# Patient Record
Sex: FEMALE | Race: WHITE | Employment: OTHER | URBAN - METROPOLITAN AREA
[De-identification: names, ages, dates, MRNs, and addresses within clinical notes are randomized per-mention and may not be internally consistent; named-entity substitution may affect disease eponyms.]

---

## 2017-01-04 ENCOUNTER — TELEPHONE (OUTPATIENT)
Dept: CARDIOLOGY CLINIC | Age: 67
End: 2017-01-04

## 2017-01-19 ENCOUNTER — HOSPITAL ENCOUNTER (OUTPATIENT)
Dept: SURGERY | Age: 67
Discharge: HOME | End: 2017-01-19
Attending: INTERNAL MEDICINE | Admitting: INTERNAL MEDICINE

## 2017-01-19 VITALS
HEIGHT: 60 IN | OXYGEN SATURATION: 100 % | HEART RATE: 97 BPM | RESPIRATION RATE: 18 BRPM | SYSTOLIC BLOOD PRESSURE: 140 MMHG | DIASTOLIC BLOOD PRESSURE: 81 MMHG | TEMPERATURE: 97 F | BODY MASS INDEX: 43.39 KG/M2 | WEIGHT: 221 LBS

## 2017-01-19 LAB
GLUCOSE BLD-MCNC: 174 MG/DL (ref 70–99)
PERFORMED ON: ABNORMAL

## 2017-01-19 RX ORDER — LIDOCAINE HYDROCHLORIDE 10 MG/ML
0.1 INJECTION, SOLUTION INFILTRATION; PERINEURAL ONCE
Status: DISCONTINUED | OUTPATIENT
Start: 2017-01-19 | End: 2017-01-20 | Stop reason: HOSPADM

## 2017-01-19 RX ORDER — SODIUM CHLORIDE, SODIUM LACTATE, POTASSIUM CHLORIDE, CALCIUM CHLORIDE 600; 310; 30; 20 MG/100ML; MG/100ML; MG/100ML; MG/100ML
INJECTION, SOLUTION INTRAVENOUS CONTINUOUS
Status: DISCONTINUED | OUTPATIENT
Start: 2017-01-19 | End: 2017-01-20 | Stop reason: HOSPADM

## 2017-01-19 RX ADMIN — SODIUM CHLORIDE, SODIUM LACTATE, POTASSIUM CHLORIDE, CALCIUM CHLORIDE: 600; 310; 30; 20 INJECTION, SOLUTION INTRAVENOUS at 09:50

## 2017-01-19 ASSESSMENT — PAIN SCALES - GENERAL: PAINLEVEL_OUTOF10: 0

## 2017-01-19 ASSESSMENT — PAIN - FUNCTIONAL ASSESSMENT: PAIN_FUNCTIONAL_ASSESSMENT: 0-10

## 2017-01-29 PROBLEM — J44.1 COPD EXACERBATION (HCC): Status: ACTIVE | Noted: 2017-01-29

## 2017-01-29 PROBLEM — F32.A DEPRESSION: Chronic | Status: ACTIVE | Noted: 2017-01-29

## 2017-05-11 PROBLEM — K21.9 GERD (GASTROESOPHAGEAL REFLUX DISEASE): Chronic | Status: ACTIVE | Noted: 2017-05-11

## 2017-05-11 PROBLEM — R73.9 ACUTE HYPERGLYCEMIA: Status: ACTIVE | Noted: 2017-05-11

## 2017-05-11 PROBLEM — D64.9 CHRONIC ANEMIA: Chronic | Status: ACTIVE | Noted: 2017-05-11

## 2017-05-11 PROBLEM — R79.89 ELEVATED LFTS: Status: ACTIVE | Noted: 2017-05-11

## 2017-11-14 ENCOUNTER — TELEPHONE (OUTPATIENT)
Dept: CARDIOLOGY CLINIC | Age: 67
End: 2017-11-14

## 2018-02-21 RX ORDER — METOPROLOL TARTRATE 50 MG/1
TABLET, FILM COATED ORAL
Qty: 60 TABLET | Refills: 4 | OUTPATIENT
Start: 2018-02-21

## 2018-02-28 NOTE — TELEPHONE ENCOUNTER
Pt's  called to get pt's medication refilled. Informed  of these encounters. He stated he will inform the pt to call back to get scheduled to see RPS for a yearly followup. Pt last saw NPBB Dec 2016 and was informed to see RPS next.  When pt gets scheduled send message back to medical staff informing of pt's new appt and request to get her metoprolol filled to last until that next appt,

## 2018-03-01 RX ORDER — METOPROLOL TARTRATE 50 MG/1
50 TABLET, FILM COATED ORAL 2 TIMES DAILY
Qty: 60 TABLET | Refills: 0 | Status: SHIPPED | OUTPATIENT
Start: 2018-03-01 | End: 2018-03-07 | Stop reason: SDUPTHER

## 2018-03-07 ENCOUNTER — OFFICE VISIT (OUTPATIENT)
Dept: CARDIOLOGY CLINIC | Age: 68
End: 2018-03-07

## 2018-03-07 VITALS
DIASTOLIC BLOOD PRESSURE: 80 MMHG | HEIGHT: 60 IN | WEIGHT: 209 LBS | SYSTOLIC BLOOD PRESSURE: 122 MMHG | HEART RATE: 88 BPM | BODY MASS INDEX: 41.03 KG/M2

## 2018-03-07 DIAGNOSIS — I48.0 PAF (PAROXYSMAL ATRIAL FIBRILLATION) (HCC): Primary | Chronic | ICD-10-CM

## 2018-03-07 PROCEDURE — 3014F SCREEN MAMMO DOC REV: CPT | Performed by: INTERNAL MEDICINE

## 2018-03-07 PROCEDURE — 1036F TOBACCO NON-USER: CPT | Performed by: INTERNAL MEDICINE

## 2018-03-07 PROCEDURE — 4040F PNEUMOC VAC/ADMIN/RCVD: CPT | Performed by: INTERNAL MEDICINE

## 2018-03-07 PROCEDURE — G8417 CALC BMI ABV UP PARAM F/U: HCPCS | Performed by: INTERNAL MEDICINE

## 2018-03-07 PROCEDURE — 3017F COLORECTAL CA SCREEN DOC REV: CPT | Performed by: INTERNAL MEDICINE

## 2018-03-07 PROCEDURE — G8427 DOCREV CUR MEDS BY ELIG CLIN: HCPCS | Performed by: INTERNAL MEDICINE

## 2018-03-07 PROCEDURE — 99213 OFFICE O/P EST LOW 20 MIN: CPT | Performed by: INTERNAL MEDICINE

## 2018-03-07 PROCEDURE — 1123F ACP DISCUSS/DSCN MKR DOCD: CPT | Performed by: INTERNAL MEDICINE

## 2018-03-07 PROCEDURE — 1090F PRES/ABSN URINE INCON ASSESS: CPT | Performed by: INTERNAL MEDICINE

## 2018-03-07 PROCEDURE — G8400 PT W/DXA NO RESULTS DOC: HCPCS | Performed by: INTERNAL MEDICINE

## 2018-03-07 PROCEDURE — G8598 ASA/ANTIPLAT THER USED: HCPCS | Performed by: INTERNAL MEDICINE

## 2018-03-07 PROCEDURE — G8484 FLU IMMUNIZE NO ADMIN: HCPCS | Performed by: INTERNAL MEDICINE

## 2018-03-07 PROCEDURE — 93000 ELECTROCARDIOGRAM COMPLETE: CPT | Performed by: INTERNAL MEDICINE

## 2018-03-07 RX ORDER — METOPROLOL TARTRATE 50 MG/1
50 TABLET, FILM COATED ORAL 2 TIMES DAILY
Qty: 60 TABLET | Refills: 11 | Status: SHIPPED | OUTPATIENT
Start: 2018-03-07 | End: 2019-04-03 | Stop reason: SDUPTHER

## 2018-03-07 NOTE — LETTER
Depression; Diabetes mellitus ([facility] [address] GERD (gastroesophageal reflux disease); HCAP (healthcare-associated pneumonia); Hypertension; Paroxysmal atrial fibrillation ([facility] [address] Sepsis ([facility] TIA (transient ischemic attack); and Unspecified cerebral artery occlusion with cerebral infarction. Past Surgical History:   has a past surgical history that includes Hysterectomy; Cholecystectomy; Appendectomy; cyst incision and drainage (1986); Colonoscopy (01/19/2017); and Upper gastrointestinal endoscopy (01/19/2017). Home Medications:    Prior to Admission medications    Medication Sig Start Date End Date Taking? Authorizing Provider   metoprolol tartrate (LOPRESSOR) 50 MG tablet Take 1 tablet by mouth 2 times daily 3/1/18  Yes Conchis Moseley CNP   guaiFENesin (MUCINEX) 600 MG extended release tablet Take 1 tablet by mouth 2 times daily 5/14/17  Yes Sharri Sorenson CNP   empagliflozin (JARDIANCE) 25 MG tablet Take 25 mg by mouth daily   Yes Historical Provider, MD   ASPIRIN LOW DOSE 81 MG EC tablet TAKE 1 TABLET BY MOUTH ONE TIME A DAY  10/17/16  Yes Santos Mendoza MD   Rivaroxaban (XARELTO PO) Take 20 mg by mouth daily Does not know dose    Yes Historical Provider, MD   albuterol (PROVENTIL) (2.5 MG/3ML) 0.083% nebulizer solution Take 3 mLs by nebulization every 4 hours as needed for Wheezing or Shortness of Breath 2/14/16  Yes Eden Jacobson MD   metFORMIN ER (GLUCOPHAGE-XR) 500 MG XR tablet Take 2,000 mg by mouth Daily with supper   Yes Historical Provider, MD   omeprazole (PRILOSEC) 40 MG capsule Take 40 mg by mouth daily   Yes Historical Provider, MD   atorvastatin (LIPITOR) 20 MG tablet TAKE 1 TABLET BY MOUTH nightly AT BEDTIME 6/19/15  Yes Historical Provider, MD   NONFORMULARY Patient uses C Pap at night. Yes Historical Provider, MD   venlafaxine (EFFEXOR-XR) 75 MG XR capsule Take 75 mg by mouth daily.      Yes Historical Provider, MD   fluticasone-salmeterol (ADVAIR DISKUS) 500-50 MCG/DOSE diskus inhaler Inhale 1 puff into the lungs every 12 hours. Yes Historical Provider, MD   albuterol (PROVENTIL HFA;VENTOLIN HFA) 108 (90 BASE) MCG/ACT inhaler Inhale 2 puffs into the lungs every 6 hours as needed. Yes Historical Provider, MD       Allergies:  Acyclovir; Ace inhibitors; Amoxicillin; Cephalosporins; Oxycodone-acetaminophen; Penicillins; and Percocet [oxycodone-acetaminophen]    Social History:   reports that she has never smoked. She has never used smokeless tobacco. She reports that she does not drink alcohol or use drugs. Family History: family history includes Cancer in her mother. REVIEW OF SYSTEMS:    · Constitutional: there has been no unanticipated weight loss. There's been no change in energy level, sleep pattern, or activity level. · Eyes: No visual changes or diplopia. No scleral icterus. · ENT: No Headaches, hearing loss or vertigo. No mouth sores or sore throat. · Cardiovascular: No chest pain, No dyspnea on exertion, No palpitations or No loss of consciousness. No cough, hemoptysis, No pleuritic pain, or phlebitis. · Respiratory: No cough or wheezing, no sputum production. No hematemesis. · Gastrointestinal: No abdominal pain, appetite loss, blood in stools. No change in bowel or bladder habits. · Genitourinary: No dysuria, trouble voiding, or hematuria. · Musculoskeletal:  No gait disturbance, No weakness or joint complaints. · Integumentary: No rash or pruritis. · Neurological: No headache, diplopia, change in muscle strength, numbness or tingling. No change in gait, balance, coordination, mood, affect, memory, mentation, behavior. · Psychiatric: No anxiety, or depression. · Endocrine: No temperature intolerance. No excessive thirst, fluid intake, or urination. No tremor. · Hematologic/Lymphatic: No abnormal bruising or bleeding, blood clots or swollen lymph nodes. · Allergic/Immunologic: No nasal congestion or hives.     PHYSICAL EXAM:    Physical Examination: 1. Tobacco Cessation Counseling:   N/A  2. Retake of BP if >140/90:    N/A  3. Documentation to PCP/referring for new patient:  Sent to PCP at close of office visit  4. CAD patient on anti-platelet:   N/A    5. CAD patient on STATIN therapy:  N/A    6. Patient with Paroxysmal atrial fibrillation on anticoagulation:  Yes (Xarelto)        All questions and concerns were addressed to the patient/family. Alternatives to my treatment were discussed. The note was completed using EMR. Every effort was made to ensure accuracy; however, inadvertent computerized transcription errors may be present. Discussed with patient and nursing. MADY Tomlinson F.A.C.C. Electrophysiology  [facility]. [address]. [address]. [facility], [address]  Phone: [phone]  Fax: [phone]   3/7/2018  1:24 PM                       If you have questions, please do not hesitate to call me. I look forward to following Shiva along with you.     Sincerely,        Lucila Abbott MD

## 2018-03-12 NOTE — COMMUNICATION BODY
S2.  · Jugular venous pulsation Normal  · The carotid upstroke is normal in amplitude and contour without delay or bruit  · Peripheral pulses are symmetrical and full   Abdomen:  · No masses or tenderness  · Bowel sounds present  Extremities:  ·  No Cyanosis or Clubbing  ·  Lower extremity edema: No  ·  Skin: Warm and dry  Neurological:  · Alert and oriented. · Moves all extremities well  · No abnormalities of mood, affect, memory, mentation, or behavior are noted    DATA:    ECG:  SR 88      CARDIOLOGY LABS:   CBC: No results for input(s): WBC, HGB, HCT, PLT in the last 72 hours. BMP: No results for input(s): NA, K, CO2, BUN, CREATININE, LABGLOM, GLUCOSE in the last 72 hours. PT/INR: No results for input(s): PROTIME, INR in the last 72 hours. APTT:No results for input(s): APTT in the last 72 hours. FASTING LIPID PANEL:  Lab Results   Component Value Date    HDL 36 10/17/2015    LDLCALC 31 10/17/2015    TRIG 124 10/17/2015     LIVER PROFILE:No results for input(s): AST, ALT, ALB in the last 72 hours. IMPRESSION:    Patient Active Problem List   Diagnosis    HTN (hypertension)    Chest pain    DM2 (diabetes mellitus, type 2), diet controlled    Paroxysmal a-fib ([address]    Obesity       RECOMMENDATIONS:  1. Stable from a cardiac standpoint. 2. Continue current medications as prescribed. 3. Follow up with Judy Beasley CNP in 1 year. QUALITY MEASURES  1. Tobacco Cessation Counseling:   N/A  2. Retake of BP if >140/90:    N/A  3. Documentation to PCP/referring for new patient:  Sent to PCP at close of office visit  4. CAD patient on anti-platelet:   N/A    5. CAD patient on STATIN therapy:  N/A    6. Patient with Paroxysmal atrial fibrillation on anticoagulation:  Yes (Xarelto)        All questions and concerns were addressed to the patient/family. Alternatives to my treatment were discussed. The note was completed using EMR.  Every effort was made to ensure accuracy; however, inadvertent

## 2018-03-27 PROBLEM — Z99.89 OSA ON CPAP: Chronic | Status: ACTIVE | Noted: 2018-03-27

## 2018-03-27 PROBLEM — R74.8 ELEVATED ALKALINE PHOSPHATASE LEVEL: Chronic | Status: ACTIVE | Noted: 2017-05-11

## 2018-03-27 PROBLEM — G47.33 OSA ON CPAP: Chronic | Status: ACTIVE | Noted: 2018-03-27

## 2018-03-27 PROBLEM — G93.40 ACUTE ENCEPHALOPATHY: Status: ACTIVE | Noted: 2018-03-27

## 2018-03-28 PROBLEM — J44.9 CHRONIC OBSTRUCTIVE PULMONARY DISEASE (HCC): Status: ACTIVE | Noted: 2017-01-29

## 2018-03-28 PROBLEM — R91.8 PULMONARY MASS: Status: ACTIVE | Noted: 2018-03-28

## 2018-03-28 PROBLEM — Z86.73 HISTORY OF CARDIOEMBOLIC CEREBROVASCULAR ACCIDENT (CVA): Chronic | Status: ACTIVE | Noted: 2018-03-28

## 2018-03-28 PROBLEM — Z86.73 HISTORY OF CARDIOEMBOLIC CEREBROVASCULAR ACCIDENT (CVA): Chronic | Status: RESOLVED | Noted: 2018-03-28 | Resolved: 2018-03-28

## 2018-03-28 PROBLEM — E87.3 RESPIRATORY ALKALOSIS: Status: ACTIVE | Noted: 2018-03-28

## 2018-04-09 PROBLEM — E44.1 MILD MALNUTRITION (HCC): Chronic | Status: ACTIVE | Noted: 2018-04-09

## 2018-04-09 PROBLEM — E11.65 UNCONTROLLED TYPE 2 DIABETES MELLITUS WITH HYPERGLYCEMIA (HCC): Status: ACTIVE | Noted: 2018-04-09

## 2018-04-09 PROBLEM — I49.3 PVC (PREMATURE VENTRICULAR CONTRACTION): Status: ACTIVE | Noted: 2018-04-09

## 2018-04-09 PROBLEM — E66.9 OBESITY (BMI 30-39.9): Status: ACTIVE | Noted: 2018-04-09

## 2018-05-07 ENCOUNTER — TELEPHONE (OUTPATIENT)
Dept: PULMONOLOGY | Age: 68
End: 2018-05-07

## 2018-05-31 ENCOUNTER — OFFICE VISIT (OUTPATIENT)
Dept: NEUROLOGY | Age: 68
End: 2018-05-31

## 2018-05-31 VITALS
DIASTOLIC BLOOD PRESSURE: 66 MMHG | HEIGHT: 65 IN | HEART RATE: 59 BPM | OXYGEN SATURATION: 96 % | WEIGHT: 198 LBS | BODY MASS INDEX: 32.99 KG/M2 | SYSTOLIC BLOOD PRESSURE: 119 MMHG

## 2018-05-31 DIAGNOSIS — I61.1 NONTRAUMATIC CORTICAL HEMORRHAGE OF LEFT CEREBRAL HEMISPHERE (HCC): Primary | ICD-10-CM

## 2018-05-31 DIAGNOSIS — F34.1 DYSTHYMIA: ICD-10-CM

## 2018-05-31 DIAGNOSIS — I25.10 CAD IN NATIVE ARTERY: ICD-10-CM

## 2018-05-31 DIAGNOSIS — I10 HTN (HYPERTENSION), BENIGN: ICD-10-CM

## 2018-05-31 DIAGNOSIS — I48.0 PAF (PAROXYSMAL ATRIAL FIBRILLATION) (HCC): ICD-10-CM

## 2018-05-31 DIAGNOSIS — E78.5 DYSLIPIDEMIA: ICD-10-CM

## 2018-05-31 PROCEDURE — 1090F PRES/ABSN URINE INCON ASSESS: CPT | Performed by: PSYCHIATRY & NEUROLOGY

## 2018-05-31 PROCEDURE — G8417 CALC BMI ABV UP PARAM F/U: HCPCS | Performed by: PSYCHIATRY & NEUROLOGY

## 2018-05-31 PROCEDURE — 3017F COLORECTAL CA SCREEN DOC REV: CPT | Performed by: PSYCHIATRY & NEUROLOGY

## 2018-05-31 PROCEDURE — G8598 ASA/ANTIPLAT THER USED: HCPCS | Performed by: PSYCHIATRY & NEUROLOGY

## 2018-05-31 PROCEDURE — G8400 PT W/DXA NO RESULTS DOC: HCPCS | Performed by: PSYCHIATRY & NEUROLOGY

## 2018-05-31 PROCEDURE — 99214 OFFICE O/P EST MOD 30 MIN: CPT | Performed by: PSYCHIATRY & NEUROLOGY

## 2018-05-31 PROCEDURE — 1123F ACP DISCUSS/DSCN MKR DOCD: CPT | Performed by: PSYCHIATRY & NEUROLOGY

## 2018-05-31 PROCEDURE — 1036F TOBACCO NON-USER: CPT | Performed by: PSYCHIATRY & NEUROLOGY

## 2018-05-31 PROCEDURE — G8427 DOCREV CUR MEDS BY ELIG CLIN: HCPCS | Performed by: PSYCHIATRY & NEUROLOGY

## 2018-05-31 PROCEDURE — 4040F PNEUMOC VAC/ADMIN/RCVD: CPT | Performed by: PSYCHIATRY & NEUROLOGY

## 2018-05-31 RX ORDER — FLUTICASONE PROPIONATE AND SALMETEROL 232; 14 UG/1; UG/1
1 POWDER, METERED RESPIRATORY (INHALATION)
COMMUNITY
Start: 2018-01-23 | End: 2018-05-31

## 2018-05-31 RX ORDER — ESOMEPRAZOLE MAGNESIUM 40 MG/1
40 CAPSULE, DELAYED RELEASE ORAL
Status: ON HOLD | COMMUNITY
End: 2018-10-16 | Stop reason: SDUPTHER

## 2018-05-31 RX ORDER — DILTIAZEM HYDROCHLORIDE 60 MG/1
60 TABLET, FILM COATED ORAL
COMMUNITY
Start: 2018-05-14 | End: 2021-03-11 | Stop reason: ALTCHOICE

## 2018-06-07 ENCOUNTER — HOSPITAL ENCOUNTER (OUTPATIENT)
Dept: CT IMAGING | Age: 68
Discharge: HOME | End: 2018-06-07
Attending: PSYCHIATRY & NEUROLOGY | Admitting: PSYCHIATRY & NEUROLOGY

## 2018-06-07 DIAGNOSIS — I61.1 NONTRAUMATIC CORTICAL HEMORRHAGE OF LEFT CEREBRAL HEMISPHERE (HCC): ICD-10-CM

## 2018-06-07 DIAGNOSIS — I61.1: ICD-10-CM

## 2018-09-06 ENCOUNTER — OFFICE VISIT (OUTPATIENT)
Dept: NEUROLOGY | Age: 68
End: 2018-09-06

## 2018-09-06 VITALS
HEIGHT: 65 IN | SYSTOLIC BLOOD PRESSURE: 118 MMHG | HEART RATE: 67 BPM | BODY MASS INDEX: 32.99 KG/M2 | DIASTOLIC BLOOD PRESSURE: 70 MMHG | OXYGEN SATURATION: 96 % | WEIGHT: 198 LBS

## 2018-09-06 DIAGNOSIS — F34.1 DYSTHYMIA: ICD-10-CM

## 2018-09-06 DIAGNOSIS — E78.5 DYSLIPIDEMIA: ICD-10-CM

## 2018-09-06 DIAGNOSIS — I48.0 PAF (PAROXYSMAL ATRIAL FIBRILLATION) (HCC): ICD-10-CM

## 2018-09-06 DIAGNOSIS — I61.1 NONTRAUMATIC CORTICAL HEMORRHAGE OF LEFT CEREBRAL HEMISPHERE (HCC): Primary | ICD-10-CM

## 2018-09-06 DIAGNOSIS — I10 HTN (HYPERTENSION), BENIGN: ICD-10-CM

## 2018-09-06 PROCEDURE — 1101F PT FALLS ASSESS-DOCD LE1/YR: CPT | Performed by: PSYCHIATRY & NEUROLOGY

## 2018-09-06 PROCEDURE — 99214 OFFICE O/P EST MOD 30 MIN: CPT | Performed by: PSYCHIATRY & NEUROLOGY

## 2018-09-06 PROCEDURE — 1036F TOBACCO NON-USER: CPT | Performed by: PSYCHIATRY & NEUROLOGY

## 2018-09-06 PROCEDURE — G8427 DOCREV CUR MEDS BY ELIG CLIN: HCPCS | Performed by: PSYCHIATRY & NEUROLOGY

## 2018-09-06 PROCEDURE — 1090F PRES/ABSN URINE INCON ASSESS: CPT | Performed by: PSYCHIATRY & NEUROLOGY

## 2018-09-06 PROCEDURE — G8417 CALC BMI ABV UP PARAM F/U: HCPCS | Performed by: PSYCHIATRY & NEUROLOGY

## 2018-09-06 PROCEDURE — G8400 PT W/DXA NO RESULTS DOC: HCPCS | Performed by: PSYCHIATRY & NEUROLOGY

## 2018-09-06 PROCEDURE — 1123F ACP DISCUSS/DSCN MKR DOCD: CPT | Performed by: PSYCHIATRY & NEUROLOGY

## 2018-09-06 PROCEDURE — 3017F COLORECTAL CA SCREEN DOC REV: CPT | Performed by: PSYCHIATRY & NEUROLOGY

## 2018-09-06 PROCEDURE — 4040F PNEUMOC VAC/ADMIN/RCVD: CPT | Performed by: PSYCHIATRY & NEUROLOGY

## 2018-09-06 PROCEDURE — G8598 ASA/ANTIPLAT THER USED: HCPCS | Performed by: PSYCHIATRY & NEUROLOGY

## 2018-09-06 NOTE — PROGRESS NOTES
The patient came today for follow up regarding: Test results and recent hemorrhagic CVA. The patient came today for another follow up. Since her last visit, she had a repeat CT of the head which showed right hemispheric encephalomalacia but no residual hemorrhage. She is on aspirin daily. She denies any new symptoms today except for the same right visual field deficit. No recent falling or injury. No weakness or numbness or tingling today. Blood pressure is controlled on medications. She is on statin daily. No sleep disturbance or insomnia or parasomnia. No significant headaches or double vision or blurred vision. Other review of system was unremarkable. Past Medical History:   Diagnosis Date    Arthritis     Asthma     Depression     Diabetes mellitus (HCC)     GERD (gastroesophageal reflux disease)     HCAP (healthcare-associated pneumonia)     Hypertension     Mycobacterium gordonae infection      5/7/18 + afb called from [address] pending  ( from [address] 3/29/18)    [facility]ox[facility]mal [facility] ([facility] [address]     Sepsis ([facility] [address]     TIA (transient ischemic attack)     Unspecified cerebral artery occlusion with cerebral infarction      Prior to Visit Medications    Medication Sig Taking?  Authorizing Provider   esomeprazole (NEXIUM) 40 MG delayed release capsule Take 40 mg by mouth Yes Historical Provider, MD   diltiazem (CARDIZEM) 60 MG tablet Take 60 mg by mouth Yes Historical Provider, MD   ferrous sulfate 325 (65 Fe) MG tablet Take 325 mg by mouth daily (with breakfast) Yes Historical Provider, MD   folic acid (FOLVITE) 1 MG tablet Take 1 mg by mouth daily Yes Historical Provider, MD   montelukast (SINGULAIR) 10 MG tablet Take 10 mg by mouth nightly Yes Historical Provider, MD   albuterol (PROVENTIL) (2.5 MG/3ML) 0.083% nebulizer solution Take 3 mLs by nebulization every 4 hours as needed for Wheezing or Shortness of Breath Yes Rick Manuel, DO   metoprolol tartrate (LOPRESSOR) 50

## 2018-10-13 ENCOUNTER — APPOINTMENT (OUTPATIENT)
Dept: CT IMAGING | Age: 68
DRG: 871 | End: 2018-10-13
Payer: MEDICARE

## 2018-10-13 ENCOUNTER — HOSPITAL ENCOUNTER (EMERGENCY)
Age: 68
Discharge: HOME | DRG: 871 | End: 2018-10-13
Attending: EMERGENCY MEDICINE
Payer: MEDICARE

## 2018-10-13 VITALS
TEMPERATURE: 98 F | WEIGHT: 198 LBS | RESPIRATION RATE: 16 BRPM | BODY MASS INDEX: 32.99 KG/M2 | OXYGEN SATURATION: 99 % | HEIGHT: 65 IN | SYSTOLIC BLOOD PRESSURE: 149 MMHG | HEART RATE: 61 BPM | DIASTOLIC BLOOD PRESSURE: 68 MMHG

## 2018-10-13 DIAGNOSIS — R20.0 NUMBNESS OF LEFT HAND: ICD-10-CM

## 2018-10-13 DIAGNOSIS — R51.9 ACUTE NONINTRACTABLE HEADACHE, UNSPECIFIED HEADACHE TYPE: Primary | ICD-10-CM

## 2018-10-13 LAB
A/G RATIO: 1.2 (ref 1.1–2.2)
ALBUMIN SERPL-MCNC: 4.1 G/DL (ref 3.4–5)
ALP BLD-CCNC: 127 U/L (ref 40–129)
ALT SERPL-CCNC: 16 U/L (ref 10–40)
ANION GAP SERPL CALCULATED.3IONS-SCNC: 10 MMOL/L (ref 3–16)
AST SERPL-CCNC: 20 U/L (ref 15–37)
BASOPHILS ABSOLUTE: 0.1 K/UL (ref 0–0.2)
BASOPHILS RELATIVE PERCENT: 0.8 %
BILIRUB SERPL-MCNC: 0.4 MG/DL (ref 0–1)
BUN BLDV-MCNC: 17 MG/DL (ref 7–20)
CALCIUM SERPL-MCNC: 9.2 MG/DL (ref 8.3–10.6)
CHLORIDE BLD-SCNC: 102 MMOL/L (ref 99–110)
CO2: 26 MMOL/L (ref 21–32)
CREAT SERPL-MCNC: 0.8 MG/DL (ref 0.6–1.2)
EOSINOPHILS ABSOLUTE: 0.2 K/UL (ref 0–0.6)
EOSINOPHILS RELATIVE PERCENT: 2.2 %
GFR AFRICAN AMERICAN: >60
GFR NON-AFRICAN AMERICAN: >60
GLOBULIN: 3.5 G/DL
GLUCOSE BLD-MCNC: 144 MG/DL (ref 70–99)
HCT VFR BLD CALC: 34.7 % (ref 36–48)
HEMOGLOBIN: 11.7 G/DL (ref 12–16)
LYMPHOCYTES ABSOLUTE: 1.7 K/UL (ref 1–5.1)
LYMPHOCYTES RELATIVE PERCENT: 23 %
MCH RBC QN AUTO: 30.1 PG (ref 26–34)
MCHC RBC AUTO-ENTMCNC: 33.8 G/DL (ref 31–36)
MCV RBC AUTO: 89 FL (ref 80–100)
MONOCYTES ABSOLUTE: 0.5 K/UL (ref 0–1.3)
MONOCYTES RELATIVE PERCENT: 7.1 %
NEUTROPHILS ABSOLUTE: 5.1 K/UL (ref 1.7–7.7)
NEUTROPHILS RELATIVE PERCENT: 66.9 %
PDW BLD-RTO: 14.9 % (ref 12.4–15.4)
PLATELET # BLD: 92 K/UL (ref 135–450)
PLATELET SLIDE REVIEW: ABNORMAL
PMV BLD AUTO: 10.5 FL (ref 5–10.5)
POTASSIUM REFLEX MAGNESIUM: 4.1 MMOL/L (ref 3.5–5.1)
RBC # BLD: 3.9 M/UL (ref 4–5.2)
SLIDE REVIEW: ABNORMAL
SODIUM BLD-SCNC: 138 MMOL/L (ref 136–145)
TOTAL PROTEIN: 7.6 G/DL (ref 6.4–8.2)
WBC # BLD: 7.6 K/UL (ref 4–11)

## 2018-10-13 PROCEDURE — 80053 COMPREHEN METABOLIC PANEL: CPT

## 2018-10-13 PROCEDURE — 96361 HYDRATE IV INFUSION ADD-ON: CPT

## 2018-10-13 PROCEDURE — 85025 COMPLETE CBC W/AUTO DIFF WBC: CPT

## 2018-10-13 PROCEDURE — 70450 CT HEAD/BRAIN W/O DYE: CPT

## 2018-10-13 PROCEDURE — 96374 THER/PROPH/DIAG INJ IV PUSH: CPT

## 2018-10-13 PROCEDURE — 6360000002 HC RX W HCPCS: Performed by: EMERGENCY MEDICINE

## 2018-10-13 PROCEDURE — 2580000003 HC RX 258: Performed by: EMERGENCY MEDICINE

## 2018-10-13 PROCEDURE — 93005 ELECTROCARDIOGRAM TRACING: CPT | Performed by: EMERGENCY MEDICINE

## 2018-10-13 PROCEDURE — 93010 ELECTROCARDIOGRAM REPORT: CPT | Performed by: INTERNAL MEDICINE

## 2018-10-13 PROCEDURE — 6370000000 HC RX 637 (ALT 250 FOR IP): Performed by: EMERGENCY MEDICINE

## 2018-10-13 PROCEDURE — 99284 EMERGENCY DEPT VISIT MOD MDM: CPT

## 2018-10-13 RX ORDER — BUTALBITAL, ACETAMINOPHEN AND CAFFEINE 50; 325; 40 MG/1; MG/1; MG/1
1 TABLET ORAL ONCE
Status: COMPLETED | OUTPATIENT
Start: 2018-10-13 | End: 2018-10-13

## 2018-10-13 RX ORDER — 0.9 % SODIUM CHLORIDE 0.9 %
1000 INTRAVENOUS SOLUTION INTRAVENOUS ONCE
Status: COMPLETED | OUTPATIENT
Start: 2018-10-13 | End: 2018-10-13

## 2018-10-13 RX ORDER — ONDANSETRON 2 MG/ML
4 INJECTION INTRAMUSCULAR; INTRAVENOUS ONCE
Status: COMPLETED | OUTPATIENT
Start: 2018-10-13 | End: 2018-10-13

## 2018-10-13 RX ORDER — BUTALBITAL, ACETAMINOPHEN AND CAFFEINE 300; 40; 50 MG/1; MG/1; MG/1
1 CAPSULE ORAL EVERY 6 HOURS PRN
Qty: 12 CAPSULE | Refills: 0 | Status: SHIPPED | OUTPATIENT
Start: 2018-10-13 | End: 2018-10-14

## 2018-10-13 RX ADMIN — SODIUM CHLORIDE 1000 ML: 9 INJECTION, SOLUTION INTRAVENOUS at 17:26

## 2018-10-13 RX ADMIN — BUTALBITAL, ACETAMINOPHEN, AND CAFFEINE 1 TABLET: 50; 325; 40 TABLET ORAL at 17:26

## 2018-10-13 RX ADMIN — ONDANSETRON HYDROCHLORIDE 4 MG: 2 INJECTION, SOLUTION INTRAMUSCULAR; INTRAVENOUS at 17:26

## 2018-10-13 ASSESSMENT — PAIN DESCRIPTION - LOCATION: LOCATION: HEAD

## 2018-10-13 ASSESSMENT — PAIN SCALES - GENERAL
PAINLEVEL_OUTOF10: 2
PAINLEVEL_OUTOF10: 5
PAINLEVEL_OUTOF10: 5

## 2018-10-13 ASSESSMENT — PAIN DESCRIPTION - PAIN TYPE: TYPE: ACUTE PAIN

## 2018-10-13 NOTE — ED NOTES
Pt ambulatory to bathroom with family as standby assist. Gait steady. Sts pain is the same as arrival, no change in condition. Denies any further needs, call light within reach, will continue to monitor.         Carlos Alberto Garcia RN  10/13/18 [id number]

## 2018-10-13 NOTE — ED PROVIDER NOTES
breast ca       Exam  ED Triage Vitals [10/13/18 1540]   BP Temp Temp src Pulse Resp SpO2 Height Weight   (!) 161/56 -- -- 71 18 97 % 5' 5\" (1.651 m) 198 lb (89.8 kg)   Physical Exam   Constitutional: She is oriented to person, place, and time. She appears well-developed and well-nourished. No distress. HENT:   Head: Normocephalic and atraumatic. Eyes: Pupils are equal, round, and reactive to light. Conjunctivae and lids are normal. Right eye exhibits no discharge. Left eye exhibits no discharge. No scleral icterus. Neck: Neck supple. No tracheal deviation present. Cardiovascular: Normal rate, regular rhythm, normal heart sounds and intact distal pulses. No murmur heard. Pulmonary/Chest: Effort normal and breath sounds normal. No stridor. No respiratory distress. She has no wheezes. Abdominal: Soft. She exhibits no distension. There is no tenderness. There is no rebound and no guarding. Musculoskeletal: She exhibits no edema or deformity. Neurological: She is alert and oriented to person, place, and time. She has normal strength. She displays no tremor. A cranial nerve deficit (decreased peripheral vision on the right - baseline perpatient) and sensory deficit (patient reports decreased sensation to light touch on the left index finger, ulnar side of ring finger, and pinky finger. sensation normal in the left thumb and middle finger, and radial side of the ring finger.) is present. She exhibits normal muscle tone. No facial droop, no slurred speech. Skin: Skin is warm and dry. No rash noted. She is not diaphoretic. No cyanosis. No pallor. Psychiatric: She has a normal mood and affect. Her speech is normal.   Nursing note and vitals reviewed. MDM/ED Course  EKG  The Ekg interpreted by me in the absence of a cardiologist shows. normal sinus rhythm with a rate of 68  Axis is   Normal  QTc is  normal  Intervals and Durations are unremarkable.       No specific ST-T wave changes

## 2018-10-14 ENCOUNTER — APPOINTMENT (OUTPATIENT)
Dept: GENERAL RADIOLOGY | Age: 68
DRG: 871 | End: 2018-10-14
Payer: MEDICARE

## 2018-10-14 ENCOUNTER — HOSPITAL ENCOUNTER (INPATIENT)
Age: 68
LOS: 8 days | Discharge: HOME | DRG: 871 | End: 2018-10-22
Attending: EMERGENCY MEDICINE | Admitting: INTERNAL MEDICINE
Payer: MEDICARE

## 2018-10-14 DIAGNOSIS — R53.1 GENERAL WEAKNESS: ICD-10-CM

## 2018-10-14 DIAGNOSIS — A41.9 SEPSIS, DUE TO UNSPECIFIED ORGANISM: ICD-10-CM

## 2018-10-14 DIAGNOSIS — J18.9 PNEUMONIA DUE TO ORGANISM: Primary | ICD-10-CM

## 2018-10-14 DIAGNOSIS — R11.2 NAUSEA AND VOMITING, INTRACTABILITY OF VOMITING NOT SPECIFIED, UNSPECIFIED VOMITING TYPE: ICD-10-CM

## 2018-10-14 LAB
A/G RATIO: 1 (ref 1.1–2.2)
ALBUMIN SERPL-MCNC: 3.8 G/DL (ref 3.4–5)
ALP BLD-CCNC: 133 U/L (ref 40–129)
ALT SERPL-CCNC: 34 U/L (ref 10–40)
ANION GAP SERPL CALCULATED.3IONS-SCNC: 12 MMOL/L (ref 3–16)
AST SERPL-CCNC: 43 U/L (ref 15–37)
BACTERIA: ABNORMAL /HPF
BANDED NEUTROPHILS RELATIVE PERCENT: 13 % (ref 0–7)
BASE EXCESS ARTERIAL: -1.1 MMOL/L (ref -3–3)
BASOPHILS ABSOLUTE: 0 K/UL (ref 0–0.2)
BASOPHILS RELATIVE PERCENT: 0 %
BILIRUB SERPL-MCNC: 1.6 MG/DL (ref 0–1)
BILIRUBIN URINE: NEGATIVE
BLOOD, URINE: ABNORMAL
BUN BLDV-MCNC: 15 MG/DL (ref 7–20)
CALCIUM SERPL-MCNC: 8.8 MG/DL (ref 8.3–10.6)
CARBOXYHEMOGLOBIN ARTERIAL: 0.8 % (ref 0–1.5)
CHLORIDE BLD-SCNC: 98 MMOL/L (ref 99–110)
CLARITY: CLEAR
CO2: 24 MMOL/L (ref 21–32)
COLOR: YELLOW
CREAT SERPL-MCNC: 0.8 MG/DL (ref 0.6–1.2)
EOSINOPHILS ABSOLUTE: 0 K/UL (ref 0–0.6)
EOSINOPHILS RELATIVE PERCENT: 0 %
EPITHELIAL CELLS, UA: ABNORMAL /HPF
GFR AFRICAN AMERICAN: >60
GFR NON-AFRICAN AMERICAN: >60
GLOBULIN: 3.7 G/DL
GLUCOSE BLD-MCNC: 154 MG/DL (ref 70–99)
GLUCOSE BLD-MCNC: 164 MG/DL (ref 70–99)
GLUCOSE URINE: NEGATIVE MG/DL
HCO3 ARTERIAL: 23 MMOL/L (ref 21–29)
HCT VFR BLD CALC: 34.4 % (ref 36–48)
HEMOGLOBIN, ART, EXTENDED: 11.4 G/DL (ref 12–16)
HEMOGLOBIN: 11.2 G/DL (ref 12–16)
KETONES, URINE: NEGATIVE MG/DL
LACTIC ACID: 2.1 MMOL/L (ref 0.4–2)
LEUKOCYTE ESTERASE, URINE: NEGATIVE
LYMPHOCYTES ABSOLUTE: 2.2 K/UL (ref 1–5.1)
LYMPHOCYTES RELATIVE PERCENT: 10 %
MCH RBC QN AUTO: 29.8 PG (ref 26–34)
MCHC RBC AUTO-ENTMCNC: 32.6 G/DL (ref 31–36)
MCV RBC AUTO: 91.2 FL (ref 80–100)
METAMYELOCYTES RELATIVE PERCENT: 1 %
METHEMOGLOBIN ARTERIAL: 0.4 %
MICROSCOPIC EXAMINATION: YES
MONOCYTES ABSOLUTE: 1.1 K/UL (ref 0–1.3)
MONOCYTES RELATIVE PERCENT: 5 %
NEUTROPHILS ABSOLUTE: 19 K/UL (ref 1.7–7.7)
NEUTROPHILS RELATIVE PERCENT: 71 %
NITRITE, URINE: NEGATIVE
O2 CONTENT ARTERIAL: 15 ML/DL
O2 SAT, ARTERIAL: 92.5 %
O2 THERAPY: ABNORMAL
PCO2 ARTERIAL: 35.9 MMHG (ref 35–45)
PDW BLD-RTO: 14.9 % (ref 12.4–15.4)
PERFORMED ON: ABNORMAL
PH ARTERIAL: 7.42 (ref 7.35–7.45)
PH UA: 6
PLATELET # BLD: 69 K/UL (ref 135–450)
PLATELET SLIDE REVIEW: ABNORMAL
PMV BLD AUTO: 11.2 FL (ref 5–10.5)
PO2 ARTERIAL: 61.8 MMHG (ref 75–108)
POTASSIUM SERPL-SCNC: 4.3 MMOL/L (ref 3.5–5.1)
PROTEIN UA: 30 MG/DL
RBC # BLD: 3.77 M/UL (ref 4–5.2)
RBC UA: ABNORMAL /HPF (ref 0–2)
REASON FOR REJECTION: NORMAL
REJECTED TEST: NORMAL
RENAL EPITHELIAL, UA: ABNORMAL /HPF
SLIDE REVIEW: ABNORMAL
SODIUM BLD-SCNC: 134 MMOL/L (ref 136–145)
SPECIFIC GRAVITY UA: 1.02
SPHEROCYTES: ABNORMAL
STOMATOCYTES: ABNORMAL
TCO2 ARTERIAL: 24.1 MMOL/L
TOTAL PROTEIN: 7.5 G/DL (ref 6.4–8.2)
URINE REFLEX TO CULTURE: ABNORMAL
URINE TYPE: ABNORMAL
UROBILINOGEN, URINE: 0.2 E.U./DL
WBC # BLD: 22.3 K/UL (ref 4–11)
WBC UA: ABNORMAL /HPF (ref 0–5)

## 2018-10-14 PROCEDURE — 81001 URINALYSIS AUTO W/SCOPE: CPT

## 2018-10-14 PROCEDURE — 2060000000 HC ICU INTERMEDIATE R&B

## 2018-10-14 PROCEDURE — 94640 AIRWAY INHALATION TREATMENT: CPT

## 2018-10-14 PROCEDURE — 2580000003 HC RX 258: Performed by: INTERNAL MEDICINE

## 2018-10-14 PROCEDURE — 36415 COLL VENOUS BLD VENIPUNCTURE: CPT

## 2018-10-14 PROCEDURE — 94150 VITAL CAPACITY TEST: CPT

## 2018-10-14 PROCEDURE — 6370000000 HC RX 637 (ALT 250 FOR IP): Performed by: NURSE PRACTITIONER

## 2018-10-14 PROCEDURE — 2580000003 HC RX 258: Performed by: NURSE PRACTITIONER

## 2018-10-14 PROCEDURE — 94664 DEMO&/EVAL PT USE INHALER: CPT

## 2018-10-14 PROCEDURE — 80053 COMPREHEN METABOLIC PANEL: CPT

## 2018-10-14 PROCEDURE — 87040 BLOOD CULTURE FOR BACTERIA: CPT

## 2018-10-14 PROCEDURE — 83036 HEMOGLOBIN GLYCOSYLATED A1C: CPT

## 2018-10-14 PROCEDURE — 85025 COMPLETE CBC W/AUTO DIFF WBC: CPT

## 2018-10-14 PROCEDURE — 6360000002 HC RX W HCPCS: Performed by: NURSE PRACTITIONER

## 2018-10-14 PROCEDURE — 71046 X-RAY EXAM CHEST 2 VIEWS: CPT

## 2018-10-14 PROCEDURE — 96375 TX/PRO/DX INJ NEW DRUG ADDON: CPT

## 2018-10-14 PROCEDURE — 96366 THER/PROPH/DIAG IV INF ADDON: CPT

## 2018-10-14 PROCEDURE — 99285 EMERGENCY DEPT VISIT HI MDM: CPT

## 2018-10-14 PROCEDURE — 93005 ELECTROCARDIOGRAM TRACING: CPT | Performed by: EMERGENCY MEDICINE

## 2018-10-14 PROCEDURE — 82803 BLOOD GASES ANY COMBINATION: CPT

## 2018-10-14 PROCEDURE — 83605 ASSAY OF LACTIC ACID: CPT

## 2018-10-14 PROCEDURE — 96365 THER/PROPH/DIAG IV INF INIT: CPT

## 2018-10-14 RX ORDER — ACETAMINOPHEN 325 MG/1
650 TABLET ORAL EVERY 4 HOURS PRN
Status: DISCONTINUED | OUTPATIENT
Start: 2018-10-14 | End: 2018-10-22 | Stop reason: HOSPADM

## 2018-10-14 RX ORDER — LABETALOL HYDROCHLORIDE 5 MG/ML
5 INJECTION, SOLUTION INTRAVENOUS EVERY 4 HOURS PRN
Status: DISCONTINUED | OUTPATIENT
Start: 2018-10-14 | End: 2018-10-22 | Stop reason: HOSPADM

## 2018-10-14 RX ORDER — KETOROLAC TROMETHAMINE 30 MG/ML
15 INJECTION, SOLUTION INTRAMUSCULAR; INTRAVENOUS ONCE
Status: COMPLETED | OUTPATIENT
Start: 2018-10-14 | End: 2018-10-14

## 2018-10-14 RX ORDER — DEXTROSE MONOHYDRATE 25 G/50ML
12.5 INJECTION, SOLUTION INTRAVENOUS PRN
Status: DISCONTINUED | OUTPATIENT
Start: 2018-10-14 | End: 2018-10-22 | Stop reason: HOSPADM

## 2018-10-14 RX ORDER — ONDANSETRON 2 MG/ML
4 INJECTION INTRAMUSCULAR; INTRAVENOUS ONCE
Status: COMPLETED | OUTPATIENT
Start: 2018-10-14 | End: 2018-10-14

## 2018-10-14 RX ORDER — IPRATROPIUM BROMIDE AND ALBUTEROL SULFATE 2.5; .5 MG/3ML; MG/3ML
1 SOLUTION RESPIRATORY (INHALATION) ONCE
Status: COMPLETED | OUTPATIENT
Start: 2018-10-14 | End: 2018-10-14

## 2018-10-14 RX ORDER — ACETAMINOPHEN 500 MG
1000 TABLET ORAL ONCE
Status: COMPLETED | OUTPATIENT
Start: 2018-10-14 | End: 2018-10-14

## 2018-10-14 RX ORDER — LEVOFLOXACIN 5 MG/ML
750 INJECTION, SOLUTION INTRAVENOUS ONCE
Status: COMPLETED | OUTPATIENT
Start: 2018-10-14 | End: 2018-10-14

## 2018-10-14 RX ORDER — 0.9 % SODIUM CHLORIDE 0.9 %
1721 INTRAVENOUS SOLUTION INTRAVENOUS ONCE
Status: COMPLETED | OUTPATIENT
Start: 2018-10-14 | End: 2018-10-14

## 2018-10-14 RX ORDER — SODIUM CHLORIDE 0.9 % (FLUSH) 0.9 %
10 SYRINGE (ML) INJECTION EVERY 12 HOURS SCHEDULED
Status: DISCONTINUED | OUTPATIENT
Start: 2018-10-14 | End: 2018-10-22 | Stop reason: HOSPADM

## 2018-10-14 RX ORDER — SODIUM CHLORIDE 0.9 % (FLUSH) 0.9 %
10 SYRINGE (ML) INJECTION PRN
Status: DISCONTINUED | OUTPATIENT
Start: 2018-10-14 | End: 2018-10-14 | Stop reason: SDUPTHER

## 2018-10-14 RX ORDER — SODIUM CHLORIDE 0.9 % (FLUSH) 0.9 %
10 SYRINGE (ML) INJECTION PRN
Status: DISCONTINUED | OUTPATIENT
Start: 2018-10-14 | End: 2018-10-22 | Stop reason: HOSPADM

## 2018-10-14 RX ORDER — 0.9 % SODIUM CHLORIDE 0.9 %
1000 INTRAVENOUS SOLUTION INTRAVENOUS ONCE
Status: COMPLETED | OUTPATIENT
Start: 2018-10-14 | End: 2018-10-14

## 2018-10-14 RX ORDER — DEXTROSE MONOHYDRATE 50 MG/ML
100 INJECTION, SOLUTION INTRAVENOUS PRN
Status: DISCONTINUED | OUTPATIENT
Start: 2018-10-14 | End: 2018-10-22 | Stop reason: HOSPADM

## 2018-10-14 RX ORDER — IPRATROPIUM BROMIDE AND ALBUTEROL SULFATE 2.5; .5 MG/3ML; MG/3ML
1 SOLUTION RESPIRATORY (INHALATION) EVERY 4 HOURS PRN
Status: DISCONTINUED | OUTPATIENT
Start: 2018-10-14 | End: 2018-10-14

## 2018-10-14 RX ORDER — IPRATROPIUM BROMIDE AND ALBUTEROL SULFATE 2.5; .5 MG/3ML; MG/3ML
1 SOLUTION RESPIRATORY (INHALATION)
Status: DISCONTINUED | OUTPATIENT
Start: 2018-10-14 | End: 2018-10-15

## 2018-10-14 RX ORDER — SODIUM CHLORIDE 9 MG/ML
INJECTION, SOLUTION INTRAVENOUS CONTINUOUS
Status: DISCONTINUED | OUTPATIENT
Start: 2018-10-14 | End: 2018-10-15

## 2018-10-14 RX ORDER — SODIUM CHLORIDE 0.9 % (FLUSH) 0.9 %
10 SYRINGE (ML) INJECTION EVERY 12 HOURS SCHEDULED
Status: DISCONTINUED | OUTPATIENT
Start: 2018-10-14 | End: 2018-10-14 | Stop reason: SDUPTHER

## 2018-10-14 RX ORDER — LEVOFLOXACIN 5 MG/ML
500 INJECTION, SOLUTION INTRAVENOUS EVERY 24 HOURS
Status: DISCONTINUED | OUTPATIENT
Start: 2018-10-15 | End: 2018-10-20

## 2018-10-14 RX ORDER — NICOTINE POLACRILEX 4 MG
15 LOZENGE BUCCAL PRN
Status: DISCONTINUED | OUTPATIENT
Start: 2018-10-14 | End: 2018-10-22 | Stop reason: HOSPADM

## 2018-10-14 RX ADMIN — SODIUM CHLORIDE: 9 INJECTION, SOLUTION INTRAVENOUS at 18:36

## 2018-10-14 RX ADMIN — SODIUM CHLORIDE 1721 ML: 9 INJECTION, SOLUTION INTRAVENOUS at 17:28

## 2018-10-14 RX ADMIN — KETOROLAC TROMETHAMINE 15 MG: 30 INJECTION, SOLUTION INTRAMUSCULAR at 18:36

## 2018-10-14 RX ADMIN — IPRATROPIUM BROMIDE AND ALBUTEROL SULFATE 1 AMPULE: .5; 3 SOLUTION RESPIRATORY (INHALATION) at 17:47

## 2018-10-14 RX ADMIN — ONDANSETRON 4 MG: 2 INJECTION, SOLUTION INTRAMUSCULAR; INTRAVENOUS at 17:04

## 2018-10-14 RX ADMIN — LEVOFLOXACIN 750 MG: 5 INJECTION, SOLUTION INTRAVENOUS at 17:27

## 2018-10-14 RX ADMIN — IPRATROPIUM BROMIDE AND ALBUTEROL SULFATE 1 AMPULE: .5; 3 SOLUTION RESPIRATORY (INHALATION) at 21:16

## 2018-10-14 RX ADMIN — ACETAMINOPHEN 1000 MG: 500 TABLET ORAL at 17:04

## 2018-10-14 RX ADMIN — SODIUM CHLORIDE 1000 ML: 9 INJECTION, SOLUTION INTRAVENOUS at 17:04

## 2018-10-14 ASSESSMENT — PAIN SCALES - GENERAL
PAINLEVEL_OUTOF10: 6
PAINLEVEL_OUTOF10: 7
PAINLEVEL_OUTOF10: 0
PAINLEVEL_OUTOF10: 10
PAINLEVEL_OUTOF10: 0

## 2018-10-14 ASSESSMENT — PAIN DESCRIPTION - LOCATION: LOCATION: HEAD

## 2018-10-14 ASSESSMENT — PAIN DESCRIPTION - PAIN TYPE: TYPE: ACUTE PAIN

## 2018-10-14 NOTE — ED NOTES
Patient assisted to bedside commode. Patient back in bed with call light within reach. Denies needs at this time.        Marivel Schneider RN  10/14/18 [id number]

## 2018-10-15 LAB
EKG ATRIAL RATE: 68 BPM
EKG DIAGNOSIS: NORMAL
EKG P AXIS: 50 DEGREES
EKG P-R INTERVAL: 204 MS
EKG Q-T INTERVAL: 432 MS
EKG QRS DURATION: 74 MS
EKG QTC CALCULATION (BAZETT): 459 MS
EKG R AXIS: -3 DEGREES
EKG T AXIS: 57 DEGREES
EKG VENTRICULAR RATE: 68 BPM
ESTIMATED AVERAGE GLUCOSE: 145.6 MG/DL
GLUCOSE BLD-MCNC: 122 MG/DL (ref 70–99)
GLUCOSE BLD-MCNC: 137 MG/DL (ref 70–99)
GLUCOSE BLD-MCNC: 142 MG/DL (ref 70–99)
GLUCOSE BLD-MCNC: 188 MG/DL (ref 70–99)
HBA1C MFR BLD: 6.7 %
LACTIC ACID, SEPSIS: 3.1 MMOL/L (ref 0.4–1.9)
LACTIC ACID: 1.6 MMOL/L (ref 0.4–2)
PERFORMED ON: ABNORMAL

## 2018-10-15 PROCEDURE — 36415 COLL VENOUS BLD VENIPUNCTURE: CPT

## 2018-10-15 PROCEDURE — 93010 ELECTROCARDIOGRAM REPORT: CPT | Performed by: INTERNAL MEDICINE

## 2018-10-15 PROCEDURE — 2580000003 HC RX 258: Performed by: NURSE PRACTITIONER

## 2018-10-15 PROCEDURE — 6360000002 HC RX W HCPCS: Performed by: INTERNAL MEDICINE

## 2018-10-15 PROCEDURE — G8987 SELF CARE CURRENT STATUS: HCPCS

## 2018-10-15 PROCEDURE — 94761 N-INVAS EAR/PLS OXIMETRY MLT: CPT

## 2018-10-15 PROCEDURE — 94664 DEMO&/EVAL PT USE INHALER: CPT

## 2018-10-15 PROCEDURE — 97116 GAIT TRAINING THERAPY: CPT

## 2018-10-15 PROCEDURE — 90686 IIV4 VACC NO PRSV 0.5 ML IM: CPT | Performed by: INTERNAL MEDICINE

## 2018-10-15 PROCEDURE — 83605 ASSAY OF LACTIC ACID: CPT

## 2018-10-15 PROCEDURE — 97535 SELF CARE MNGMENT TRAINING: CPT

## 2018-10-15 PROCEDURE — G8978 MOBILITY CURRENT STATUS: HCPCS

## 2018-10-15 PROCEDURE — 1200000000 HC SEMI PRIVATE

## 2018-10-15 PROCEDURE — 2580000003 HC RX 258: Performed by: INTERNAL MEDICINE

## 2018-10-15 PROCEDURE — 6370000000 HC RX 637 (ALT 250 FOR IP): Performed by: NURSE PRACTITIONER

## 2018-10-15 PROCEDURE — 97162 PT EVAL MOD COMPLEX 30 MIN: CPT

## 2018-10-15 PROCEDURE — 97530 THERAPEUTIC ACTIVITIES: CPT

## 2018-10-15 PROCEDURE — 6370000000 HC RX 637 (ALT 250 FOR IP): Performed by: INTERNAL MEDICINE

## 2018-10-15 PROCEDURE — G0008 ADMIN INFLUENZA VIRUS VAC: HCPCS | Performed by: INTERNAL MEDICINE

## 2018-10-15 PROCEDURE — 94640 AIRWAY INHALATION TREATMENT: CPT

## 2018-10-15 PROCEDURE — G8979 MOBILITY GOAL STATUS: HCPCS

## 2018-10-15 PROCEDURE — 94667 MNPJ CHEST WALL 1ST: CPT

## 2018-10-15 PROCEDURE — 97165 OT EVAL LOW COMPLEX 30 MIN: CPT

## 2018-10-15 PROCEDURE — 97110 THERAPEUTIC EXERCISES: CPT

## 2018-10-15 PROCEDURE — G8988 SELF CARE GOAL STATUS: HCPCS

## 2018-10-15 RX ORDER — FOLIC ACID 1 MG/1
1 TABLET ORAL DAILY
Status: DISCONTINUED | OUTPATIENT
Start: 2018-10-15 | End: 2018-10-22 | Stop reason: HOSPADM

## 2018-10-15 RX ORDER — PANTOPRAZOLE SODIUM 40 MG/1
40 TABLET, DELAYED RELEASE ORAL
Status: DISCONTINUED | OUTPATIENT
Start: 2018-10-15 | End: 2018-10-22 | Stop reason: HOSPADM

## 2018-10-15 RX ORDER — ASPIRIN 81 MG/1
81 TABLET ORAL DAILY
Status: DISCONTINUED | OUTPATIENT
Start: 2018-10-15 | End: 2018-10-22 | Stop reason: HOSPADM

## 2018-10-15 RX ORDER — VENLAFAXINE HYDROCHLORIDE 37.5 MG/1
75 CAPSULE, EXTENDED RELEASE ORAL DAILY
Status: DISCONTINUED | OUTPATIENT
Start: 2018-10-15 | End: 2018-10-22 | Stop reason: HOSPADM

## 2018-10-15 RX ORDER — ATORVASTATIN CALCIUM 10 MG/1
20 TABLET, FILM COATED ORAL NIGHTLY
Status: DISCONTINUED | OUTPATIENT
Start: 2018-10-15 | End: 2018-10-17

## 2018-10-15 RX ORDER — IPRATROPIUM BROMIDE AND ALBUTEROL SULFATE 2.5; .5 MG/3ML; MG/3ML
1 SOLUTION RESPIRATORY (INHALATION) EVERY 4 HOURS
Status: DISCONTINUED | OUTPATIENT
Start: 2018-10-15 | End: 2018-10-22 | Stop reason: HOSPADM

## 2018-10-15 RX ORDER — FERROUS SULFATE 325(65) MG
325 TABLET ORAL
Status: DISCONTINUED | OUTPATIENT
Start: 2018-10-15 | End: 2018-10-22 | Stop reason: HOSPADM

## 2018-10-15 RX ORDER — MONTELUKAST SODIUM 10 MG/1
10 TABLET ORAL NIGHTLY
Status: DISCONTINUED | OUTPATIENT
Start: 2018-10-15 | End: 2018-10-22 | Stop reason: HOSPADM

## 2018-10-15 RX ORDER — TRAMADOL HYDROCHLORIDE 50 MG/1
50 TABLET ORAL EVERY 6 HOURS PRN
Status: DISCONTINUED | OUTPATIENT
Start: 2018-10-15 | End: 2018-10-22 | Stop reason: HOSPADM

## 2018-10-15 RX ORDER — METOPROLOL TARTRATE 50 MG/1
50 TABLET, FILM COATED ORAL 2 TIMES DAILY
Status: DISCONTINUED | OUTPATIENT
Start: 2018-10-15 | End: 2018-10-22 | Stop reason: HOSPADM

## 2018-10-15 RX ADMIN — MONTELUKAST SODIUM 10 MG: 10 TABLET, COATED ORAL at 22:10

## 2018-10-15 RX ADMIN — METOPROLOL TARTRATE 50 MG: 50 TABLET ORAL at 22:10

## 2018-10-15 RX ADMIN — INFLUENZA A VIRUS A/MICHIGAN/45/2015 X-275 (H1N1) ANTIGEN (FORMALDEHYDE INACTIVATED), INFLUENZA A VIRUS A/SINGAPORE/INFIMH-16-0019/2016 IVR-186 (H3N2) ANTIGEN (FORMALDEHYDE INACTIVATED), INFLUENZA B VIRUS B/PHUKET/3073/2013 ANTIGEN (FORMALDEHYDE INACTIVATED), AND INFLUENZA B VIRUS B/MARYLAND/15/2016 BX-69A ANTIGEN (FORMALDEHYDE INACTIVATED) 0.5 ML: 15; 15; 15; 15 INJECTION, SUSPENSION INTRAMUSCULAR at 08:54

## 2018-10-15 RX ADMIN — ATORVASTATIN CALCIUM 20 MG: 10 TABLET, FILM COATED ORAL at 22:10

## 2018-10-15 RX ADMIN — Medication 10 ML: at 22:10

## 2018-10-15 RX ADMIN — SODIUM CHLORIDE: 9 INJECTION, SOLUTION INTRAVENOUS at 06:46

## 2018-10-15 RX ADMIN — ACETAMINOPHEN 650 MG: 325 TABLET ORAL at 13:32

## 2018-10-15 RX ADMIN — IPRATROPIUM BROMIDE AND ALBUTEROL SULFATE 1 AMPULE: .5; 3 SOLUTION RESPIRATORY (INHALATION) at 08:17

## 2018-10-15 RX ADMIN — LEVOFLOXACIN 500 MG: 5 INJECTION, SOLUTION INTRAVENOUS at 17:08

## 2018-10-15 RX ADMIN — IPRATROPIUM BROMIDE AND ALBUTEROL SULFATE 1 AMPULE: .5; 3 SOLUTION RESPIRATORY (INHALATION) at 12:07

## 2018-10-15 RX ADMIN — IPRATROPIUM BROMIDE AND ALBUTEROL SULFATE 1 AMPULE: .5; 3 SOLUTION RESPIRATORY (INHALATION) at 23:45

## 2018-10-15 RX ADMIN — METOPROLOL TARTRATE 50 MG: 50 TABLET ORAL at 08:55

## 2018-10-15 RX ADMIN — FOLIC ACID 1 MG: 1 TABLET ORAL at 08:55

## 2018-10-15 RX ADMIN — INSULIN LISPRO 1 UNITS: 100 INJECTION, SOLUTION INTRAVENOUS; SUBCUTANEOUS at 13:32

## 2018-10-15 RX ADMIN — FERROUS SULFATE TAB 325 MG (65 MG ELEMENTAL FE) 325 MG: 325 (65 FE) TAB at 08:55

## 2018-10-15 RX ADMIN — PANTOPRAZOLE SODIUM 40 MG: 40 TABLET, DELAYED RELEASE ORAL at 06:45

## 2018-10-15 RX ADMIN — VENLAFAXINE HYDROCHLORIDE 75 MG: 37.5 CAPSULE, EXTENDED RELEASE ORAL at 08:55

## 2018-10-15 RX ADMIN — IPRATROPIUM BROMIDE AND ALBUTEROL SULFATE 1 AMPULE: .5; 3 SOLUTION RESPIRATORY (INHALATION) at 20:20

## 2018-10-15 RX ADMIN — IPRATROPIUM BROMIDE AND ALBUTEROL SULFATE 1 AMPULE: .5; 3 SOLUTION RESPIRATORY (INHALATION) at 16:40

## 2018-10-15 RX ADMIN — TRAMADOL HYDROCHLORIDE 50 MG: 50 TABLET, FILM COATED ORAL at 22:35

## 2018-10-15 RX ADMIN — Medication 10 ML: at 08:55

## 2018-10-15 RX ADMIN — ASPIRIN 81 MG: 81 TABLET, COATED ORAL at 08:55

## 2018-10-15 ASSESSMENT — PAIN DESCRIPTION - DESCRIPTORS
DESCRIPTORS: SHARP;SHOOTING
DESCRIPTORS: ACHING
DESCRIPTORS: ACHING

## 2018-10-15 ASSESSMENT — PAIN SCALES - GENERAL
PAINLEVEL_OUTOF10: 10
PAINLEVEL_OUTOF10: 9
PAINLEVEL_OUTOF10: 8
PAINLEVEL_OUTOF10: 0
PAINLEVEL_OUTOF10: 9
PAINLEVEL_OUTOF10: 5

## 2018-10-15 ASSESSMENT — PAIN DESCRIPTION - ONSET
ONSET: ON-GOING
ONSET: SUDDEN

## 2018-10-15 ASSESSMENT — PAIN DESCRIPTION - PROGRESSION
CLINICAL_PROGRESSION: GRADUALLY WORSENING
CLINICAL_PROGRESSION: GRADUALLY IMPROVING

## 2018-10-15 ASSESSMENT — PAIN DESCRIPTION - ORIENTATION
ORIENTATION: ANTERIOR
ORIENTATION: LEFT;RIGHT

## 2018-10-15 ASSESSMENT — PAIN DESCRIPTION - LOCATION
LOCATION: CHEST
LOCATION: RIB CAGE
LOCATION: CHEST

## 2018-10-15 ASSESSMENT — PAIN DESCRIPTION - FREQUENCY
FREQUENCY: INTERMITTENT
FREQUENCY: CONTINUOUS

## 2018-10-15 ASSESSMENT — PAIN DESCRIPTION - PAIN TYPE
TYPE: ACUTE PAIN

## 2018-10-15 NOTE — PROGRESS NOTES
Pt has transfer orders and room assignment to [facility]. S/w jossue villatoro. She will come to unit/bedside for report.

## 2018-10-15 NOTE — PROGRESS NOTES
Screening  Patient Currently in Pain: Yes  Pain Assessment  Pain Assessment: 0-10  Pain Level: 5  Pain Type: Acute pain  Pain Location: Chest  Pain Orientation: Anterior  Pain Descriptors: Aching  Pain Frequency: Continuous  Pain Onset: On-going  Clinical Progression: Gradually improving  Pain Intervention(s): Repositioned; Ambulation/Increased activity; Emotional support  Response to Pain Intervention: Patient Satisfied    Pre Treatment Pain Screening  Intervention List: Patient able to continue with treatment    Orientation  Orientation  Overall Orientation Status: Within Normal Limits    Social/Functional History  Social/Functional History  Lives With: Spouse  Type of Home: House  Home Layout: Two level, Able to Live on Main level with bedroom/bathroom  Home Access: Stairs to enter with rails (3)  Entrance Stairs - Rails: Right  Bathroom Shower/Tub: Tub/Shower unit  Bathroom Toilet: Standard  Bathroom Equipment: Grab bars in shower, 3-in-1 commode, Shower chair  Home Equipment: Rolling walker, Cane  Receives Help From: Outpatient therapy  ADL Assistance: Independent  Homemaking Responsibilities: Yes  Meal Prep Responsibility: No (spouse)  Laundry Responsibility: Primary  Cleaning Responsibility: No (spouse)  Ambulation Assistance: Independent (without device in the house,  will stand by when walking outside)  Transfer Assistance: Independent  Active : No  Patient's  Info: spouse drives as patient has R filed cut and neglect from previous CVA  Occupation: Retired  Type of occupation:  at [employer]: gayatri to read  Objective     RLE AROM: WFL  LLE AROM : WFL  Strength : BLEs grossly 3-/5 hip abd and ext, 3/5 hip flexion, knee ext 4+, DF 4+        Bed mobility  Comment: not observed, pt up in chair upon approach and on exit  Transfers  Sit to Stand: Contact guard assistance  Stand to sit: Contact guard assistance  Ambulation  Surface: level tile  Device: Rolling

## 2018-10-15 NOTE — PROGRESS NOTES
Occupational Therapy   Occupational Therapy Initial Assessment/Treatment  Date: 10/15/2018   Patient Name: Rakesh Reilly  MRN: [id number]     : 1950    Date of Service: 10/15/2018    Discharge Recommendations:  Outpatient OT, 24 hour supervision or assist         Patient Diagnosis(es): The primary encounter diagnosis was Pneumonia due to organism. Diagnoses of Sepsis, due to unspecified organism ([facility] [address] Nausea and vomiting, intractability of vomiting not specified, unspecified vomiting type, and General weakness were also pertinent to this visit. has a past medical history of Arthritis; Asthma; Depression; Diabetes mellitus ([facility] [address] GERD (gastroesophageal reflux disease); HCAP (healthcare-associated pneumonia); Hypertension; Mycobacterium gordonae infection; Paroxysmal atrial fibrillation ([facility] [address] Sepsis ([facility] [address] TIA (transient ischemic attack); and Unspecified cerebral artery occlusion with cerebral infarction. has a past surgical history that includes Hysterectomy; Cholecystectomy; Appendectomy; cyst incision and drainage (); Colonoscopy (2017); and Upper gastrointestinal endoscopy (2017). Restrictions  Restrictions/Precautions  Restrictions/Precautions: Fall Risk, General Precautions  Position Activity Restriction  Other position/activity restrictions: Up with assist    Subjective   General  Chart Reviewed: Yes  Patient assessed for rehabilitation services?: Yes  Family / Caregiver Present: No  Referring Practitioner: Tyler  Referral Date : 10/15/18  Diagnosis: PNA, sepsis, lactic acidosis, encephalopathy  Subjective: Pt. pleasant and agreeable to OT evaluation. \"I want to get back to therapy to work on my vision so I can read.   Pain Assessment  Patient Currently in Pain: Yes  Pain Assessment: 0-10  Pain Level: 5  Pain Type: Acute pain  Pain Location: Chest  Pain Orientation: Anterior  Pain Descriptors: Aching  Pain Frequency: Continuous  Clinical Progression: Gradually Cognitive Status: Exceptions  Insights: Decreased awareness of deficits  Perception  Overall Perceptual Status: Impaired  Unilateral Attention: Cues to attend right visual field     LUE AROM (degrees)  LUE AROM : WFL  RUE AROM (degrees)  RUE AROM : WFL  LUE Strength  Gross LUE Strength: WFL  RUE Strength  Gross RUE Strength: WFL     Assessment   Performance deficits / Impairments: Decreased functional mobility ; Decreased endurance;Decreased ADL status; Decreased balance;Decreased vision/visual deficit  After evaluation, pt found to be presenting with the above mentioned occupational performance deficits which are affecting participation in daily living skills. Pt would benefit from continued skilled occupational therapy to address ADLs, functional mobility, and safety while in acute care. Patient with R neglect and field cut from previous CVA, requiring assistance with walker mgmt and grooming at sink when objects on R side. CGA for functional transfers with Carter for functional mobility with RW. Prognosis: Good  Decision Making: Medium Complexity  Patient Education: ADLS, bed mobility, functional transfers and role of OT  REQUIRES OT FOLLOW UP: Yes  Activity Tolerance  Activity Tolerance: Patient Tolerated treatment well  Safety Devices  Safety Devices in place: Yes  Type of devices: Left in chair;Call light within reach;Nurse notified; Chair alarm in place;Gait belt         Plan   Plan  Times per week: 3-5x's a week while in acute care    G-Code  OT G-codes  Functional Assessment Tool Used: AM-PAC  Score: 19  Functional Limitation: Self care  Self Care Current Status (): At least 40 percent but less than 60 percent impaired, limited or restricted  Self Care Goal Status ():  At least 20 percent but less than 40 percent impaired, limited or restricted                 AM-PAC Score        AM-PAC Inpatient Daily Activity Raw Score: 19  AM-PAC Inpatient ADL T-Scale Score : 40.22  ADL Inpatient CMS 0-100% Score:

## 2018-10-15 NOTE — PROGRESS NOTES
appears stated age and cooperative. HEENT:  Normal cephalic, atraumatic without obvious deformity. Pupils equal, round, and reactive to light. Extra ocular muscles intact. Conjunctivae/corneas clear. Neck: Supple, with full range of motion. No jugular venous distention. Trachea midline. Respiratory:  Normal respiratory effort. Clear to auscultation, bilaterally without Rales/Wheezes/Rhonchi. Cardiovascular:  Regular rate and rhythm with normal S1/S2 without murmurs, rubs or gallops. Abdomen: Soft, non-tender, non-distended with normal bowel sounds. Musculoskeletal:  No clubbing, cyanosis or edema bilaterally. Full range of motion without deformity. Skin: Skin color, texture, turgor normal.  No rashes or lesions. Neurologic:  Binocular R visual field defect. Symmetrical and fairly diffuse weakness, more pronounced in BUE's. Psychiatric:  Alert and mostly oriented, limited insight, sometimes loses her train of thought. Capillary Refill: Brisk,< 3 seconds   Peripheral Pulses: +2 palpable, equal bilaterally       Labs:   Recent Labs      10/13/18   1600  10/14/18   1646   WBC  7.6  22.3*   HGB  11.7*  11.2*   HCT  34.7*  34.4*   PLT  92*  69*     Recent Labs      10/13/18   1600  10/14/18   1646   NA  138  134*   K  4.1  4.3   CL  102  98*   CO2  26  24   BUN  17  15   CREATININE  0.8  0.8   CALCIUM  9.2  8.8     Recent Labs      10/13/18   1600  10/14/18   1646   AST  20  43*   ALT  16  34   BILITOT  0.4  1.6*   ALKPHOS  127  133*     No results for input(s): INR in the last 72 hours. No results for input(s): Leila Jutricia in the last 72 hours.     Urinalysis:    Lab Results   Component Value Date    NITRU Negative 10/14/2018    WBCUA 3-5 10/14/2018    BACTERIA Rare 10/14/2018    RBCUA 3-5 10/14/2018    BLOODU TRACE-INTACT 10/14/2018    SPECGRAV 1.025 10/14/2018    GLUCOSEU Negative 10/14/2018       Radiology:  XR CHEST STANDARD (2 VW)   Final Result   Focal opacity within the left midlung,

## 2018-10-15 NOTE — PROGRESS NOTES
RESPIRATORY THERAPY ASSESSMENT    Name:  Arcelia Hernandez [facility] Record Number:  [id number]  Age: 74 y.o. Gender: female  : 1950  Today's Date:  10/14/2018  Room:  [facility], [facility]/[id number]    Assessment     Is the patient being admitted for a COPD or Asthma exacerbation? No   (If yes the patient will be seen every 4 hours for the first 24 hours and then reassessed)    Patient Admission Diagnosis      Allergies  Allergies   Allergen Reactions    Acyclovir Anaphylaxis     Redness face & back, swollen tounge, eyes and throat     Ace Inhibitors      Possible anaphylaxis    Amoxicillin      hives    Cephalosporins      anaphylaxis  Lip swelling    Oxycodone-Acetaminophen      Respiratory distress    Penicillins     Percocet [Oxycodone-Acetaminophen]        Minimum Predicted Vital Capacity:     683          Actual Vital Capacity:      250-500          Pulmonary History:COPD and Asthma, JARRETT (home unit)  Home Oxygen Therapy:  room air  Home Respiratory Therapy:Albuterol hhn qid/mdi prn (per pt)  Current Respiratory Therapy:  duoneb   Treatment Type: IS  Medications: Albuterol/Ipratropium    Respiratory Severity Index(RSI)   Patients with orders for inhalation medications, oxygen, or any therapeutic treatment modality will be placed on Respiratory Protocol. They will be assessed with the first treatment and at least every 72 hours thereafter. The following severity scale will be used to determine frequency of treatment intervention.     Smoking History: No Smoking History = 0    Social History  Social History   Substance Use Topics    Smoking status: Never Smoker    Smokeless tobacco: Never Used    Alcohol use No       Recent Surgical History: None = 0  Past Surgical History  Past Surgical History:   Procedure Laterality Date    APPENDECTOMY      CHOLECYSTECTOMY      COLONOSCOPY  2017    polyp    CYST INCISION AND DRAINAGE  1986    on head    HYSTERECTOMY      UPPER GASTROINTESTINAL ENDOSCOPY when the following criteria are met:  a. Alert and cooperative     b. HR < 140 bpm  c. RR < 30 bpm                d. Can demonstrate a 2-3 second inspiratory hold  4. Bronchodilators will be administered via Hand Held Nebulizer [facility]) to patients when ANY of the following criteria are met  a. Incognizant or uncooperative          b. Patients treated with HHN at Home        c. Unable to demonstrate proper use of MDI with spacer     d. RR > 30 bpm   5. Bronchodilators will be delivered via Metered Dose Inhaler (MDI), HHN, Aerogen to intubated patients on mechanical ventilation. 6. Inhalation medication orders will be delivered and/or substituted as outlined below. Aerosolized Medications Ordering and Administration Guidelines:    1. All Medications will be ordered by a physician, and their frequency and/or modality will be adjusted as defined by the patients Respiratory Severity Index (RSI) score. 2. If the patient does not have documented COPD, consider discontinuing anticholinergics when RSI is less than 9.  3. If the bronchospasm worsens (increased RSI), then the bronchodilator frequency can be increased to a maximum of every 4 hours. If greater than every 4 hours is required, the physician will be contacted. 4. If the bronchospasm improves, the frequency of the bronchodilator can be decreased, based on the patient's RSI, but not less than home treatment regimen frequency. 5. Bronchodilator(s) will be discontinued if patient has a RSI less than 9 and has received no scheduled or as needed treatment for 72  Hrs. Patients Ordered on a Mucolytic Agent:    1. Must always be administered with a bronchodilator. 2. Discontinue if patient experiences worsened bronchospasm, or secretions have lessened to the point that the patient is able to clear them with a cough. Anti-inflammatory and Combination Medications:    1.  If the patient lacks prior history of lung disease, is not using inhaled anti-inflammatory

## 2018-10-15 NOTE — ED PROVIDER NOTES
Emergency Department Provider Note     Location: [place] [address] PCU  10/14/2018    I independently performed a history and physical on [address] [place] [address]. All diagnostic, treatment, and disposition decisions were made by myself in conjunction with the mid-level provider. Briefly, this is a 74 y.o. female here for N/V and fatigue. I saw this patient yesterday for what she said was the worst headache of her life. She also had numbness and tingling in 3 of her fingers in the left hand. CT was unremarkable and she declined LP. After getting her sick, her headache resolved and she was discharged home. Patient returned today stating that she had a very mild headache earlier today that already resolved. However she still had nausea and vomiting today. In addition, she feels \"terrible\" and weak in general.      ED Triage Vitals [10/14/18 1605]   BP Temp Temp Source Pulse Resp SpO2 Height Weight   (!) 141/56 103.3 °F (39.6 °C) Oral 94 14 92 % 5' (1.524 m) 200 lb (90.7 kg)        Exam showed WDWN F, ill appearing, very sleepy and slow to answer questions. Heart RRR. Lungs diminished in the bases. No LE edema. Patient mental status today is a significant change compared to yesterday. EKG  The Ekg interpreted by me in the absence of a cardiologist shows. normal sinus rhythm with a rate of 90  Axis is   Normal  QTc is  normal  Intervals and Durations are unremarkable. No specific ST-T wave changes appreciated. No evidence of acute ischemia. No significant change from prior EKG dated 1013/18     Lab reviewed, WBC 22,000 which is a significant change compared to yesterday. CBC and CMP did not change significantly compared to yesterday. Lactic acid 2.1    Chest x-ray showed pneumonia  Patient treated as coming acquired pneumonia. Admit to the hospitalist.    For further details of [facility] emergency department encounter, please see La Nena Samaniego NP's documentation.       This chart was generated in

## 2018-10-15 NOTE — PLAN OF CARE
Problem: Neurological  Intervention: OT Evaluation/treatment  Increase patients ADLs/functional status to baseline.

## 2018-10-16 ENCOUNTER — APPOINTMENT (OUTPATIENT)
Dept: MRI IMAGING | Age: 68
DRG: 871 | End: 2018-10-16
Payer: MEDICARE

## 2018-10-16 LAB
ALBUMIN SERPL-MCNC: 3.3 G/DL (ref 3.4–5)
ALP BLD-CCNC: 125 U/L (ref 40–129)
ALT SERPL-CCNC: 29 U/L (ref 10–40)
ANION GAP SERPL CALCULATED.3IONS-SCNC: 11 MMOL/L (ref 3–16)
AST SERPL-CCNC: 23 U/L (ref 15–37)
BILIRUB SERPL-MCNC: 1.2 MG/DL (ref 0–1)
BILIRUBIN DIRECT: 0.5 MG/DL (ref 0–0.3)
BILIRUBIN, INDIRECT: 0.7 MG/DL (ref 0–1)
BUN BLDV-MCNC: 11 MG/DL (ref 7–20)
CALCIUM SERPL-MCNC: 8.9 MG/DL (ref 8.3–10.6)
CHLORIDE BLD-SCNC: 103 MMOL/L (ref 99–110)
CO2: 21 MMOL/L (ref 21–32)
CREAT SERPL-MCNC: 0.7 MG/DL (ref 0.6–1.2)
GFR AFRICAN AMERICAN: >60
GFR NON-AFRICAN AMERICAN: >60
GLUCOSE BLD-MCNC: 145 MG/DL (ref 70–99)
GLUCOSE BLD-MCNC: 155 MG/DL (ref 70–99)
GLUCOSE BLD-MCNC: 191 MG/DL (ref 70–99)
GLUCOSE BLD-MCNC: 238 MG/DL (ref 70–99)
GLUCOSE BLD-MCNC: 295 MG/DL (ref 70–99)
HCT VFR BLD CALC: 34.4 % (ref 36–48)
HEMOGLOBIN: 11.1 G/DL (ref 12–16)
MAGNESIUM: 1.9 MG/DL (ref 1.8–2.4)
MCH RBC QN AUTO: 30 PG (ref 26–34)
MCHC RBC AUTO-ENTMCNC: 32.4 G/DL (ref 31–36)
MCV RBC AUTO: 92.5 FL (ref 80–100)
PDW BLD-RTO: 15 % (ref 12.4–15.4)
PERFORMED ON: ABNORMAL
PLATELET # BLD: 48 K/UL (ref 135–450)
PLATELET SLIDE REVIEW: ABNORMAL
PMV BLD AUTO: 11.5 FL (ref 5–10.5)
POTASSIUM REFLEX MAGNESIUM: 3.8 MMOL/L (ref 3.5–5.1)
RBC # BLD: 3.72 M/UL (ref 4–5.2)
SLIDE REVIEW: ABNORMAL
SODIUM BLD-SCNC: 135 MMOL/L (ref 136–145)
TOTAL PROTEIN: 7.3 G/DL (ref 6.4–8.2)
WBC # BLD: 12.2 K/UL (ref 4–11)

## 2018-10-16 PROCEDURE — 94640 AIRWAY INHALATION TREATMENT: CPT

## 2018-10-16 PROCEDURE — 70551 MRI BRAIN STEM W/O DYE: CPT

## 2018-10-16 PROCEDURE — 1200000000 HC SEMI PRIVATE

## 2018-10-16 PROCEDURE — 80076 HEPATIC FUNCTION PANEL: CPT

## 2018-10-16 PROCEDURE — 36415 COLL VENOUS BLD VENIPUNCTURE: CPT

## 2018-10-16 PROCEDURE — 6370000000 HC RX 637 (ALT 250 FOR IP): Performed by: INTERNAL MEDICINE

## 2018-10-16 PROCEDURE — 6370000000 HC RX 637 (ALT 250 FOR IP): Performed by: NURSE PRACTITIONER

## 2018-10-16 PROCEDURE — 94668 MNPJ CHEST WALL SBSQ: CPT

## 2018-10-16 PROCEDURE — 97110 THERAPEUTIC EXERCISES: CPT

## 2018-10-16 PROCEDURE — 80048 BASIC METABOLIC PNL TOTAL CA: CPT

## 2018-10-16 PROCEDURE — 2500000003 HC RX 250 WO HCPCS: Performed by: INTERNAL MEDICINE

## 2018-10-16 PROCEDURE — 93005 ELECTROCARDIOGRAM TRACING: CPT | Performed by: INTERNAL MEDICINE

## 2018-10-16 PROCEDURE — 6360000002 HC RX W HCPCS: Performed by: INTERNAL MEDICINE

## 2018-10-16 PROCEDURE — 97116 GAIT TRAINING THERAPY: CPT

## 2018-10-16 PROCEDURE — 2580000003 HC RX 258: Performed by: NURSE PRACTITIONER

## 2018-10-16 PROCEDURE — 83735 ASSAY OF MAGNESIUM: CPT

## 2018-10-16 PROCEDURE — 85027 COMPLETE CBC AUTOMATED: CPT

## 2018-10-16 RX ORDER — PREDNISONE 20 MG/1
40 TABLET ORAL DAILY
Status: DISCONTINUED | OUTPATIENT
Start: 2018-10-16 | End: 2018-10-22

## 2018-10-16 RX ORDER — DILTIAZEM HYDROCHLORIDE 5 MG/ML
5 INJECTION INTRAVENOUS ONCE
Status: COMPLETED | OUTPATIENT
Start: 2018-10-16 | End: 2018-10-16

## 2018-10-16 RX ORDER — DILTIAZEM HYDROCHLORIDE 60 MG/1
60 TABLET, FILM COATED ORAL 2 TIMES DAILY
Status: DISCONTINUED | OUTPATIENT
Start: 2018-10-16 | End: 2018-10-22 | Stop reason: HOSPADM

## 2018-10-16 RX ORDER — POTASSIUM CHLORIDE 20 MEQ/1
20 TABLET, EXTENDED RELEASE ORAL ONCE
Status: COMPLETED | OUTPATIENT
Start: 2018-10-16 | End: 2018-10-16

## 2018-10-16 RX ORDER — MAGNESIUM SULFATE 1 G/100ML
1 INJECTION INTRAVENOUS PRN
Status: DISCONTINUED | OUTPATIENT
Start: 2018-10-16 | End: 2018-10-22 | Stop reason: HOSPADM

## 2018-10-16 RX ORDER — OMEPRAZOLE 40 MG/1
40 CAPSULE, DELAYED RELEASE ORAL DAILY
Status: ON HOLD | COMMUNITY
End: 2022-08-25 | Stop reason: HOSPADM

## 2018-10-16 RX ADMIN — LEVOFLOXACIN 500 MG: 5 INJECTION, SOLUTION INTRAVENOUS at 17:27

## 2018-10-16 RX ADMIN — Medication 10 ML: at 09:07

## 2018-10-16 RX ADMIN — INSULIN LISPRO 1 UNITS: 100 INJECTION, SOLUTION INTRAVENOUS; SUBCUTANEOUS at 09:13

## 2018-10-16 RX ADMIN — POTASSIUM CHLORIDE 20 MEQ: 20 TABLET, EXTENDED RELEASE ORAL at 09:07

## 2018-10-16 RX ADMIN — INSULIN LISPRO 1 UNITS: 100 INJECTION, SOLUTION INTRAVENOUS; SUBCUTANEOUS at 12:45

## 2018-10-16 RX ADMIN — TRAMADOL HYDROCHLORIDE 50 MG: 50 TABLET, FILM COATED ORAL at 23:17

## 2018-10-16 RX ADMIN — IPRATROPIUM BROMIDE AND ALBUTEROL SULFATE 1 AMPULE: .5; 3 SOLUTION RESPIRATORY (INHALATION) at 07:55

## 2018-10-16 RX ADMIN — IPRATROPIUM BROMIDE AND ALBUTEROL SULFATE 1 AMPULE: .5; 3 SOLUTION RESPIRATORY (INHALATION) at 12:36

## 2018-10-16 RX ADMIN — DILTIAZEM HYDROCHLORIDE 60 MG: 60 TABLET, FILM COATED ORAL at 11:13

## 2018-10-16 RX ADMIN — ASPIRIN 81 MG: 81 TABLET, COATED ORAL at 09:07

## 2018-10-16 RX ADMIN — MONTELUKAST SODIUM 10 MG: 10 TABLET, COATED ORAL at 22:06

## 2018-10-16 RX ADMIN — DILTIAZEM HYDROCHLORIDE 60 MG: 60 TABLET, FILM COATED ORAL at 22:06

## 2018-10-16 RX ADMIN — IPRATROPIUM BROMIDE AND ALBUTEROL SULFATE 1 AMPULE: .5; 3 SOLUTION RESPIRATORY (INHALATION) at 23:27

## 2018-10-16 RX ADMIN — PANTOPRAZOLE SODIUM 40 MG: 40 TABLET, DELAYED RELEASE ORAL at 05:49

## 2018-10-16 RX ADMIN — VENLAFAXINE HYDROCHLORIDE 75 MG: 37.5 CAPSULE, EXTENDED RELEASE ORAL at 09:07

## 2018-10-16 RX ADMIN — PREDNISONE 40 MG: 20 TABLET ORAL at 11:13

## 2018-10-16 RX ADMIN — IPRATROPIUM BROMIDE AND ALBUTEROL SULFATE 1 AMPULE: .5; 3 SOLUTION RESPIRATORY (INHALATION) at 15:47

## 2018-10-16 RX ADMIN — FOLIC ACID 1 MG: 1 TABLET ORAL at 09:07

## 2018-10-16 RX ADMIN — IPRATROPIUM BROMIDE AND ALBUTEROL SULFATE 1 AMPULE: .5; 3 SOLUTION RESPIRATORY (INHALATION) at 03:27

## 2018-10-16 RX ADMIN — METOPROLOL TARTRATE 50 MG: 50 TABLET ORAL at 09:07

## 2018-10-16 RX ADMIN — INSULIN LISPRO 2 UNITS: 100 INJECTION, SOLUTION INTRAVENOUS; SUBCUTANEOUS at 17:31

## 2018-10-16 RX ADMIN — IPRATROPIUM BROMIDE AND ALBUTEROL SULFATE 1 AMPULE: .5; 3 SOLUTION RESPIRATORY (INHALATION) at 19:18

## 2018-10-16 RX ADMIN — Medication 10 ML: at 22:08

## 2018-10-16 RX ADMIN — DILTIAZEM HYDROCHLORIDE 5 MG: 5 INJECTION INTRAVENOUS at 11:13

## 2018-10-16 RX ADMIN — ATORVASTATIN CALCIUM 20 MG: 10 TABLET, FILM COATED ORAL at 22:06

## 2018-10-16 RX ADMIN — METOPROLOL TARTRATE 50 MG: 50 TABLET ORAL at 22:06

## 2018-10-16 RX ADMIN — FERROUS SULFATE TAB 325 MG (65 MG ELEMENTAL FE) 325 MG: 325 (65 FE) TAB at 09:07

## 2018-10-16 RX ADMIN — INSULIN LISPRO 2 UNITS: 100 INJECTION, SOLUTION INTRAVENOUS; SUBCUTANEOUS at 22:04

## 2018-10-16 RX ADMIN — TRAMADOL HYDROCHLORIDE 50 MG: 50 TABLET, FILM COATED ORAL at 07:33

## 2018-10-16 ASSESSMENT — PAIN SCALES - GENERAL
PAINLEVEL_OUTOF10: 7
PAINLEVEL_OUTOF10: 7

## 2018-10-16 NOTE — PROGRESS NOTES
Pt OOB ambulating to BR then to chair, O-2 RA 92%, Hr 80  Some shortness of breath and cough with exertion Will monitor.

## 2018-10-16 NOTE — PROGRESS NOTES
Physical Therapy  HOLD Note/No Treatment  RN requesting to hold PT at this time d/t pt's elevated HR this am. Will follow up later as able.     Fariba Benjamin, [place] [address]

## 2018-10-16 NOTE — PROGRESS NOTES
211 lb 11.2 oz (96 kg)   LMP  (LMP Unknown)   SpO2 95%   BMI 41.34 kg/m²     General appearance:  No apparent distress, appears stated age and cooperative. HEENT:  Normal cephalic, atraumatic without obvious deformity. Pupils equal, round, and reactive to light. Extra ocular muscles intact. Conjunctivae/corneas clear. Neck: Supple, with full range of motion. No jugular venous distention. Trachea midline. Respiratory:  Normal respiratory effort. Clear to auscultation, bilaterally without Rales/Wheezes/Rhonchi. Cardiovascular:  Regular rate and rhythm with normal S1/S2 without murmurs, rubs or gallops. Abdomen: Soft, non-tender, non-distended with normal bowel sounds. Musculoskeletal:  No clubbing, cyanosis or edema bilaterally. Full range of motion without deformity. Skin: Skin color, texture, turgor normal.  No rashes or lesions. Neurologic:  Binocular R visual field defect. Symmetrical and fairly diffuse weakness, more pronounced in BUE's. Psychiatric:  Alert and oriented, has insight  Capillary Refill: Brisk,< 3 seconds   Peripheral Pulses: +2 palpable, equal bilaterally       Labs:   Recent Labs      10/13/18   1600  10/14/18   1646  10/16/18   0556   WBC  7.6  22.3*  12.2*   HGB  11.7*  11.2*  11.1*   HCT  34.7*  34.4*  34.4*   PLT  92*  69*  48*     Recent Labs      10/13/18   1600  10/14/18   1646  10/16/18   0556   NA  138  134*  135*   K  4.1  4.3  3.8   CL  102  98*  103   CO2  26  24  21   BUN  17  15  11   CREATININE  0.8  0.8  0.7   CALCIUM  9.2  8.8  8.9     Recent Labs      10/13/18   1600  10/14/18   1646  10/16/18   0556   AST  20  43*  23   ALT  16  34  29   BILIDIR   --    --   0.5*   BILITOT  0.4  1.6*  1.2*   ALKPHOS  127  133*  125     No results for input(s): INR in the last 72 hours. No results for input(s): Nicole Cindy in the last 72 hours.     Urinalysis:      Lab Results   Component Value Date    NITRU Negative 10/14/2018    WBCUA 3-5 10/14/2018    BACTERIA Rare

## 2018-10-17 ENCOUNTER — APPOINTMENT (OUTPATIENT)
Dept: CT IMAGING | Age: 68
DRG: 871 | End: 2018-10-17
Payer: MEDICARE

## 2018-10-17 LAB
ALBUMIN SERPL-MCNC: 3.4 G/DL (ref 3.4–5)
ALP BLD-CCNC: 112 U/L (ref 40–129)
ALT SERPL-CCNC: 23 U/L (ref 10–40)
ANION GAP SERPL CALCULATED.3IONS-SCNC: 9 MMOL/L (ref 3–16)
AST SERPL-CCNC: 13 U/L (ref 15–37)
BILIRUB SERPL-MCNC: 0.6 MG/DL (ref 0–1)
BILIRUBIN DIRECT: 0.3 MG/DL (ref 0–0.3)
BILIRUBIN, INDIRECT: 0.3 MG/DL (ref 0–1)
BUN BLDV-MCNC: 13 MG/DL (ref 7–20)
CALCIUM SERPL-MCNC: 9 MG/DL (ref 8.3–10.6)
CHLORIDE BLD-SCNC: 104 MMOL/L (ref 99–110)
CO2: 24 MMOL/L (ref 21–32)
CREAT SERPL-MCNC: 0.7 MG/DL (ref 0.6–1.2)
EKG ATRIAL RATE: 82 BPM
EKG DIAGNOSIS: NORMAL
EKG P AXIS: 57 DEGREES
EKG P-R INTERVAL: 170 MS
EKG Q-T INTERVAL: 392 MS
EKG QRS DURATION: 82 MS
EKG QTC CALCULATION (BAZETT): 457 MS
EKG R AXIS: 3 DEGREES
EKG T AXIS: 17 DEGREES
EKG VENTRICULAR RATE: 82 BPM
GFR AFRICAN AMERICAN: >60
GFR NON-AFRICAN AMERICAN: >60
GLUCOSE BLD-MCNC: 140 MG/DL (ref 70–99)
GLUCOSE BLD-MCNC: 153 MG/DL (ref 70–99)
GLUCOSE BLD-MCNC: 197 MG/DL (ref 70–99)
GLUCOSE BLD-MCNC: 247 MG/DL (ref 70–99)
GLUCOSE BLD-MCNC: 293 MG/DL (ref 70–99)
HCT VFR BLD CALC: 30.5 % (ref 36–48)
HEMOGLOBIN: 10.1 G/DL (ref 12–16)
MCH RBC QN AUTO: 29.8 PG (ref 26–34)
MCHC RBC AUTO-ENTMCNC: 33 G/DL (ref 31–36)
MCV RBC AUTO: 90.3 FL (ref 80–100)
PDW BLD-RTO: 14.6 % (ref 12.4–15.4)
PERFORMED ON: ABNORMAL
PLATELET # BLD: 60 K/UL (ref 135–450)
PMV BLD AUTO: 11 FL (ref 5–10.5)
POTASSIUM REFLEX MAGNESIUM: 3.8 MMOL/L (ref 3.5–5.1)
RBC # BLD: 3.38 M/UL (ref 4–5.2)
SODIUM BLD-SCNC: 137 MMOL/L (ref 136–145)
TOTAL PROTEIN: 7.1 G/DL (ref 6.4–8.2)
WBC # BLD: 7.4 K/UL (ref 4–11)

## 2018-10-17 PROCEDURE — 70496 CT ANGIOGRAPHY HEAD: CPT

## 2018-10-17 PROCEDURE — 80048 BASIC METABOLIC PNL TOTAL CA: CPT

## 2018-10-17 PROCEDURE — 94664 DEMO&/EVAL PT USE INHALER: CPT

## 2018-10-17 PROCEDURE — 1200000000 HC SEMI PRIVATE

## 2018-10-17 PROCEDURE — 97530 THERAPEUTIC ACTIVITIES: CPT

## 2018-10-17 PROCEDURE — 94640 AIRWAY INHALATION TREATMENT: CPT

## 2018-10-17 PROCEDURE — G8979 MOBILITY GOAL STATUS: HCPCS

## 2018-10-17 PROCEDURE — 36415 COLL VENOUS BLD VENIPUNCTURE: CPT

## 2018-10-17 PROCEDURE — 6370000000 HC RX 637 (ALT 250 FOR IP): Performed by: INTERNAL MEDICINE

## 2018-10-17 PROCEDURE — 6360000002 HC RX W HCPCS: Performed by: INTERNAL MEDICINE

## 2018-10-17 PROCEDURE — 94668 MNPJ CHEST WALL SBSQ: CPT

## 2018-10-17 PROCEDURE — 80076 HEPATIC FUNCTION PANEL: CPT

## 2018-10-17 PROCEDURE — 70498 CT ANGIOGRAPHY NECK: CPT

## 2018-10-17 PROCEDURE — 97110 THERAPEUTIC EXERCISES: CPT

## 2018-10-17 PROCEDURE — 93010 ELECTROCARDIOGRAM REPORT: CPT | Performed by: INTERNAL MEDICINE

## 2018-10-17 PROCEDURE — 97535 SELF CARE MNGMENT TRAINING: CPT

## 2018-10-17 PROCEDURE — G8978 MOBILITY CURRENT STATUS: HCPCS

## 2018-10-17 PROCEDURE — 85027 COMPLETE CBC AUTOMATED: CPT

## 2018-10-17 PROCEDURE — 2580000003 HC RX 258: Performed by: NURSE PRACTITIONER

## 2018-10-17 PROCEDURE — 6360000004 HC RX CONTRAST MEDICATION: Performed by: INTERNAL MEDICINE

## 2018-10-17 RX ORDER — POTASSIUM CHLORIDE 20 MEQ/1
20 TABLET, EXTENDED RELEASE ORAL ONCE
Status: COMPLETED | OUTPATIENT
Start: 2018-10-17 | End: 2018-10-17

## 2018-10-17 RX ORDER — ATORVASTATIN CALCIUM 40 MG/1
40 TABLET, FILM COATED ORAL NIGHTLY
Status: DISCONTINUED | OUTPATIENT
Start: 2018-10-17 | End: 2018-10-22 | Stop reason: HOSPADM

## 2018-10-17 RX ADMIN — INSULIN LISPRO 1 UNITS: 100 INJECTION, SOLUTION INTRAVENOUS; SUBCUTANEOUS at 12:47

## 2018-10-17 RX ADMIN — IPRATROPIUM BROMIDE AND ALBUTEROL SULFATE 1 AMPULE: .5; 3 SOLUTION RESPIRATORY (INHALATION) at 03:47

## 2018-10-17 RX ADMIN — PREDNISONE 40 MG: 20 TABLET ORAL at 08:24

## 2018-10-17 RX ADMIN — MONTELUKAST SODIUM 10 MG: 10 TABLET, COATED ORAL at 21:18

## 2018-10-17 RX ADMIN — INSULIN LISPRO 1 UNITS: 100 INJECTION, SOLUTION INTRAVENOUS; SUBCUTANEOUS at 08:26

## 2018-10-17 RX ADMIN — DILTIAZEM HYDROCHLORIDE 60 MG: 60 TABLET, FILM COATED ORAL at 08:24

## 2018-10-17 RX ADMIN — Medication 10 ML: at 21:22

## 2018-10-17 RX ADMIN — IPRATROPIUM BROMIDE AND ALBUTEROL SULFATE 1 AMPULE: .5; 3 SOLUTION RESPIRATORY (INHALATION) at 07:47

## 2018-10-17 RX ADMIN — INSULIN LISPRO 2 UNITS: 100 INJECTION, SOLUTION INTRAVENOUS; SUBCUTANEOUS at 16:55

## 2018-10-17 RX ADMIN — IOPAMIDOL 80 ML: 755 INJECTION, SOLUTION INTRAVENOUS at 18:10

## 2018-10-17 RX ADMIN — VENLAFAXINE HYDROCHLORIDE 75 MG: 37.5 CAPSULE, EXTENDED RELEASE ORAL at 08:25

## 2018-10-17 RX ADMIN — FERROUS SULFATE TAB 325 MG (65 MG ELEMENTAL FE) 325 MG: 325 (65 FE) TAB at 08:24

## 2018-10-17 RX ADMIN — POTASSIUM CHLORIDE 20 MEQ: 20 TABLET, EXTENDED RELEASE ORAL at 14:05

## 2018-10-17 RX ADMIN — IPRATROPIUM BROMIDE AND ALBUTEROL SULFATE 1 AMPULE: .5; 3 SOLUTION RESPIRATORY (INHALATION) at 23:27

## 2018-10-17 RX ADMIN — INSULIN LISPRO 2 UNITS: 100 INJECTION, SOLUTION INTRAVENOUS; SUBCUTANEOUS at 21:19

## 2018-10-17 RX ADMIN — ATORVASTATIN CALCIUM 40 MG: 40 TABLET, FILM COATED ORAL at 21:18

## 2018-10-17 RX ADMIN — IPRATROPIUM BROMIDE AND ALBUTEROL SULFATE 1 AMPULE: .5; 3 SOLUTION RESPIRATORY (INHALATION) at 19:47

## 2018-10-17 RX ADMIN — DILTIAZEM HYDROCHLORIDE 60 MG: 60 TABLET, FILM COATED ORAL at 21:18

## 2018-10-17 RX ADMIN — LEVOFLOXACIN 500 MG: 5 INJECTION, SOLUTION INTRAVENOUS at 16:29

## 2018-10-17 RX ADMIN — FOLIC ACID 1 MG: 1 TABLET ORAL at 08:25

## 2018-10-17 RX ADMIN — METOPROLOL TARTRATE 50 MG: 50 TABLET ORAL at 08:25

## 2018-10-17 RX ADMIN — ASPIRIN 81 MG: 81 TABLET, COATED ORAL at 08:24

## 2018-10-17 RX ADMIN — METOPROLOL TARTRATE 50 MG: 50 TABLET ORAL at 21:18

## 2018-10-17 RX ADMIN — IPRATROPIUM BROMIDE AND ALBUTEROL SULFATE 1 AMPULE: .5; 3 SOLUTION RESPIRATORY (INHALATION) at 11:46

## 2018-10-17 RX ADMIN — PANTOPRAZOLE SODIUM 40 MG: 40 TABLET, DELAYED RELEASE ORAL at 06:54

## 2018-10-17 RX ADMIN — Medication 10 ML: at 08:30

## 2018-10-17 RX ADMIN — IPRATROPIUM BROMIDE AND ALBUTEROL SULFATE 1 AMPULE: .5; 3 SOLUTION RESPIRATORY (INHALATION) at 16:19

## 2018-10-17 NOTE — PROGRESS NOTES
Hospitalist Progress Note      PCP: Mariia Javed MD    Date of Admission: 10/14/2018    Chief Complaint: lethargy    Hospital Course: 74 Y F with a h/o HTN, HLD, DM2, Afib, and hemorrhagic CVA in 3/2018 presented to the ED with a headache and L hand paresthesias on 10/13, basic workup was negative and she felt better, so she was discharged home. She now returns the following day due to fluctuating spells of lethargy, confusion, and generalized weakness. She has developed a new cough lately, CXR suggested pneumonia, and lab workup suggested sepsis. Subjective:  Respiratory status is stable. RVR has resolved. MRI from yesterday showed a possible recent R posterior CVA. The patient has had a variety of neurological symptoms in the few days leading up to this admission, one of which was increased difficulty with reading. CTA head and neck ordered, neuro consulted. Perhaps change from aspirin to clopidogrel? I increased to high-intensity statin therapy (though lipid panel in 8/2018 looked great).         Medications:  Reviewed    Infusion Medications    dextrose       Scheduled Medications    diltiazem  60 mg Oral BID    predniSONE  40 mg Oral Daily    aspirin  81 mg Oral Daily    atorvastatin  20 mg Oral Nightly    pantoprazole  40 mg Oral QAM AC    ferrous sulfate  325 mg Oral Daily with breakfast    folic acid  1 mg Oral Daily    metoprolol tartrate  50 mg Oral BID    montelukast  10 mg Oral Nightly    venlafaxine  75 mg Oral Daily    ipratropium-albuterol  1 ampule Inhalation Q4H    sodium chloride flush  10 mL Intravenous 2 times per day    levofloxacin  500 mg Intravenous Q24H    insulin lispro  0-6 Units Subcutaneous TID WC    insulin lispro  0-3 Units Subcutaneous Nightly     PRN Meds: magnesium sulfate, traMADol, acetaminophen, sodium chloride flush, magnesium hydroxide, glucose, dextrose, glucagon (rDNA), dextrose, labetalol      Intake/Output Summary (Last 24 hours) at 10/17/18 1453  Last data filed at 10/17/18 1423   Gross per 24 hour   Intake             1840 ml   Output                0 ml   Net             1840 ml       Physical Exam Performed:    BP (!) 146/77   Pulse 69   Temp 98.1 °F (36.7 °C) (Oral)   Resp 17   Ht 5' (1.524 m)   Wt 211 lb 11.2 oz (96 kg)   LMP  (LMP Unknown)   SpO2 93%   BMI 41.34 kg/m²     General appearance:  No apparent distress, appears stated age and cooperative. HEENT:  Normal cephalic, atraumatic without obvious deformity. Pupils equal, round, and reactive to light. Extra ocular muscles intact. Conjunctivae/corneas clear. Neck: Supple, with full range of motion. No jugular venous distention. Trachea midline. Respiratory:  Normal respiratory effort. Clear to auscultation, bilaterally without Rales/Wheezes/Rhonchi. Cardiovascular:  Regular rate and rhythm with normal S1/S2 without murmurs, rubs or gallops. Abdomen: Soft, non-tender, non-distended with normal bowel sounds. Musculoskeletal:  No clubbing, cyanosis or edema bilaterally. Full range of motion without deformity. Skin: Skin color, texture, turgor normal.  No rashes or lesions. Neurologic:  Binocular R visual field defect, also peripheral L visual field defect. Symmetrical and fairly diffuse weakness, more pronounced in BUE's, also perhaps R facial droop.   Psychiatric:  Alert and oriented, has insight  Capillary Refill: Brisk,< 3 seconds   Peripheral Pulses: +2 palpable, equal bilaterally       Labs:   Recent Labs      10/14/18   1646  10/16/18   0556  10/17/18   0519   WBC  22.3*  12.2*  7.4   HGB  11.2*  11.1*  10.1*   HCT  34.4*  34.4*  30.5*   PLT  69*  48*  60*     Recent Labs      10/14/18   1646  10/16/18   0556  10/17/18   0519   NA  134*  135*  137   K  4.3  3.8  3.8   CL  98*  103  104   CO2  24  21  24   BUN  15  11  13   CREATININE  0.8  0.7  0.7   CALCIUM  8.8  8.9  9.0     Recent Labs      10/14/18   1646  10/16/18   0556  10/17/18   0519   AST  43*  23  13* ALT  34  29  23   BILIDIR   --   0.5*  0.3   BILITOT  1.6*  1.2*  0.6   ALKPHOS  133*  125  112     No results for input(s): INR in the last 72 hours. No results for input(s): Frann Goes in the last 72 hours. Urinalysis:      Lab Results   Component Value Date    NITRU Negative 10/14/2018    WBCUA 3-5 10/14/2018    BACTERIA Rare 10/14/2018    RBCUA 3-5 10/14/2018    BLOODU TRACE-INTACT 10/14/2018    SPECGRAV 1.025 10/14/2018    GLUCOSEU Negative 10/14/2018       Radiology:  MRI BRAIN WO CONTRAST   Final Result   1. Evolving area of chronic stroke involving the left occipital lobe with   areas of laminar necrosis. 2.  Chronic areas of stroke there seen throughout right hemisphere in   distribution of right MCA including right frontal, right temporal, and right   parietal lobes. 3.  New area of abnormal signal is seen involving right occipital cortex and   right cerebellar hemisphere suggestive of recent areas of stroke. Clinical   correlation recommended. XR CHEST STANDARD (2 VW)   Final Result   Focal opacity within the left midlung, suggestive of focal pneumonia. That   should be followed to resolution. Assessment/Plan:    Active Hospital Problems    Diagnosis Date Noted    Pneumonia [J18.9] 10/14/2018    HTN (hypertension), benign [I10]     Morbid obesity with BMI of 40.0-44.9, adult ([facility] [E66.01, Z68.41] 05/19/2014    Asthma/COPD [T36.108] 12/02/2013    Poorly controlled type 2 diabetes mellitus ([facility] [E11.65] 12/02/2013       74 Y F with a h/o HTN, HLD, DM2, Afib, and hemorrhagic CVA in 3/2018 presented to the ED with a headache and L hand paresthesias on 10/13, basic workup was negative and she felt better, so she was discharged home. She now returns the following day due to fluctuating spells of lethargy, confusion, and generalized weakness.   She has developed a new cough lately, CXR suggested pneumonia, and lab workup suggested sepsis.          L-sided

## 2018-10-17 NOTE — PROGRESS NOTES
0-100% Score: 42.8  ADL Inpatient CMS G-Code Modifier : CK    Goals  Short term goals  Time Frame for Short term goals: 1 week unless otherwise specified  Short term goal 1: Pt. will attend to R visual field during ADL's/functional mobility with 1 vc.   Short term goal 2: Pt. will complete LE dressing with SBA by 10/19  Short term goal 3: Pt. will complete toilet transfers with supervision  Patient Goals   Patient goals : \"to go back to therapy to work on my vision\"       Therapy Time   Individual Concurrent Group Co-treatment   Time In 0833         Time Out 0911         Minutes 38         Timed Code Treatment Minutes: 206 [facility], OTR/L

## 2018-10-18 LAB
GLUCOSE BLD-MCNC: 130 MG/DL (ref 70–99)
GLUCOSE BLD-MCNC: 187 MG/DL (ref 70–99)
GLUCOSE BLD-MCNC: 235 MG/DL (ref 70–99)
GLUCOSE BLD-MCNC: 249 MG/DL (ref 70–99)
PERFORMED ON: ABNORMAL

## 2018-10-18 PROCEDURE — 2580000003 HC RX 258: Performed by: NURSE PRACTITIONER

## 2018-10-18 PROCEDURE — 6370000000 HC RX 637 (ALT 250 FOR IP): Performed by: INTERNAL MEDICINE

## 2018-10-18 PROCEDURE — 94640 AIRWAY INHALATION TREATMENT: CPT

## 2018-10-18 PROCEDURE — 1200000000 HC SEMI PRIVATE

## 2018-10-18 PROCEDURE — 94664 DEMO&/EVAL PT USE INHALER: CPT

## 2018-10-18 PROCEDURE — 94668 MNPJ CHEST WALL SBSQ: CPT

## 2018-10-18 PROCEDURE — 6360000002 HC RX W HCPCS: Performed by: INTERNAL MEDICINE

## 2018-10-18 RX ADMIN — MONTELUKAST SODIUM 10 MG: 10 TABLET, COATED ORAL at 21:05

## 2018-10-18 RX ADMIN — IPRATROPIUM BROMIDE AND ALBUTEROL SULFATE 1 AMPULE: .5; 3 SOLUTION RESPIRATORY (INHALATION) at 03:24

## 2018-10-18 RX ADMIN — VENLAFAXINE HYDROCHLORIDE 75 MG: 37.5 CAPSULE, EXTENDED RELEASE ORAL at 08:19

## 2018-10-18 RX ADMIN — METOPROLOL TARTRATE 50 MG: 50 TABLET ORAL at 08:19

## 2018-10-18 RX ADMIN — Medication 10 ML: at 08:19

## 2018-10-18 RX ADMIN — FOLIC ACID 1 MG: 1 TABLET ORAL at 08:19

## 2018-10-18 RX ADMIN — IPRATROPIUM BROMIDE AND ALBUTEROL SULFATE 1 AMPULE: .5; 3 SOLUTION RESPIRATORY (INHALATION) at 07:56

## 2018-10-18 RX ADMIN — INSULIN LISPRO 1 UNITS: 100 INJECTION, SOLUTION INTRAVENOUS; SUBCUTANEOUS at 11:46

## 2018-10-18 RX ADMIN — PANTOPRAZOLE SODIUM 40 MG: 40 TABLET, DELAYED RELEASE ORAL at 08:19

## 2018-10-18 RX ADMIN — INSULIN LISPRO 2 UNITS: 100 INJECTION, SOLUTION INTRAVENOUS; SUBCUTANEOUS at 16:38

## 2018-10-18 RX ADMIN — IPRATROPIUM BROMIDE AND ALBUTEROL SULFATE 1 AMPULE: .5; 3 SOLUTION RESPIRATORY (INHALATION) at 19:44

## 2018-10-18 RX ADMIN — IPRATROPIUM BROMIDE AND ALBUTEROL SULFATE 1 AMPULE: .5; 3 SOLUTION RESPIRATORY (INHALATION) at 15:53

## 2018-10-18 RX ADMIN — FERROUS SULFATE TAB 325 MG (65 MG ELEMENTAL FE) 325 MG: 325 (65 FE) TAB at 08:19

## 2018-10-18 RX ADMIN — DILTIAZEM HYDROCHLORIDE 60 MG: 60 TABLET, FILM COATED ORAL at 08:19

## 2018-10-18 RX ADMIN — IPRATROPIUM BROMIDE AND ALBUTEROL SULFATE 1 AMPULE: .5; 3 SOLUTION RESPIRATORY (INHALATION) at 12:21

## 2018-10-18 RX ADMIN — Medication 10 ML: at 21:06

## 2018-10-18 RX ADMIN — ATORVASTATIN CALCIUM 40 MG: 40 TABLET, FILM COATED ORAL at 21:05

## 2018-10-18 RX ADMIN — ASPIRIN 81 MG: 81 TABLET, COATED ORAL at 08:19

## 2018-10-18 RX ADMIN — INSULIN LISPRO 1 UNITS: 100 INJECTION, SOLUTION INTRAVENOUS; SUBCUTANEOUS at 21:08

## 2018-10-18 RX ADMIN — METOPROLOL TARTRATE 50 MG: 50 TABLET ORAL at 21:05

## 2018-10-18 RX ADMIN — PREDNISONE 40 MG: 20 TABLET ORAL at 08:19

## 2018-10-18 RX ADMIN — DILTIAZEM HYDROCHLORIDE 60 MG: 60 TABLET, FILM COATED ORAL at 21:05

## 2018-10-18 RX ADMIN — LEVOFLOXACIN 500 MG: 5 INJECTION, SOLUTION INTRAVENOUS at 16:44

## 2018-10-18 ASSESSMENT — PAIN DESCRIPTION - PAIN TYPE: TYPE: ACUTE PAIN

## 2018-10-18 ASSESSMENT — PAIN SCALES - GENERAL: PAINLEVEL_OUTOF10: 3

## 2018-10-18 ASSESSMENT — PAIN DESCRIPTION - LOCATION: LOCATION: RIB CAGE

## 2018-10-18 NOTE — CONSULTS
throughout right hemisphere in   distribution of right MCA including right frontal, right temporal, and right   parietal lobes. 3.  New area of abnormal signal is seen involving right occipital cortex and   right cerebellar hemisphere suggestive of recent areas of stroke. Clinical   correlation recommended. XR CHEST STANDARD (2 VW)   Final Result   Focal opacity within the left midlung, suggestive of focal pneumonia. That   should be followed to resolution. MRI of the head:10/16/2018          All reports below personally reviewed & actual images reviewed where indicated. Pertinent positives & negatives are addressed in Assessment & Plan section of note      Laboratory Review: All results below personally reviewed.  Pertinent positives & negatives are addressed in Assessment & Plan section of note  Recent Results (from the past 36 hour(s))   POCT Glucose    Collection Time: 10/16/18  8:41 PM   Result Value Ref Range    POC Glucose 295 (H) 70 - 99 mg/dl    Performed on ACCU-CHEK    Basic Metabolic Panel w/ Reflex to MG    Collection Time: 10/17/18  5:19 AM   Result Value Ref Range    Sodium 137 136 - 145 mmol/L    Potassium reflex Magnesium 3.8 3.5 - 5.1 mmol/L    Chloride 104 99 - 110 mmol/L    CO2 24 21 - 32 mmol/L    Anion Gap 9 3 - 16    Glucose 153 (H) 70 - 99 mg/dL    BUN 13 7 - 20 mg/dL    CREATININE 0.7 0.6 - 1.2 mg/dL    GFR Non-African American >60 >60    GFR African American >60 >60    Calcium 9.0 8.3 - 10.6 mg/dL   CBC    Collection Time: 10/17/18  5:19 AM   Result Value Ref Range    WBC 7.4 4.0 - 11.0 K/uL    RBC 3.38 (L) 4.00 - 5.20 M/uL    Hemoglobin 10.1 (L) 12.0 - 16.0 g/dL    Hematocrit 30.5 (L) 36.0 - 48.0 %    MCV 90.3 80.0 - 100.0 fL    MCH 29.8 26.0 - 34.0 pg    MCHC 33.0 31.0 - 36.0 g/dL    RDW 14.6 12.4 - 15.4 %    Platelets 60 (L) 829 - 450 K/uL    MPV 11.0 (H) 5.0 - 10.5 fL   Hepatic Function Panel    Collection Time: 10/17/18  5:19 AM   Result Value Ref Range Total Protein 7.1 6.4 - 8.2 g/dL    Alb 3.4 3.4 - 5.0 g/dL    Alkaline Phosphatase 112 40 - 129 U/L    ALT 23 10 - 40 U/L    AST 13 (L) 15 - 37 U/L    Total Bilirubin 0.6 0.0 - 1.0 mg/dL    Bilirubin, Direct 0.3 0.0 - 0.3 mg/dL    Bilirubin, Indirect 0.3 0.0 - 1.0 mg/dL   POCT Glucose    Collection Time: 10/17/18  7:45 AM   Result Value Ref Range    POC Glucose 140 (H) 70 - 99 mg/dl    Performed on ACCU-CHEK    POCT Glucose    Collection Time: 10/17/18 11:50 AM   Result Value Ref Range    POC Glucose 197 (H) 70 - 99 mg/dl    Performed on ACCU-CHEK    POCT Glucose    Collection Time: 10/17/18  4:52 PM   Result Value Ref Range    POC Glucose 247 (H) 70 - 99 mg/dl    Performed on ACCU-CHEK    POCT Glucose    Collection Time: 10/17/18  8:28 PM   Result Value Ref Range    POC Glucose 293 (H) 70 - 99 mg/dl    Performed on ACCU-CHEK    POCT Glucose    Collection Time: 10/18/18  7:16 AM   Result Value Ref Range    POC Glucose 130 (H) 70 - 99 mg/dl    Performed on ACCU-CHEK        Scheduled Meds:   atorvastatin  40 mg Oral Nightly    diltiazem  60 mg Oral BID    predniSONE  40 mg Oral Daily    aspirin  81 mg Oral Daily    pantoprazole  40 mg Oral QAM AC    ferrous sulfate  325 mg Oral Daily with breakfast    folic acid  1 mg Oral Daily    metoprolol tartrate  50 mg Oral BID    montelukast  10 mg Oral Nightly    venlafaxine  75 mg Oral Daily    ipratropium-albuterol  1 ampule Inhalation Q4H    sodium chloride flush  10 mL Intravenous 2 times per day    levofloxacin  500 mg Intravenous Q24H    insulin lispro  0-6 Units Subcutaneous TID WC    insulin lispro  0-3 Units Subcutaneous Nightly       Continuous Infusions:    dextrose          ASSESSMENT & RECOMMENDATIONS:   Pge Taylor 75 y/o female who presented with encephalopathy suspect due to pneumonia with abnormal MRI of the brain revealing new areas of cerebral infarctions within the right cerebellar and occipital regions.   History of atrial fibrillation but

## 2018-10-18 NOTE — PROGRESS NOTES
Re: question of anticoagulation in setting of stroke after ICH    75 yo lady with recurrent ischemic strokes in the setting of afib with ICH in march 2018. On review of march/april imaging this is most consistent with parenchymal hematoma following ischemic stroke (as opposed to primary lobar ICH). No CMBs or superficial siderosis to suggest amyloid. Current acute infarct burden is small. I think initiation of anticoagulation is reasonable with heparin infusion transition to eliquis    Heparin infusion. Low dose protocol targeting Xa of 0.3-0.7 or PTT of 70-90 (similar to ACS protocols) without a bolus. Once therapeutic Xa or PTT check non-con CT to assess for hemorrhagic transformation    If CT stable can begin oral anticoagulation.           CT 3/27/18        Head CT 3/31        MRI 3/2018            10/2018

## 2018-10-19 ENCOUNTER — ANESTHESIA (OUTPATIENT)
Dept: CARDIAC CATH/INVASIVE PROCEDURES | Age: 68
DRG: 871 | End: 2018-10-19
Payer: MEDICARE

## 2018-10-19 ENCOUNTER — APPOINTMENT (OUTPATIENT)
Dept: CARDIAC CATH/INVASIVE PROCEDURES | Age: 68
DRG: 871 | End: 2018-10-19
Payer: MEDICARE

## 2018-10-19 ENCOUNTER — ANESTHESIA EVENT (OUTPATIENT)
Dept: CARDIAC CATH/INVASIVE PROCEDURES | Age: 68
DRG: 871 | End: 2018-10-19
Payer: MEDICARE

## 2018-10-19 VITALS — OXYGEN SATURATION: 96 % | DIASTOLIC BLOOD PRESSURE: 94 MMHG | SYSTOLIC BLOOD PRESSURE: 169 MMHG

## 2018-10-19 LAB
ANION GAP SERPL CALCULATED.3IONS-SCNC: 10 MMOL/L (ref 3–16)
APTT: 22.9 SEC (ref 26–36)
APTT: 25 SEC (ref 26–36)
BLOOD CULTURE, ROUTINE: NORMAL
BUN BLDV-MCNC: 17 MG/DL (ref 7–20)
CALCIUM SERPL-MCNC: 8.9 MG/DL (ref 8.3–10.6)
CHLORIDE BLD-SCNC: 105 MMOL/L (ref 99–110)
CO2: 26 MMOL/L (ref 21–32)
CREAT SERPL-MCNC: 0.7 MG/DL (ref 0.6–1.2)
CULTURE, BLOOD 2: NORMAL
GFR AFRICAN AMERICAN: >60
GFR NON-AFRICAN AMERICAN: >60
GLUCOSE BLD-MCNC: 137 MG/DL (ref 70–99)
GLUCOSE BLD-MCNC: 141 MG/DL (ref 70–99)
GLUCOSE BLD-MCNC: 194 MG/DL (ref 70–99)
GLUCOSE BLD-MCNC: 198 MG/DL (ref 70–99)
GLUCOSE BLD-MCNC: 216 MG/DL (ref 70–99)
GLUCOSE BLD-MCNC: 235 MG/DL (ref 70–99)
GLUCOSE BLD-MCNC: 272 MG/DL (ref 70–99)
HCT VFR BLD CALC: 33.4 % (ref 36–48)
HEMOGLOBIN: 11.1 G/DL (ref 12–16)
LV EF: 55 %
LVEF MODALITY: NORMAL
MAGNESIUM: 2 MG/DL (ref 1.8–2.4)
MCH RBC QN AUTO: 29.8 PG (ref 26–34)
MCHC RBC AUTO-ENTMCNC: 33.2 G/DL (ref 31–36)
MCV RBC AUTO: 90 FL (ref 80–100)
PDW BLD-RTO: 14.7 % (ref 12.4–15.4)
PERFORMED ON: ABNORMAL
PLATELET # BLD: 83 K/UL (ref 135–450)
PMV BLD AUTO: 10.7 FL (ref 5–10.5)
POTASSIUM REFLEX MAGNESIUM: 3.5 MMOL/L (ref 3.5–5.1)
RBC # BLD: 3.71 M/UL (ref 4–5.2)
SODIUM BLD-SCNC: 141 MMOL/L (ref 136–145)
WBC # BLD: 7 K/UL (ref 4–11)

## 2018-10-19 PROCEDURE — 36415 COLL VENOUS BLD VENIPUNCTURE: CPT

## 2018-10-19 PROCEDURE — 0DJ08ZZ INSPECTION OF UPPER INTESTINAL TRACT, VIA NATURAL OR ARTIFICIAL OPENING ENDOSCOPIC: ICD-10-PCS | Performed by: INTERNAL MEDICINE

## 2018-10-19 PROCEDURE — 94640 AIRWAY INHALATION TREATMENT: CPT

## 2018-10-19 PROCEDURE — 2500000003 HC RX 250 WO HCPCS: Performed by: NURSE ANESTHETIST, CERTIFIED REGISTERED

## 2018-10-19 PROCEDURE — 6370000000 HC RX 637 (ALT 250 FOR IP): Performed by: INTERNAL MEDICINE

## 2018-10-19 PROCEDURE — 1200000000 HC SEMI PRIVATE

## 2018-10-19 PROCEDURE — 2580000003 HC RX 258: Performed by: NURSE PRACTITIONER

## 2018-10-19 PROCEDURE — 93315 ECHO TRANSESOPHAGEAL: CPT | Performed by: INTERNAL MEDICINE

## 2018-10-19 PROCEDURE — 85730 THROMBOPLASTIN TIME PARTIAL: CPT

## 2018-10-19 PROCEDURE — 3700000000 HC ANESTHESIA ATTENDED CARE

## 2018-10-19 PROCEDURE — 93312 ECHO TRANSESOPHAGEAL: CPT

## 2018-10-19 PROCEDURE — 83735 ASSAY OF MAGNESIUM: CPT

## 2018-10-19 PROCEDURE — 6360000002 HC RX W HCPCS: Performed by: NURSE ANESTHETIST, CERTIFIED REGISTERED

## 2018-10-19 PROCEDURE — 85027 COMPLETE CBC AUTOMATED: CPT

## 2018-10-19 PROCEDURE — 99221 1ST HOSP IP/OBS SF/LOW 40: CPT | Performed by: INTERNAL MEDICINE

## 2018-10-19 PROCEDURE — 94664 DEMO&/EVAL PT USE INHALER: CPT

## 2018-10-19 PROCEDURE — 93312 ECHO TRANSESOPHAGEAL: CPT | Performed by: INTERNAL MEDICINE

## 2018-10-19 PROCEDURE — 93320 DOPPLER ECHO COMPLETE: CPT | Performed by: INTERNAL MEDICINE

## 2018-10-19 PROCEDURE — 80048 BASIC METABOLIC PNL TOTAL CA: CPT

## 2018-10-19 PROCEDURE — 93325 DOPPLER ECHO COLOR FLOW MAPG: CPT | Performed by: INTERNAL MEDICINE

## 2018-10-19 PROCEDURE — B24BZZ4 ULTRASONOGRAPHY OF HEART WITH AORTA, TRANSESOPHAGEAL: ICD-10-PCS | Performed by: INTERNAL MEDICINE

## 2018-10-19 PROCEDURE — 94668 MNPJ CHEST WALL SBSQ: CPT

## 2018-10-19 PROCEDURE — 6360000002 HC RX W HCPCS: Performed by: INTERNAL MEDICINE

## 2018-10-19 RX ORDER — LIDOCAINE HYDROCHLORIDE 20 MG/ML
INJECTION, SOLUTION EPIDURAL; INFILTRATION; INTRACAUDAL; PERINEURAL PRN
Status: DISCONTINUED | OUTPATIENT
Start: 2018-10-19 | End: 2018-10-19 | Stop reason: SDUPTHER

## 2018-10-19 RX ORDER — PROPOFOL 10 MG/ML
INJECTION, EMULSION INTRAVENOUS PRN
Status: DISCONTINUED | OUTPATIENT
Start: 2018-10-19 | End: 2018-10-19 | Stop reason: SDUPTHER

## 2018-10-19 RX ADMIN — FOLIC ACID 1 MG: 1 TABLET ORAL at 08:35

## 2018-10-19 RX ADMIN — INSULIN LISPRO 3 UNITS: 100 INJECTION, SOLUTION INTRAVENOUS; SUBCUTANEOUS at 15:02

## 2018-10-19 RX ADMIN — ATORVASTATIN CALCIUM 40 MG: 40 TABLET, FILM COATED ORAL at 21:03

## 2018-10-19 RX ADMIN — PROPOFOL 80 MG: 10 INJECTION, EMULSION INTRAVENOUS at 12:01

## 2018-10-19 RX ADMIN — HEPARIN SODIUM 12 UNITS/KG/HR: 10000 INJECTION, SOLUTION INTRAVENOUS at 17:00

## 2018-10-19 RX ADMIN — IPRATROPIUM BROMIDE AND ALBUTEROL SULFATE 1 AMPULE: .5; 3 SOLUTION RESPIRATORY (INHALATION) at 07:44

## 2018-10-19 RX ADMIN — DILTIAZEM HYDROCHLORIDE 60 MG: 60 TABLET, FILM COATED ORAL at 08:35

## 2018-10-19 RX ADMIN — LIDOCAINE HYDROCHLORIDE 50 MG: 20 INJECTION, SOLUTION EPIDURAL; INFILTRATION; INTRACAUDAL; PERINEURAL at 12:01

## 2018-10-19 RX ADMIN — HEPARIN SODIUM 10.5 ML/HR: 10000 INJECTION, SOLUTION INTRAVENOUS at 23:00

## 2018-10-19 RX ADMIN — PREDNISONE 40 MG: 20 TABLET ORAL at 08:35

## 2018-10-19 RX ADMIN — INSULIN LISPRO 2 UNITS: 100 INJECTION, SOLUTION INTRAVENOUS; SUBCUTANEOUS at 18:24

## 2018-10-19 RX ADMIN — ASPIRIN 81 MG: 81 TABLET, COATED ORAL at 08:35

## 2018-10-19 RX ADMIN — Medication 10 ML: at 08:35

## 2018-10-19 RX ADMIN — FERROUS SULFATE TAB 325 MG (65 MG ELEMENTAL FE) 325 MG: 325 (65 FE) TAB at 08:34

## 2018-10-19 RX ADMIN — INSULIN LISPRO 1 UNITS: 100 INJECTION, SOLUTION INTRAVENOUS; SUBCUTANEOUS at 21:11

## 2018-10-19 RX ADMIN — METOPROLOL TARTRATE 50 MG: 50 TABLET ORAL at 21:03

## 2018-10-19 RX ADMIN — LEVOFLOXACIN 500 MG: 5 INJECTION, SOLUTION INTRAVENOUS at 15:58

## 2018-10-19 RX ADMIN — IPRATROPIUM BROMIDE AND ALBUTEROL SULFATE 1 AMPULE: .5; 3 SOLUTION RESPIRATORY (INHALATION) at 20:00

## 2018-10-19 RX ADMIN — MONTELUKAST SODIUM 10 MG: 10 TABLET, COATED ORAL at 21:03

## 2018-10-19 RX ADMIN — VENLAFAXINE HYDROCHLORIDE 75 MG: 37.5 CAPSULE, EXTENDED RELEASE ORAL at 08:35

## 2018-10-19 RX ADMIN — IPRATROPIUM BROMIDE AND ALBUTEROL SULFATE 1 AMPULE: .5; 3 SOLUTION RESPIRATORY (INHALATION) at 01:13

## 2018-10-19 RX ADMIN — IPRATROPIUM BROMIDE AND ALBUTEROL SULFATE 1 AMPULE: .5; 3 SOLUTION RESPIRATORY (INHALATION) at 04:52

## 2018-10-19 RX ADMIN — IPRATROPIUM BROMIDE AND ALBUTEROL SULFATE 1 AMPULE: .5; 3 SOLUTION RESPIRATORY (INHALATION) at 15:48

## 2018-10-19 RX ADMIN — DILTIAZEM HYDROCHLORIDE 60 MG: 60 TABLET, FILM COATED ORAL at 21:03

## 2018-10-19 RX ADMIN — METOPROLOL TARTRATE 50 MG: 50 TABLET ORAL at 08:35

## 2018-10-19 ASSESSMENT — PAIN SCALES - GENERAL
PAINLEVEL_OUTOF10: 0

## 2018-10-19 NOTE — CONSULTS
([facility]    Fever    Mild malnutrition ([facility]    Uncontrolled type 2 diabetes mellitus with hyperglycemia (HCC)    Obesity (BMI 30-39. 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 EKG:  ECHO:   STRESS TEST:  CATH:  BYPASS:  VASCULAR:    Past Medical History:   has a past medical history of Arthritis; Asthma; Depression; Diabetes mellitus ([facility] GERD (gastroesophageal reflux disease); HCAP (healthcare-associated pneumonia); Hypertension; Mycobacterium gordonae infection; Paroxysmal atrial fibrillation ([facility] Sepsis ([facility] TIA (transient ischemic attack); and Unspecified cerebral artery occlusion with cerebral infarction. Surgical History:   has a past surgical history that includes Hysterectomy; Cholecystectomy; Appendectomy; cyst incision and drainage (1986); Colonoscopy (01/19/2017); and Upper gastrointestinal endoscopy (01/19/2017). Social History:   reports that she has never smoked. She has never used smokeless tobacco. She reports that she does not drink alcohol or use drugs. Family History:  No evidence for sudden cardiac death or premature CAD    Medications:  Reviewed and are listed in nursing record. and/or listed below  Outpatient Medications:  Prior to Admission medications    Medication Sig Start Date End Date Taking?  Authorizing Provider   omeprazole (PRILOSEC) 40 MG delayed release capsule Take 40 mg by mouth daily   Yes Historical Provider, MD   diltiazem (CARDIZEM) 60 MG tablet Take 60 mg by mouth 5/14/18  Yes Historical Provider, MD   ferrous sulfate 325 (65 Fe) MG tablet Take 325 mg by mouth daily (with breakfast)   Yes Historical Provider, MD   folic acid (FOLVITE) 1 MG tablet Take 1 mg by mouth daily   Yes Historical Provider, MD   montelukast (SINGULAIR) 10 MG tablet Take 10 mg by mouth nightly available positives mentioned in history of present illness.        Physical Examination:    [unfilled]  BP (!) 145/88   Pulse 93   Temp 97.9 °F (36.6 °C) (Oral)   Resp 18   Ht 5' (1.524 m)   Wt 211 lb 11.2 oz (96 kg)   LMP  (LMP Unknown)   SpO2 94%   BMI 41.34 kg/m²    Weight: 211 lb 11.2 oz (96 kg)     Wt Readings from Last 3 Encounters:   10/15/18 211 lb 11.2 oz (96 kg)   10/13/18 198 lb (89.8 kg)   09/06/18 198 lb (89.8 kg)       Intake/Output Summary (Last 24 hours) at 10/19/18 1150  Last data filed at 10/19/18 0843   Gross per 24 hour   Intake              170 ml   Output                0 ml   Net              170 ml       General Appearance:  Alert, cooperative, no distress, appears stated age   Head:  Normocephalic, without obvious abnormality, atraumatic   Eyes:  PERRL, conjunctiva/corneas clear       Nose: Nares normal, no drainage or sinus tenderness   Throat: Lips, mucosa, and tongue normal   Neck: Supple, symmetrical, trachea midline, no adenopathy, thyroid: not enlarged, symmetric, no tenderness/mass/nodules, no carotid bruit or JVD       Lungs:   Clear to auscultation bilaterally, respirations unlabored   Chest Wall:  No tenderness or deformity   Heart:  Regular rhythm and normal rate; S1, S2 are normal; no murmur noted; no rub or gallop   Abdomen:   Soft, non-tender, bowel sounds active all four quadrants,  no masses, no organomegaly           Extremities: Extremities normal, atraumatic, no cyanosis or edema   Pulses: 2+ and symmetric   Skin: Skin color, texture, turgor normal, no rashes or lesions   Pysch: Distant mood and affect   Neurologic: Limited due to prior CVA        Labs  Recent Labs      10/17/18   0519  10/19/18   0516   WBC  7.4  7.0   HGB  10.1*  11.1*   HCT  30.5*  33.4*   MCV  90.3  90.0   PLT  60*  83*     Recent Labs      10/17/18   0519  10/19/18   0516   CREATININE  0.7  0.7   BUN  13  17   NA  137  141   K  3.8  3.5   CL  104  105   CO2  24  26     No results for input(s):

## 2018-10-19 NOTE — ANESTHESIA PRE PROCEDURE
insulin NPH (HUMULIN N;NOVOLIN N) injection vial 5 Units  5 Units Subcutaneous QAM Michael Reese MD        atorvastatin (LIPITOR) tablet 40 mg  40 mg Oral Nightly Michael Reese MD   40 mg at 10/18/18 2105    magnesium sulfate 1 g in dextrose 5% 100 mL IVPB  1 g Intravenous PRN Michael Reese MD        diltiazem (CARDIZEM) tablet 60 mg  60 mg Oral BID Michael Reese MD   60 mg at 10/19/18 0835    predniSONE (DELTASONE) tablet 40 mg  40 mg Oral Daily Michael Reese MD   40 mg at 10/19/18 0835    aspirin EC tablet 81 mg  81 mg Oral Daily Michael Reese MD   81 mg at 10/19/18 0835    pantoprazole (PROTONIX) tablet 40 mg  40 mg Oral QAM AC Michael Reese MD   40 mg at 10/18/18 6866    ferrous sulfate tablet 325 mg  325 mg Oral Daily with breakfast Michael Reese MD   325 mg at 29/59/60 0211    folic acid (FOLVITE) tablet 1 mg  1 mg Oral Daily Michael Reese MD   1 mg at 10/19/18 0835    metoprolol tartrate (LOPRESSOR) tablet 50 mg  50 mg Oral BID Michael Reese MD   50 mg at 10/19/18 0835    montelukast (SINGULAIR) tablet 10 mg  10 mg Oral Nightly Michael Reese MD   10 mg at 10/18/18 2105    venlafaxine (EFFEXOR XR) extended release capsule 75 mg  75 mg Oral Daily Michael Reese MD   75 mg at 10/19/18 0835    ipratropium-albuterol (DUONEB) nebulizer solution 1 ampule  1 ampule Inhalation Q4H Michael Reese MD   1 ampule at 10/19/18 0744    traMADol (ULTRAM) tablet 50 mg  50 mg Oral Q6H PRN Marie Ledezma, APRN - CNP   50 mg at 10/16/18 2317    sodium chloride flush 0.9 % injection 10 mL  10 mL Intravenous 2 times per day JARROD Coffman - CNP   10 mL at 10/19/18 0835    levofloxacin (LEVAQUIN) 500 MG/100ML infusion 500 mg  500 mg Intravenous Q24H Michael Reese MD   Stopped at 10/18/18 1825    acetaminophen (TYLENOL) tablet 650 mg  650 mg Oral Q4H PRN Michael Reese MD   650 mg at 10/15/18 1332    sodium chloride flush 0.9 % injection 10 mL  10 mL Intravenous PRN Farzaneh Baig Substance Use Topics    Smoking status: Never Smoker    Smokeless tobacco: Never Used    Alcohol use No                                Counseling given: Not Answered      Vital Signs (Current):   Vitals:    10/19/18 0354 10/19/18 0454 10/19/18 0800 10/19/18 0815   BP:   (!) 145/88    Pulse:   93    Resp:  16 18    Temp:   97.9 °F (36.6 °C)    TempSrc: Oral  Oral    SpO2:  97% 94% 94%   Weight:       Height:                                                  BP Readings from Last 3 Encounters:   10/19/18 (!) 145/88   10/13/18 (!) 149/68   09/06/18 118/70       NPO Status:                                                                                 BMI:   Wt Readings from Last 3 Encounters:   10/15/18 211 lb 11.2 oz (96 kg)   10/13/18 198 lb (89.8 kg)   09/06/18 198 lb (89.8 kg)     Body mass index is 41.34 kg/m².     CBC:   Lab Results   Component Value Date    WBC 7.0 10/19/2018    RBC 3.71 10/19/2018    HGB 11.1 10/19/2018    HCT 33.4 10/19/2018    MCV 90.0 10/19/2018    RDW 14.7 10/19/2018    PLT 83 10/19/2018       CMP:   Lab Results   Component Value Date     10/19/2018    K 3.5 10/19/2018     10/19/2018    CO2 26 10/19/2018    BUN 17 10/19/2018    CREATININE 0.7 10/19/2018    GFRAA >60 10/19/2018    GFRAA 54 12/17/2012    AGRATIO 1.0 10/14/2018    LABGLOM >60 10/19/2018    GLUCOSE 137 10/19/2018    PROT 7.1 10/17/2018    PROT 6.5 12/17/2012    CALCIUM 8.9 10/19/2018    BILITOT 0.6 10/17/2018    ALKPHOS 112 10/17/2018    AST 13 10/17/2018    ALT 23 10/17/2018       POC Tests:   Recent Labs      10/19/18   0843   POCGLU  141*       Coags:   Lab Results   Component Value Date    PROTIME 14.0 03/28/2018    INR 1.21 03/28/2018    APTT 28.1 05/12/2017       HCG (If Applicable): No results found for: PREGTESTUR, PREGSERUM, HCG, HCGQUANT     ABGs:   Lab Results   Component Value Date    PHART 7.424 10/14/2018    PO2ART 61.8 10/14/2018    ITO7IWQ 35.9 10/14/2018    OTI4TRF 23.0 10/14/2018    BEART -1.1

## 2018-10-19 NOTE — PLAN OF CARE
Problem: Pain:  Goal: Control of acute pain  Control of acute pain   Outcome: Ongoing  Pt with c/o rib pain from coughing, states improved. Denies need for pain medication at present time. Will continue to monitor.

## 2018-10-19 NOTE — PROGRESS NOTES
Pertinent positives & negatives are addressed in Assessment & Plan section of note  Recent Results (from the past 36 hour(s))   POCT Glucose    Collection Time: 10/18/18  7:16 AM   Result Value Ref Range    POC Glucose 130 (H) 70 - 99 mg/dl    Performed on ACCU-CHEK    POCT Glucose    Collection Time: 10/18/18 11:02 AM   Result Value Ref Range    POC Glucose 187 (H) 70 - 99 mg/dl    Performed on ACCU-CHEK    POCT Glucose    Collection Time: 10/18/18  4:36 PM   Result Value Ref Range    POC Glucose 235 (H) 70 - 99 mg/dl    Performed on ACCU-CHEK    POCT Glucose    Collection Time: 10/18/18  8:56 PM   Result Value Ref Range    POC Glucose 249 (H) 70 - 99 mg/dl    Performed on ACCU-CHEK    Basic Metabolic Panel w/ Reflex to MG    Collection Time: 10/19/18  5:16 AM   Result Value Ref Range    Sodium 141 136 - 145 mmol/L    Potassium reflex Magnesium 3.5 3.5 - 5.1 mmol/L    Chloride 105 99 - 110 mmol/L    CO2 26 21 - 32 mmol/L    Anion Gap 10 3 - 16    Glucose 137 (H) 70 - 99 mg/dL    BUN 17 7 - 20 mg/dL    CREATININE 0.7 0.6 - 1.2 mg/dL    GFR Non-African American >60 >60    GFR African American >60 >60    Calcium 8.9 8.3 - 10.6 mg/dL   CBC    Collection Time: 10/19/18  5:16 AM   Result Value Ref Range    WBC 7.0 4.0 - 11.0 K/uL    RBC 3.71 (L) 4.00 - 5.20 M/uL    Hemoglobin 11.1 (L) 12.0 - 16.0 g/dL    Hematocrit 33.4 (L) 36.0 - 48.0 %    MCV 90.0 80.0 - 100.0 fL    MCH 29.8 26.0 - 34.0 pg    MCHC 33.2 31.0 - 36.0 g/dL    RDW 14.7 12.4 - 15.4 %    Platelets 83 (L) 162 - 450 K/uL    MPV 10.7 (H) 5.0 - 10.5 fL   Magnesium    Collection Time: 10/19/18  5:16 AM   Result Value Ref Range    Magnesium 2.00 1.80 - 2.40 mg/dL   POCT Glucose    Collection Time: 10/19/18  8:43 AM   Result Value Ref Range    POC Glucose 141 (H) 70 - 99 mg/dl    Performed on ACCU-CHEK    POCT Glucose    Collection Time: 10/19/18  1:13 PM   Result Value Ref Range    POC Glucose 198 (H) 70 - 99 mg/dl    Performed on ACCU-CHEK        Scheduled

## 2018-10-19 NOTE — PROGRESS NOTES
Pt back from [facility]. Report received via phone. VSS, room air. Pt to remain NPO until 1350. Pt verbalized understanding.

## 2018-10-20 ENCOUNTER — APPOINTMENT (OUTPATIENT)
Dept: CT IMAGING | Age: 68
DRG: 871 | End: 2018-10-20
Payer: MEDICARE

## 2018-10-20 LAB
APTT: 42.2 SEC (ref 26–36)
APTT: 45.2 SEC (ref 26–36)
APTT: 48.9 SEC (ref 26–36)
GLUCOSE BLD-MCNC: 126 MG/DL (ref 70–99)
GLUCOSE BLD-MCNC: 158 MG/DL (ref 70–99)
GLUCOSE BLD-MCNC: 218 MG/DL (ref 70–99)
GLUCOSE BLD-MCNC: 364 MG/DL (ref 70–99)
HCT VFR BLD CALC: 32.3 % (ref 36–48)
HEMOGLOBIN: 10.8 G/DL (ref 12–16)
MCH RBC QN AUTO: 30 PG (ref 26–34)
MCHC RBC AUTO-ENTMCNC: 33.5 G/DL (ref 31–36)
MCV RBC AUTO: 89.4 FL (ref 80–100)
PDW BLD-RTO: 14.4 % (ref 12.4–15.4)
PERFORMED ON: ABNORMAL
PLATELET # BLD: 99 K/UL (ref 135–450)
PMV BLD AUTO: 11.6 FL (ref 5–10.5)
RBC # BLD: 3.61 M/UL (ref 4–5.2)
WBC # BLD: 5.6 K/UL (ref 4–11)

## 2018-10-20 PROCEDURE — 85730 THROMBOPLASTIN TIME PARTIAL: CPT

## 2018-10-20 PROCEDURE — 1200000000 HC SEMI PRIVATE

## 2018-10-20 PROCEDURE — 6370000000 HC RX 637 (ALT 250 FOR IP): Performed by: INTERNAL MEDICINE

## 2018-10-20 PROCEDURE — 85027 COMPLETE CBC AUTOMATED: CPT

## 2018-10-20 PROCEDURE — 99233 SBSQ HOSP IP/OBS HIGH 50: CPT | Performed by: INTERNAL MEDICINE

## 2018-10-20 PROCEDURE — 70450 CT HEAD/BRAIN W/O DYE: CPT

## 2018-10-20 PROCEDURE — 94640 AIRWAY INHALATION TREATMENT: CPT

## 2018-10-20 PROCEDURE — 36415 COLL VENOUS BLD VENIPUNCTURE: CPT

## 2018-10-20 PROCEDURE — 94664 DEMO&/EVAL PT USE INHALER: CPT

## 2018-10-20 PROCEDURE — 6360000002 HC RX W HCPCS: Performed by: INTERNAL MEDICINE

## 2018-10-20 PROCEDURE — 94668 MNPJ CHEST WALL SBSQ: CPT

## 2018-10-20 PROCEDURE — 94761 N-INVAS EAR/PLS OXIMETRY MLT: CPT

## 2018-10-20 RX ORDER — LEVOFLOXACIN 500 MG/1
500 TABLET, FILM COATED ORAL ONCE
Status: COMPLETED | OUTPATIENT
Start: 2018-10-20 | End: 2018-10-20

## 2018-10-20 RX ADMIN — DILTIAZEM HYDROCHLORIDE 60 MG: 60 TABLET, FILM COATED ORAL at 10:00

## 2018-10-20 RX ADMIN — LEVOFLOXACIN 500 MG: 500 TABLET, FILM COATED ORAL at 17:19

## 2018-10-20 RX ADMIN — IPRATROPIUM BROMIDE AND ALBUTEROL SULFATE 1 AMPULE: .5; 3 SOLUTION RESPIRATORY (INHALATION) at 07:40

## 2018-10-20 RX ADMIN — PREDNISONE 40 MG: 20 TABLET ORAL at 09:53

## 2018-10-20 RX ADMIN — METOPROLOL TARTRATE 50 MG: 50 TABLET ORAL at 10:00

## 2018-10-20 RX ADMIN — VENLAFAXINE HYDROCHLORIDE 75 MG: 37.5 CAPSULE, EXTENDED RELEASE ORAL at 09:53

## 2018-10-20 RX ADMIN — IPRATROPIUM BROMIDE AND ALBUTEROL SULFATE 1 AMPULE: .5; 3 SOLUTION RESPIRATORY (INHALATION) at 05:18

## 2018-10-20 RX ADMIN — INSULIN LISPRO 1 UNITS: 100 INJECTION, SOLUTION INTRAVENOUS; SUBCUTANEOUS at 21:20

## 2018-10-20 RX ADMIN — FOLIC ACID 1 MG: 1 TABLET ORAL at 09:52

## 2018-10-20 RX ADMIN — INSULIN LISPRO 1 UNITS: 100 INJECTION, SOLUTION INTRAVENOUS; SUBCUTANEOUS at 11:53

## 2018-10-20 RX ADMIN — IPRATROPIUM BROMIDE AND ALBUTEROL SULFATE 1 AMPULE: .5; 3 SOLUTION RESPIRATORY (INHALATION) at 00:00

## 2018-10-20 RX ADMIN — FERROUS SULFATE TAB 325 MG (65 MG ELEMENTAL FE) 325 MG: 325 (65 FE) TAB at 09:54

## 2018-10-20 RX ADMIN — INSULIN HUMAN 5 UNITS: 100 INJECTION, SUSPENSION SUBCUTANEOUS at 10:01

## 2018-10-20 RX ADMIN — INSULIN LISPRO 5 UNITS: 100 INJECTION, SOLUTION INTRAVENOUS; SUBCUTANEOUS at 17:19

## 2018-10-20 RX ADMIN — METOPROLOL TARTRATE 50 MG: 50 TABLET ORAL at 21:21

## 2018-10-20 RX ADMIN — ATORVASTATIN CALCIUM 40 MG: 40 TABLET, FILM COATED ORAL at 21:21

## 2018-10-20 RX ADMIN — ASPIRIN 81 MG: 81 TABLET, COATED ORAL at 09:52

## 2018-10-20 RX ADMIN — DILTIAZEM HYDROCHLORIDE 60 MG: 60 TABLET, FILM COATED ORAL at 21:21

## 2018-10-20 RX ADMIN — PANTOPRAZOLE SODIUM 40 MG: 40 TABLET, DELAYED RELEASE ORAL at 05:42

## 2018-10-20 RX ADMIN — IPRATROPIUM BROMIDE AND ALBUTEROL SULFATE 1 AMPULE: .5; 3 SOLUTION RESPIRATORY (INHALATION) at 19:46

## 2018-10-20 RX ADMIN — IPRATROPIUM BROMIDE AND ALBUTEROL SULFATE 1 AMPULE: .5; 3 SOLUTION RESPIRATORY (INHALATION) at 15:26

## 2018-10-20 RX ADMIN — MONTELUKAST SODIUM 10 MG: 10 TABLET, COATED ORAL at 21:21

## 2018-10-20 RX ADMIN — HEPARIN SODIUM 13.1 ML/HR: 10000 INJECTION, SOLUTION INTRAVENOUS at 16:51

## 2018-10-20 NOTE — PROGRESS NOTES
Hospitalist Progress Note      PCP: Berenice Mnuoz MD    Date of Admission: 10/14/2018    Chief Complaint: lethargy    Hospital Course: 74 Y F with a h/o HTN, HLD, DM2, Afib, and hemorrhagic CVA in 3/2018 presented to the ED with a headache and L hand paresthesias on 10/13, basic workup was negative and she felt better, so she was discharged home. She now returns the following day due to fluctuating spells of lethargy, confusion, and generalized weakness. She has developed a new cough lately, CXR suggested pneumonia, and lab workup suggested sepsis. Subjective:  ALISTAIR yesterday without thrombus. Started on heparin gtt yesterday per neurology. Will order repeat head CT today. If no neurologic changes or evidence of bleeding, will transition to eliquis tomorrow.       Medications:  Reviewed    Infusion Medications    heparin (porcine) 11.8 mL/hr (10/20/18 0708)    dextrose       Scheduled Medications    insulin NPH  5 Units Subcutaneous QAM    atorvastatin  40 mg Oral Nightly    diltiazem  60 mg Oral BID    predniSONE  40 mg Oral Daily    aspirin  81 mg Oral Daily    pantoprazole  40 mg Oral QAM AC    ferrous sulfate  325 mg Oral Daily with breakfast    folic acid  1 mg Oral Daily    metoprolol tartrate  50 mg Oral BID    montelukast  10 mg Oral Nightly    venlafaxine  75 mg Oral Daily    ipratropium-albuterol  1 ampule Inhalation Q4H    sodium chloride flush  10 mL Intravenous 2 times per day    levofloxacin  500 mg Intravenous Q24H    insulin lispro  0-6 Units Subcutaneous TID WC    insulin lispro  0-3 Units Subcutaneous Nightly     PRN Meds: magnesium sulfate, traMADol, acetaminophen, sodium chloride flush, magnesium hydroxide, glucose, dextrose, glucagon (rDNA), dextrose, labetalol      Intake/Output Summary (Last 24 hours) at 10/20/18 0913  Last data filed at 10/20/18 0948   Gross per 24 hour   Intake             1741 ml   Output                0 ml   Net             1741 ml 10/14/2018    SPECGRAV 1.025 10/14/2018    GLUCOSEU Negative 10/14/2018       Radiology:  CTA NECK W CONTRAST   Final Result   Addendum 1 of 1   ADDENDUM:   3D reconstructed images were performed on a separate workstation and    provided   for review. Final   Patent head and neck arteries. Subacute infarcts in the right postcentral gyrus, right occipital lobe, and   right superior cerebellum. Left upper lobe pneumonia. CTA HEAD W CONTRAST   Final Result   Addendum 1 of 1   ADDENDUM:   3D reconstructed images were performed on a separate workstation and    provided   for review. Final   Patent head and neck arteries. Subacute infarcts in the right postcentral gyrus, right occipital lobe, and   right superior cerebellum. Left upper lobe pneumonia. MRI BRAIN WO CONTRAST   Final Result   1. Evolving area of chronic stroke involving the left occipital lobe with   areas of laminar necrosis. 2.  Chronic areas of stroke there seen throughout right hemisphere in   distribution of right MCA including right frontal, right temporal, and right   parietal lobes. 3.  New area of abnormal signal is seen involving right occipital cortex and   right cerebellar hemisphere suggestive of recent areas of stroke. Clinical   correlation recommended. XR CHEST STANDARD (2 VW)   Final Result   Focal opacity within the left midlung, suggestive of focal pneumonia. That   should be followed to resolution.                  Assessment/Plan:    Active Hospital Problems    Diagnosis Date Noted    Pneumonia [J18.9] 10/14/2018    Stroke [facility]cerebrum) ([facility] [I63.9]     HTN (hypertension), benign [I10]     Morbid obesity with BMI of 40.0-44.9, adult ([facility] [E66.01, Z68.41] 05/19/2014    Asthma/COPD [S71.511] 12/02/2013    Poorly controlled type 2 diabetes mellitus ([facility] [E11.65] 12/02/2013       74 Y F with a h/o HTN, HLD, DM2, Afib, and hemorrhagic CVA in 3/2018 presented to the discharge.     Afib with RVR  - was previously deemed to not be a candidate for anticoagulation due to her previous intracranial hemorrhage, also thrombocytopenic. Continue aspirin, statin. - metoprolol and diltiazem. Given extra dilt IV PRN. Rate was uncontrolled due to infection, RVR eventually resolved. - anticoagulation as above     Acute on chronic thrombocytopenia  - acute component likely due to sepsis. Monitor. Morbid Obesity- with body mass index of 02.3, complicating assessment and treatment, placing patient at risk for multiple co-morbidities as well as early death and contributing to the patient's presentation.  Counseled on weight loss.       DVT Prophylaxis: SCDs  Diet: DIET GENERAL;  Code Status: Full Code    PT/OT Eval Status: rec'd outpatient PT/OT    Dispo - likely 10/21 as long as no bleeding issues      Jay Park MD

## 2018-10-20 NOTE — PROGRESS NOTES
Perfect served cross cover MD.\" FYI, Pt had 4 beats of Vtach this morning. Pt asymptomatic and sleeping when it occurred. Labs being done this morning. \"

## 2018-10-21 LAB
APTT: 41.9 SEC (ref 26–36)
APTT: 64.3 SEC (ref 26–36)
GLUCOSE BLD-MCNC: 131 MG/DL (ref 70–99)
GLUCOSE BLD-MCNC: 179 MG/DL (ref 70–99)
GLUCOSE BLD-MCNC: 223 MG/DL (ref 70–99)
GLUCOSE BLD-MCNC: 364 MG/DL (ref 70–99)
HCT VFR BLD CALC: 33 % (ref 36–48)
HEMOGLOBIN: 11 G/DL (ref 12–16)
MCH RBC QN AUTO: 29.9 PG (ref 26–34)
MCHC RBC AUTO-ENTMCNC: 33.4 G/DL (ref 31–36)
MCV RBC AUTO: 89.5 FL (ref 80–100)
PDW BLD-RTO: 14.4 % (ref 12.4–15.4)
PERFORMED ON: ABNORMAL
PLATELET # BLD: 107 K/UL (ref 135–450)
PMV BLD AUTO: 10.9 FL (ref 5–10.5)
RBC # BLD: 3.69 M/UL (ref 4–5.2)
WBC # BLD: 5.6 K/UL (ref 4–11)

## 2018-10-21 PROCEDURE — 85027 COMPLETE CBC AUTOMATED: CPT

## 2018-10-21 PROCEDURE — 1200000000 HC SEMI PRIVATE

## 2018-10-21 PROCEDURE — 6370000000 HC RX 637 (ALT 250 FOR IP): Performed by: INTERNAL MEDICINE

## 2018-10-21 PROCEDURE — 94668 MNPJ CHEST WALL SBSQ: CPT

## 2018-10-21 PROCEDURE — 36415 COLL VENOUS BLD VENIPUNCTURE: CPT

## 2018-10-21 PROCEDURE — 94640 AIRWAY INHALATION TREATMENT: CPT

## 2018-10-21 PROCEDURE — 2580000003 HC RX 258: Performed by: NURSE PRACTITIONER

## 2018-10-21 PROCEDURE — 85730 THROMBOPLASTIN TIME PARTIAL: CPT

## 2018-10-21 RX ADMIN — METOPROLOL TARTRATE 50 MG: 50 TABLET ORAL at 09:14

## 2018-10-21 RX ADMIN — IPRATROPIUM BROMIDE AND ALBUTEROL SULFATE 1 AMPULE: .5; 3 SOLUTION RESPIRATORY (INHALATION) at 00:09

## 2018-10-21 RX ADMIN — ASPIRIN 81 MG: 81 TABLET, COATED ORAL at 09:14

## 2018-10-21 RX ADMIN — INSULIN LISPRO 1 UNITS: 100 INJECTION, SOLUTION INTRAVENOUS; SUBCUTANEOUS at 21:47

## 2018-10-21 RX ADMIN — IPRATROPIUM BROMIDE AND ALBUTEROL SULFATE 1 AMPULE: .5; 3 SOLUTION RESPIRATORY (INHALATION) at 04:22

## 2018-10-21 RX ADMIN — PANTOPRAZOLE SODIUM 40 MG: 40 TABLET, DELAYED RELEASE ORAL at 05:10

## 2018-10-21 RX ADMIN — FOLIC ACID 1 MG: 1 TABLET ORAL at 09:14

## 2018-10-21 RX ADMIN — MONTELUKAST SODIUM 10 MG: 10 TABLET, COATED ORAL at 21:47

## 2018-10-21 RX ADMIN — PREDNISONE 40 MG: 20 TABLET ORAL at 09:14

## 2018-10-21 RX ADMIN — METOPROLOL TARTRATE 50 MG: 50 TABLET ORAL at 21:47

## 2018-10-21 RX ADMIN — DILTIAZEM HYDROCHLORIDE 60 MG: 60 TABLET, FILM COATED ORAL at 21:47

## 2018-10-21 RX ADMIN — IPRATROPIUM BROMIDE AND ALBUTEROL SULFATE 1 AMPULE: .5; 3 SOLUTION RESPIRATORY (INHALATION) at 23:24

## 2018-10-21 RX ADMIN — INSULIN HUMAN 5 UNITS: 100 INJECTION, SUSPENSION SUBCUTANEOUS at 09:12

## 2018-10-21 RX ADMIN — IPRATROPIUM BROMIDE AND ALBUTEROL SULFATE 1 AMPULE: .5; 3 SOLUTION RESPIRATORY (INHALATION) at 15:43

## 2018-10-21 RX ADMIN — INSULIN LISPRO 1 UNITS: 100 INJECTION, SOLUTION INTRAVENOUS; SUBCUTANEOUS at 12:45

## 2018-10-21 RX ADMIN — IPRATROPIUM BROMIDE AND ALBUTEROL SULFATE 1 AMPULE: .5; 3 SOLUTION RESPIRATORY (INHALATION) at 19:23

## 2018-10-21 RX ADMIN — DILTIAZEM HYDROCHLORIDE 60 MG: 60 TABLET, FILM COATED ORAL at 09:14

## 2018-10-21 RX ADMIN — VENLAFAXINE HYDROCHLORIDE 75 MG: 37.5 CAPSULE, EXTENDED RELEASE ORAL at 09:14

## 2018-10-21 RX ADMIN — APIXABAN 5 MG: 5 TABLET, FILM COATED ORAL at 11:02

## 2018-10-21 RX ADMIN — FERROUS SULFATE TAB 325 MG (65 MG ELEMENTAL FE) 325 MG: 325 (65 FE) TAB at 09:14

## 2018-10-21 RX ADMIN — IPRATROPIUM BROMIDE AND ALBUTEROL SULFATE 1 AMPULE: .5; 3 SOLUTION RESPIRATORY (INHALATION) at 12:10

## 2018-10-21 RX ADMIN — APIXABAN 5 MG: 5 TABLET, FILM COATED ORAL at 21:47

## 2018-10-21 RX ADMIN — INSULIN LISPRO 5 UNITS: 100 INJECTION, SOLUTION INTRAVENOUS; SUBCUTANEOUS at 16:05

## 2018-10-21 RX ADMIN — Medication 10 ML: at 21:55

## 2018-10-21 RX ADMIN — ATORVASTATIN CALCIUM 40 MG: 40 TABLET, FILM COATED ORAL at 21:47

## 2018-10-21 NOTE — DISCHARGE SUMMARY
Studies    Radiology:   CT HEAD WO CONTRAST   Final Result   No acute intracranial abnormality. Multiple remote infarcts as described above, similar to the prior study. CTA NECK W CONTRAST   Final Result   Addendum 1 of 1   ADDENDUM:   3D reconstructed images were performed on a separate workstation and    provided   for review. Final   Patent head and neck arteries. Subacute infarcts in the right postcentral gyrus, right occipital lobe, and   right superior cerebellum. Left upper lobe pneumonia. CTA HEAD W CONTRAST   Final Result   Addendum 1 of 1   ADDENDUM:   3D reconstructed images were performed on a separate workstation and    provided   for review. Final   Patent head and neck arteries. Subacute infarcts in the right postcentral gyrus, right occipital lobe, and   right superior cerebellum. Left upper lobe pneumonia. MRI BRAIN WO CONTRAST   Final Result   1. Evolving area of chronic stroke involving the left occipital lobe with   areas of laminar necrosis. 2.  Chronic areas of stroke there seen throughout right hemisphere in   distribution of right MCA including right frontal, right temporal, and right   parietal lobes. 3.  New area of abnormal signal is seen involving right occipital cortex and   right cerebellar hemisphere suggestive of recent areas of stroke. Clinical   correlation recommended. XR CHEST STANDARD (2 VW)   Final Result   Focal opacity within the left midlung, suggestive of focal pneumonia. That   should be followed to resolution.                 Consults:     IP CONSULT TO HOSPITALIST  IP CONSULT TO NEUROLOGY  IP CONSULT TO CARDIOLOGY  IP CONSULT TO HOME CARE NEEDS    Disposition:  Home with home health     Condition at Discharge: Stable    Discharge Instructions/Follow-up:  Follow up with PCP within 1 week    Code Status:  Full Code    Activity: activity as tolerated    Diet: regular diet      Discharge

## 2018-10-22 VITALS
WEIGHT: 211.7 LBS | SYSTOLIC BLOOD PRESSURE: 144 MMHG | HEART RATE: 63 BPM | TEMPERATURE: 97.7 F | HEIGHT: 60 IN | BODY MASS INDEX: 41.56 KG/M2 | OXYGEN SATURATION: 96 % | RESPIRATION RATE: 16 BRPM | DIASTOLIC BLOOD PRESSURE: 74 MMHG

## 2018-10-22 PROBLEM — J44.1 COPD EXACERBATION (HCC): Status: ACTIVE | Noted: 2018-10-22

## 2018-10-22 LAB
GLUCOSE BLD-MCNC: 121 MG/DL (ref 70–99)
GLUCOSE BLD-MCNC: 211 MG/DL (ref 70–99)
HCT VFR BLD CALC: 34.3 % (ref 36–48)
HEMOGLOBIN: 11.1 G/DL (ref 12–16)
MCH RBC QN AUTO: 29.1 PG (ref 26–34)
MCHC RBC AUTO-ENTMCNC: 32.4 G/DL (ref 31–36)
MCV RBC AUTO: 89.7 FL (ref 80–100)
PDW BLD-RTO: 14.5 % (ref 12.4–15.4)
PERFORMED ON: ABNORMAL
PERFORMED ON: ABNORMAL
PLATELET # BLD: 124 K/UL (ref 135–450)
PMV BLD AUTO: 10.4 FL (ref 5–10.5)
RBC # BLD: 3.82 M/UL (ref 4–5.2)
WBC # BLD: 6.3 K/UL (ref 4–11)

## 2018-10-22 PROCEDURE — 85027 COMPLETE CBC AUTOMATED: CPT

## 2018-10-22 PROCEDURE — 6370000000 HC RX 637 (ALT 250 FOR IP): Performed by: INTERNAL MEDICINE

## 2018-10-22 PROCEDURE — 2580000003 HC RX 258: Performed by: NURSE PRACTITIONER

## 2018-10-22 PROCEDURE — 94668 MNPJ CHEST WALL SBSQ: CPT

## 2018-10-22 PROCEDURE — 97535 SELF CARE MNGMENT TRAINING: CPT

## 2018-10-22 PROCEDURE — 97530 THERAPEUTIC ACTIVITIES: CPT

## 2018-10-22 PROCEDURE — 36415 COLL VENOUS BLD VENIPUNCTURE: CPT

## 2018-10-22 PROCEDURE — 94640 AIRWAY INHALATION TREATMENT: CPT

## 2018-10-22 PROCEDURE — G8989 SELF CARE D/C STATUS: HCPCS

## 2018-10-22 RX ORDER — PREDNISONE 10 MG/1
TABLET ORAL
Qty: 12 TABLET | Refills: 0 | Status: SHIPPED | OUTPATIENT
Start: 2018-10-22 | End: 2019-03-28 | Stop reason: ALTCHOICE

## 2018-10-22 RX ADMIN — PREDNISONE 30 MG: 20 TABLET ORAL at 09:12

## 2018-10-22 RX ADMIN — Medication 10 ML: at 09:13

## 2018-10-22 RX ADMIN — IPRATROPIUM BROMIDE AND ALBUTEROL SULFATE 1 AMPULE: .5; 3 SOLUTION RESPIRATORY (INHALATION) at 07:49

## 2018-10-22 RX ADMIN — FOLIC ACID 1 MG: 1 TABLET ORAL at 09:13

## 2018-10-22 RX ADMIN — FERROUS SULFATE TAB 325 MG (65 MG ELEMENTAL FE) 325 MG: 325 (65 FE) TAB at 09:13

## 2018-10-22 RX ADMIN — IPRATROPIUM BROMIDE AND ALBUTEROL SULFATE 1 AMPULE: .5; 3 SOLUTION RESPIRATORY (INHALATION) at 15:19

## 2018-10-22 RX ADMIN — METOPROLOL TARTRATE 50 MG: 50 TABLET ORAL at 09:13

## 2018-10-22 RX ADMIN — PANTOPRAZOLE SODIUM 40 MG: 40 TABLET, DELAYED RELEASE ORAL at 05:24

## 2018-10-22 RX ADMIN — IPRATROPIUM BROMIDE AND ALBUTEROL SULFATE 1 AMPULE: .5; 3 SOLUTION RESPIRATORY (INHALATION) at 03:48

## 2018-10-22 RX ADMIN — VENLAFAXINE HYDROCHLORIDE 75 MG: 37.5 CAPSULE, EXTENDED RELEASE ORAL at 09:13

## 2018-10-22 RX ADMIN — APIXABAN 5 MG: 5 TABLET, FILM COATED ORAL at 09:13

## 2018-10-22 RX ADMIN — INSULIN LISPRO 2 UNITS: 100 INJECTION, SOLUTION INTRAVENOUS; SUBCUTANEOUS at 11:41

## 2018-10-22 RX ADMIN — INSULIN HUMAN 5 UNITS: 100 INJECTION, SUSPENSION SUBCUTANEOUS at 09:15

## 2018-10-22 RX ADMIN — ASPIRIN 81 MG: 81 TABLET, COATED ORAL at 09:13

## 2018-10-22 RX ADMIN — DILTIAZEM HYDROCHLORIDE 60 MG: 60 TABLET, FILM COATED ORAL at 09:13

## 2018-10-22 RX ADMIN — IPRATROPIUM BROMIDE AND ALBUTEROL SULFATE 1 AMPULE: .5; 3 SOLUTION RESPIRATORY (INHALATION) at 11:37

## 2018-10-22 NOTE — PROGRESS NOTES
CK    Goals  Short term goals  Time Frame for Short term goals: 1 week unless otherwise specified  Short term goal 1: Pt. will attend to R visual field during ADL's/functional mobility with 1 vc.-STG met 10/22/18  Short term goal 2: Pt. will complete LE dressing with SBA by 10/19-STG met 10/22/18  Short term goal 3: Pt. will complete toilet transfers with supervision-STG met 10/22/18  Patient Goals   Patient goals : \"to go back to therapy to work on my vision\"       Therapy Time   Individual Concurrent Group Co-treatment   Time In 1310         Time Out 1348         Minutes 38         Timed Code Treatment Minutes: [address] [place], [place]

## 2018-10-22 NOTE — PROGRESS NOTES
Shift assessment updated as charted. Medication administered as ordered. Patient resting at this time. Denies further needs. Call light in reach. Will monitor.

## 2018-10-22 NOTE — DISCHARGE SUMMARY
Hospital Medicine Discharge Summary    Patient ID: Kiara Beaulieu      Patient's PCP: Darylene Glaser, MD    Admit Date: 10/14/2018     Discharge Date:   10/22/2018    Admitting Physician: Geoffrey Bernard MD     Discharge Physician: Francy Hernadez MD     Discharge Diagnoses: Active Hospital Problems    Diagnosis Date Noted    Stroke (cerebrum) [facility]) [I63.9]      Priority: High    PA[facility]ox[facility] [I48.0] 12/02/2013     Priority: Medium    Pneumonia [J18.9] 10/14/2018     Priority: Low    COPD exacerbation ([facility] [J44.1] 10/22/2018    HTN (hypertension), benign [I10]     Morbid obesity with BMI of 40.0-44.9, adult ([facility] [E66.01, Z68.41] 05/19/2014    Poorly controlled type 2 diabetes mellitus ([facility] [E11.65] 12/02/2013       The patient was seen and examined on day of discharge and this discharge summary is in conjunction with any daily progress note from day of discharge. Hospital Course:   74 Y F with a h/o HTN, HLD, DM2, Afib, and hemorrhagic CVA in 3/2018 presented to the ED with a headache and L hand paresthesias on 10/13, basic workup was negative and she felt better, so she was discharged home. Delores Sen now returns the following day due to fluctuating spells of lethargy, confusion, and generalized weakness.  She has developed a new cough lately, CXR suggested pneumonia, and lab workup suggested sepsis.       Patient was treated with levaquin for her CAP. Patient also subsequently developed some increased wheezing and was placed steroids for a presumed asthma exacerbation superimposed on pneumonia and and was given steroid taper at d/c. Patient's confusion improved with treatment of her pneumonia and holding of fioricet. CT head was stable but MRI showed several small CVAs that had occurred since March. Cardiology was consulted for a ALISTAIR that did not show any thrombus. Patient was started on a heparin gtt for presumed cardioembolic CVAs.  Due to her history of hemorrhagic CVA, a repeat CT head was ordered and did not show any new bleeding. Patient was successfully transitioned to eliquis prior to discharge. Patient also had some RVR with her known Afib. This improved with the treatment of her pneumonia and patient remained rate controlled with her home cardizem and metoprolol. Initial plan for d/c on 10/21/2018 but was later cancelled as eliquis was unable to be filled by her pharmacy that day but was then discharged the next day when this was provided by her pharmacy. D/ryder home in stable condition. Physical Exam Performed:     BP (!) 154/76   Pulse 64   Temp 97.7 °F (36.5 °C) (Oral)   Resp 18   Ht 5' (1.524 m)   Wt 211 lb 11.2 oz (96 kg)   LMP  (LMP Unknown)   SpO2 94%   BMI 41.34 kg/m²       General appearance:  No apparent distress, appears stated age and cooperative. HEENT:  Normal cephalic, atraumatic without obvious deformity. Pupils equal, round, and reactive to light. Extra ocular muscles intact. Conjunctivae/corneas clear. Neck: Supple, with full range of motion. No jugular venous distention. Trachea midline. Respiratory:  Normal respiratory effort. Clear to auscultation, bilaterally without Rales/Wheezes/Rhonchi. Cardiovascular:  Regular rate and rhythm with normal S1/S2 without murmurs, rubs or gallops. Abdomen: Soft, non-tender, non-distended with normal bowel sounds. Musculoskeletal:  No clubbing, cyanosis or edema bilaterally. Skin: Skin color, texture, turgor normal.  No rashes or lesions. Neurologic:  Neurovascularly intact without any focal sensory/motor deficits. Cranial nerves: II-XII intact, grossly non-focal.  Psychiatric:  Alert and oriented, thought content appropriate, normal insight  Capillary Refill: Brisk,< 3 seconds   Peripheral Pulses: +2 palpable, equal bilaterally       Labs:  For convenience and continuity at follow-up the following most recent labs are provided:      CBC:    Lab Results   Component Value Date CONSULT TO HOSPITALIST  IP CONSULT TO NEUROLOGY  IP CONSULT TO CARDIOLOGY  IP CONSULT TO HOME CARE NEEDS    Disposition:  Home with outpatient PT/OT       Condition at Discharge: Stable    Discharge Instructions/Follow-up:     Follow up with neurology in 1-2 weeks     Outpatient f/u with your pulmonologist, Dr Michael Tong       Outpatient PT/OT            Code Status:  Full Code       Activity: activity as tolerated    Diet: diabetic diet      Discharge Medications:     Current Discharge Medication List           Details   predniSONE (DELTASONE) 10 MG tablet Take 30mg for 2 days then 20mg for 2 days then 10 mg for 2 days then stop  Qty: 12 tablet, Refills: 0      apixaban (ELIQUIS) 5 MG TABS tablet Take 1 tablet by mouth 2 times daily  Qty: 60 tablet, Refills: 0              Details   omeprazole (PRILOSEC) 40 MG delayed release capsule Take 40 mg by mouth daily      diltiazem (CARDIZEM) 60 MG tablet Take 60 mg by mouth      ferrous sulfate 325 (65 Fe) MG tablet Take 325 mg by mouth daily (with breakfast)      folic acid (FOLVITE) 1 MG tablet Take 1 mg by mouth daily      montelukast (SINGULAIR) 10 MG tablet Take 10 mg by mouth nightly      albuterol (PROVENTIL) (2.5 MG/3ML) 0.083% nebulizer solution Take 3 mLs by nebulization every 4 hours as needed for Wheezing or Shortness of Breath  Qty: 120 each, Refills: 3      metoprolol tartrate (LOPRESSOR) 50 MG tablet Take 1 tablet by mouth 2 times daily  Qty: 60 tablet, Refills: 11      ASPIRIN LOW DOSE 81 MG EC tablet TAKE 1 TABLET BY MOUTH ONE TIME A DAY   Qty: 30 tablet, Refills: 5      metFORMIN ER (GLUCOPHAGE-XR) 500 MG XR tablet Take 1,000 mg by mouth Daily with supper       atorvastatin (LIPITOR) 20 MG tablet TAKE 1 TABLET BY MOUTH nightly AT BEDTIME      venlafaxine (EFFEXOR-XR) 75 MG XR capsule Take 75 mg by mouth daily. albuterol (PROVENTIL HFA;VENTOLIN HFA) 108 (90 BASE) MCG/ACT inhaler Inhale 2 puffs into the lungs every 6 hours as needed.                Time Spent on discharge is more than 30 minutes in the examination, evaluation, counseling and review of medications and discharge plan. Signed:    Ellyn Ya MD   10/22/2018      Thank you Norm Poole MD for the opportunity to be involved in this patient's care. If you have any questions or concerns please feel free to contact me at [phone].

## 2018-10-24 LAB
EKG ATRIAL RATE: 90 BPM
EKG DIAGNOSIS: NORMAL
EKG P AXIS: 53 DEGREES
EKG P-R INTERVAL: 176 MS
EKG Q-T INTERVAL: 342 MS
EKG QRS DURATION: 78 MS
EKG QTC CALCULATION (BAZETT): 418 MS
EKG R AXIS: 17 DEGREES
EKG T AXIS: 48 DEGREES
EKG VENTRICULAR RATE: 90 BPM

## 2018-11-08 ENCOUNTER — HOSPITAL ENCOUNTER (OUTPATIENT)
Age: 68
Discharge: HOME | End: 2018-11-08
Payer: MEDICARE

## 2018-11-08 ENCOUNTER — HOSPITAL ENCOUNTER (OUTPATIENT)
Dept: GENERAL RADIOLOGY | Age: 68
Discharge: HOME | End: 2018-11-08
Payer: MEDICARE

## 2018-11-08 DIAGNOSIS — J18.9 UNRESOLVED PNEUMONIA: ICD-10-CM

## 2018-11-08 PROCEDURE — 71046 X-RAY EXAM CHEST 2 VIEWS: CPT

## 2019-03-26 RX ORDER — METOPROLOL TARTRATE 50 MG/1
TABLET, FILM COATED ORAL
Qty: 60 TABLET | Refills: 0 | OUTPATIENT
Start: 2019-03-26

## 2019-03-28 ENCOUNTER — OFFICE VISIT (OUTPATIENT)
Dept: NEUROLOGY | Age: 69
End: 2019-03-28
Payer: MEDICARE

## 2019-03-28 VITALS
HEART RATE: 56 BPM | SYSTOLIC BLOOD PRESSURE: 118 MMHG | WEIGHT: 198 LBS | BODY MASS INDEX: 32.99 KG/M2 | HEIGHT: 65 IN | OXYGEN SATURATION: 96 % | DIASTOLIC BLOOD PRESSURE: 60 MMHG

## 2019-03-28 DIAGNOSIS — I48.0 PAF (PAROXYSMAL ATRIAL FIBRILLATION) (HCC): ICD-10-CM

## 2019-03-28 DIAGNOSIS — I61.1 NONTRAUMATIC CORTICAL HEMORRHAGE OF LEFT CEREBRAL HEMISPHERE (HCC): Primary | ICD-10-CM

## 2019-03-28 DIAGNOSIS — F34.1 DYSTHYMIA: ICD-10-CM

## 2019-03-28 DIAGNOSIS — I10 HTN (HYPERTENSION), BENIGN: ICD-10-CM

## 2019-03-28 DIAGNOSIS — E78.5 DYSLIPIDEMIA: ICD-10-CM

## 2019-03-28 PROCEDURE — 1036F TOBACCO NON-USER: CPT | Performed by: PSYCHIATRY & NEUROLOGY

## 2019-03-28 PROCEDURE — 4040F PNEUMOC VAC/ADMIN/RCVD: CPT | Performed by: PSYCHIATRY & NEUROLOGY

## 2019-03-28 PROCEDURE — 3017F COLORECTAL CA SCREEN DOC REV: CPT | Performed by: PSYCHIATRY & NEUROLOGY

## 2019-03-28 PROCEDURE — G8598 ASA/ANTIPLAT THER USED: HCPCS | Performed by: PSYCHIATRY & NEUROLOGY

## 2019-03-28 PROCEDURE — G8482 FLU IMMUNIZE ORDER/ADMIN: HCPCS | Performed by: PSYCHIATRY & NEUROLOGY

## 2019-03-28 PROCEDURE — 99213 OFFICE O/P EST LOW 20 MIN: CPT | Performed by: PSYCHIATRY & NEUROLOGY

## 2019-03-28 PROCEDURE — G8417 CALC BMI ABV UP PARAM F/U: HCPCS | Performed by: PSYCHIATRY & NEUROLOGY

## 2019-03-28 PROCEDURE — G8427 DOCREV CUR MEDS BY ELIG CLIN: HCPCS | Performed by: PSYCHIATRY & NEUROLOGY

## 2019-03-28 PROCEDURE — G8400 PT W/DXA NO RESULTS DOC: HCPCS | Performed by: PSYCHIATRY & NEUROLOGY

## 2019-03-28 PROCEDURE — 1123F ACP DISCUSS/DSCN MKR DOCD: CPT | Performed by: PSYCHIATRY & NEUROLOGY

## 2019-03-28 PROCEDURE — 1090F PRES/ABSN URINE INCON ASSESS: CPT | Performed by: PSYCHIATRY & NEUROLOGY

## 2019-03-29 RX ORDER — METOPROLOL TARTRATE 50 MG/1
TABLET, FILM COATED ORAL
Qty: 60 TABLET | Refills: 0 | OUTPATIENT
Start: 2019-03-29

## 2019-04-02 RX ORDER — METOPROLOL TARTRATE 50 MG/1
TABLET, FILM COATED ORAL
Qty: 60 TABLET | Refills: 10 | OUTPATIENT
Start: 2019-04-02

## 2019-04-02 NOTE — TELEPHONE ENCOUNTER
' called the office stating pt will be out of her Metoprolol by this Friday 4-5-19.  stated they have been trying to get this refilled with no luck. Please advise the refill. He's hoping to hear back today if the medication can or cannot get filled. Please contact him at [phone].

## 2019-04-03 RX ORDER — METOPROLOL TARTRATE 50 MG/1
50 TABLET, FILM COATED ORAL 2 TIMES DAILY
Qty: 60 TABLET | Refills: 0 | Status: SHIPPED | OUTPATIENT
Start: 2019-04-03 | End: 2019-04-24 | Stop reason: SDUPTHER

## 2019-04-03 NOTE — TELEPHONE ENCOUNTER
Spoke with the patient's , he was greatly confused about the medication and didn't realize that it was a cardiac medication. He would like to continue care with our office and would like to have a refill on Metoprolol, will see NPBB on 4/18/19.

## 2019-04-12 ENCOUNTER — APPOINTMENT (OUTPATIENT)
Dept: CT IMAGING | Age: 69
DRG: 065 | End: 2019-04-12
Payer: MEDICARE

## 2019-04-12 ENCOUNTER — APPOINTMENT (OUTPATIENT)
Dept: GENERAL RADIOLOGY | Age: 69
DRG: 065 | End: 2019-04-12
Payer: MEDICARE

## 2019-04-12 ENCOUNTER — HOSPITAL ENCOUNTER (INPATIENT)
Age: 69
LOS: 1 days | Discharge: HOME HEALTH | DRG: 065 | End: 2019-04-13
Attending: EMERGENCY MEDICINE | Admitting: INTERNAL MEDICINE
Payer: MEDICARE

## 2019-04-12 DIAGNOSIS — G45.9 TIA (TRANSIENT ISCHEMIC ATTACK): Primary | ICD-10-CM

## 2019-04-12 LAB
A/G RATIO: 1.1 (ref 1.1–2.2)
ALBUMIN SERPL-MCNC: 4.2 G/DL (ref 3.4–5)
ALP BLD-CCNC: 153 U/L (ref 40–129)
ALT SERPL-CCNC: 19 U/L (ref 10–40)
AMPHETAMINE SCREEN, URINE: NORMAL
ANION GAP SERPL CALCULATED.3IONS-SCNC: 11 MMOL/L (ref 3–16)
AST SERPL-CCNC: 22 U/L (ref 15–37)
BARBITURATE SCREEN URINE: NORMAL
BASOPHILS ABSOLUTE: 0 K/UL (ref 0–0.2)
BASOPHILS RELATIVE PERCENT: 0.5 %
BENZODIAZEPINE SCREEN, URINE: NORMAL
BILIRUB SERPL-MCNC: 0.6 MG/DL (ref 0–1)
BUN BLDV-MCNC: 21 MG/DL (ref 7–20)
CALCIUM SERPL-MCNC: 9.3 MG/DL (ref 8.3–10.6)
CANNABINOID SCREEN URINE: NORMAL
CHLORIDE BLD-SCNC: 102 MMOL/L (ref 99–110)
CO2: 27 MMOL/L (ref 21–32)
COCAINE METABOLITE SCREEN URINE: NORMAL
CREAT SERPL-MCNC: 1 MG/DL (ref 0.6–1.2)
EOSINOPHILS ABSOLUTE: 0.1 K/UL (ref 0–0.6)
EOSINOPHILS RELATIVE PERCENT: 1.9 %
ETHANOL: NORMAL MG/DL (ref 0–0.08)
GFR AFRICAN AMERICAN: >60
GFR NON-AFRICAN AMERICAN: 55
GLOBULIN: 3.9 G/DL
GLUCOSE BLD-MCNC: 196 MG/DL (ref 70–99)
GLUCOSE BLD-MCNC: 197 MG/DL (ref 70–99)
HCT VFR BLD CALC: 36.3 % (ref 36–48)
HEMOGLOBIN: 12.3 G/DL (ref 12–16)
LYMPHOCYTES ABSOLUTE: 2.3 K/UL (ref 1–5.1)
LYMPHOCYTES RELATIVE PERCENT: 30.4 %
Lab: NORMAL
MCH RBC QN AUTO: 31.1 PG (ref 26–34)
MCHC RBC AUTO-ENTMCNC: 33.9 G/DL (ref 31–36)
MCV RBC AUTO: 91.8 FL (ref 80–100)
METHADONE SCREEN, URINE: NORMAL
MONOCYTES ABSOLUTE: 0.5 K/UL (ref 0–1.3)
MONOCYTES RELATIVE PERCENT: 7.2 %
NEUTROPHILS ABSOLUTE: 4.5 K/UL (ref 1.7–7.7)
NEUTROPHILS RELATIVE PERCENT: 60 %
OPIATE SCREEN URINE: NORMAL
OXYCODONE URINE: NORMAL
PDW BLD-RTO: 14.5 % (ref 12.4–15.4)
PERFORMED ON: ABNORMAL
PH UA: 6
PHENCYCLIDINE SCREEN URINE: NORMAL
PLATELET # BLD: 104 K/UL (ref 135–450)
PMV BLD AUTO: 11.4 FL (ref 5–10.5)
POTASSIUM SERPL-SCNC: 4.1 MMOL/L (ref 3.5–5.1)
PROPOXYPHENE SCREEN: NORMAL
RBC # BLD: 3.95 M/UL (ref 4–5.2)
SODIUM BLD-SCNC: 140 MMOL/L (ref 136–145)
SPECIMEN STATUS: NORMAL
TOTAL PROTEIN: 8.1 G/DL (ref 6.4–8.2)
TROPONIN: <0.01 NG/ML
WBC # BLD: 7.4 K/UL (ref 4–11)

## 2019-04-12 PROCEDURE — 93005 ELECTROCARDIOGRAM TRACING: CPT | Performed by: INTERNAL MEDICINE

## 2019-04-12 PROCEDURE — G0480 DRUG TEST DEF 1-7 CLASSES: HCPCS

## 2019-04-12 PROCEDURE — 70450 CT HEAD/BRAIN W/O DYE: CPT

## 2019-04-12 PROCEDURE — 85025 COMPLETE CBC W/AUTO DIFF WBC: CPT

## 2019-04-12 PROCEDURE — G0378 HOSPITAL OBSERVATION PER HR: HCPCS

## 2019-04-12 PROCEDURE — 6370000000 HC RX 637 (ALT 250 FOR IP): Performed by: EMERGENCY MEDICINE

## 2019-04-12 PROCEDURE — 80307 DRUG TEST PRSMV CHEM ANLYZR: CPT

## 2019-04-12 PROCEDURE — 6360000004 HC RX CONTRAST MEDICATION: Performed by: EMERGENCY MEDICINE

## 2019-04-12 PROCEDURE — 80053 COMPREHEN METABOLIC PANEL: CPT

## 2019-04-12 PROCEDURE — 99291 CRITICAL CARE FIRST HOUR: CPT

## 2019-04-12 PROCEDURE — 70496 CT ANGIOGRAPHY HEAD: CPT

## 2019-04-12 PROCEDURE — 84484 ASSAY OF TROPONIN QUANT: CPT

## 2019-04-12 PROCEDURE — 71045 X-RAY EXAM CHEST 1 VIEW: CPT

## 2019-04-12 PROCEDURE — 70498 CT ANGIOGRAPHY NECK: CPT

## 2019-04-12 PROCEDURE — 93005 ELECTROCARDIOGRAM TRACING: CPT | Performed by: EMERGENCY MEDICINE

## 2019-04-12 RX ORDER — ASPIRIN 81 MG/1
81 TABLET ORAL DAILY
Status: DISCONTINUED | OUTPATIENT
Start: 2019-04-13 | End: 2019-04-13 | Stop reason: HOSPADM

## 2019-04-12 RX ORDER — DEXTROSE MONOHYDRATE 50 MG/ML
100 INJECTION, SOLUTION INTRAVENOUS PRN
Status: DISCONTINUED | OUTPATIENT
Start: 2019-04-12 | End: 2019-04-13 | Stop reason: HOSPADM

## 2019-04-12 RX ORDER — METOPROLOL TARTRATE 50 MG/1
50 TABLET, FILM COATED ORAL 2 TIMES DAILY
Status: DISCONTINUED | OUTPATIENT
Start: 2019-04-12 | End: 2019-04-13 | Stop reason: HOSPADM

## 2019-04-12 RX ORDER — NICOTINE POLACRILEX 4 MG
15 LOZENGE BUCCAL PRN
Status: DISCONTINUED | OUTPATIENT
Start: 2019-04-12 | End: 2019-04-13 | Stop reason: HOSPADM

## 2019-04-12 RX ORDER — PANTOPRAZOLE SODIUM 40 MG/1
40 TABLET, DELAYED RELEASE ORAL
Status: DISCONTINUED | OUTPATIENT
Start: 2019-04-13 | End: 2019-04-13 | Stop reason: HOSPADM

## 2019-04-12 RX ORDER — SODIUM CHLORIDE 0.9 % (FLUSH) 0.9 %
10 SYRINGE (ML) INJECTION EVERY 12 HOURS SCHEDULED
Status: DISCONTINUED | OUTPATIENT
Start: 2019-04-12 | End: 2019-04-13 | Stop reason: HOSPADM

## 2019-04-12 RX ORDER — SODIUM CHLORIDE 0.9 % (FLUSH) 0.9 %
10 SYRINGE (ML) INJECTION PRN
Status: DISCONTINUED | OUTPATIENT
Start: 2019-04-12 | End: 2019-04-13 | Stop reason: HOSPADM

## 2019-04-12 RX ORDER — VENLAFAXINE HYDROCHLORIDE 37.5 MG/1
75 CAPSULE, EXTENDED RELEASE ORAL DAILY
Status: DISCONTINUED | OUTPATIENT
Start: 2019-04-13 | End: 2019-04-13 | Stop reason: HOSPADM

## 2019-04-12 RX ORDER — DILTIAZEM HYDROCHLORIDE 60 MG/1
60 TABLET, FILM COATED ORAL EVERY 12 HOURS SCHEDULED
Status: DISCONTINUED | OUTPATIENT
Start: 2019-04-12 | End: 2019-04-13 | Stop reason: HOSPADM

## 2019-04-12 RX ORDER — METOPROLOL SUCCINATE 50 MG/1
50 TABLET, EXTENDED RELEASE ORAL ONCE
Status: COMPLETED | OUTPATIENT
Start: 2019-04-12 | End: 2019-04-12

## 2019-04-12 RX ORDER — ATORVASTATIN CALCIUM 10 MG/1
20 TABLET, FILM COATED ORAL NIGHTLY
Status: DISCONTINUED | OUTPATIENT
Start: 2019-04-12 | End: 2019-04-13

## 2019-04-12 RX ORDER — ONDANSETRON 2 MG/ML
4 INJECTION INTRAMUSCULAR; INTRAVENOUS EVERY 6 HOURS PRN
Status: DISCONTINUED | OUTPATIENT
Start: 2019-04-12 | End: 2019-04-13 | Stop reason: HOSPADM

## 2019-04-12 RX ORDER — MONTELUKAST SODIUM 10 MG/1
10 TABLET ORAL NIGHTLY
Status: DISCONTINUED | OUTPATIENT
Start: 2019-04-12 | End: 2019-04-13 | Stop reason: HOSPADM

## 2019-04-12 RX ORDER — FOLIC ACID 1 MG/1
1 TABLET ORAL DAILY
Status: DISCONTINUED | OUTPATIENT
Start: 2019-04-13 | End: 2019-04-13 | Stop reason: HOSPADM

## 2019-04-12 RX ORDER — ASPIRIN 81 MG/1
324 TABLET, CHEWABLE ORAL ONCE
Status: COMPLETED | OUTPATIENT
Start: 2019-04-12 | End: 2019-04-12

## 2019-04-12 RX ORDER — DEXTROSE MONOHYDRATE 25 G/50ML
12.5 INJECTION, SOLUTION INTRAVENOUS PRN
Status: DISCONTINUED | OUTPATIENT
Start: 2019-04-12 | End: 2019-04-13 | Stop reason: HOSPADM

## 2019-04-12 RX ORDER — ALBUTEROL SULFATE 2.5 MG/3ML
2.5 SOLUTION RESPIRATORY (INHALATION) EVERY 4 HOURS PRN
Status: DISCONTINUED | OUTPATIENT
Start: 2019-04-12 | End: 2019-04-13 | Stop reason: HOSPADM

## 2019-04-12 RX ORDER — FERROUS SULFATE 325(65) MG
325 TABLET ORAL
Status: DISCONTINUED | OUTPATIENT
Start: 2019-04-13 | End: 2019-04-13 | Stop reason: HOSPADM

## 2019-04-12 RX ADMIN — ASPIRIN 81 MG 324 MG: 81 TABLET ORAL at 21:33

## 2019-04-12 RX ADMIN — METOPROLOL SUCCINATE 50 MG: 50 TABLET, EXTENDED RELEASE ORAL at 22:36

## 2019-04-12 RX ADMIN — IOPAMIDOL 75 ML: 755 INJECTION, SOLUTION INTRAVENOUS at 20:53

## 2019-04-12 RX ADMIN — APIXABAN 5 MG: 5 TABLET, FILM COATED ORAL at 22:36

## 2019-04-12 ASSESSMENT — ENCOUNTER SYMPTOMS
TROUBLE SWALLOWING: 0
VOMITING: 0
NAUSEA: 0
ABDOMINAL PAIN: 0
VOICE CHANGE: 0
FACIAL SWELLING: 0
STRIDOR: 0
COLOR CHANGE: 0
WHEEZING: 0
SHORTNESS OF BREATH: 0

## 2019-04-12 ASSESSMENT — PAIN SCALES - GENERAL: PAINLEVEL_OUTOF10: 0

## 2019-04-12 NOTE — ED PROVIDER NOTES
[facility]  ED  eMERGENCY dEPARTMENT eNCOUnter      Pt Name: Gem Melton  MRN: [id number]  [place] 1950  Date of evaluation: 4/12/2019  Provider: Gabriella Chamorro MD    [address] [place] [address]       Chief Complaint   Patient presents with    Fatigue     Pt states yesterday at [phone] she couldn't get up out of the bathtub. States she both legs were weak.  Blurred Vision     Pt states at 1100 today she couldn't remember how to play solitaire and she plays every day. Pt states she feels like she is seeing tiny spots. HISTORY OF PRESENT ILLNESS   (Location/Symptom, Timing/Onset, Context/Setting, Quality, Duration, Modifying Factors, Severity)  Note limiting factors. Gem Melton is a 79 y.o. female with hx of previous TIAs and CVA who presents with intermittent neurologic deficits. The patient's  reports that last night the patient struggled to get out of bed and he is unsure whether this was due to weakness or confusion. He reports that the symptoms completely resolved until 11 AM this morning when the patient was playing solitaire and forgot how to play despite playing the skin every day. He reports that this episode of confusion lasted approximately one hour but did resolve. They stated that the patient began complaining of blurry vision in her left visual fields later in the day causing them to bring her to the emergency department for evaluation. The patient reports that her speech and confusion has resolved. She reports that her gait is at her chronic baseline. Her symptoms are intermittent, severe, waxing and waning, and have no known aggravating or alleviating factors. HPI    Nursing Notes were reviewed. REVIEW OFSYSTEMS    (2-9 systems for level 4, 10 or more for level 5)     Review of Systems   Constitutional: Negative for appetite change, fever and unexpected weight change. HENT: Negative for facial swelling, trouble swallowing and voice change. Eyes: Positive for visual disturbance. Respiratory: Negative for shortness of breath, wheezing and stridor. Cardiovascular: Negative for chest pain and palpitations. Gastrointestinal: Negative for abdominal pain, nausea and vomiting. Genitourinary: Negative for dysuria and vaginal bleeding. Musculoskeletal: Negative for neck pain and neck stiffness. Skin: Negative for color change and wound. Neurological: Positive for weakness. Negative for seizures and syncope. Psychiatric/Behavioral: Positive for confusion. Negative for self-injury and suicidal ideas. Except as noted above the remainder of the review of systems was reviewed and negative. PAST MEDICAL HISTORY     Past Medical History:   Diagnosis Date    Arthritis     Asthma     Depression     [facility]     GERD (gastroesophageal reflux disease)     HCAP (healthcare-associated pneumonia)     Hypertension     Mycobacterium gordonae infection      5/7/18 + afb called from [address] pending  ( from [address] 3/29/18)    Paroxysmal atrial fibrillation ([facility] [address]     Sepsis ([facility]     TIA (transient ischemic attack)     Unspecified cerebral artery occlusion with cerebral infarction          SURGICAL HISTORY       Past Surgical History:   Procedure Laterality Date    APPENDECTOMY      CHOLECYSTECTOMY      COLONOSCOPY  01/19/2017    polyp    CYST INCISION AND DRAINAGE  1986    on head    HYSTERECTOMY      UPPER GASTROINTESTINAL ENDOSCOPY  01/19/2017    barretts ? CURRENT MEDICATIONS       Previous Medications    ALBUTEROL (PROVENTIL HFA;VENTOLIN HFA) 108 (90 BASE) MCG/ACT INHALER    Inhale 2 puffs into the lungs every 6 hours as needed.       ALBUTEROL (PROVENTIL) (2.5 MG/3ML) 0.083% NEBULIZER SOLUTION    Take 3 mLs by nebulization every 4 hours as needed for Wheezing or Shortness of Breath    APIXABAN (ELIQUIS) 5 MG TABS TABLET    Take 1 tablet by mouth 2 times daily    ATORVASTATIN (LIPITOR) 20 MG TABLET    TAKE 1 TABLET BY MOUTH nightly AT BEDTIME    DILTIAZEM (CARDIZEM) 60 MG TABLET    Take 60 mg by mouth    FERROUS SULFATE 325 (65 FE) MG TABLET    Take 325 mg by mouth daily (with breakfast)    FOLIC ACID (FOLVITE) 1 MG TABLET    Take 1 mg by mouth daily    METFORMIN ER (GLUCOPHAGE-XR) 500 MG XR TABLET    Take 1,000 mg by mouth Daily with supper     METOPROLOL TARTRATE (LOPRESSOR) 50 MG TABLET    Take 1 tablet by mouth 2 times daily    MONTELUKAST (SINGULAIR) 10 MG TABLET    Take 10 mg by mouth nightly    OMEPRAZOLE (PRILOSEC) 40 MG DELAYED RELEASE CAPSULE    Take 40 mg by mouth daily    VENLAFAXINE (EFFEXOR-XR) 75 MG XR CAPSULE    Take 75 mg by mouth daily. ALLERGIES     Acyclovir; Ace inhibitors; Amoxicillin; Cephalosporins;  Oxycodone-acetaminophen; Penicillins; and Percocet [oxycodone-acetaminophen]    FAMILY HISTORY       Family History   Problem Relation Age of Onset    Cancer Mother         breast ca          SOCIAL HISTORY       Social History     Socioeconomic History    Marital status:      Spouse name: None    Number of children: None    Years of education: None    Highest education level: None   Occupational History    None   Social Needs    Financial resource strain: None    Food insecurity:     Worry: None     Inability: None    Transportation needs:     Medical: None     Non-medical: None   Tobacco Use    Smoking status: Never Smoker    Smokeless tobacco: Never Used   Substance and Sexual Activity    Alcohol use: No     Alcohol/week: 0.0 oz    Drug use: No    Sexual activity: Never   Lifestyle    Physical activity:     Days per week: None     Minutes per session: None    Stress: None   Relationships    Social connections:     Talks on phone: None     Gets together: None     Attends Caodaism service: None     Active member of club or organization: None     Attends meetings of clubs or organizations: None     Relationship status: None    Intimate partner violence:     Fear of current or ex partner: None     Emotionally abused: None     Physically abused: None     Forced sexual activity: None   Other Topics Concern    None   Social History Narrative    None         PHYSICAL EXAM    (up to 7 for level 4, 8 or more for level 5)     ED Triage Vitals [04/12/19 1955]   BP Temp Temp Source Pulse Resp SpO2 Height Weight   (!) 142/70 97.2 °F (36.2 °C) Oral 81 16 93 % 5' (1.524 m) 205 lb (93 kg)       Physical Exam   Constitutional: She appears well-developed and well-nourished. HENT:   Head: Normocephalic and atraumatic. Right Ear: External ear normal.   Left Ear: External ear normal.   Eyes: Conjunctivae and EOM are normal.   Neck: Neck supple. No JVD present. No tracheal deviation present. Cardiovascular: Normal rate and intact distal pulses. Pulmonary/Chest: Effort normal and breath sounds normal. No respiratory distress. She has no wheezes. Abdominal: Soft. She exhibits no distension. There is no tenderness. There is no rebound and no guarding. Musculoskeletal: Normal range of motion. She exhibits no tenderness or deformity. Neurological: She is alert. No cranial nerve deficit. Oriented to person, year, and the fact that she is in a hospital but not oriented to which hospital.  Normal speech with no facial droop. No CN V sensory deficit to any distribution of the face. 5 out of 5  strength equal bilaterally. Slow deliberate steady gait on own power at patient's baseline according to her and her . Sensation intact in all 4 extremities. No pronator drift. Negative Romberg. Normal finger to nose and right visual fields and straight ahead visual fields but mild past pointing to left visual fields. Skin: Skin is warm and dry. She is not diaphoretic. Nursing note and vitals reviewed.       NIH Stroke Scale     Interval: Baseline  Time: 10:39 PM  Person Administering Scale: [address] stroke scale items in the order listed. Record performance in each category after each subscale exam. Do not go back and change scores. Follow directions provided for each exam technique. Scores should reflect what the patient does, not what the clinician thinks the patient can do. The clinician should record answers while administering the exam and work quickly. Except where indicated, the patient should not be coached (i.e., repeated requests to patient to make a special effort). 1a  Level of consciousness: 0=alert; keenly responsive   1b. LOC questions:  0=Performs both tasks correctly   1c. LOC commands: 0=Performs both tasks correctly   2. Best Gaze: 0=normal   3. Visual: 1=Partial hemianopia   4. Facial Palsy: 0=Normal symmetric movement   5a. Motor left arm: 0=No drift, limb holds 90 (or 45) degrees for full 10 seconds   5b. Motor right arm: 0=No drift, limb holds 90 (or 45) degrees for full 10 seconds   6a. motor left le=No drift, limb holds 90 (or 45) degrees for full 10 seconds   6b  Motor right le=No drift, limb holds 90 (or 45) degrees for full 10 seconds   7. Limb Ataxia: 0=Absent   8. Sensory: 0=Normal; no sensory loss   9. Best Language:  0=No aphasia, normal   10. Dysarthria: 0=Normal   11. Extinction and Inattention: 0=No abnormality         Total:  1         DIAGNOSTIC RESULTS       RADIOLOGY:     Interpretation per the Radiologist below, if available at the time of this note:    XR CHEST PORTABLE   Final Result   No acute abnormality detected. CTA HEAD W CONTRAST   Final Result   No acute arterial abnormality or hemosiderin arterial stenosis in the head or   neck. CTA NECK W CONTRAST   Final Result   No acute arterial abnormality or hemosiderin arterial stenosis in the head or   neck. CT HEAD WO CONTRAST   Final Result   Multifocal areas of encephalomalacia are stable from 10/20/2018. No acute   intracranial abnormality. Unchanged right craniotomy with adjacent dural thickening. A1C       All otherlabs were within normal range or not returned as of this dictation. EMERGENCY DEPARTMENT COURSE and DIFFERENTIAL DIAGNOSIS/MDM:   Vitals:    Vitals:    04/12/19 2056 04/12/19 2057 04/12/19 2135 04/12/19 2236   BP:  (!) 143/66  117/67   Pulse:  78 74 76   Resp:  18 16    Temp:       TempSrc:       SpO2:  96% 100%    Weight: 208 lb 12.8 oz (94.7 kg)      Height:             MDM  As the patient is on anticoagulant Eliquis and is far outside the window for TPA I do not feel she is not a candidate for TPA at this time. I do have concerns about possible crescendo TIAs versus current CVA. I've spoken to the  stroke team who agrees with my current plan with the patient is not a TPA candidate however stroke imaging should be performed, if there is no bleed the patient should be given aspirin, and she should be admitted at [facility] for MRI and neurology consultation. The patient and her  express understanding and agreement with this plan. She is reevaluated multiple times after standing emergency Department with no acute worsening of clinical status.     CONSULTS:  [facility]  IP CONSULT TO HOSPITALIST  IP CONSULT TO NEUROLOGY    PROCEDURES:  Unless otherwise noted below, none     Critical Care  Performed by: Donato Biggs MD  Authorized by: Donato Biggs MD     Critical care provider statement:     Critical care time (minutes):  40    Critical care time was exclusive of:  Separately billable procedures and treating other patients and teaching time    Critical care was necessary to treat or prevent imminent or life-threatening deterioration of the following conditions:  CNS failure or compromise    Critical care was time spent personally by me on the following activities:  Ordering and performing treatments and interventions, development of treatment plan with patient or surrogate, ordering and review of laboratory studies, discussions with consultants, ordering and review of radiographic studies, evaluation of patient's response to treatment, re-evaluation of patient's condition, examination of patient and obtaining history from patient or surrogate        FINAL IMPRESSION      1. TIA (transient ischemic attack)          DISPOSITION/PLAN   DISPOSITION Admitted 04/12/2019 10:32:59 PM      PATIENT REFERRED TO:  Marissa Ervin MD  [address] [address] Cynthia Casper  [address] [place] [id number]            (Please note that portions of this note were completed with a voice recognition program.  Efforts were made to edit the dictations but occasionally words aremis-transcribed. )    Ramila Waters MD (electronically signed)  Attending Emergency Physician           Ramila Waters MD  04/12/19 6833

## 2019-04-12 NOTE — ED NOTES
Dr. Eusebio Chiang performing NIHSS. States CODE STROKE. Charge RN informed.        Keli Gilliland RN  04/12/19 1951

## 2019-04-13 ENCOUNTER — APPOINTMENT (OUTPATIENT)
Dept: MRI IMAGING | Age: 69
DRG: 065 | End: 2019-04-13
Payer: MEDICARE

## 2019-04-13 VITALS
RESPIRATION RATE: 16 BRPM | OXYGEN SATURATION: 97 % | HEIGHT: 60 IN | WEIGHT: 208.8 LBS | BODY MASS INDEX: 40.99 KG/M2 | DIASTOLIC BLOOD PRESSURE: 75 MMHG | TEMPERATURE: 97.9 F | HEART RATE: 64 BPM | SYSTOLIC BLOOD PRESSURE: 122 MMHG

## 2019-04-13 PROBLEM — I63.9 ACUTE CVA (CEREBROVASCULAR ACCIDENT) (HCC): Status: ACTIVE | Noted: 2019-04-13

## 2019-04-13 LAB
ANION GAP SERPL CALCULATED.3IONS-SCNC: 8 MMOL/L (ref 3–16)
BASOPHILS ABSOLUTE: 0 K/UL (ref 0–0.2)
BASOPHILS RELATIVE PERCENT: 0.6 %
BUN BLDV-MCNC: 17 MG/DL (ref 7–20)
CALCIUM SERPL-MCNC: 9.3 MG/DL (ref 8.3–10.6)
CHLORIDE BLD-SCNC: 104 MMOL/L (ref 99–110)
CHOLESTEROL, TOTAL: 102 MG/DL (ref 0–199)
CO2: 27 MMOL/L (ref 21–32)
CREAT SERPL-MCNC: 0.8 MG/DL (ref 0.6–1.2)
EKG ATRIAL RATE: 74 BPM
EKG DIAGNOSIS: NORMAL
EKG P AXIS: 53 DEGREES
EKG P-R INTERVAL: 216 MS
EKG Q-T INTERVAL: 410 MS
EKG QRS DURATION: 74 MS
EKG QTC CALCULATION (BAZETT): 455 MS
EKG R AXIS: 5 DEGREES
EKG T AXIS: 66 DEGREES
EKG VENTRICULAR RATE: 74 BPM
EOSINOPHILS ABSOLUTE: 0.1 K/UL (ref 0–0.6)
EOSINOPHILS RELATIVE PERCENT: 2.3 %
GFR AFRICAN AMERICAN: >60
GFR NON-AFRICAN AMERICAN: >60
GLUCOSE BLD-MCNC: 121 MG/DL (ref 70–99)
GLUCOSE BLD-MCNC: 151 MG/DL (ref 70–99)
GLUCOSE BLD-MCNC: 151 MG/DL (ref 70–99)
GLUCOSE BLD-MCNC: 157 MG/DL (ref 70–99)
HCT VFR BLD CALC: 33.5 % (ref 36–48)
HDLC SERPL-MCNC: 51 MG/DL (ref 40–60)
HEMOGLOBIN: 11.2 G/DL (ref 12–16)
LDL CHOLESTEROL CALCULATED: 33 MG/DL
LYMPHOCYTES ABSOLUTE: 2 K/UL (ref 1–5.1)
LYMPHOCYTES RELATIVE PERCENT: 31.2 %
MCH RBC QN AUTO: 30.6 PG (ref 26–34)
MCHC RBC AUTO-ENTMCNC: 33.5 G/DL (ref 31–36)
MCV RBC AUTO: 91.4 FL (ref 80–100)
MONOCYTES ABSOLUTE: 0.5 K/UL (ref 0–1.3)
MONOCYTES RELATIVE PERCENT: 7.4 %
NEUTROPHILS ABSOLUTE: 3.7 K/UL (ref 1.7–7.7)
NEUTROPHILS RELATIVE PERCENT: 58.5 %
PDW BLD-RTO: 14.7 % (ref 12.4–15.4)
PERFORMED ON: ABNORMAL
PLATELET # BLD: 80 K/UL (ref 135–450)
PMV BLD AUTO: 11 FL (ref 5–10.5)
POTASSIUM REFLEX MAGNESIUM: 4.1 MMOL/L (ref 3.5–5.1)
RBC # BLD: 3.66 M/UL (ref 4–5.2)
SODIUM BLD-SCNC: 139 MMOL/L (ref 136–145)
TRIGL SERPL-MCNC: 90 MG/DL (ref 0–150)
VLDLC SERPL CALC-MCNC: 18 MG/DL
WBC # BLD: 6.4 K/UL (ref 4–11)

## 2019-04-13 PROCEDURE — 2580000003 HC RX 258: Performed by: NURSE PRACTITIONER

## 2019-04-13 PROCEDURE — 85025 COMPLETE CBC W/AUTO DIFF WBC: CPT

## 2019-04-13 PROCEDURE — 96372 THER/PROPH/DIAG INJ SC/IM: CPT

## 2019-04-13 PROCEDURE — 1200000000 HC SEMI PRIVATE

## 2019-04-13 PROCEDURE — 94150 VITAL CAPACITY TEST: CPT

## 2019-04-13 PROCEDURE — 93010 ELECTROCARDIOGRAM REPORT: CPT | Performed by: INTERNAL MEDICINE

## 2019-04-13 PROCEDURE — 94640 AIRWAY INHALATION TREATMENT: CPT

## 2019-04-13 PROCEDURE — 6370000000 HC RX 637 (ALT 250 FOR IP): Performed by: INTERNAL MEDICINE

## 2019-04-13 PROCEDURE — 6370000000 HC RX 637 (ALT 250 FOR IP): Performed by: NURSE PRACTITIONER

## 2019-04-13 PROCEDURE — 83036 HEMOGLOBIN GLYCOSYLATED A1C: CPT

## 2019-04-13 PROCEDURE — 36415 COLL VENOUS BLD VENIPUNCTURE: CPT

## 2019-04-13 PROCEDURE — 97165 OT EVAL LOW COMPLEX 30 MIN: CPT

## 2019-04-13 PROCEDURE — 97116 GAIT TRAINING THERAPY: CPT

## 2019-04-13 PROCEDURE — 97535 SELF CARE MNGMENT TRAINING: CPT

## 2019-04-13 PROCEDURE — 80061 LIPID PANEL: CPT

## 2019-04-13 PROCEDURE — 80048 BASIC METABOLIC PNL TOTAL CA: CPT

## 2019-04-13 PROCEDURE — 99222 1ST HOSP IP/OBS MODERATE 55: CPT | Performed by: PSYCHIATRY & NEUROLOGY

## 2019-04-13 PROCEDURE — 97162 PT EVAL MOD COMPLEX 30 MIN: CPT

## 2019-04-13 PROCEDURE — 70551 MRI BRAIN STEM W/O DYE: CPT

## 2019-04-13 RX ORDER — ALBUTEROL SULFATE 90 UG/1
2 AEROSOL, METERED RESPIRATORY (INHALATION) EVERY 6 HOURS PRN
Status: DISCONTINUED | OUTPATIENT
Start: 2019-04-13 | End: 2019-04-13 | Stop reason: HOSPADM

## 2019-04-13 RX ORDER — ATORVASTATIN CALCIUM 80 MG/1
80 TABLET, FILM COATED ORAL NIGHTLY
Status: DISCONTINUED | OUTPATIENT
Start: 2019-04-13 | End: 2019-04-13 | Stop reason: HOSPADM

## 2019-04-13 RX ORDER — ALBUTEROL SULFATE 90 UG/1
2 AEROSOL, METERED RESPIRATORY (INHALATION) DAILY
Status: DISCONTINUED | OUTPATIENT
Start: 2019-04-13 | End: 2019-04-13 | Stop reason: HOSPADM

## 2019-04-13 RX ADMIN — ASPIRIN 81 MG: 81 TABLET, COATED ORAL at 08:44

## 2019-04-13 RX ADMIN — METOPROLOL TARTRATE 50 MG: 50 TABLET ORAL at 08:44

## 2019-04-13 RX ADMIN — INSULIN LISPRO 2 UNITS: 100 INJECTION, SOLUTION INTRAVENOUS; SUBCUTANEOUS at 12:51

## 2019-04-13 RX ADMIN — DILTIAZEM HYDROCHLORIDE 60 MG: 60 TABLET, FILM COATED ORAL at 00:14

## 2019-04-13 RX ADMIN — VENLAFAXINE HYDROCHLORIDE 75 MG: 37.5 CAPSULE, EXTENDED RELEASE ORAL at 08:44

## 2019-04-13 RX ADMIN — APIXABAN 5 MG: 5 TABLET, FILM COATED ORAL at 08:44

## 2019-04-13 RX ADMIN — INSULIN LISPRO 2 UNITS: 100 INJECTION, SOLUTION INTRAVENOUS; SUBCUTANEOUS at 08:44

## 2019-04-13 RX ADMIN — Medication 2 PUFF: at 10:07

## 2019-04-13 RX ADMIN — ATORVASTATIN CALCIUM 20 MG: 10 TABLET, FILM COATED ORAL at 00:14

## 2019-04-13 RX ADMIN — MONTELUKAST 10 MG: 10 TABLET, FILM COATED ORAL at 00:14

## 2019-04-13 RX ADMIN — FERROUS SULFATE TAB 325 MG (65 MG ELEMENTAL FE) 325 MG: 325 (65 FE) TAB at 07:59

## 2019-04-13 RX ADMIN — DILTIAZEM HYDROCHLORIDE 60 MG: 60 TABLET, FILM COATED ORAL at 08:44

## 2019-04-13 RX ADMIN — SODIUM CHLORIDE, PRESERVATIVE FREE 10 ML: 5 INJECTION INTRAVENOUS at 08:44

## 2019-04-13 RX ADMIN — PANTOPRAZOLE SODIUM 40 MG: 40 TABLET, DELAYED RELEASE ORAL at 06:08

## 2019-04-13 RX ADMIN — SODIUM CHLORIDE, PRESERVATIVE FREE 10 ML: 5 INJECTION INTRAVENOUS at 00:14

## 2019-04-13 RX ADMIN — FOLIC ACID 1 MG: 1 TABLET ORAL at 08:44

## 2019-04-13 ASSESSMENT — PAIN SCALES - GENERAL
PAINLEVEL_OUTOF10: 0
PAINLEVEL_OUTOF10: 0
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
## 2019-04-13 NOTE — PROGRESS NOTES
RESPIRATORY THERAPY ASSESSMENT    Name:  Arcelia Jo Record Number:  [id number]  Age: 79 y.o. Gender: female  : 1950  Today's Date:  2019  Room:  [address] [place] [address]    Assessment     Is the patient being admitted for a COPD or Asthma exacerbation? No   (If yes the patient will be seen every 4 hours for the first 24 hours and then reassessed)    Patient Admission Diagnosis      Allergies  Allergies   Allergen Reactions    Acyclovir Anaphylaxis     Redness face & back, swollen tounge, eyes and throat     Ace Inhibitors      Possible anaphylaxis    Amoxicillin      hives    Cephalosporins      anaphylaxis  Lip swelling    Oxycodone-Acetaminophen      Respiratory distress    Penicillins     Percocet [Oxycodone-Acetaminophen]        Minimum Predicted Vital Capacity:     683          Actual Vital Capacity:      1000              Pulmonary History:Asthma  Home Oxygen Therapy:  room air  Home Respiratory Therapy:Albuterol prn  Current Respiratory Therapy:  Albuterol prn  Treatment Type: IS       Respiratory Severity Index(RSI)   Patients with orders for inhalation medications, oxygen, or any therapeutic treatment modality will be placed on Respiratory Protocol. They will be assessed with the first treatment and at least every 72 hours thereafter. The following severity scale will be used to determine frequency of treatment intervention.     Smoking History: No Smoking History = 0    Social History  Social History     Tobacco Use    Smoking status: Never Smoker    Smokeless tobacco: Never Used   Substance Use Topics    Alcohol use: No     Alcohol/week: 0.0 oz    Drug use: No       Recent Surgical History: None = 0  Past Surgical History  Past Surgical History:   Procedure Laterality Date    APPENDECTOMY      CHOLECYSTECTOMY      COLONOSCOPY  2017    polyp    CYST INCISION AND DRAINAGE  1986    on head    HYSTERECTOMY      UPPER GASTROINTESTINAL ENDOSCOPY  2017    marcio ?       Level of Consciousness: Alert, Oriented, and Cooperative = 0    Level of Activity: Walking with assistance = 1    Respiratory Pattern: Regular Pattern; RR 8-20 = 0    Breath Sounds: Diminished unilaterally = 1    Sputum   ,  ,    Cough: Strong, spontaneous, non-productive = 0    Vital Signs   /66   Pulse 66   Temp 97.6 °F (36.4 °C) (Oral)   Resp 16   Ht 5' (1.524 m)   Wt 208 lb 12.8 oz (94.7 kg)   LMP  (LMP Unknown)   SpO2 98%   BMI 40.78 kg/m²   SPO2 (COPD values may differ): Greater than or equal to 92% on room air = 0    Peak Flow (asthma only): not applicable = 0    RSI: 0-4 = See once and convert to home regimen or discontinue        Plan       Goals: medication delivery    Patient/caregiver was educated on the proper method of use for Respiratory Care Devices:  Yes      Level of patient/caregiver understanding able to:   ? Verbalize understanding   ? Demonstrate understanding       ? Teach back        ? Needs reinforcement       ? No available caregiver               ? Other:     Response to education:  Excellent     Is patient being placed on Home Treatment Regimen? Yes     Does the patient have everything they need prior to discharge? NA     Comments: pt seen and chart reviewed    Plan of Care: albuterol prn    Electronically signed by Connor Acevedo RCP on 4/13/2019 at 1:00 AM    Respiratory Protocol Guidelines     1. Assessment and treatment by Respiratory Therapy will be initiated for medication and therapeutic interventions upon initiation of aerosolized medication. 2. Physician will be contacted for respiratory rate (RR) greater than 35 breaths per minute. Therapy will be held for heart rate (HR) greater than 140 beats per minute, pending direction from physician. 3. Bronchodilators will be administered via Metered Dose Inhaler (MDI) with spacer when the following criteria are met:  a.  Alert and cooperative     b. HR < 140 bpm  c. RR < 30 bpm                d. Can demonstrate a 2-3 second inspiratory hold  4. Bronchodilators will be administered via Hand Held Nebulizer [facility]) to patients when ANY of the following criteria are met  a. Incognizant or uncooperative          b. Patients treated with HHN at Home        c. Unable to demonstrate proper use of MDI with spacer     d. RR > 30 bpm   5. Bronchodilators will be delivered via Metered Dose Inhaler (MDI), HHN, Aerogen to intubated patients on mechanical ventilation. 6. Inhalation medication orders will be delivered and/or substituted as outlined below. Aerosolized Medications Ordering and Administration Guidelines:    1. All Medications will be ordered by a physician, and their frequency and/or modality will be adjusted as defined by the patients Respiratory Severity Index (RSI) score. 2. If the patient does not have documented COPD, consider discontinuing anticholinergics when RSI is less than 9.  3. If the bronchospasm worsens (increased RSI), then the bronchodilator frequency can be increased to a maximum of every 4 hours. If greater than every 4 hours is required, the physician will be contacted. 4. If the bronchospasm improves, the frequency of the bronchodilator can be decreased, based on the patient's RSI, but not less than home treatment regimen frequency. 5. Bronchodilator(s) will be discontinued if patient has a RSI less than 9 and has received no scheduled or as needed treatment for 72  Hrs. Patients Ordered on a Mucolytic Agent:    1. Must always be administered with a bronchodilator. 2. Discontinue if patient experiences worsened bronchospasm, or secretions have lessened to the point that the patient is able to clear them with a cough. Anti-inflammatory and Combination Medications:    1.  If the patient lacks prior history of lung disease, is not using inhaled anti-inflammatory medication at home, and lacks wheezing by examination or by history for at least 24 hours, contact physician for possible discontinuation.

## 2019-04-13 NOTE — PLAN OF CARE
Bed in lowest position, wheels locked, 2/4 side rails up, nonskid footwear on. Bedcheck alarm in place, call light within reach. Pt instructed to call out when needing assistance. Pt stated understanding. Pt has no acute deficits related to TIA. Will continue to monitor.      Problem: Falls - Risk of:  Goal: Will remain free from falls  Description  Will remain free from falls  Outcome: Ongoing     Problem: HEMODYNAMIC STATUS  Goal: Patient has stable vital signs and fluid balance  Outcome: Ongoing     Problem: ACTIVITY INTOLERANCE/IMPAIRED MOBILITY  Goal: Mobility/activity is maintained at optimum level for patient  Outcome: Ongoing     Problem: COMMUNICATION IMPAIRMENT  Goal: Ability to express needs and understand communication  Outcome: Ongoing

## 2019-04-13 NOTE — CONSULTS
(transient ischemic attack)     Unspecified cerebral artery occlusion with cerebral infarction      Family History   Problem Relation Age of Onset    Cancer Mother         breast ca     Past Surgical History:   Procedure Laterality Date    APPENDECTOMY      CHOLECYSTECTOMY      COLONOSCOPY  01/19/2017    polyp    CYST INCISION AND DRAINAGE  1986    on head    HYSTERECTOMY      UPPER GASTROINTESTINAL ENDOSCOPY  01/19/2017    barretts ? Past Surgical History:   Procedure Laterality Date    APPENDECTOMY      CHOLECYSTECTOMY      COLONOSCOPY  01/19/2017    polyp    CYST INCISION AND DRAINAGE  1986    on head    HYSTERECTOMY      UPPER GASTROINTESTINAL ENDOSCOPY  01/19/2017    barretts ?      Family History   Problem Relation Age of Onset    Cancer Mother         breast ca     Social History     Tobacco Use    Smoking status: Never Smoker    Smokeless tobacco: Never Used   Substance Use Topics    Alcohol use: No     Alcohol/week: 0.0 oz    Drug use: No     Allergies   Allergen Reactions    Acyclovir Anaphylaxis     Redness face & back, swollen tounge, eyes and throat     Ace Inhibitors      Possible anaphylaxis    Amoxicillin      hives    Cephalosporins      anaphylaxis  Lip swelling    Oxycodone-Acetaminophen      Respiratory distress    Penicillins     Percocet [Oxycodone-Acetaminophen]      Current Facility-Administered Medications   Medication Dose Route Frequency Provider Last Rate Last Dose    albuterol sulfate  (90 Base) MCG/ACT inhaler 2 puff  2 puff Inhalation Q6H PRN Irasema Frost MD        albuterol sulfate  (90 Base) MCG/ACT inhaler 2 puff  2 puff Inhalation Daily Irasema Frost MD   2 puff at 04/13/19 1007    atorvastatin (LIPITOR) tablet 80 mg  80 mg Oral Nightly Irasema Frost MD        albuterol (PROVENTIL) nebulizer solution 2.5 mg  2.5 mg Nebulization Q4H PRN JARROD Tuttle - CNP        apixaban (ELIQUIS) tablet 5 mg  5 mg Oral BID Leena Rand, APRN - CNP   5 mg at 04/13/19 0844    diltiazem (CARDIZEM) tablet 60 mg  60 mg Oral 2 times per day Leena Rand, APRN - CNP   60 mg at 04/13/19 0844    ferrous sulfate tablet 325 mg  325 mg Oral Daily with breakfast Leena Rand, APRN - CNP   325 mg at 02/12/79 3572    folic acid (FOLVITE) tablet 1 mg  1 mg Oral Daily Leena Rand, APRN - CNP   1 mg at 04/13/19 0844    metoprolol tartrate (LOPRESSOR) tablet 50 mg  50 mg Oral BID Leena Rand, APRN - CNP   50 mg at 04/13/19 0844    montelukast (SINGULAIR) tablet 10 mg  10 mg Oral Nightly Leena Rand, APRN - CNP   10 mg at 04/13/19 0014    pantoprazole (PROTONIX) tablet 40 mg  40 mg Oral QAM AC Leena Rand, APRN - CNP   40 mg at 04/13/19 4455    venlafaxine (EFFEXOR XR) extended release capsule 75 mg  75 mg Oral Daily Leena Rand, APRN - CNP   75 mg at 04/13/19 0844    sodium chloride flush 0.9 % injection 10 mL  10 mL Intravenous 2 times per day Leena Rand, APRN - CNP   10 mL at 04/13/19 0844    sodium chloride flush 0.9 % injection 10 mL  10 mL Intravenous PRN Leena Rand, APRN - CNP        magnesium hydroxide (MILK OF MAGNESIA) 400 MG/5ML suspension 30 mL  30 mL Oral Daily PRN Leena Rand, APRN - CNP        ondansetron (ZOFRAN) injection 4 mg  4 mg Intravenous Q6H PRN Leena Rand, APRN - CNP        aspirin EC tablet 81 mg  81 mg Oral Daily Leena Rand, APRN - CNP   81 mg at 04/13/19 0844    insulin lispro (HUMALOG) injection vial 0-12 Units  0-12 Units Subcutaneous TID WC Leena Rand, APRN - CNP   2 Units at 04/13/19 1251    insulin lispro (HUMALOG) injection vial 0-6 Units  0-6 Units Subcutaneous Nightly Leena Rand, APRN - CNP        glucose (GLUTOSE) 40 % oral gel 15 g  15 g Oral PRN Leena Gutting, APRN - CNP        dextrose 50 % solution 12.5 g  12.5 g Intravenous PRN Leena Njting, APRN - CNP        glucagon (rDNA) injection 1 mg  1 mg Intramuscular PRN Leena Rand, APRN - CNP        dextrose 5 % solution  100 mL/hr Intravenous PRN Araceli Gutierrez, APRN - CNP           ROS : A 10-12 system review of constitutional, cardiovascular, respiratory, musculoskeletal, endocrine, skin, hematological, SHEENT, genitourinary, psychiatric and neurologic systems was obtained and updated today and is unremarkable except as mentioned in my HPI      Exam:     Constitutional:   Vitals:    04/13/19 0757 04/13/19 0800 04/13/19 1009 04/13/19 1210   BP: (!) 141/76 (!) 141/76  122/75   Pulse: 71 71  64   Resp: 16 18 18 16   Temp: 98.2 °F (36.8 °C) 98.2 °F (36.8 °C)  97.9 °F (36.6 °C)   TempSrc: Oral Oral  Oral   SpO2: 93% 95% 94% 97%   Weight:       Height:           General appearance:  Normal development and appear in no acute distress. Eye: No icterus. Fundus: No blurring of optic disc. Neck: supple  Cardiovascular: No carotid bruit. No lower leg edema with good pulsation. Mental Status:   Oriented to person, place, problem, and time. Memory: Aware of recent and remote event. Good immediate recall. Intact remote memory  Normal attention span and concentration. Language: intact naming, repeating and fluency   Good fund of Knowledge. Aware of current events and vocabulary   Cranial Nerves:   II: Visual fields: Full to confrontation and nl [facility]. Pupils: equal, round, reactive to light  III,IV,VI: Extra Ocular Movements are intact. No nystagmus  V: Facial sensation is intact to pin prick and light touch  VII: Facial strength and movements: intact and symmetric  VIII: Hearing: Intact to finger rub bilaterally  IX: Palate elevation is symmetric  XI: Shoulder shrug is intact  XII: Tongue movements are normal  Musculoskeletal: 5/5 in all 4 extremities. Tone: Normal tone. Reflexes: Bilateral biceps 2/4, triceps 2/4, brachial radialis 2/4, knee 2/4 and ankle 2/4. Planters: flexor bilaterally. Coordination: no pronator drift, no dysmetria with FNF in upper extremities. Normal REM.    Sensation: normal to all modalities in both arms and

## 2019-04-13 NOTE — PROGRESS NOTES
Hospitalist Progress Note  4/13/2019 1:19 PM  Subjective:   Admit Date: 4/12/2019  PCP: Chelsie Hernandez MD Status: Observation [104]  Interval History: Hospital Day: 2, admitted overnight with fatigue and blurred vision. History of CVA in March 2018, followed by neurology (Dr. Nori Robert). Could not play solitaire and she was trying to get on the computer and she couldn't remember how to turn it on. She has been taking aspirin daily since CVA last year and Eliquis for atrial fibrillation. HPI: The patient stated since her stroke she plays solitaire every day and today she could not remember how to play. She stated she then began to see floaters out of her left eye. The patient stated the physical therapist showed up and she told him her symptoms and he told her to go to the ED for further evaluation. the patient stated her symptoms lasted roughly 1 hour and she returned to baseline. DIET CARB CONTROL;  Medications:     albuterol sulfate HFA  2 puff Inhalation Daily   apixaban  5 mg Oral BID   atorvastatin  20 mg Oral Nightly   diltiazem  60 mg Oral 2 times per day   ferrous sulfate  325 mg Oral Daily with breakfast   folic acid  1 mg Oral Daily   metoprolol tartrate  50 mg Oral BID   montelukast  10 mg Oral Nightly   pantoprazole  40 mg Oral QAM AC   venlafaxine  75 mg Oral Daily   aspirin  81 mg Oral Daily   insulin lispro SSI   0-12 Units Subcutaneous TID WC / HS     Recent Labs     04/12/19 2007 04/13/19  0608   WBC 7.4 6.4   HGB 12.3 11.2*   * 80*   MCV 91.8 91.4     Recent Labs     04/12/19 2007 04/13/19  0608    139   K 4.1 4.1    104   CO2 27 27   BUN 21* 17   CREATININE 1.0 0.8   GLUCOSE 196* 151*     Recent Labs     04/12/19 2007   AST 22   ALT 19   BILITOT 0.6   ALKPHOS 153*     Troponin T:  negative  POC Glucose:   Recent Labs     04/12/19 2001 04/13/19  0743 04/13/19  1142   POCGLU 197* 151* 157*     MRI brain (4/13) Acute infarct in left precentral gyrus.  Multiple chronic

## 2019-04-13 NOTE — PROGRESS NOTES
Physical Therapy    Facility/Department: [facility] C5 - MED SURG/ORTHO  Initial Assessment    NAME: Iglesia Caldwell  : 1950  MRN: [id number]    Date of Service: 2019    Discharge Recommendations:  24 hour supervision or assist, Home with Home health PT, S Level 1   PT Equipment Recommendations  Equipment Needed: No    Assessment   Body structures, Functions, Activity limitations: Decreased functional mobility ; Decreased balance  Assessment: Pt is 80 y/o female referred to PT with diagnosis of TIA and PMHx including CVA with residual R side neglect. Pt reports PLOF to be IND for amb with RW. Pt amb 400 ft total today with SBA using RW and went up/down 3 steps CGA without AD. Recommend continued acute PT until safe d/c home with 24 hr assist and HHPT S1 level. Treatment Diagnosis: dec IND with functional mobility  Specific instructions for Next Treatment: progress functional mobility per pt teresa  Prognosis: Good  Decision Making: Medium Complexity  Patient Education: role of PT, d/c recs, use of call light, transfers/amb with RW, stair navigation; pt verbalizes understanding  REQUIRES PT FOLLOW UP: Yes  Activity Tolerance  Activity Tolerance: Patient Tolerated treatment well       Patient Diagnosis(es): The encounter diagnosis was TIA (transient ischemic attack). has a past medical history of Arthritis, Asthma, Depression, Diabetes mellitus ([address] GERD (gastroesophageal reflux disease), HCAP (healthcare-associated pneumonia), Hypertension, Mycobacterium gordonae infection, Paroxysmal atrial fibrillation ([facility] [address] Sepsis ([facility] [address] TIA (transient ischemic attack), and Unspecified cerebral artery occlusion with cerebral infarction. has a past surgical history that includes Hysterectomy; Cholecystectomy; Appendectomy; cyst incision and drainage (); Colonoscopy (2017); and Upper gastrointestinal endoscopy (2017).     Restrictions  Restrictions/Precautions  Restrictions/Precautions: General (degrees)  RLE AROM: WNL  AROM LLE (degrees)  LLE AROM : WNL  Strength RLE  Strength RLE: WNL  Strength LLE  Strength LLE: WNL  Tone RLE  RLE Tone: Normotonic  Tone LLE  LLE Tone: Normotonic  Motor Control  Gross Motor?: WNL  Coordination  Finger to Nose: Normal  Heel to Shin: Normal  Sensation  Overall Sensation Status: WNL(intact to light touch BLEs)  Bed mobility  Rolling to Left: Stand by assistance  Rolling to Right: Stand by assistance  Supine to Sit: Stand by assistance  Sit to Supine: Stand by assistance  Scooting: Stand by assistance  Transfers  Sit to Stand: Stand by assistance  Stand to sit: Stand by assistance  Bed to Chair: Unable to assess  Ambulation  Ambulation?: Yes  Ambulation 1  Surface: level tile  Device: Rolling Walker  Assistance: Stand by assistance  Quality of Gait: partial step through pattern with dec augie, steady with no overt LOB  Distance: 200' x 2 with stair training between bouts  Stairs/Curb  Stairs?: Yes  Stairs  # Steps : 3  Stairs Height: 6\"  Rails: Bilateral  Device: No Device  Assistance: Contact guard assistance  Comment: step to pattern with RLE leading up and LLE leading down, steady              Plan   Plan  Times per week: 3-5x/wk  Plan weeks: 4/20/2019  Specific instructions for Next Treatment: progress functional mobility per pt teresa  Current Treatment Recommendations: Strengthening, Functional Mobility Training, Neuromuscular Re-education, Transfer Training, Gait Training, Stair training, Endurance Training, Balance Training, Patient/Caregiver Education & Training, Safety Education & Training, Home Exercise Program  Safety Devices  Type of devices:  All fall risk precautions in place, Bed alarm in place, Call light within reach, Left in bed, Patient at risk for falls, Gait belt  Restraints  Initially in place: No    G-Code  PT G-Codes  Functional Assessment Tool Used: AMPAC  Score: 23  Functional Limitation: Mobility: Walking and moving around    AM-PAC Score  AM-PAC Inpatient Mobility Raw Score : 23  AM-PAC Inpatient T-Scale Score : 56.93  Mobility Inpatient CMS 0-100% Score: 11.2  Mobility Inpatient CMS G-Code Modifier : CI          Goals  Short term goals  Time Frame for Short term goals: 4/20/2019  Short term goal 1: Pt to be IND for bed mobility  Short term goal 2: Pt to transfer bed<>chair Mod I with RW  Short term goal 3: Pt to amb 150 ft Mod I with RW  Short term goal 4: Pt to go up/down 1 step Mod I without AD  Short term goal 5: By 4/15: Pt to teresa 2x10 BLE standing ther ex to promote inc LE strength and improved balance  Patient Goals   Patient goals : \"to go home\"       Therapy Time   Individual Concurrent Group Co-treatment   Time In       1500   Time Out       1533   Minutes       33   Timed Code Treatment Minutes: 23 Minutes(10 for eval)       Kavon Bradford, PT, DPT #[id number]

## 2019-04-13 NOTE — DISCHARGE SUMMARY
Hospital Medicine Discharge Summary    Ellyn Zuniga  :  1950  MRN:  [id number]    Admit date:  2019  Discharge date:  2019    Admitting Physician:  Peter Poole MD  Primary Care Physician:  Paulette Herzog MD  Code Status:   Full Code  Status: Inpatient [101]  Discharged Condition: Stable  Activity: activity as tolerated  Diet:  DIET CARB CONTROL; Discharge Diagnoses:      TIA (transient ischemic attack)    HTN (hypertension), benign    Paroxysmal atrial fibrillation (HCC)    H/O: stroke    DM (diabetes mellitus), type 2, uncontrolled with complications (HCC)    Obesity (BMI 30-39. 9)    Dyslipidemia    CAD in native artery    Acute CVA (cerebrovascular accident) [facility])        Hospital Course:   79 y.o. female admitted with fatigue and blurred vision. History of CVA in 2018, followed by neurology (Dr. Mateus Overton). Could not play solitaire and she was trying to get on the computer and she couldn't remember how to turn it on. She has been taking aspirin daily since CVA last year and Eliquis for atrial fibrillation.       HPI: The patient stated since her stroke she plays solitaire every day and today she could not remember how to play. She stated she then began to see floaters out of her left eye. The patient stated the physical therapist showed up and she told him her symptoms and he told her to go to the ED for further evaluation. the patient stated her symptoms lasted roughly 1 hour and she returned to baseline. 1. Acute CVA in setting or prior CVA a year ago:  Aspirin 81 mg daily, increase intensity of statin therapy with atorvastatin 80 mg nightly. Acute infarct in left precentral gyrus by MRI. Neurology evaluation pending. NIHSS scale, pt score of 0 at this time. PT/OT evaluation. 2. Paroxysmal atrial fibrillation:  Sinus rhythm by exam today. Anticoagulation with apixaban (Eliquis) 5 mg BID and rate control with diltiazem 60 mg BID.    3. Diabetes (controlled, HgbA1c 6.7% 10/18/18): Cover with a \"sliding scale\" lispro moderate scale prandial correction insulin. 4. Class III obesity (BMI 50.9) complicates medical management. Neurology Evaluation:  Continue current blood pressure medications and monitor BP at home  Continue Effexor  Stroke prevention was discussed with the patient as well as test results  Can be discharged from neurology when medical stable    Discharge Medications:    albuterol (PROVENTIL HFA;VENTOLIN HFA) 108 (90 BASE) MCG/ACT inhaler  Inhale 2 puffs into the lungs every 6 hours as needed. albuterol (PROVENTIL) (2.5 MG/3ML) 0.083% nebulizer solution  Take 3 mLs by nebulization every 4 hours as needed for Wheezing or Shortness of Breath     apixaban (ELIQUIS) 5 MG TABS tablet  Take 1 tablet by mouth 2 times daily     atorvastatin (LIPITOR) 20 MG tablet  TAKE 1 TABLET BY MOUTH nightly AT BEDTIME     dextromethorphan-guaiFENesin (MUCINEX DM)  MG per extended release tablet  Take 1 tablet by mouth every 12 hours as needed     diltiazem (CARDIZEM) 60 MG tablet  Take 60 mg by mouth     ferrous sulfate 325 (65 Fe) MG tablet  Take 325 mg by mouth daily (with breakfast)     folic acid (FOLVITE) 1 MG tablet  Take 1 mg by mouth daily     metFORMIN ER (GLUCOPHAGE-XR) 500 MG XR tablet  Take 1,000 mg by mouth Daily with supper      metoprolol tartrate (LOPRESSOR) 50 MG tablet  Take 1 tablet by mouth 2 times daily     montelukast (SINGULAIR) 10 MG tablet  Take 10 mg by mouth nightly     omeprazole (PRILOSEC) 40 MG delayed release capsule  Take 40 mg by mouth daily     venlafaxine (EFFEXOR-XR) 75 MG XR capsule  Take 75 mg by mouth daily. Consults:  IP CONSULT TO STROKE TEAM  IP CONSULT TO HOSPITALIST  IP CONSULT TO NEUROLOGY    Significant Diagnostic Studies:    MRI brain (4/13) Acute infarct in left precentral gyrus. Multiple chronic infarcts.   CTA head and neck (4/12) No acute arterial abnormality or hemosiderin arterial stenosis in the head or neck.  CT head (4/12) Multifocal areas of encephalomalacia are stable from 10/20/2018.  No acute intracranial abnormality. Unchanged right craniotomy with adjacent dural thickening. Treatments:   Telemetry (no arrhythmia), observation    Disposition:   Home with self care  Follow up with Tam Lam MD in 1-2 weeks.     Signed:  KELLI PEREZ  4/13/2019, 5:13 PM

## 2019-04-13 NOTE — PLAN OF CARE
Pt assessed using the NIHSS scale, pt score of 0. Reassessed every 4 hours with no changes at this time. Fall precautions in place. 2/4 siderails up, non skid footwear on pt. Call light within reach and pt bed check on and engaged. Educated pt on the importance of calling out when getting out of bed. Will continue to assess and monitor.

## 2019-04-13 NOTE — PLAN OF CARE
Patient has a past medical history of Arthritis, Asthma, Depression, Diabetes mellitus ([facility] [address] GERD (gastroesophageal reflux disease), HCAP (healthcare-associated pneumonia), Hypertension, Mycobacterium gordonae infection, Paroxysmal atrial fibrillation ([facility] [address] Sepsis ([facility] [address] TIA (transient ischemic attack), and Unspecified cerebral artery occlusion with cerebral infarction. Comorbidities and risk factors for stroke reviewed and education provided as appropriate. Patient and/or family's stated goal of care: Improve mobility     Reviewed the Following Education with Patient and/or Family:   Signs and Symptoms of Stroke-Reviewed FAST   Facial Drooping   Arm Weakness   Speech Difficulty   Time to Call 911 and how to activate EMS (911)   Importance of Follow Up Appointment at Discharge   Importance of Compliance with Medications Prescribed at Discharge     Pt verbalized understanding.      Family Present during Education: No     Stroke Education Booklet at Bedside: Yes    Total Education Time: 15 Minutes

## 2019-04-13 NOTE — DISCHARGE INSTR - COC
Continuity of Care Form    Patient Name: Gem Melton   :  1950  MRN:  [id number]    Admit date:  2019  Discharge date:  ***    Code Status Order: Full Code   Advance Directives:   Advance Care Flowsheet Documentation     Date/Time Healthcare Directive Type of Healthcare Directive Copy in [address] Agent's Name Healthcare Agent's Phone Number    [phone]  No, patient does not have an advance directive for healthcare treatment -- -- -- -- --          Admitting Physician:  Krish Cassidy MD  PCP: Chelsie Hernandez MD    Discharging Nurse: [facility] Unit/Room#: 7279/6940-34  Discharging Unit Phone Number: ***    Emergency Contact:   Extended Emergency Contact Information  Primary Emergency Contact: [email or web address]  Address: [address] [place], [address] [place] Mcardle of [address] Phone: [phone]  Mobile Phone: [phone]  Relation: Spouse  Secondary Emergency Contact: Domonique Bates  [place] Phone: [phone]  Relation: Brother/Sister    Past Surgical History:  Past Surgical History:   Procedure Laterality Date    APPENDECTOMY      CHOLECYSTECTOMY      COLONOSCOPY  2017    polyp    CYST INCISION AND DRAINAGE  1986    on head    HYSTERECTOMY      UPPER GASTROINTESTINAL ENDOSCOPY  2017    barretts ?        Immunization History:   Immunization History   Administered Date(s) Administered    Influenza Virus Vaccine 12/10/2012, 2013, 2016    Influenza, [place], 3 yrs and older, IM, PF (Fluzone 3 yrs and older or Afluria 5 yrs and older) 2017    Influenza, [place] November, 6 mo and older, IM, PF (Flulaval, Fluarix) 10/15/2018    Pneumococcal Polysaccharide ([id number]) 12/10/2012, 2016       Active Problems:  Patient Active Problem List   Diagnosis Code    Poorly controlled type 2 diabetes mellitus ([facility] [address] E11.65    PAF (paroxysmal atrial fibrillation) ([facility] [address] I48.0    Morbid obesity with BMI of LAZJ:[id number]}    Rehab Therapies: {THERAPEUTIC INTERVENTION:8991375234}  Weight Bearing Status/Restrictions: [address] [employer] Weight Bearin}  Other Medical Equipment (for information only, NOT a DME order):  {EQUIPMENT:065152036}  Other Treatments: ***    Patient's personal belongings (please select all that are sent with patient):  {CHP DME Belongings:961340522}    RN SIGNATURE:  {Esignature:[id number]}    CASE MANAGEMENT/SOCIAL WORK SECTION    Inpatient Status Date: ***    Readmission Risk Assessment Score:  Readmission Risk              Risk of Unplanned Readmission:        19           Discharging to Facility/ Agency   · Name: [facility] acute hospital  · Address:  · Phone:[phone]  · Fax:[phone]    Dialysis Facility (if applicable)   · Name:  · Address:  · Dialysis Schedule:  · Phone:  · Fax:    / signature: Electronically signed by Myla Abdullahi RN on 19 at 5:54 PM    PHYSICIAN SECTION    Prognosis: Good    Condition at Discharge: Stable    Rehab Potential (if transferring to Rehab): Good    Recommended Labs or Other Treatments After Discharge:     Physician Certification: I certify the above information and transfer of Lencho Mejias  is necessary for the continuing treatment of the diagnosis listed and that she requires Home Care for less 30 days.      Update Admission H&P: No change in H&P    PHYSICIAN SIGNATURE:  Electronically signed by Anthony Ventura MD on 19 at 5:22 PM

## 2019-04-13 NOTE — PROGRESS NOTES
Patient has a past medical history of Arthritis, Asthma, Depression, Diabetes mellitus ([facility] [address] GERD (gastroesophageal reflux disease), HCAP (healthcare-associated pneumonia), Hypertension, Mycobacterium gordonae infection, Paroxysmal atrial fibrillation ([facility] [address] Sepsis ([facility] [address] TIA (transient ischemic attack), and Unspecified cerebral artery occlusion with cerebral infarction. Comorbidities and risk factors for stroke reviewed and education provided as appropriate. Patient and/or family's stated goal of care: Improve mobility     Reviewed the Following Education with Patient and/or Family:   Signs and Symptoms of Stroke-Reviewed FAST   Facial Drooping   Arm Weakness   Speech Difficulty   Time to Call 911 and how to activate EMS (911)   Importance of Follow Up Appointment at Discharge   Importance of Compliance with Medications Prescribed at Discharge     Pt verbalized understanding.      Family Present during Education: no     Stroke Education Booklet at Bedside: yes     Total Education Time:  Minutes

## 2019-04-13 NOTE — H&P
Hospital Medicine History & Physical      PCP: Paulette Herzog MD    Date of Admission: 4/12/2019    Date of Service: Pt seen/examined on 4/12/2019 and Admitted to observation with expected LOS less than two midnights due to medical therapy. Chief Complaint:  Fatigue and blurred vision    History Of Present Illness:     79 y.o. female, with PMH of CVA, HTN, HLD, CAD, DM, obesity and PAF, who presented to [address] [place] [address] with fatigue and blurred vision. History obtained from the patient and review of EMR. The patient stated yesterday around noon she was getting ready for a doctor appointment when she became extremely weak  In the bath tub. She stated she yelled for her  and told him she was unable to get out of the bathtub. The patient stated her  helped her out and then she went to lay down down. The patient stated this morning she felt fine, but then in the afternoon she was trying to get on the computer and she couldn't remember how to turn it on. She stated she does have a history of a CVA in March, 2018 and she follows with dr. Erica Babb from neurology. The patient stated since her stroke she plays solitaire every day and today she could not remember how to play. She stated she then began to see floaters out of her left eye. The patient stated the physical therapist showed up and she told him her symptoms and he told her to go to the ED for further evaluation. the patient stated her symptoms lasted roughly 1 hour and she returned to baseline. The patient denied any other associated symptoms as well as any aggravating and/or alleviating factors. At the time of this assessment, the patient was resting comfortably in bed. The currently denies any headaches, vision change, chest pain, back pain, abdominal pain, shortness of breath, numbness, tingling, N/V/C/D, fever and/or chills.      Past Medical History:          Diagnosis Date    Arthritis     Asthma     Depression     Diabetes mellitus ([facility]     GERD (gastroesophageal reflux disease)     HCAP (healthcare-associated pneumonia)     Hypertension     Mycobacterium gordonae infection      5/7/18 + afb called from [address] pending  ( from [address] 3/29/18)    Paroxysmal atrial fibrillation ([facility]     [facility]     TIA (transient ischemic attack)     Unspecified cerebral artery occlusion with cerebral infarction      Past Surgical History:          Procedure Laterality Date    APPENDECTOMY      CHOLECYSTECTOMY      COLONOSCOPY  01/19/2017    polyp    CYST INCISION AND DRAINAGE  1986    on head    HYSTERECTOMY      UPPER GASTROINTESTINAL ENDOSCOPY  01/19/2017    barretts ? Medications Prior to Admission:      Prior to Admission medications    Medication Sig Start Date End Date Taking?  Authorizing Provider   dextromethorphan-guaiFENesin (MUCINEX DM)  MG per extended release tablet Take 1 tablet by mouth every 12 hours as needed   Yes Historical Provider, MD   metoprolol tartrate (LOPRESSOR) 50 MG tablet Take 1 tablet by mouth 2 times daily 4/3/19  Yes Nazia Gama MD   apixaban (ELIQUIS) 5 MG TABS tablet Take 1 tablet by mouth 2 times daily 10/21/18  Yes Gelacio Garcia MD   omeprazole (PRILOSEC) 40 MG delayed release capsule Take 40 mg by mouth daily   Yes Historical Provider, MD   diltiazem (CARDIZEM) 60 MG tablet Take 60 mg by mouth 5/14/18  Yes Historical Provider, MD   ferrous sulfate 325 (65 Fe) MG tablet Take 325 mg by mouth daily (with breakfast)   Yes Historical Provider, MD   folic acid (FOLVITE) 1 MG tablet Take 1 mg by mouth daily   Yes Historical Provider, MD   montelukast (SINGULAIR) 10 MG tablet Take 10 mg by mouth nightly   Yes Historical Provider, MD   albuterol (PROVENTIL) (2.5 MG/3ML) 0.083% nebulizer solution Take 3 mLs by nebulization every 4 hours as needed for Wheezing or Shortness of Breath 3/19/18  Yes Rick Manuel DO   metFORMIN ER (GLUCOPHAGE-XR) 500 MG XR tablet Take 1,000 mg by mouth Daily with supper    Yes Historical Provider, MD   atorvastatin (LIPITOR) 20 MG tablet TAKE 1 TABLET BY MOUTH nightly AT BEDTIME 6/19/15  Yes Historical Provider, MD   venlafaxine (EFFEXOR-XR) 75 MG XR capsule Take 75 mg by mouth daily. Yes Historical Provider, MD   albuterol (PROVENTIL HFA;VENTOLIN HFA) 108 (90 BASE) MCG/ACT inhaler Inhale 2 puffs into the lungs every 6 hours as needed. Yes Historical Provider, MD     Allergies:  Acyclovir; Ace inhibitors; Amoxicillin; Cephalosporins; Oxycodone-acetaminophen; Penicillins; and Percocet [oxycodone-acetaminophen]    Social History:      The patient currently lives at home with her     TOBACCO:   reports that she has never smoked. She has never used smokeless tobacco.  ETOH:   reports that she does not drink alcohol. Family History:      Reviewed in detail. Positive as follows:        Problem Relation Age of Onset    Cancer Mother         breast ca     REVIEW OF SYSTEMS:   Pertinent positives as noted in the HPI. All other systems reviewed and negative. PHYSICAL EXAM PERFORMED:    /66   Pulse 66   Temp 97.6 °F (36.4 °C) (Oral)   Resp 16   Ht 5' (1.524 m)   Wt 208 lb 12.8 oz (94.7 kg)   LMP  (LMP Unknown)   SpO2 98%   BMI 40.78 kg/m²     General appearance:  Pleasant, obese female in no apparent distress, appears stated age and cooperative. HEENT: Pupils equal, round, and reactive to light. glasses Extra ocular muscles intact. Conjunctivae/corneas clear. Neck: Supple, with full range of motion. No jugular venous distention. Trachea midline. Respiratory:  Normal respiratory effort. Clear to auscultation, bilaterally without Rales/Wheezes/Rhonchi. Cardiovascular:  Regular rate and rhythm with normal S1/S2 without murmurs, rubs or gallops. Abdomen: Soft, obese, round, non-tender, non-distended with normal bowel sounds. Musculoskeletal:  No clubbing, cyanosis or edema bilaterally. Full range of motion without deformity.   Skin: Skin Dyslipidemia [E78.5] 05/31/2018    Uncontrolled type 2 diabetes mellitus with hyperglycemia ([facility] [E11.65] 04/09/2018    Obesity (BMI 30-39. 9) [E66.9] 04/09/2018    H/O: stroke [Z86.73] 03/28/2018    Paroxysmal atrial fibrillation (HCC) [I48.0]     Hypertension [I10]      PLAN:    TIA vs CVA - symptoms include fatigue and blurred vision. Patient does have a history of stroke   -CT head reveled:no acute intracranial abnormality  -CTA head and neck revealed: no acute arterial abnormality or hemosiderin arterial stenosis in the head or neck  -stroke team called in ED - not a TPA candidate  -NIH in ED 1  -ASA given in ED  -UDS in ED negative  -UA pending  -Anticoagulated on eliquis   -asa and statin daily  -MRI brain w/o contrast in am  -echo in am  -pt/ot  -stroke education  -neurochecks  -Monitor on tele. -Consult neurology for further recs - appreciated in advance. Essential HTN in setting of known CAD  -continue metoprolol    HLD, stable  -continue atorvastatin    DM2, uncontrolled   -  -hemglobin a1c 6.7 10/14/2017, repeat in am  -hold home metformin  -mdssi  -poct ac/hs  -hypoglycemia protocol  -carb control diet    PAF,   -currently on Eliquis for [facility]  -continue diltiazem    Obesity  -With Body mass index is 40.9 kg/m². Complicating assessment and treatment. Placing patient at risk for multiple co-morbidities as well as early death and contributing to the patient's presentation. Counseled on weight loss    DVT Prophylaxis: Lovenox  Diet: DIET CARB CONTROL;  Code Status: Full Code    PT/OT Eval Status: ordered    Dispo - 1-2 days pending clinical improvement     Redge Console, APRN - CNP    Thank you Brady Herman MD for the opportunity to be involved in this patient's care.  If you have any questions or concerns please feel free to contact me at [phone].  -----------------------------------Anticipated Dr. Nery rae----------------------------------

## 2019-04-13 NOTE — CARE COORDINATION
CASE MANAGEMENT DISCHARGE SUMMARY  Patient used [facility] home care in the past. Unable to contact [facility] as number has been disconnected. Discharge to: home with [place] ordered/agency: na    Transportation:    Family/car: private    Notified:    Facility/Agency: [facility] [id number]  :     Note: Discharging nurse to complete [facility], reconcile AVS, and place final copy with patient's discharge packet. RN to ensure that written prescriptions for  Level II medications are sent with patient to the facility as per protocol.

## 2019-04-14 LAB
EKG ATRIAL RATE: 75 BPM
EKG DIAGNOSIS: NORMAL
EKG P AXIS: 54 DEGREES
EKG P-R INTERVAL: 216 MS
EKG Q-T INTERVAL: 404 MS
EKG QRS DURATION: 78 MS
EKG QTC CALCULATION (BAZETT): 451 MS
EKG R AXIS: 4 DEGREES
EKG T AXIS: 67 DEGREES
EKG VENTRICULAR RATE: 75 BPM
ESTIMATED AVERAGE GLUCOSE: 177.2 MG/DL
HBA1C MFR BLD: 7.8 %

## 2019-04-14 PROCEDURE — 93010 ELECTROCARDIOGRAM REPORT: CPT | Performed by: INTERNAL MEDICINE

## 2019-04-14 NOTE — PROGRESS NOTES
Pt provided and explained AVS, pt denies any needs or questions. PIV pulled out and tele box removed, [facility] notified. Transported pt via wheelchair to husbands car.

## 2019-04-18 ENCOUNTER — OFFICE VISIT (OUTPATIENT)
Dept: CARDIOLOGY CLINIC | Age: 69
End: 2019-04-18
Payer: MEDICARE

## 2019-04-18 ENCOUNTER — TELEPHONE (OUTPATIENT)
Dept: CARDIOLOGY CLINIC | Age: 69
End: 2019-04-18

## 2019-04-18 VITALS
SYSTOLIC BLOOD PRESSURE: 108 MMHG | WEIGHT: 210.2 LBS | BODY MASS INDEX: 41.27 KG/M2 | HEART RATE: 54 BPM | DIASTOLIC BLOOD PRESSURE: 62 MMHG | OXYGEN SATURATION: 95 % | HEIGHT: 60 IN

## 2019-04-18 DIAGNOSIS — I10 ESSENTIAL HYPERTENSION: ICD-10-CM

## 2019-04-18 DIAGNOSIS — I48.0 PAROXYSMAL ATRIAL FIBRILLATION (HCC): Primary | ICD-10-CM

## 2019-04-18 DIAGNOSIS — I44.0 FIRST DEGREE AV BLOCK: ICD-10-CM

## 2019-04-18 PROBLEM — J18.9 PNEUMONIA: Status: RESOLVED | Noted: 2018-10-14 | Resolved: 2019-04-18

## 2019-04-18 PROCEDURE — G8598 ASA/ANTIPLAT THER USED: HCPCS | Performed by: NURSE PRACTITIONER

## 2019-04-18 PROCEDURE — 1036F TOBACCO NON-USER: CPT | Performed by: NURSE PRACTITIONER

## 2019-04-18 PROCEDURE — G8400 PT W/DXA NO RESULTS DOC: HCPCS | Performed by: NURSE PRACTITIONER

## 2019-04-18 PROCEDURE — 1123F ACP DISCUSS/DSCN MKR DOCD: CPT | Performed by: NURSE PRACTITIONER

## 2019-04-18 PROCEDURE — G8417 CALC BMI ABV UP PARAM F/U: HCPCS | Performed by: NURSE PRACTITIONER

## 2019-04-18 PROCEDURE — 99214 OFFICE O/P EST MOD 30 MIN: CPT | Performed by: NURSE PRACTITIONER

## 2019-04-18 PROCEDURE — 1090F PRES/ABSN URINE INCON ASSESS: CPT | Performed by: NURSE PRACTITIONER

## 2019-04-18 PROCEDURE — G8427 DOCREV CUR MEDS BY ELIG CLIN: HCPCS | Performed by: NURSE PRACTITIONER

## 2019-04-18 PROCEDURE — 4040F PNEUMOC VAC/ADMIN/RCVD: CPT | Performed by: NURSE PRACTITIONER

## 2019-04-18 PROCEDURE — 3017F COLORECTAL CA SCREEN DOC REV: CPT | Performed by: NURSE PRACTITIONER

## 2019-04-18 PROCEDURE — 93000 ELECTROCARDIOGRAM COMPLETE: CPT | Performed by: NURSE PRACTITIONER

## 2019-04-18 PROCEDURE — 1111F DSCHRG MED/CURRENT MED MERGE: CPT | Performed by: NURSE PRACTITIONER

## 2019-04-18 RX ORDER — ASPIRIN 81 MG/1
81 TABLET ORAL DAILY
Qty: 90 TABLET | Refills: 1 | COMMUNITY
Start: 2019-04-18

## 2019-04-18 NOTE — TELEPHONE ENCOUNTER
Called patient to discuss Dr. Jia You recommendations for loop recorder. I left a message for her to call me back on Monday to discuss further.

## 2019-04-18 NOTE — PROGRESS NOTES
[address]   Electrophysiology  Note              Date:  April 18, 2019  Patient name: Reggie Sanz  YOB: 1950    Primary Care physician: Karie Armando MD    HISTORY OF PRESENT ILLNESS: The patient is a 79 y.o.  female with a history of paroxysmal atrial fibrillation, HTN, recurrent CVA (with hemorrhagic conversion), DVT, and DM. In 2013 she was admitted with chest pain. A LHC in 12/2013 showed normal coronary arteries. Echo in 10/2015 showed an EF of 55%. She had an acute left posterior parietal/occipital CVA in 10/2015 while on Xarelto. She is followed by Dr. Latricia Myers and Xarelto and ASA were resumed. She was admitted in 4/2018 with CVA with hemorrhagic conversion and anticoagulation was stopped. She was readmitted in 10/2018 with recurrent CVA and Eliquis was started. ALISTAIR was negative. Patient was readmitted in 4/2019 with another CVA while on Eliquis and ASA. Today she is being seen for paroxysmal atrial fibrillation. Her EKG shows sinus ayo with a 1st degree AVB with a HR of 55. She complains of a rare palpitation and chronic SOB. Denies chest pain and dizziness. Is frustrated in not finding an explanation for recurrent CVA. Past Medical History:   has a past medical history of Arthritis, Asthma, Depression, Diabetes mellitus ([address] GERD (gastroesophageal reflux disease), HCAP (healthcare-associated pneumonia), Hypertension, Mycobacterium gordonae infection, Paroxysmal atrial fibrillation ([address] Sepsis ([address] TIA (transient ischemic attack), and Unspecified cerebral artery occlusion with cerebral infarction. Past Surgical History:   has a past surgical history that includes Hysterectomy; Cholecystectomy; Appendectomy; cyst incision and drainage (1986); Colonoscopy (01/19/2017); and Upper gastrointestinal endoscopy (01/19/2017). Home Medications:    Prior to Admission medications    Medication Sig Start Date End Date Taking?  Authorizing Provider dextromethorphan-guaiFENesin (MUCINEX DM)  MG per extended release tablet Take 1 tablet by mouth every 12 hours as needed   Yes Historical Provider, MD   metoprolol tartrate (LOPRESSOR) 50 MG tablet Take 1 tablet by mouth 2 times daily 4/3/19  Yes Sunita Lindsey MD   apixaban (ELIQUIS) 5 MG TABS tablet Take 1 tablet by mouth 2 times daily 10/21/18  Yes Renuka Zimmerman MD   omeprazole (PRILOSEC) 40 MG delayed release capsule Take 40 mg by mouth daily   Yes Historical Provider, MD   diltiazem (CARDIZEM) 60 MG tablet Take 60 mg by mouth 5/14/18  Yes Historical Provider, MD   ferrous sulfate 325 (65 Fe) MG tablet Take 325 mg by mouth daily (with breakfast)   Yes Historical Provider, MD   folic acid (FOLVITE) 1 MG tablet Take 1 mg by mouth daily   Yes Historical Provider, MD   montelukast (SINGULAIR) 10 MG tablet Take 10 mg by mouth nightly   Yes Historical Provider, MD   albuterol (PROVENTIL) (2.5 MG/3ML) 0.083% nebulizer solution Take 3 mLs by nebulization every 4 hours as needed for Wheezing or Shortness of Breath 3/19/18  Yes Rick Manuel,    metFORMIN ER (GLUCOPHAGE-XR) 500 MG XR tablet Take 1,000 mg by mouth Daily with supper    Yes Historical Provider, MD   atorvastatin (LIPITOR) 20 MG tablet TAKE 1 TABLET BY MOUTH nightly AT BEDTIME 6/19/15  Yes Historical Provider, MD   venlafaxine (EFFEXOR-XR) 75 MG XR capsule Take 75 mg by mouth daily. Yes Historical Provider, MD   albuterol (PROVENTIL HFA;VENTOLIN HFA) 108 (90 BASE) MCG/ACT inhaler Inhale 2 puffs into the lungs every 6 hours as needed. Yes Historical Provider, MD       Allergies:  Acyclovir; Ace inhibitors; Amoxicillin; Cephalosporins; Oxycodone-acetaminophen; Penicillins; and Percocet [oxycodone-acetaminophen]    Social History:   reports that she has never smoked. She has never used smokeless tobacco. She reports that she does not drink alcohol or use drugs. Family History: family history includes Cancer in her mother.     Review of Systems   Constitutional: Negative. HENT: Negative. Eyes: Negative. Respiratory: + chronic SOB  Cardiovascular: see HPI  Gastrointestinal: Negative. Genitourinary: Negative. Musculoskeletal: Negative. Skin: Negative. Neurological: + right facial droop  Hematological: Negative. Psychiatric/Behavioral: Negative. PHYSICAL EXAM:    Vital signs:    /62   Pulse 54   Ht 5' (1.524 m)   Wt 210 lb 3.2 oz (95.3 kg)   LMP  (LMP Unknown)   SpO2 95%   BMI 41.05 kg/m²      Constitutional and general appearance: alert, cooperative, no distress and appears stated age  HEENT: PERRL, no cervical lymphadenopathy. No masses palpable. Normal oral mucosa  Respiratory:  · Normal excursion and expansion without use of accessory muscles  · Resp auscultation: Normal breath sounds without dullness or wheezing  Cardiovascular:  · The apical impulse is not displaced  · Heart tones are crisp and normal. Regular S1 and S2.  · Jugular venous pulsation Normal  · The carotid upstroke is normal in amplitude and contour without delay or bruit  · Peripheral pulses are symmetrical and full   Abdomen:  · No masses or tenderness  · Bowel sounds present  Extremities:  ·  No cyanosis or clubbing  ·  No lower extremity edema  ·  Skin: warm and dry  Neurological:  · Alert and oriented  · Moves all extremities equally   · + right facial droop   · No abnormalities of mood, affect, memory, mentation, or behavior are noted    DATA:    ECG 4/18/2019:  Sinus ayo with a 1st degree AVB with a HR of 55    ALISTAIR 10/19/2018:  Left ventricular systolic function is normal with an estimated ejection fraction of 55%.   There is mild concentric left ventricular hypertrophy.   Moderate mitral regurgitation.  Coco Factor is no evidence of mass or thrombus in the left atrium or appendage.   A bubble study was performed and showed no evidence of shunting.     Echo 4/2/2018:   Technically difficult study due to morbid obesity, poor acoustic windows and patient on ventilator.   Normal left ventricular systolic function with ejection fraction of 55-60%.  Regional wall motion abnormalites cannot be excluded due to poor visualization.   Mild concentric left ventricular hypertrophy.   Grade I diastolic dysfunction with normal filling pressure.   Systolic pulmonic artery pressure (SPAP) is normal at 14 mmHg assuming a right atrial pressure of 3 mmHg.   Saline bubble study failed to show evidence of shunting.   Compared to previous study, no changed noted. Echo 10/19/2015:  Normal left ventricle size with mild concentric left ventricular   hypertrophy.   Normal systolic function with an estimated ejection fraction of 55%.   No regional wall motion abnormalities are seen.   Diastolic filling parameters suggests grade I diastolic dysfunction .   Mild mitral regurgitation.   Trace tricuspid regurgitation.   Systolic pulmonary artery pressure (SPAP) is normal and estimated at 26 mmHg   (RA pressure 3 mmHg). [facility] 12/2/2013 Ana Lilia Mayes):  Left Main: normal  Left anterior descending artery: normal  Left circumflex: normal  Right coronary artery: Dominant, normal     LVEF 55%. Wall motion; inferoapical hypokinesis  Mitral valve regurgitation; trace  No gradient across aortic valve  LVEDP; elevated      Impression:  -Angiographically normal coronary arteries  -LVEF 55%  -Elevated LVEDP secondary to HTN  -Inferoapical hypokinesis    CARDIOLOGY LABS:   CBC: No results for input(s): WBC, HGB, HCT, PLT in the last 72 hours. BMP: No results for input(s): NA, K, CO2, BUN, CREATININE, LABGLOM, GLUCOSE in the last 72 hours. PT/INR: No results for input(s): PROTIME, INR in the last 72 hours. APTT:No results for input(s): APTT in the last 72 hours. FASTING LIPID PANEL:  Lab Results   Component Value Date    HDL 51 04/13/2019    LDLCALC 33 04/13/2019    TRIG 90 04/13/2019     LIVER PROFILE:No results for input(s): AST, ALT, ALB in the last 72 hours. Assessment:   1.  Paroxysmal atrial fibrillation: stable   -diagnosed in 12/2013, no documented recurrence since 2016   -[id number] score 6 (age, gender, HTN, CVA/DVT, DM)  2. First degree AV block: stable  3. HTN: controlled   4. 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 Jennifer Barton  5. DM  6. History of DVT  7. History of chest pain: [facility] normal 2013  8. JARRETT: uses CPAP  9. Chronic anemia and thrombocytopenia    Plan:   1. Continue metoprolol, Eliquis, and Cardizem  2. Due to recurrent CVA on Xarelto and Eliquis, Dr. Giselle Hairston is recommending loop recorder implant. If patient continues to have PAF, will recommend Watchman MARILIN closure device. 3. Patient to discuss hypercoagulable workup with PCP  4. Neuro notes indicate the patient should be taking ASA. Will start 81mg po QD (same dose as hospital). 5. Follow up after procedure    Stroke/anticoagulation history:  10/2015: left parietal/occipital CVA while on Xarelto  4/2018: left occipital CVA with hemorrhagic conversion while on ASA and Xarelto  10/2018: right cerebellar and occipital CVA; ALISTAIR negative; started on Eliquis  4/2019: left frontal CVA while on Eliquis (ASA recommended also)    Discussed medications and plan for loop recorder/possible Watchman with Dr. Giselle Hairston.     Mai Hamilton, [address]  [phone]

## 2019-04-18 NOTE — PATIENT INSTRUCTIONS
I will discuss blood thinners and aspirin with neurology. I will talk to Dr. Laura Dow about afib procedures.   Follow up in 6 months

## 2019-04-18 NOTE — LETTER
[address]   Electrophysiology  Note              Date:  April 18, 2019  Patient name: Melinda Rich  YOB: 1950    Primary Care physician: Brady Herman MD    HISTORY OF PRESENT ILLNESS: The patient is a 79 y.o.  female with a history of paroxysmal atrial fibrillation, HTN, recurrent CVA (with hemorrhagic conversion), DVT, and DM. In 2013 she was admitted with chest pain. A LHC in 12/2013 showed normal coronary arteries. Echo in 10/2015 showed an EF of 55%. She had an acute left posterior parietal/occipital CVA in 10/2015 while on Xarelto. She is followed by Dr. Tasneem Neal and Xarelto and ASA were resumed. She was admitted in 4/2018 with CVA with hemorrhagic conversion and anticoagulation was stopped. She was readmitted in 10/2018 with recurrent CVA and Eliquis was started. ALISTAIR was negative. Patient was readmitted in 4/2019 with another CVA while on Eliquis and ASA. Today she is being seen for paroxysmal atrial fibrillation. Her EKG shows sinus ayo with a 1st degree AVB with a HR of 55. She complains of a rare palpitation and chronic SOB. Denies chest pain and dizziness. Is frustrated in not finding an explanation for recurrent CVA. Past Medical History:   has a past medical history of Arthritis, Asthma, Depression, Diabetes mellitus ([address] GERD (gastroesophageal reflux disease), HCAP (healthcare-associated pneumonia), Hypertension, Mycobacterium gordonae infection, Paroxysmal atrial fibrillation ([address] Sepsis ([address] TIA (transient ischemic attack), and Unspecified cerebral artery occlusion with cerebral infarction. Past Surgical History:   has a past surgical history that includes Hysterectomy; Cholecystectomy; Appendectomy; cyst incision and drainage (1986); Colonoscopy (01/19/2017); and Upper gastrointestinal endoscopy (01/19/2017). Home Medications:    Prior to Admission medications    Medication Sig Start Date End Date Taking?  Authorizing Provider dextromethorphan-guaiFENesin (MUCINEX DM)  MG per extended release tablet Take 1 tablet by mouth every 12 hours as needed   Yes Historical Provider, MD   metoprolol tartrate (LOPRESSOR) 50 MG tablet Take 1 tablet by mouth 2 times daily 4/3/19  Yes Brock Frye MD   apixaban (ELIQUIS) 5 MG TABS tablet Take 1 tablet by mouth 2 times daily 10/21/18  Yes Cruz Abraham MD   omeprazole (PRILOSEC) 40 MG delayed release capsule Take 40 mg by mouth daily   Yes Historical Provider, MD   diltiazem (CARDIZEM) 60 MG tablet Take 60 mg by mouth 5/14/18  Yes Historical Provider, MD   ferrous sulfate 325 (65 Fe) MG tablet Take 325 mg by mouth daily (with breakfast)   Yes Historical Provider, MD   folic acid (FOLVITE) 1 MG tablet Take 1 mg by mouth daily   Yes Historical Provider, MD   montelukast (SINGULAIR) 10 MG tablet Take 10 mg by mouth nightly   Yes Historical Provider, MD   albuterol (PROVENTIL) (2.5 MG/3ML) 0.083% nebulizer solution Take 3 mLs by nebulization every 4 hours as needed for Wheezing or Shortness of Breath 3/19/18  Yes Rick Manuel,    metFORMIN ER (GLUCOPHAGE-XR) 500 MG XR tablet Take 1,000 mg by mouth Daily with supper    Yes Historical Provider, MD   atorvastatin (LIPITOR) 20 MG tablet TAKE 1 TABLET BY MOUTH nightly AT BEDTIME 6/19/15  Yes Historical Provider, MD   venlafaxine (EFFEXOR-XR) 75 MG XR capsule Take 75 mg by mouth daily. Yes Historical Provider, MD   albuterol (PROVENTIL HFA;VENTOLIN HFA) 108 (90 BASE) MCG/ACT inhaler Inhale 2 puffs into the lungs every 6 hours as needed. Yes Historical Provider, MD       Allergies:  Acyclovir; Ace inhibitors; Amoxicillin; Cephalosporins; Oxycodone-acetaminophen; Penicillins; and Percocet [oxycodone-acetaminophen]    Social History:   reports that she has never smoked. She has never used smokeless tobacco. She reports that she does not drink alcohol or use drugs. Family History: family history includes Cancer in her mother. Review of Systems   Constitutional: Negative. HENT: Negative. Eyes: Negative. Respiratory: + chronic SOB  Cardiovascular: see HPI  Gastrointestinal: Negative. Genitourinary: Negative. Musculoskeletal: Negative. Skin: Negative. Neurological: + right facial droop  Hematological: Negative. Psychiatric/Behavioral: Negative. PHYSICAL EXAM:    Vital signs:    /62   Pulse 54   Ht 5' (1.524 m)   Wt 210 lb 3.2 oz (95.3 kg)   LMP  (LMP Unknown)   SpO2 95%   BMI 41.05 kg/m²       Constitutional and general appearance: alert, cooperative, no distress and appears stated age  HEENT: PERRL, no cervical lymphadenopathy. No masses palpable. Normal oral mucosa  Respiratory:  · Normal excursion and expansion without use of accessory muscles  · Resp auscultation: Normal breath sounds without dullness or wheezing  Cardiovascular:  · The apical impulse is not displaced  · Heart tones are crisp and normal. Regular S1 and S2.  · Jugular venous pulsation Normal  · The carotid upstroke is normal in amplitude and contour without delay or bruit  · Peripheral pulses are symmetrical and full   Abdomen:  · No masses or tenderness  · Bowel sounds present  Extremities:  ·  No cyanosis or clubbing  ·  No lower extremity edema  ·  Skin: warm and dry  Neurological:  · Alert and oriented  · Moves all extremities equally   · + right facial droop   · No abnormalities of mood, affect, memory, mentation, or behavior are noted    DATA:    ECG 4/18/2019:  Sinus ayo with a 1st degree AVB with a HR of 55    ALISTAIR 10/19/2018:  Left ventricular systolic function is normal with an estimated ejection fraction of 55%.   There is mild concentric left ventricular hypertrophy.   Moderate mitral regurgitation.  Wayna Davin is no evidence of mass or thrombus in the left atrium or appendage.   A bubble study was performed and showed no evidence of shunting.     Echo 4/2/2018:  Technically difficult study due to morbid obesity, poor acoustic windows and patient on ventilator.   Normal left ventricular systolic function with ejection fraction of 55-60%.  Regional wall motion abnormalites cannot be excluded due to poor visualization.   Mild concentric left ventricular hypertrophy.   Grade I diastolic dysfunction with normal filling pressure.   Systolic pulmonic artery pressure (SPAP) is normal at 14 mmHg assuming a right atrial pressure of 3 mmHg.   Saline bubble study failed to show evidence of shunting.   Compared to previous study, no changed noted. Echo 10/19/2015:  Normal left ventricle size with mild concentric left ventricular   hypertrophy.   Normal systolic function with an estimated ejection fraction of 55%.   No regional wall motion abnormalities are seen.   Diastolic filling parameters suggests grade I diastolic dysfunction .   Mild mitral regurgitation.   Trace tricuspid regurgitation.   Systolic pulmonary artery pressure (SPAP) is normal and estimated at 26 mmHg   (RA pressure 3 mmHg). [facility] 12/2/2013 Iselaluis Escoto):  Left Main: normal  Left anterior descending artery: normal  Left circumflex: normal  Right coronary artery: Dominant, normal     LVEF 55%. Wall motion; inferoapical hypokinesis  Mitral valve regurgitation; trace  No gradient across aortic valve  LVEDP; elevated      Impression:  -Angiographically normal coronary arteries  -LVEF 55%  -Elevated LVEDP secondary to HTN  -Inferoapical hypokinesis    CARDIOLOGY LABS:   CBC: No results for input(s): WBC, HGB, HCT, PLT in the last 72 hours. BMP: No results for input(s): NA, K, CO2, BUN, CREATININE, LABGLOM, GLUCOSE in the last 72 hours. PT/INR: No results for input(s): PROTIME, INR in the last 72 hours. APTT:No results for input(s): APTT in the last 72 hours.   FASTING LIPID PANEL:  Lab Results   Component Value Date    HDL 51 04/13/2019    LDLCALC 33 04/13/2019    TRIG 90 04/13/2019 LIVER PROFILE:No results for input(s): AST, ALT, ALB in the last 72 hours. Assessment:   1. Paroxysmal atrial fibrillation: stable   -diagnosed in 12/2013, no documented recurrence since 2016   -[id number] score 6 (age, gender, HTN, CVA/DVT, DM)  2. First degree AV block: stable  3. HTN: controlled   4. 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 Megan Caceres  5. DM  6. History of DVT  7. History of chest pain: [facility] normal 2013  8. JARRETT: uses CPAP  9. Chronic anemia and thrombocytopenia    Plan:   1. Continue metoprolol, Eliquis, and Cardizem  2. Due to recurrent CVA on Xarelto and Eliquis, Dr. Neva Cushing is recommending loop recorder implant. If patient continues to have PAF, will recommend Watchman MARILIN closure device. 3. Patient to discuss hypercoagulable workup with PCP  4. Neuro notes indicate the patient should be taking ASA. Will start 81mg po QD (same dose as hospital). 5. Follow up after procedure    Stroke/anticoagulation history:  10/2015: left parietal/occipital CVA while on Xarelto  4/2018: left occipital CVA with hemorrhagic conversion while on ASA and Xarelto  10/2018: right cerebellar and occipital CVA; ALISTAIR negative; started on Eliquis  4/2019: left frontal CVA while on Eliquis (ASA recommended also)    Discussed medications and plan for loop recorder/possible Watchman with Dr. Neva Cushing.     Debra Mckeon, [address]  [phone]

## 2019-04-23 NOTE — TELEPHONE ENCOUNTER
I called the patient again. She stated she was not ready to discuss options and she or her  would call back when ready.

## 2019-04-24 RX ORDER — METOPROLOL TARTRATE 50 MG/1
TABLET, FILM COATED ORAL
Qty: 60 TABLET | Refills: 11 | Status: SHIPPED | OUTPATIENT
Start: 2019-04-24 | End: 2020-04-20

## 2019-04-24 NOTE — TELEPHONE ENCOUNTER
Discussed loop recorder at length with patient and her  at length. They would like to consider and call if decision is made.

## 2019-04-25 ENCOUNTER — TELEPHONE (OUTPATIENT)
Dept: CARDIOLOGY CLINIC | Age: 69
End: 2019-04-25

## 2019-04-25 DIAGNOSIS — I48.0 PAROXYSMAL ATRIAL FIBRILLATION (HCC): Primary | ICD-10-CM

## 2019-04-25 NOTE — TELEPHONE ENCOUNTER
Patient's  called regarding scheduling Loop Implant. Explained to patient that their insurance requires monitor (14 day) prior to any Loop being authorized. He understood and will wait for the next call.

## 2019-04-26 NOTE — TELEPHONE ENCOUNTER
Spoke with patient and her  and they prefer to have the monitor mailed to them. Monitor ordered and registered.

## 2019-04-26 NOTE — TELEPHONE ENCOUNTER
Ok. Will have to start with 2 week event monitor if patient agreeable. Results will not change recommendation for loop recorder due to recurrent CVA, history of PAF, and need for monitoring afib burden for possible Watchman.

## 2019-05-29 ENCOUNTER — TELEPHONE (OUTPATIENT)
Dept: CARDIOLOGY CLINIC | Age: 69
End: 2019-05-29

## 2019-05-29 PROCEDURE — 93272 ECG/REVIEW INTERPRET ONLY: CPT | Performed by: INTERNAL MEDICINE

## 2019-05-29 NOTE — TELEPHONE ENCOUNTER
Spoke with the patient, she would like to speak with her  before she proceeds with scheduling the ILR. Notified of monitor results.

## 2019-05-29 NOTE — TELEPHONE ENCOUNTER
Per the telephone encounter on 4/25/19, patient's insurance required a monitor for 14 days prior to any LOOP being authorized. Monitor completed and Dr. Giselle Hairston would like to proceed with ILR.

## 2019-06-06 DIAGNOSIS — I48.0 PAROXYSMAL ATRIAL FIBRILLATION (HCC): ICD-10-CM

## 2019-06-20 NOTE — TELEPHONE ENCOUNTER
Spoke with patient. Patient is scheduled with Dr. Kelly Meier for Loop Implant on 7/26/19 at 8:30am [facility], arrival time of 7am to the Cath Lab. Please have patient arrive to the main entrance of [facility] at 6:45am and check in with the registration desk. Please call patient regarding medication instructions. Remind patient to be NPO after midnight (8 hours prior). Do not apply lotions/creams on skin the day of procedure.

## 2019-06-20 NOTE — TELEPHONE ENCOUNTER
Marylu Gurrola, pt's , called stating the pt is ready to proceed with the loop implant procedure. Please advise and then contact Marylu Gurrola at [phone].

## 2019-07-02 ENCOUNTER — TELEPHONE (OUTPATIENT)
Dept: CARDIOLOGY CLINIC | Age: 69
End: 2019-07-02

## 2019-07-02 NOTE — TELEPHONE ENCOUNTER
Patient's  called and I reviewed medications prior to loop implant. She should hold her Eliquis for 24 hours prior to the procedure and the day of. she can take  Metoprolol and Diltiazem the morning of with a small sip of water. Patient to be NPO after midnight (8 hours prior). Do not apply lotions/creams on skin the day of procedure.

## 2019-07-11 ENCOUNTER — TELEPHONE (OUTPATIENT)
Dept: CARDIOLOGY CLINIC | Age: 69
End: 2019-07-11

## 2019-07-18 ENCOUNTER — TELEPHONE (OUTPATIENT)
Dept: CARDIOLOGY CLINIC | Age: 69
End: 2019-07-18

## 2019-07-25 ENCOUNTER — TELEPHONE (OUTPATIENT)
Dept: CARDIOLOGY CLINIC | Age: 69
End: 2019-07-25

## 2019-07-26 ENCOUNTER — HOSPITAL ENCOUNTER (OUTPATIENT)
Dept: CARDIAC CATH/INVASIVE PROCEDURES | Age: 69
Discharge: HOME | End: 2019-07-26
Attending: INTERNAL MEDICINE | Admitting: INTERNAL MEDICINE
Payer: MEDICARE

## 2019-07-26 VITALS — HEIGHT: 60 IN | BODY MASS INDEX: 40.05 KG/M2 | WEIGHT: 204 LBS

## 2019-07-26 DIAGNOSIS — Z45.09 ENCOUNTER FOR LOOP RECORDER CHECK: Primary | ICD-10-CM

## 2019-07-26 LAB
A/G RATIO: 1.1 (ref 1.1–2.2)
ALBUMIN SERPL-MCNC: 4 G/DL (ref 3.4–5)
ALP BLD-CCNC: 130 U/L (ref 40–129)
ALT SERPL-CCNC: 42 U/L (ref 10–40)
ANION GAP SERPL CALCULATED.3IONS-SCNC: 9 MMOL/L (ref 3–16)
AST SERPL-CCNC: 27 U/L (ref 15–37)
BILIRUB SERPL-MCNC: 0.6 MG/DL (ref 0–1)
BUN BLDV-MCNC: 23 MG/DL (ref 7–20)
CALCIUM SERPL-MCNC: 9.6 MG/DL (ref 8.3–10.6)
CHLORIDE BLD-SCNC: 104 MMOL/L (ref 99–110)
CO2: 27 MMOL/L (ref 21–32)
CREAT SERPL-MCNC: 0.8 MG/DL (ref 0.6–1.2)
EKG ATRIAL RATE: 68 BPM
EKG DIAGNOSIS: NORMAL
EKG P AXIS: 61 DEGREES
EKG P-R INTERVAL: 232 MS
EKG Q-T INTERVAL: 432 MS
EKG QRS DURATION: 82 MS
EKG QTC CALCULATION (BAZETT): 459 MS
EKG R AXIS: 24 DEGREES
EKG T AXIS: 77 DEGREES
EKG VENTRICULAR RATE: 68 BPM
GFR AFRICAN AMERICAN: >60
GFR NON-AFRICAN AMERICAN: >60
GLOBULIN: 3.7 G/DL
GLUCOSE BLD-MCNC: 154 MG/DL (ref 70–99)
HCT VFR BLD CALC: 36.6 % (ref 36–48)
HEMOGLOBIN: 12 G/DL (ref 12–16)
INR BLD: 1.18 (ref 0.86–1.14)
MCH RBC QN AUTO: 30.4 PG (ref 26–34)
MCHC RBC AUTO-ENTMCNC: 32.8 G/DL (ref 31–36)
MCV RBC AUTO: 92.7 FL (ref 80–100)
PDW BLD-RTO: 13.7 % (ref 12.4–15.4)
PLATELET # BLD: 96 K/UL (ref 135–450)
PLATELET SLIDE REVIEW: ABNORMAL
PMV BLD AUTO: 11.1 FL (ref 5–10.5)
POTASSIUM SERPL-SCNC: 4.2 MMOL/L (ref 3.5–5.1)
PROTHROMBIN TIME: 13.5 SEC (ref 9.8–13)
RBC # BLD: 3.94 M/UL (ref 4–5.2)
SLIDE REVIEW: ABNORMAL
SODIUM BLD-SCNC: 140 MMOL/L (ref 136–145)
TOTAL PROTEIN: 7.7 G/DL (ref 6.4–8.2)
WBC # BLD: 8.7 K/UL (ref 4–11)

## 2019-07-26 PROCEDURE — 2500000003 HC RX 250 WO HCPCS

## 2019-07-26 PROCEDURE — 85610 PROTHROMBIN TIME: CPT

## 2019-07-26 PROCEDURE — 93005 ELECTROCARDIOGRAM TRACING: CPT | Performed by: INTERNAL MEDICINE

## 2019-07-26 PROCEDURE — 6360000002 HC RX W HCPCS

## 2019-07-26 PROCEDURE — 2580000003 HC RX 258

## 2019-07-26 PROCEDURE — 33285 INSJ SUBQ CAR RHYTHM MNTR: CPT

## 2019-07-26 PROCEDURE — 85027 COMPLETE CBC AUTOMATED: CPT

## 2019-07-26 PROCEDURE — 93010 ELECTROCARDIOGRAM REPORT: CPT | Performed by: INTERNAL MEDICINE

## 2019-07-26 PROCEDURE — C1764 EVENT RECORDER, CARDIAC: HCPCS

## 2019-07-26 PROCEDURE — 80053 COMPREHEN METABOLIC PANEL: CPT

## 2019-07-26 PROCEDURE — 33285 INSJ SUBQ CAR RHYTHM MNTR: CPT | Performed by: INTERNAL MEDICINE

## 2019-07-26 RX ORDER — SODIUM CHLORIDE 9 MG/ML
INJECTION, SOLUTION INTRAVENOUS ONCE
Status: DISCONTINUED | OUTPATIENT
Start: 2019-07-26 | End: 2019-07-26 | Stop reason: HOSPADM

## 2019-07-26 RX ORDER — FENTANYL CITRATE 50 UG/ML
INJECTION, SOLUTION INTRAMUSCULAR; INTRAVENOUS
Status: COMPLETED | OUTPATIENT
Start: 2019-07-26 | End: 2019-07-26

## 2019-07-26 RX ORDER — MIDAZOLAM HYDROCHLORIDE 5 MG/ML
INJECTION INTRAMUSCULAR; INTRAVENOUS
Status: COMPLETED | OUTPATIENT
Start: 2019-07-26 | End: 2019-07-26

## 2019-07-26 RX ADMIN — FENTANYL CITRATE 25 MCG: 50 INJECTION, SOLUTION INTRAMUSCULAR; INTRAVENOUS at 08:04

## 2019-07-26 RX ADMIN — FENTANYL CITRATE 25 MCG: 50 INJECTION, SOLUTION INTRAMUSCULAR; INTRAVENOUS at 08:08

## 2019-07-26 RX ADMIN — FENTANYL CITRATE 50 MCG: 50 INJECTION, SOLUTION INTRAMUSCULAR; INTRAVENOUS at 07:57

## 2019-07-26 RX ADMIN — MIDAZOLAM HYDROCHLORIDE 2 MG: 5 INJECTION INTRAMUSCULAR; INTRAVENOUS at 07:57

## 2019-07-26 NOTE — PROCEDURES
July 26, 2019        PROCEDURE PERFORMED:    Internal loop recorder insertion  Moderate sedation    INDICATION:    1. CVA  2. R/o cardiac arrhythmia as cause of stroke      Sedation start time: 8:10  Sedation stop time: 8:22  ASA class: 2  Mallampati: 10  Pre and post Beti score: 10  Medication given: Versed 2 mg , Fentanyl 100 dimple     DESCRIPTION OF PROCEDURE:  The patient was brought to the EP lab area in a fasting and non-sedated state. The risks, benefits and alternatives of the procedure were discussed with the patient. The patient opted to proceed with the procedure. Written informed consent was signed and placed in the chart. A timeout protocol was completed to identify the patient and the procedure being performed. The patient was prepped and draped in usual sterile fashion. Moderate sedation was given using fentanyl and Versed and then local infiltration was done by using lidocaine and Marcaine. After ensuring adequate sedation, small incision was made using a Newsblur scalpel and then Newsblur Reveal LINQ was  inserted under the subcutaneous tissue using Newsblur delivery system. The LINQ serial number is [id number]. The patient tolerated the procedure well, made complete recovery. Final programming parameters were as follows:     1. Sensing sensitivity was 0.25 mV. 2. Interval rate: VT was programmed at 400 milliseconds, that is 150 beats per minute, duration 16 beats. Fast  milliseconds, that is 231 beats per minute, duration 30 out of 40. Siva parameters were 1500 milliseconds,   that is 40 beats per minute for 3 seconds or more. Asystole was 4 seconds. CONCLUSION: Successful internal loop recorder insertion. PLAN:     1. The patient will follow up with me as scheduled. KASIE GomezC. Electrophysiology  [facility]. [address] [place] [address]. [address].   [place] [address]  Phone: [phone]  Fax: [phone]

## 2019-08-06 ENCOUNTER — NURSE ONLY (OUTPATIENT)
Dept: CARDIOLOGY CLINIC | Age: 69
End: 2019-08-06

## 2019-08-06 DIAGNOSIS — Z45.09 ENCOUNTER FOR LOOP RECORDER CHECK: ICD-10-CM

## 2019-08-09 NOTE — PROGRESS NOTES
Remote interrogation of implanted cardiac event monitor shows normal function. dx CVA and r/o atrial arrhythmias. Hx PAF. ([facility], [facility])  No AF recorded. Pause events recorded are from implant. Follow up 1 month via carelink. See PACEART report under Cardiology tab.

## 2019-08-22 ENCOUNTER — TELEPHONE (OUTPATIENT)
Dept: CARDIOLOGY CLINIC | Age: 69
End: 2019-08-22

## 2019-09-03 ENCOUNTER — NURSE ONLY (OUTPATIENT)
Dept: CARDIOLOGY CLINIC | Age: 69
End: 2019-09-03
Payer: MEDICARE

## 2019-09-03 DIAGNOSIS — I63.232 ARTERIAL ISCHEMIC STROKE, ICA, LEFT, ACUTE (HCC): Chronic | ICD-10-CM

## 2019-09-03 DIAGNOSIS — I48.0 PAROXYSMAL ATRIAL FIBRILLATION (HCC): ICD-10-CM

## 2019-09-03 DIAGNOSIS — Z45.09 ENCOUNTER FOR LOOP RECORDER CHECK: ICD-10-CM

## 2019-09-03 PROCEDURE — 93298 REM INTERROG DEV EVAL SCRMS: CPT | Performed by: INTERNAL MEDICINE

## 2019-09-03 PROCEDURE — 93299 PR REM INTERROG ICPMS/SCRMS <30 D TECH REVIEW: CPT | Performed by: INTERNAL MEDICINE

## 2019-10-08 ENCOUNTER — NURSE ONLY (OUTPATIENT)
Dept: CARDIOLOGY CLINIC | Age: 69
End: 2019-10-08
Payer: MEDICARE

## 2019-10-08 DIAGNOSIS — I63.232 ARTERIAL ISCHEMIC STROKE, ICA, LEFT, ACUTE (HCC): Chronic | ICD-10-CM

## 2019-10-08 DIAGNOSIS — Z45.09 ENCOUNTER FOR LOOP RECORDER CHECK: ICD-10-CM

## 2019-10-08 PROCEDURE — 93298 REM INTERROG DEV EVAL SCRMS: CPT | Performed by: INTERNAL MEDICINE

## 2019-10-08 PROCEDURE — 93299 PR REM INTERROG ICPMS/SCRMS <30 D TECH REVIEW: CPT | Performed by: INTERNAL MEDICINE

## 2019-10-29 ENCOUNTER — TELEPHONE (OUTPATIENT)
Dept: CARDIOLOGY CLINIC | Age: 69
End: 2019-10-29

## 2019-11-12 ENCOUNTER — NURSE ONLY (OUTPATIENT)
Dept: CARDIOLOGY CLINIC | Age: 69
End: 2019-11-12
Payer: MEDICARE

## 2019-11-12 DIAGNOSIS — I63.232 ARTERIAL ISCHEMIC STROKE, ICA, LEFT, ACUTE (HCC): Chronic | ICD-10-CM

## 2019-11-12 DIAGNOSIS — Z45.09 ENCOUNTER FOR LOOP RECORDER CHECK: ICD-10-CM

## 2019-11-12 PROCEDURE — 93298 REM INTERROG DEV EVAL SCRMS: CPT | Performed by: INTERNAL MEDICINE

## 2019-11-12 PROCEDURE — 93299 PR REM INTERROG ICPMS/SCRMS <30 D TECH REVIEW: CPT | Performed by: INTERNAL MEDICINE

## 2020-02-18 ENCOUNTER — NURSE ONLY (OUTPATIENT)
Dept: CARDIOLOGY CLINIC | Age: 70
End: 2020-02-18
Payer: MEDICARE

## 2020-02-18 PROCEDURE — G2066 INTER DEVC REMOTE 30D: HCPCS | Performed by: INTERNAL MEDICINE

## 2020-02-18 PROCEDURE — 93298 REM INTERROG DEV EVAL SCRMS: CPT | Performed by: INTERNAL MEDICINE

## 2020-02-20 ENCOUNTER — OFFICE VISIT (OUTPATIENT)
Dept: CARDIOLOGY CLINIC | Age: 70
End: 2020-02-20
Payer: MEDICARE

## 2020-02-20 VITALS
BODY MASS INDEX: 43.39 KG/M2 | HEIGHT: 60 IN | DIASTOLIC BLOOD PRESSURE: 64 MMHG | SYSTOLIC BLOOD PRESSURE: 112 MMHG | OXYGEN SATURATION: 92 % | WEIGHT: 221 LBS | HEART RATE: 77 BPM

## 2020-02-20 PROCEDURE — 99214 OFFICE O/P EST MOD 30 MIN: CPT | Performed by: NURSE PRACTITIONER

## 2020-02-20 PROCEDURE — 4040F PNEUMOC VAC/ADMIN/RCVD: CPT | Performed by: NURSE PRACTITIONER

## 2020-02-20 PROCEDURE — G8417 CALC BMI ABV UP PARAM F/U: HCPCS | Performed by: NURSE PRACTITIONER

## 2020-02-20 PROCEDURE — 3017F COLORECTAL CA SCREEN DOC REV: CPT | Performed by: NURSE PRACTITIONER

## 2020-02-20 PROCEDURE — 1036F TOBACCO NON-USER: CPT | Performed by: NURSE PRACTITIONER

## 2020-02-20 PROCEDURE — G8427 DOCREV CUR MEDS BY ELIG CLIN: HCPCS | Performed by: NURSE PRACTITIONER

## 2020-02-20 PROCEDURE — G8400 PT W/DXA NO RESULTS DOC: HCPCS | Performed by: NURSE PRACTITIONER

## 2020-02-20 PROCEDURE — G8484 FLU IMMUNIZE NO ADMIN: HCPCS | Performed by: NURSE PRACTITIONER

## 2020-02-20 PROCEDURE — 1123F ACP DISCUSS/DSCN MKR DOCD: CPT | Performed by: NURSE PRACTITIONER

## 2020-02-20 PROCEDURE — 1090F PRES/ABSN URINE INCON ASSESS: CPT | Performed by: NURSE PRACTITIONER

## 2020-02-20 NOTE — PROGRESS NOTES
[address]   Electrophysiology  Note              Date:  February 20, 2020  Patient name: Donnie Aguilar  YOB: 1950    Primary Care physician: [place] MD    HISTORY OF PRESENT ILLNESS: The patient is a 81 y.o.  female with a history of PAF, HTN, recurrent CVA (with hemorrhagic conversion), DVT, and DM. In 2013 she was admitted with chest pain. LHC in 12/2013 showed was normal. Echo in 10/2015 showed an EF of 55%. She had an acute left posterior parietal/occipital CVA in 10/2015 while on Xarelto. She is followed by Dr. Karen Gatica and Xarelto and ASA were resumed. She was admitted in 4/2018 with CVA with hemorrhagic conversion and anticoagulation was stopped. She was readmitted in 10/2018 with recurrent CVA and Eliquis was started. ALISTAIR was negative. Patient was readmitted in 4/2019 with another CVA while on Eliquis and ASA. In 7/2019 she had a loop recorder to determine arrhythmia burden due to recurrent CVA on anticoagulation. No arrhythmias have been detected to date. Today she is being seen for paroxysmal atrial fibrillation. Device check on 2/18/2020 showed normal function and no arrhythmias. She has a rare skipped beat but is feeling well overall. Denies chest pain, shortness of breath, and dizziness. Wants to walk a 5K. Past Medical History:   has a past medical history of Arthritis, Asthma, Depression, Diabetes mellitus ([address] GERD (gastroesophageal reflux disease), HCAP (healthcare-associated pneumonia), Hypertension, Mycobacterium gordonae infection, Paroxysmal atrial fibrillation ([address] Sepsis ([address] TIA (transient ischemic attack), and Unspecified cerebral artery occlusion with cerebral infarction. Past Surgical History:   has a past surgical history that includes Hysterectomy; Cholecystectomy; Appendectomy; cyst incision and drainage (1986); Colonoscopy (01/19/2017); and Upper gastrointestinal endoscopy (01/19/2017).      Home Medications:    Prior to Admission medications    Medication Sig Start Date End Date Taking? Authorizing Provider   metoprolol tartrate (LOPRESSOR) 50 MG tablet TAKE 1 TABLET BY MOUTH TWO TIMES A DAY  4/24/19  Yes JARROD Horta CNP   aspirin EC 81 MG EC tablet Take 1 tablet by mouth daily 4/18/19  Yes JARROD Horta CNP   dextromethorphan-guaiFENesin ([address] DM)  MG per extended release tablet Take 1 tablet by mouth every 12 hours as needed   Yes Historical Provider, MD   apixaban (ELIQUIS) 5 MG TABS tablet Take 1 tablet by mouth 2 times daily 10/21/18  Yes Andrzej Sofia MD   omeprazole (PRILOSEC) 40 MG delayed release capsule Take 40 mg by mouth daily   Yes Historical Provider, MD   diltiazem (CARDIZEM) 60 MG tablet Take 60 mg by mouth 5/14/18  Yes Historical Provider, MD   ferrous sulfate 325 (65 Fe) MG tablet Take 325 mg by mouth daily (with breakfast)   Yes Historical Provider, MD   folic acid (FOLVITE) 1 MG tablet Take 1 mg by mouth daily   Yes Historical Provider, MD   montelukast (SINGULAIR) 10 MG tablet Take 10 mg by mouth nightly   Yes Historical Provider, MD   albuterol (PROVENTIL) (2.5 MG/3ML) 0.083% nebulizer solution Take 3 mLs by nebulization every 4 hours as needed for Wheezing or Shortness of Breath 3/19/18  Yes Rick Manuel,    metFORMIN ER (GLUCOPHAGE-XR) 500 MG XR tablet Take 1,000 mg by mouth Daily with supper    Yes Historical Provider, MD   atorvastatin (LIPITOR) 20 MG tablet TAKE 1 TABLET BY MOUTH nightly AT BEDTIME 6/19/15  Yes Historical Provider, MD   venlafaxine (EFFEXOR-XR) 75 MG XR capsule Take 75 mg by mouth daily. Yes Historical Provider, MD   albuterol (PROVENTIL HFA;VENTOLIN HFA) 108 (90 BASE) MCG/ACT inhaler Inhale 2 puffs into the lungs every 6 hours as needed. Yes Historical Provider, MD       Allergies:  Acyclovir; Ace inhibitors; Amoxicillin; Cephalosporins;  Oxycodone-acetaminophen; Penicillins; and Percocet [oxycodone-acetaminophen]    Social History:   reports that no evidence of shunting. Echo 4/2/2018:   Technically difficult study due to morbid obesity, poor acoustic windows and patient on ventilator.   Normal left ventricular systolic function with ejection fraction of 55-60%.  Regional wall motion abnormalites cannot be excluded due to poor visualization.   Mild concentric left ventricular hypertrophy.   Grade I diastolic dysfunction with normal filling pressure.   Systolic pulmonic artery pressure (SPAP) is normal at 14 mmHg assuming a right atrial pressure of 3 mmHg.   Saline bubble study failed to show evidence of shunting.   Compared to previous study, no changed noted. Echo 10/19/2015:  Normal left ventricle size with mild concentric left ventricular   hypertrophy.   Normal systolic function with an estimated ejection fraction of 55%.   No regional wall motion abnormalities are seen.   Diastolic filling parameters suggests grade I diastolic dysfunction .   Mild mitral regurgitation.   Trace tricuspid regurgitation.   Systolic pulmonary artery pressure (SPAP) is normal and estimated at 26 mmHg   (RA pressure 3 mmHg). [facility] 12/2/2013 Tawana Organ):  Left Main: normal  Left anterior descending artery: normal  Left circumflex: normal  Right coronary artery: Dominant, normal     LVEF 55%. Wall motion; inferoapical hypokinesis  Mitral valve regurgitation; trace  No gradient across aortic valve  LVEDP; elevated      Impression:  -Angiographically normal coronary arteries  -LVEF 55%  -Elevated LVEDP secondary to HTN  -Inferoapical hypokinesis    CARDIOLOGY LABS:   CBC: No results for input(s): WBC, HGB, HCT, PLT in the last 72 hours. BMP: No results for input(s): NA, K, CO2, BUN, CREATININE, LABGLOM, GLUCOSE in the last 72 hours. PT/INR: No results for input(s): PROTIME, INR in the last 72 hours. APTT:No results for input(s): APTT in the last 72 hours.   FASTING LIPID PANEL:  Lab Results   Component Value Date    HDL 51 04/13/2019    LDLCALC 33 04/13/2019    TRIG 90

## 2020-03-19 ENCOUNTER — NURSE ONLY (OUTPATIENT)
Dept: CARDIOLOGY CLINIC | Age: 70
End: 2020-03-19

## 2020-03-19 NOTE — PROGRESS NOTES
Remote interrogation of implanted cardiac event monitor shows normal function. dx CVA and r/o atrial arrhythmias. Hx PAF. ([address] [facility], [facility]). Observations Summary 18-Feb-2020 00:05 to 19-Mar-2020 00:05   No new arrhythmias and No AF recorded to date. Follow up 1 month via carelink.

## 2020-04-20 ENCOUNTER — NURSE ONLY (OUTPATIENT)
Dept: CARDIOLOGY CLINIC | Age: 70
End: 2020-04-20
Payer: MEDICARE

## 2020-04-20 PROCEDURE — 93298 REM INTERROG DEV EVAL SCRMS: CPT | Performed by: INTERNAL MEDICINE

## 2020-04-20 PROCEDURE — G2066 INTER DEVC REMOTE 30D: HCPCS | Performed by: INTERNAL MEDICINE

## 2020-04-20 RX ORDER — METOPROLOL TARTRATE 50 MG/1
TABLET, FILM COATED ORAL
Qty: 180 TABLET | Refills: 3 | Status: SHIPPED | OUTPATIENT
Start: 2020-04-20 | End: 2021-04-20

## 2020-05-18 ENCOUNTER — TELEPHONE (OUTPATIENT)
Dept: CARDIOLOGY CLINIC | Age: 70
End: 2020-05-18

## 2020-05-18 NOTE — TELEPHONE ENCOUNTER
[facility] requests cardiac clearance for EGD that is scheduled for 5/21/2020. They wish to hold Eliquis and ASA for 2 days prior to procedure.      Fax letter to [phone] or [phone]

## 2020-05-19 NOTE — TELEPHONE ENCOUNTER
The patient has had multiple strokes while on anticoagulation. I discussed this with Dr. Vandana Pryor and the urgency/importance of the EGD needs to be discussed with her GI doctor. We don't know why she is having the EGD but if he believes the risk outweighs the benefit, the patient should be aware of stroke risk.

## 2020-05-21 ENCOUNTER — NURSE ONLY (OUTPATIENT)
Dept: CARDIOLOGY CLINIC | Age: 70
End: 2020-05-21
Payer: MEDICARE

## 2020-05-21 PROCEDURE — G2066 INTER DEVC REMOTE 30D: HCPCS | Performed by: INTERNAL MEDICINE

## 2020-05-21 PROCEDURE — 93298 REM INTERROG DEV EVAL SCRMS: CPT | Performed by: INTERNAL MEDICINE

## 2020-06-22 ENCOUNTER — NURSE ONLY (OUTPATIENT)
Dept: CARDIOLOGY CLINIC | Age: 70
End: 2020-06-22
Payer: MEDICARE

## 2020-06-22 PROCEDURE — 93298 REM INTERROG DEV EVAL SCRMS: CPT | Performed by: INTERNAL MEDICINE

## 2020-06-22 PROCEDURE — G2066 INTER DEVC REMOTE 30D: HCPCS | Performed by: INTERNAL MEDICINE

## 2020-07-27 ENCOUNTER — NURSE ONLY (OUTPATIENT)
Dept: CARDIOLOGY CLINIC | Age: 70
End: 2020-07-27
Payer: MEDICARE

## 2020-07-27 PROCEDURE — 93298 REM INTERROG DEV EVAL SCRMS: CPT | Performed by: INTERNAL MEDICINE

## 2020-07-27 PROCEDURE — G2066 INTER DEVC REMOTE 30D: HCPCS | Performed by: INTERNAL MEDICINE

## 2020-07-27 NOTE — LETTER
[address] [facility] [phone]  [place] [facility] [phone]    Pacemaker/Defibrillator Clinic          07/27/20        [address] [place] [address] [facility] [address]        Dear Leigh Zepeda    This letter is to inform you that we received the transmission from your monitor at home that checks your implanted heart device. The next date your monitor will automatically transmit will be 8/31/20. If your report needs attention we will notify you. Your device and monitor are wireless and most transmit cellularly, but please periodically check your monitor is still plugged in to the electrical outlet. If you still use the telephone land line to send please ensure the connection to the phone abe is secure. This will help to ensure successful automatic transmissions in the future. Also, the monitor needs to be close to you while sleeping at night. Please be aware that the remote device transmission sites are periodically monitored only during regular business hours during which simultaneous in-office device clinics are being run. If your transmission requires attention, we will contact you as soon as possible. Thank you.             [facility]

## 2020-07-27 NOTE — PROGRESS NOTES
Remote interrogation of implanted cardiac event monitor shows normal function. Patient has a history of CVA, PAF, PVCs, and 1st deg. AVB. Takes Lopressor, ASA, Eliquis, and Caridzem. Patient's last device interrogation was on 6/23. Since last interrogation, no new arrhythmias recorded. See Paceart report under the Cardiology tab. Follow up 1 month via Omni-IDlink.

## 2020-08-06 ENCOUNTER — TELEPHONE (OUTPATIENT)
Dept: CARDIOLOGY CLINIC | Age: 70
End: 2020-08-06

## 2020-09-25 ENCOUNTER — TELEPHONE (OUTPATIENT)
Dept: CARDIOLOGY CLINIC | Age: 70
End: 2020-09-25

## 2020-11-24 ENCOUNTER — TELEPHONE (OUTPATIENT)
Dept: CARDIOLOGY CLINIC | Age: 70
End: 2020-11-24

## 2020-11-24 NOTE — TELEPHONE ENCOUNTER
Spoke w/ [facility]. Gave device information. Per previous message:     Patient's  called requesting samples of Eliquis 5mg.

## 2020-11-24 NOTE — TELEPHONE ENCOUNTER
Needs to know what kind of Loop Recorder pt has implanted? Pt needs a MRI but pt does not know nor has a card with info.    Pt has MRI 11/25/20

## 2020-12-09 ENCOUNTER — OFFICE VISIT (OUTPATIENT)
Dept: NEUROLOGY | Age: 70
End: 2020-12-09
Payer: MEDICARE

## 2020-12-09 VITALS
BODY MASS INDEX: 36.82 KG/M2 | DIASTOLIC BLOOD PRESSURE: 55 MMHG | HEART RATE: 58 BPM | WEIGHT: 221 LBS | HEIGHT: 65 IN | SYSTOLIC BLOOD PRESSURE: 107 MMHG | OXYGEN SATURATION: 97 % | TEMPERATURE: 95.5 F

## 2020-12-09 PROBLEM — I10 HTN (HYPERTENSION), BENIGN: Status: ACTIVE | Noted: 2020-12-09

## 2020-12-09 PROBLEM — I63.231 ARTERIAL ISCHEMIC STROKE, ICA, RIGHT, ACUTE (HCC): Status: ACTIVE | Noted: 2020-12-09

## 2020-12-09 PROBLEM — I48.0 PAF (PAROXYSMAL ATRIAL FIBRILLATION) (HCC): Status: ACTIVE | Noted: 2020-12-09

## 2020-12-09 PROCEDURE — G8427 DOCREV CUR MEDS BY ELIG CLIN: HCPCS | Performed by: PSYCHIATRY & NEUROLOGY

## 2020-12-09 PROCEDURE — G8417 CALC BMI ABV UP PARAM F/U: HCPCS | Performed by: PSYCHIATRY & NEUROLOGY

## 2020-12-09 PROCEDURE — 3046F HEMOGLOBIN A1C LEVEL >9.0%: CPT | Performed by: PSYCHIATRY & NEUROLOGY

## 2020-12-09 PROCEDURE — G8400 PT W/DXA NO RESULTS DOC: HCPCS | Performed by: PSYCHIATRY & NEUROLOGY

## 2020-12-09 PROCEDURE — 99214 OFFICE O/P EST MOD 30 MIN: CPT | Performed by: PSYCHIATRY & NEUROLOGY

## 2020-12-09 PROCEDURE — 4040F PNEUMOC VAC/ADMIN/RCVD: CPT | Performed by: PSYCHIATRY & NEUROLOGY

## 2020-12-09 PROCEDURE — 2022F DILAT RTA XM EVC RTNOPTHY: CPT | Performed by: PSYCHIATRY & NEUROLOGY

## 2020-12-09 PROCEDURE — 1090F PRES/ABSN URINE INCON ASSESS: CPT | Performed by: PSYCHIATRY & NEUROLOGY

## 2020-12-09 PROCEDURE — 1036F TOBACCO NON-USER: CPT | Performed by: PSYCHIATRY & NEUROLOGY

## 2020-12-09 PROCEDURE — 3017F COLORECTAL CA SCREEN DOC REV: CPT | Performed by: PSYCHIATRY & NEUROLOGY

## 2020-12-09 PROCEDURE — G8484 FLU IMMUNIZE NO ADMIN: HCPCS | Performed by: PSYCHIATRY & NEUROLOGY

## 2020-12-09 PROCEDURE — 1123F ACP DISCUSS/DSCN MKR DOCD: CPT | Performed by: PSYCHIATRY & NEUROLOGY

## 2020-12-09 RX ORDER — CLOPIDOGREL BISULFATE 75 MG/1
75 TABLET ORAL DAILY
COMMUNITY
Start: 2020-11-30 | End: 2021-03-11 | Stop reason: ALTCHOICE

## 2020-12-09 NOTE — PROGRESS NOTES
The patient came today for follow up regarding: recent stroke. The patient was last seen last year. She came in again for another FU regarding stroke. She was fine till last month when she had sudden visual disturbance and confusion. Degree was severe and persistent. No headaches or weakness. No LOC or FRACISCO. No clear triggers. She was seen by PCP and had MRI brain which showed acute right tempora/occpital stroke. She was given DAPT in addition to her DOAC. She denies any new sx today. No new weakness or headaches. She is diabetic and her BSL has been elevated. She is on BP medications. No recent CUS. She denies any recent fever or sleep issues. She takes Statin. Other ROS was negative. Past Medical History:   Diagnosis Date    Arthritis     Asthma     Depression     Diabetes mellitus (HCC)     GERD (gastroesophageal reflux disease)     HCAP (healthcare-associated pneumonia)     Hypertension     Mycobacterium gordonae infection      5/7/18 + afb called from [address] pending  ( from [address] 3/29/18)    [facility]oxysmal [facility] fibrillation ([facility]     Sepsis ([facility]     TIA (transient ischemic attack)     Unspecified cerebral artery occlusion with cerebral infarction      Prior to Visit Medications    Medication Sig Taking?  Authorizing Provider   clopidogrel (PLAVIX) 75 MG tablet Take 75 mg by mouth daily Yes Historical Provider, MD   metoprolol tartrate (LOPRESSOR) 50 MG tablet TAKE 1 TABLET BY MOUTH TWO TIMES A DAY  Yes JARROD Vazquez CNP   aspirin EC 81 MG EC tablet Take 1 tablet by mouth daily Yes JARROD Vazquez CNP   dextromethorphan-guaiFENesin (MUCINEX DM)  MG per extended release tablet Take 1 tablet by mouth every 12 hours as needed Yes Historical Provider, MD   apixaban (ELIQUIS) 5 MG TABS tablet Take 1 tablet by mouth 2 times daily Yes Cameron Masters MD   omeprazole (PRILOSEC) 40 MG delayed release capsule Take 40 mg by mouth daily Yes Historical Provider, MD diltiazem (CARDIZEM) 60 MG tablet Take 60 mg by mouth Yes Historical Provider, MD   ferrous sulfate 325 (65 Fe) MG tablet Take 325 mg by mouth daily (with breakfast) Yes Historical Provider, MD   folic acid (FOLVITE) 1 MG tablet Take 1 mg by mouth daily Yes Historical Provider, MD   montelukast (SINGULAIR) 10 MG tablet Take 10 mg by mouth nightly Yes Historical Provider, MD   albuterol (PROVENTIL) (2.5 MG/3ML) 0.083% nebulizer solution Take 3 mLs by nebulization every 4 hours as needed for Wheezing or Shortness of Breath Yes Rick Manuel,    metFORMIN ER (GLUCOPHAGE-XR) 500 MG XR tablet Take 1,000 mg by mouth Daily with supper  Yes Historical Provider, MD   atorvastatin (LIPITOR) 20 MG tablet TAKE 1 TABLET BY MOUTH nightly AT BEDTIME Yes Historical Provider, MD   venlafaxine (EFFEXOR-XR) 75 MG XR capsule Take 75 mg by mouth daily. Yes Historical Provider, MD   albuterol (PROVENTIL HFA;VENTOLIN HFA) 108 (90 BASE) MCG/ACT inhaler Inhale 2 puffs into the lungs every 6 hours as needed. Yes Historical Provider, MD     Allergies   Allergen Reactions    Acyclovir Anaphylaxis     Redness face & back, swollen tounge, eyes and throat     Ace Inhibitors      Possible anaphylaxis    Amoxicillin      hives    Cephalosporins      anaphylaxis  Lip swelling    Oxycodone-Acetaminophen      Respiratory distress    Penicillins     Percocet [Oxycodone-Acetaminophen]      Social History     Tobacco Use    Smoking status: Never Smoker    Smokeless tobacco: Never Used   Substance Use Topics    Alcohol use: No     Alcohol/week: 0.0 standard drinks     Family History   Problem Relation Age of Onset    Cancer Mother         breast ca     Past Surgical History:   Procedure Laterality Date    APPENDECTOMY      CHOLECYSTECTOMY      COLONOSCOPY  01/19/2017    polyp    CYST INCISION AND DRAINAGE  1986    on head    HYSTERECTOMY      UPPER GASTROINTESTINAL ENDOSCOPY  01/19/2017    barretts ?          Exam:   Constitutional: Vitals:    12/09/20 1423   BP: (!) 107/55   Pulse: 58   Temp: 95.5 °F (35.3 °C)   TempSrc: Infrared   SpO2: 97%   Weight: 221 lb (100.2 kg)   Height: 5' 5\" (1.651 m)       General appearance: well-nourished. Eye: No icterus. Neck: supple  Cardiovascular:    No lower leg edema with good pulsation. Mental Status: Oriented to person, place, problem, and time. Fluent speech. Good fund of knowledge. Normal attention span and concentration. Intact recent and remote memory. Cranial Nerves:   II: Visual fields: Dense right homonymous hemianopsia. The same. III: Pupils: equal, round, reactive to light  III,IV,VI: Extra Ocular Movements are intact. No nystagmus  V: Facial sensation is intact to pin prick and light touch  VII: Facial strength and movements: intact and symmetric  IX: Palate elevation is symmetric  XI: Shoulder shrug is intact  XII: Tongue movements are normal  Musculoskeletal: 5/5 in all 4 extremities. Normal tone. Reflexes: 2 in UE and 1 in LL. Coordination: no pronator drift, no dysmetria. Finger nose finger testing within normal limits. Sensation: normal to all modalities. Gait/Posture: steady    ROS : A 10-12 system review of constitutional, cardiovascular, respiratory, musculoskeletal, endocrine, hematological, skin, SHEENT, genitourinary, psychiatric and neurologic systems was obtained and updated today which is unremarkable except as mentioned in my HPI  No changes    Medical decision making:  I personally reviewed and updated social history, past medical history, medications, allergy, surgical history, and family history as documented in the patient's electronic health records. Labs and/or neuroimaging and other test results reviewed and discussed with the patient. Reviewed notes from other physicians. Provided patient education regarding risk, benefits and treatment options as well as adherence to medication regimen and side effect from these medications.       Diagnosis Orders 1. Arterial ischemic stroke, ICA, right, acute ([address]  VL DUP CAROTID BILATERAL   2. PAF (paroxysmal atrial fibrillation) ([address]     3. HTN (hypertension), benign     4. DM (diabetes mellitus), type 2, uncontrolled with complications ([address]     5. Dyslipidemia       Recent right PCA stroke. Could be thromboembolic vs embolic  PAF  HTN  DM, not controlled. HLD      Plan:  Schedule Carotid doppler  No need for DAPT and Eliquis. Can continue with ASA and DOAC  BSL  Follow A1c  Monitor BP and continue with the same BP medications. Hold BP medications if BP below 120/80  Statin  Stroke prevention was discussed today  Will order CTA if CUS showed severe ICA stenosis  No driving  FU with CUS showed severe ICA stenosis.

## 2020-12-16 ENCOUNTER — NURSE ONLY (OUTPATIENT)
Dept: CARDIOLOGY CLINIC | Age: 70
End: 2020-12-16
Payer: MEDICARE

## 2020-12-16 PROCEDURE — G2066 INTER DEVC REMOTE 30D: HCPCS | Performed by: INTERNAL MEDICINE

## 2020-12-16 PROCEDURE — 93298 REM INTERROG DEV EVAL SCRMS: CPT | Performed by: INTERNAL MEDICINE

## 2020-12-16 NOTE — LETTER
[address] [facility] [phone]  Luige Patrick [address] [facility] [phone]    Pacemaker/Defibrillator Clinic          12/20/20        [address] [facility] [address]        Dear Nelda Irving    This letter is to inform you that we received the transmission from your monitor at home that checks your implanted heart device. The next date your monitor will automatically transmit will be 1/20/2021 No AFib recorded to date. .  If your report needs attention we will notify you. Your device and monitor are wireless and most transmit cellularly, but please periodically check your monitor is still plugged in to the electrical outlet. If you still use the telephone land line to send please ensure the connection to the phone abe is secure. This will help to ensure successful automatic transmissions in the future. Also, the monitor needs to be close to you while sleeping at night. Please be aware that the remote device transmission sites are periodically monitored only during regular business hours during which simultaneous in-office device clinics are being run. If your transmission requires attention, we will contact you as soon as possible. Thank you.             [facility]

## 2020-12-17 ENCOUNTER — HOSPITAL ENCOUNTER (OUTPATIENT)
Dept: VASCULAR LAB | Age: 70
Discharge: HOME | End: 2020-12-17
Payer: MEDICARE

## 2020-12-17 PROCEDURE — 93880 EXTRACRANIAL BILAT STUDY: CPT

## 2020-12-17 NOTE — PROGRESS NOTES
Remote interrogation of implanted cardiac event monitor shows normal function. dx CVA and r/o atrial arrhythmias. Hx PAF. ([address] [facility], [facility]). No AF recorded to date. Tachy 25-Nov-2020 17:06 x 3 min shows over/undersening, artifact and underlying SB. No symptom episodes recorded. Pause is undersensed. last carelink 11/11/20-         AF parameters set to record EGM if >=6 min. AT/AF on 11/5 x 2 min, no egm to view. Follow up 1 month via carelink.

## 2021-01-20 ENCOUNTER — NURSE ONLY (OUTPATIENT)
Dept: CARDIOLOGY CLINIC | Age: 71
End: 2021-01-20
Payer: MEDICARE

## 2021-01-20 DIAGNOSIS — Z45.09 ENCOUNTER FOR LOOP RECORDER CHECK: ICD-10-CM

## 2021-01-20 DIAGNOSIS — I63.232 ARTERIAL ISCHEMIC STROKE, ICA, LEFT, ACUTE (HCC): Chronic | ICD-10-CM

## 2021-01-20 PROCEDURE — 93298 REM INTERROG DEV EVAL SCRMS: CPT | Performed by: INTERNAL MEDICINE

## 2021-01-20 PROCEDURE — G2066 INTER DEVC REMOTE 30D: HCPCS | Performed by: INTERNAL MEDICINE

## 2021-01-20 NOTE — LETTER
[address] [place] [phone]  Luige Patrick [address] [facility] [phone]    Pacemaker/Defibrillator Clinic          01/21/21        [address] [facility] [address]        Dear Mika Baugh    This letter is to inform you that we received the transmission from your monitor at home that checks your implanted heart device. The next date your monitor will automatically transmit will be 2/24. No AFib recorded. .  If your report needs attention we will notify you. Your device and monitor are wireless and most transmit cellularly, but please periodically check your monitor is still plugged in to the electrical outlet. If you still use the telephone land line to send please ensure the connection to the phone abe is secure. This will help to ensure successful automatic transmissions in the future. Also, the monitor needs to be close to you while sleeping at night. Please be aware that the remote device transmission sites are periodically monitored only during regular business hours during which simultaneous in-office device clinics are being run. If your transmission requires attention, we will contact you as soon as possible. Thank you.             [facility]

## 2021-01-20 NOTE — PROGRESS NOTES
Remote interrogation of implanted cardiac event monitor shows normal function. dx CVA and r/o atrial arrhythmias. Hx PAF. ([address] [facility], [facility]). No AF recorded to date. No new arrhythmias. Follow up 1 month via carelink. carelink 11/11/20-         AF parameters set to record EGM if >=6 min. AT/AF on 11/5 x 2 min w/ HR  max), no egm to view.

## 2021-02-22 NOTE — LETTER
[address] [facility] [phone]  [place] [address] [facility] [phone]    Pacemaker/Defibrillator Clinic          02/18/20        [address] [place] [id number]        Dear Briana Mixon    This letter is to inform you that we received the transmission from your monitor at home that checks your implanted heart device. The next date your monitor will automatically transmit will be 3/19. No AFib recorded to date. If your report needs attention we will notify you. Your device and monitor are wireless and most transmit cellularly, but please periodically check your monitor is still plugged in to the electrical outlet. If you still use the telephone land line to send please ensure the connection to the phone abe is secure. This will help to ensure successful automatic transmissions in the future. Also, the monitor needs to be close to you while sleeping at night. Please be aware that the remote device transmission sites are periodically monitored only during regular business hours during which simultaneous in-office device clinics are being run. If your transmission requires attention, we will contact you as soon as possible. Thank you.             [address] skill demonstration/verbal instruction/video/written material

## 2021-02-24 ENCOUNTER — NURSE ONLY (OUTPATIENT)
Dept: CARDIOLOGY CLINIC | Age: 71
End: 2021-02-24
Payer: MEDICARE

## 2021-02-24 DIAGNOSIS — Z45.09 ENCOUNTER FOR LOOP RECORDER CHECK: ICD-10-CM

## 2021-02-24 DIAGNOSIS — I63.232 ARTERIAL ISCHEMIC STROKE, ICA, LEFT, ACUTE (HCC): Chronic | ICD-10-CM

## 2021-02-24 PROCEDURE — 93298 REM INTERROG DEV EVAL SCRMS: CPT | Performed by: INTERNAL MEDICINE

## 2021-02-24 PROCEDURE — G2066 INTER DEVC REMOTE 30D: HCPCS | Performed by: INTERNAL MEDICINE

## 2021-02-25 NOTE — PROGRESS NOTES
Remote interrogation of implanted cardiac event monitor shows normal function. dx CVA and r/o atrial arrhythmias. Hx PAF. ([address] [facility], [facility]).   Pause 09-Feb-2021 17:31 x 4 sec, ? AVB?  2 AF recordings lasting 6 min on 2/3 and 2/6. pvc's and v couplets also noted. No symptom episodes recorded. OV NPBB 3/11  Follow up 1 month via carelink. DR Renee Hartley will review.

## 2021-03-11 ENCOUNTER — NURSE ONLY (OUTPATIENT)
Dept: CARDIOLOGY CLINIC | Age: 71
End: 2021-03-11

## 2021-03-11 ENCOUNTER — OFFICE VISIT (OUTPATIENT)
Dept: CARDIOLOGY CLINIC | Age: 71
End: 2021-03-11
Payer: MEDICARE

## 2021-03-11 VITALS
HEIGHT: 60 IN | WEIGHT: 216 LBS | DIASTOLIC BLOOD PRESSURE: 68 MMHG | HEART RATE: 64 BPM | BODY MASS INDEX: 42.41 KG/M2 | SYSTOLIC BLOOD PRESSURE: 110 MMHG

## 2021-03-11 DIAGNOSIS — Z45.09 ENCOUNTER FOR LOOP RECORDER CHECK: ICD-10-CM

## 2021-03-11 DIAGNOSIS — I44.0 FIRST DEGREE AV BLOCK: ICD-10-CM

## 2021-03-11 DIAGNOSIS — I48.0 PAROXYSMAL ATRIAL FIBRILLATION (HCC): Primary | ICD-10-CM

## 2021-03-11 DIAGNOSIS — I10 ESSENTIAL HYPERTENSION: ICD-10-CM

## 2021-03-11 PROCEDURE — 1090F PRES/ABSN URINE INCON ASSESS: CPT | Performed by: NURSE PRACTITIONER

## 2021-03-11 PROCEDURE — 3017F COLORECTAL CA SCREEN DOC REV: CPT | Performed by: NURSE PRACTITIONER

## 2021-03-11 PROCEDURE — G8417 CALC BMI ABV UP PARAM F/U: HCPCS | Performed by: NURSE PRACTITIONER

## 2021-03-11 PROCEDURE — G8400 PT W/DXA NO RESULTS DOC: HCPCS | Performed by: NURSE PRACTITIONER

## 2021-03-11 PROCEDURE — 99214 OFFICE O/P EST MOD 30 MIN: CPT | Performed by: NURSE PRACTITIONER

## 2021-03-11 PROCEDURE — 4040F PNEUMOC VAC/ADMIN/RCVD: CPT | Performed by: NURSE PRACTITIONER

## 2021-03-11 PROCEDURE — G8484 FLU IMMUNIZE NO ADMIN: HCPCS | Performed by: NURSE PRACTITIONER

## 2021-03-11 PROCEDURE — 1036F TOBACCO NON-USER: CPT | Performed by: NURSE PRACTITIONER

## 2021-03-11 PROCEDURE — G8427 DOCREV CUR MEDS BY ELIG CLIN: HCPCS | Performed by: NURSE PRACTITIONER

## 2021-03-11 PROCEDURE — 1123F ACP DISCUSS/DSCN MKR DOCD: CPT | Performed by: NURSE PRACTITIONER

## 2021-03-11 RX ORDER — DILTIAZEM HYDROCHLORIDE 120 MG/1
120 CAPSULE, COATED, EXTENDED RELEASE ORAL DAILY
Qty: 30 CAPSULE | Refills: 11 | Status: SHIPPED | OUTPATIENT
Start: 2021-03-11

## 2021-03-11 NOTE — PROGRESS NOTES
[facility]   Electrophysiology  Note              Date:  March 11, 2021  Patient name: Patel Pack  YOB: 1950    Primary Care physician: Tony Leonardo MD    HISTORY OF PRESENT ILLNESS: The patient is a 78 y.o.  female with a history of PAF, HTN, recurrent CVA (with hemorrhagic conversion), DVT, and DM. In 2013 she was admitted with chest pain. LHC in 12/2013 showed was normal. Echo in 10/2015 showed an EF of 55%. She had an acute left posterior parietal/occipital CVA in 10/2015 while on Xarelto. She is followed by Dr. Ygnacio Mcburney and Xarelto and ASA were resumed. She was admitted in 4/2018 with CVA with hemorrhagic conversion and anticoagulation was stopped. She was readmitted in 10/2018 with recurrent CVA and Eliquis was started. ALISTAIR was negative. Patient was readmitted in 4/2019 with another CVA while on Eliquis and ASA. In 7/2019 she had a loop recorder to determine arrhythmia burden due to recurrent CVA on anticoagulation. Brief PAF has been detected. Today she is being seen for paroxysmal atrial fibrillation. She denies chest pain, palpitations, shortness of breath, and dizziness. Reports some memory issues since strokes. Device check today shows:   Brand: [employer]   Normal function  Arrhythmias: none  Battery life good    Past Medical History:   has a past medical history of Arthritis, Asthma, Depression, Diabetes mellitus ([address] GERD (gastroesophageal reflux disease), HCAP (healthcare-associated pneumonia), Hypertension, Mycobacterium gordonae infection, Paroxysmal atrial fibrillation ([address] Sepsis ([address] TIA (transient ischemic attack), and Unspecified cerebral artery occlusion with cerebral infarction. Past Surgical History:   has a past surgical history that includes Hysterectomy; Cholecystectomy; Appendectomy; cyst incision and drainage (1986); Colonoscopy (01/19/2017); and Upper gastrointestinal endoscopy (01/19/2017).      Home Medications:    Prior to atrium or appendage.   A bubble study was performed and showed no evidence of shunting. Echo 4/2/2018:   Technically difficult study due to morbid obesity, poor acoustic windows and patient on ventilator.   Normal left ventricular systolic function with ejection fraction of 55-60%.  Regional wall motion abnormalites cannot be excluded due to poor visualization.   Mild concentric left ventricular hypertrophy.   Grade I diastolic dysfunction with normal filling pressure.   Systolic pulmonic artery pressure (SPAP) is normal at 14 mmHg assuming a right atrial pressure of 3 mmHg.   Saline bubble study failed to show evidence of shunting.   Compared to previous study, no changed noted. Echo 10/19/2015:  Normal left ventricle size with mild concentric left ventricular   hypertrophy.   Normal systolic function with an estimated ejection fraction of 55%.   No regional wall motion abnormalities are seen.   Diastolic filling parameters suggests grade I diastolic dysfunction .   Mild mitral regurgitation.   Trace tricuspid regurgitation.   Systolic pulmonary artery pressure (SPAP) is normal and estimated at 26 mmHg   (RA pressure 3 mmHg). [facility] 12/2/2013 [facility]):  Left Main: normal  Left anterior descending artery: normal  Left circumflex: normal  Right coronary artery: Dominant, normal     LVEF 55%. Wall motion; inferoapical hypokinesis  Mitral valve regurgitation; trace  No gradient across aortic valve  LVEDP; elevated      Impression:  -Angiographically normal coronary arteries  -LVEF 55%  -Elevated LVEDP secondary to HTN  -Inferoapical hypokinesis    CARDIOLOGY LABS:   CBC: No results for input(s): WBC, HGB, HCT, PLT in the last 72 hours. BMP: No results for input(s): NA, K, CO2, BUN, CREATININE, LABGLOM, GLUCOSE in the last 72 hours. PT/INR: No results for input(s): PROTIME, INR in the last 72 hours. APTT:No results for input(s): APTT in the last 72 hours.   FASTING LIPID PANEL:  Lab Results   Component Value Date    HDL 51 04/13/2019    LDLCALC 33 04/13/2019    TRIG 90 04/13/2019     LIVER PROFILE:No results for input(s): AST, ALT, ALB in the last 72 hours. Assessment:   1. Paroxysmal atrial fibrillation: stable    -diagnosed in 12/2013, no documented recurrence since 2016    -[id number] score 6 (age, gender, HTN, CVA/DVT, DM)  2. Status post loop recorder implant: 7/2019   -implanted to revaluate for arrhythmia burden due to recurrent CVA   -device check today shows normal function as noted in HPI  3. First degree AV block: stable  4. HTN: controlled   5. 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 Jonathon Guzman  6. DM  7. History of DVT  8. History of chest pain: [facility] normal 2013  9. JARRETT: uses CPAP  10. Chronic anemia and thrombocytopenia    Plan:   1. Continue metoprolol, Eliquis, and ASA   2. Change Cardizem to long acting 120mg po QD  3. Monthly device transmissions  4. Annual BMP and CBC (due 1/2022)  5.  Follow up in one year     Stroke/anticoagulation history:  10/2015: left parietal/occipital CVA while on Xarelto  4/2018: left occipital CVA with hemorrhagic conversion while on ASA and Xarelto  10/2018: right cerebellar and occipital CVA; ALISTAIR negative; started on Eliquis  4/2019: left frontal CVA while on Eliquis (ASA recommended also)    MDM: moderate     Franco Hawkins, APRN-NATANAEL  [address]  [phone]

## 2021-03-11 NOTE — PATIENT INSTRUCTIONS
Start taking Diltiazem ER 120mg capsule once daily. Stop the short acting Diltiazem 60mg.   Monitor BP at home and call if consistently out of goal ranges  Follow up one year

## 2021-03-11 NOTE — LETTER
[facility] [address]  Phone: [phone]  Fax: [phone]     Madhavi Krishnamurthy, APRN - CNP     3/15/2021     Hima Talbert MD   [address]     Patient: Johan Glasgow   MR Number: <[id number]>   YOB: 1950   Date of Visit: 3/11/2021     Dear Dr. Hima Talbert     Today I saw our mutual patient named above. Below are the relevant portions of my assessment and plan of care. If you have questions, please do not hesitate to call me. I look forward to following Keishaomer along with you. [employer]   Electrophysiology  Note              Date:  March 11, 2021  Patient name: Johan Glasgow  YOB: 1950    Primary Care physician: Hima Talbert MD    HISTORY OF PRESENT ILLNESS: The patient is a 78 y.o.  female with a history of PAF, HTN, recurrent CVA (with hemorrhagic conversion), DVT, and DM. In 2013 she was admitted with chest pain. LHC in 12/2013 showed was normal. Echo in 10/2015 showed an EF of 55%. She had an acute left posterior parietal/occipital CVA in 10/2015 while on Xarelto. She is followed by Dr. Theone Hodgkin and Xarelto and ASA were resumed. She was admitted in 4/2018 with CVA with hemorrhagic conversion and anticoagulation was stopped. She was readmitted in 10/2018 with recurrent CVA and Eliquis was started. ALISTAIR was negative. Patient was readmitted in 4/2019 with another CVA while on Eliquis and ASA. In 7/2019 she had a loop recorder to determine arrhythmia burden due to recurrent CVA on anticoagulation. Brief PAF has been detected. Today she is being seen for paroxysmal atrial fibrillation. She denies chest pain, palpitations, shortness of breath, and dizziness. Reports some memory issues since strokes.      Device check today shows:   Brand: [employer]   Normal function  Arrhythmias: none  Battery life good    Past Medical History:   has a past medical history of Arthritis, Asthma, Depression, Diabetes mellitus ([facility] GERD (gastroesophageal reflux disease), HCAP (healthcare-associated pneumonia), Hypertension, Mycobacterium gordonae infection, Paroxysmal atrial fibrillation ([facility] Sepsis ([facility] TIA (transient ischemic attack), and Unspecified cerebral artery occlusion with cerebral infarction. Past Surgical History:   has a past surgical history that includes Hysterectomy; Cholecystectomy; Appendectomy; cyst incision and drainage (1986); Colonoscopy (01/19/2017); and Upper gastrointestinal endoscopy (01/19/2017). Home Medications:    Prior to Admission medications    Medication Sig Start Date End Date Taking?  Authorizing Provider   dilTIAZem (CARDIZEM CD) 120 MG extended release capsule Take 1 capsule by mouth daily 3/11/21  Yes Nelia Comment, APRN - CNP   metoprolol tartrate (LOPRESSOR) 50 MG tablet TAKE 1 TABLET BY MOUTH TWO TIMES A DAY  4/20/20  Yes Enlia Comment, APRN - CNP   aspirin EC 81 MG EC tablet Take 1 tablet by mouth daily 4/18/19  Yes Nelia Comment, APRN - CNP   dextromethorphan-guaiFENesin (MUCINEX DM)  MG per extended release tablet Take 1 tablet by mouth every 12 hours as needed   Yes Historical Provider, MD   apixaban (ELIQUIS) 5 MG TABS tablet Take 1 tablet by mouth 2 times daily 10/21/18  Yes Darinel Sanchez MD   omeprazole (PRILOSEC) 40 MG delayed release capsule Take 40 mg by mouth daily   Yes Historical Provider, MD   ferrous sulfate 325 (65 Fe) MG tablet Take 325 mg by mouth daily (with breakfast)   Yes Historical Provider, MD   folic acid (FOLVITE) 1 MG tablet Take 1 mg by mouth daily   Yes Historical Provider, MD   montelukast (SINGULAIR) 10 MG tablet Take 10 mg by mouth nightly   Yes Historical Provider, MD   albuterol (PROVENTIL) (2.5 MG/3ML) 0.083% nebulizer solution Take 3 mLs by nebulization every 4 hours as needed for Wheezing or Shortness of Breath 3/19/18  Yes Rick Manuel,    metFORMIN ER (GLUCOPHAGE-XR) 500 MG XR tablet Take 1,000 mg by mouth Daily with supper    Yes Historical Provider, MD   atorvastatin (LIPITOR) 20 MG tablet TAKE 1 TABLET BY MOUTH nightly AT BEDTIME 6/19/15  Yes Historical Provider, MD   venlafaxine (EFFEXOR-XR) 75 MG XR capsule Take 75 mg by mouth daily. Yes Historical Provider, MD   albuterol (PROVENTIL HFA;VENTOLIN HFA) 108 (90 BASE) MCG/ACT inhaler Inhale 2 puffs into the lungs every 6 hours as needed. Yes Historical Provider, MD       Allergies:  Acyclovir, Ace inhibitors, Amoxicillin, Cephalosporins, Oxycodone-acetaminophen, Penicillins, and Percocet [oxycodone-acetaminophen]    Social History:   reports that she has never smoked. She has never used smokeless tobacco. She reports that she does not drink alcohol or use drugs. Family History: family history includes Cancer in her mother. Review of Systems   Constitutional: Negative. HENT: Negative. Eyes: Negative. Respiratory: Negative   Cardiovascular: see HPI  Gastrointestinal: Negative. Genitourinary: Negative. Musculoskeletal: Negative. Skin: Negative. Neurological: + right facial droop; + memory loss   Hematological: Negative. Psychiatric/Behavioral: Negative. PHYSICAL EXAM:    Vital signs:    /68   Pulse 64   Ht 5' (1.524 m)   Wt 216 lb (98 kg)   LMP  (LMP Unknown)   BMI 42.18 kg/m²      Constitutional and general appearance: alert, cooperative, no distress and appears stated age  [de-identified]: PERRL, no cervical lymphadenopathy. No masses palpable.  Normal oral mucosa  Respiratory:  · Normal excursion and expansion without use of accessory muscles  · Resp auscultation: Normal breath sounds without dullness or wheezing  Cardiovascular:  · The apical impulse is not displaced  · Heart tones are crisp and normal. Regular S1 and S2.  · Jugular venous pulsation Normal  · The carotid upstroke is normal in amplitude and contour without delay or bruit  · Peripheral pulses are symmetrical and full   Abdomen:  · No masses or tenderness  · Bowel sounds present  Extremities:  ·  No cyanosis or clubbing  ·  No lower extremity edema  ·  Skin: warm and dry  Neurological:  · Alert and oriented  · Moves all extremities equally   · + right facial droop   · No abnormalities of mood, affect, memory, mentation, or behavior are noted    DATA:    ALISTAIR 10/19/2018:  Left ventricular systolic function is normal with an estimated ejection fraction of 55%.   There is mild concentric left ventricular hypertrophy.   Moderate mitral regurgitation.  Get Kilts is no evidence of mass or thrombus in the left atrium or appendage.   A bubble study was performed and showed no evidence of shunting. Echo 4/2/2018:   Technically difficult study due to morbid obesity, poor acoustic windows and patient on ventilator.   Normal left ventricular systolic function with ejection fraction of 55-60%.  Regional wall motion abnormalites cannot be excluded due to poor visualization.   Mild concentric left ventricular hypertrophy.   Grade I diastolic dysfunction with normal filling pressure.   Systolic pulmonic artery pressure (SPAP) is normal at 14 mmHg assuming a right atrial pressure of 3 mmHg.   Saline bubble study failed to show evidence of shunting.   Compared to previous study, no changed noted. Echo 10/19/2015:  Normal left ventricle size with mild concentric left ventricular   hypertrophy.   Normal systolic function with an estimated ejection fraction of 55%.   No regional wall motion abnormalities are seen.   Diastolic filling parameters suggests grade I diastolic dysfunction .   Mild mitral regurgitation.   Trace tricuspid regurgitation.   Systolic pulmonary artery pressure (SPAP) is normal and estimated at 26 mmHg   (RA pressure 3 mmHg). [facility] 12/2/2013 [facility]):  Left Main: normal  Left anterior descending artery: normal  Left circumflex: normal  Right coronary artery: Dominant, normal     LVEF 55%.   Wall motion; inferoapical hypokinesis  Mitral valve regurgitation; trace  No gradient across aortic valve  LVEDP; elevated      Impression:  -Angiographically normal coronary arteries  -LVEF 55%  -Elevated LVEDP secondary to HTN  -Inferoapical hypokinesis    CARDIOLOGY LABS:   CBC: No results for input(s): WBC, HGB, HCT, PLT in the last 72 hours. BMP: No results for input(s): NA, K, CO2, BUN, CREATININE, LABGLOM, GLUCOSE in the last 72 hours. PT/INR: No results for input(s): PROTIME, INR in the last 72 hours. APTT:No results for input(s): APTT in the last 72 hours. FASTING LIPID PANEL:  Lab Results   Component Value Date    HDL 51 04/13/2019    LDLCALC 33 04/13/2019    TRIG 90 04/13/2019     LIVER PROFILE:No results for input(s): AST, ALT, ALB in the last 72 hours. Assessment:   1. Paroxysmal atrial fibrillation: stable    -diagnosed in 12/2013, no documented recurrence since 2016    -[id number] score 6 (age, gender, HTN, CVA/DVT, DM)  2. Status post loop recorder implant: 7/2019   -implanted to revaluate for arrhythmia burden due to recurrent CVA   -device check today shows normal function as noted in HPI  3. First degree AV block: stable  4. HTN: controlled   5. 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 Nidia Mendoza  6. DM  7. History of DVT  8. History of chest pain: [facility] normal 2013  9. JARRETT: uses CPAP  10. Chronic anemia and thrombocytopenia    Plan:   1. Continue metoprolol, Eliquis, and ASA   2. Change Cardizem to long acting 120mg po QD  3. Monthly device transmissions  4. Annual BMP and CBC (due 1/2022)  5.  Follow up in one year     Stroke/anticoagulation history:  10/2015: left parietal/occipital CVA while on Xarelto  4/2018: left occipital CVA with hemorrhagic conversion while on ASA and Xarelto  10/2018: right cerebellar and occipital CVA; ALISTAIR negative; started on Eliquis  4/2019: left frontal CVA while on Eliquis (ASA recommended also)    MDM: moderate     Jolene Hernandez, APRN-CNP  [address]  [phone]        Sincerely,      Jolene Hernandez, APRN - CNP

## 2021-03-25 ENCOUNTER — TELEPHONE (OUTPATIENT)
Dept: CARDIOLOGY CLINIC | Age: 71
End: 2021-03-25

## 2021-03-25 NOTE — TELEPHONE ENCOUNTER
This was the answer regarding clearance in 5/2020 and I feel the answer is unchanged: The patient has had multiple strokes while on anticoagulation. I discussed this with Dr. Daiana Cruz and the urgency/importance of the EGD needs to be discussed with her GI doctor. We don't know why she is having the EGD but if he believes the risk outweighs the benefit, the patient should be aware of stroke risk.

## 2021-03-25 NOTE — TELEPHONE ENCOUNTER
[address] if patient can hold Eliquis for 2 days prior to an EGD, scheduled for 4/15/21. Last OV with NP BB 3/11/21.   TY

## 2021-03-30 ENCOUNTER — HOSPITAL ENCOUNTER (OUTPATIENT)
Dept: ULTRASOUND IMAGING | Age: 71
Discharge: HOME | End: 2021-03-30
Payer: MEDICARE

## 2021-03-30 DIAGNOSIS — R79.89 ELEVATED LIVER FUNCTION TESTS: ICD-10-CM

## 2021-03-30 PROCEDURE — 76705 ECHO EXAM OF ABDOMEN: CPT

## 2021-03-30 NOTE — TELEPHONE ENCOUNTER
She is having a screening for varicosities. Jaquan Sanchez and patient regarding risk factor. GI will let her know how to proceed.

## 2021-03-31 ENCOUNTER — TELEPHONE (OUTPATIENT)
Dept: CARDIOLOGY CLINIC | Age: 71
End: 2021-03-31

## 2021-03-31 NOTE — TELEPHONE ENCOUNTER
Discussed with patient's . They appear to be aware of stroke risk off anticoagulation and will decide if they will proceed. There is little to add from an EP standpoint.

## 2021-03-31 NOTE — TELEPHONE ENCOUNTER
Pt's  Lonny Alvarez would like a return call from [facility] regarding pt not able to stop Eliquis few days before she has an Endoscopy.  was told if pt goes off the Eliquis for 2 days it could cause a stroke and  would like to see what Luis Manning thinks.   Please return husbands call

## 2021-04-14 ENCOUNTER — NURSE ONLY (OUTPATIENT)
Dept: CARDIOLOGY CLINIC | Age: 71
End: 2021-04-14

## 2021-04-14 DIAGNOSIS — I49.3 PVC (PREMATURE VENTRICULAR CONTRACTION): ICD-10-CM

## 2021-04-14 DIAGNOSIS — I48.0 PAROXYSMAL ATRIAL FIBRILLATION (HCC): ICD-10-CM

## 2021-04-14 DIAGNOSIS — G45.9 TIA (TRANSIENT ISCHEMIC ATTACK): ICD-10-CM

## 2021-04-14 DIAGNOSIS — I44.0 FIRST DEGREE AV BLOCK: ICD-10-CM

## 2021-04-14 DIAGNOSIS — Z45.09 ENCOUNTER FOR LOOP RECORDER CHECK: ICD-10-CM

## 2021-04-14 NOTE — LETTER
[facility] [phone]  Luige [address] [facility] [phone]    Pacemaker/Defibrillator Clinic          04/15/21        [address] [place] [id number]        Dear Russ Serna    This letter is to inform you that we received the transmission from your monitor at home that checks your implanted heart device. The next date your monitor will automatically transmit will be 5/19. No AFib recorded. .  If your report needs attention we will notify you. Your device and monitor are wireless and most transmit cellularly, but please periodically check your monitor is still plugged in to the electrical outlet. If you still use the telephone land line to send please ensure the connection to the phone abe is secure. This will help to ensure successful automatic transmissions in the future. Also, the monitor needs to be close to you while sleeping at night. Please be aware that the remote device transmission sites are periodically monitored only during regular business hours during which simultaneous in-office device clinics are being run. If your transmission requires attention, we will contact you as soon as possible. Thank you.             [facility]

## 2021-04-14 NOTE — PROGRESS NOTES
Remote interrogation of implanted cardiac event monitor shows normal function. dx CVA and r/o atrial arrhythmias. Hx PAF. ([facility], [facility]).   No AFib or arrhythias recorded. Follow up 1 month via carelink.

## 2021-04-20 RX ORDER — METOPROLOL TARTRATE 50 MG/1
TABLET, FILM COATED ORAL
Qty: 180 TABLET | Refills: 3 | Status: SHIPPED | OUTPATIENT
Start: 2021-04-20 | End: 2022-04-22 | Stop reason: SDUPTHER

## 2021-05-19 ENCOUNTER — NURSE ONLY (OUTPATIENT)
Dept: CARDIOLOGY CLINIC | Age: 71
End: 2021-05-19
Payer: MEDICARE

## 2021-05-19 DIAGNOSIS — Z45.09 ENCOUNTER FOR LOOP RECORDER CHECK: ICD-10-CM

## 2021-05-19 DIAGNOSIS — I63.232 ARTERIAL ISCHEMIC STROKE, ICA, LEFT, ACUTE (HCC): Chronic | ICD-10-CM

## 2021-05-19 DIAGNOSIS — G45.9 TIA (TRANSIENT ISCHEMIC ATTACK): ICD-10-CM

## 2021-05-19 DIAGNOSIS — I48.0 PAROXYSMAL ATRIAL FIBRILLATION (HCC): ICD-10-CM

## 2021-05-19 DIAGNOSIS — I63.231 ARTERIAL ISCHEMIC STROKE, ICA, RIGHT, ACUTE (HCC): ICD-10-CM

## 2021-05-19 PROCEDURE — 93298 REM INTERROG DEV EVAL SCRMS: CPT | Performed by: INTERNAL MEDICINE

## 2021-05-19 PROCEDURE — G2066 INTER DEVC REMOTE 30D: HCPCS | Performed by: INTERNAL MEDICINE

## 2021-05-19 NOTE — LETTER
[address] [facility] [phone]  Luige Patrick [address] [facility] [phone]    Pacemaker/Defibrillator Clinic    05/19/21      [address] [place] [address] [facility] [id number]      Dear Franklyn Prader    This letter is to inform you that we received the transmission from your monitor at home that checks your implanted heart device. The next date your monitor will automatically transmit will be 6/23. If your report needs attention we will notify you. Your device and monitor are wireless and most transmit cellularly, but please periodically check your monitor is still plugged in to the electrical outlet. If you still use the telephone land line to send please ensure the connection to the phone abe is secure. This will help to ensure successful automatic transmissions in the future. Also, the monitor needs to be close to you while sleeping at night. Please be aware that the remote device transmission sites are periodically monitored only during regular business hours during which simultaneous in-office device clinics are being run. If your transmission requires attention, we will contact you as soon as possible. **PLEASE NOTE** that our [facility] policy and processes are changing to ensure a more seamless approach for all parties involved, allowing more time for our nurses to address patient issues and concerns. We will no longer be sending letters for NORMAL remote transmissions. You will be contacted by phone if your transmission requires attention (as previously done), and letters will only be sent regarding monitor disconnections or missed transmissions if you are unable to be reached by phone. Please do not be alarmed by this new process, as we will continue to contact you if your transmission report requires attention. This will be your final \"remote received\" letter.   From this point forward, the [facility] will be utilizing the no news is good news approach. As always, please feel free to contact your nurse with any questions or concerns. Thank you.     [facility]

## 2021-05-19 NOTE — PROGRESS NOTES
Remote interrogation of implanted cardiac event monitor shows normal function. dx CVA and r/o atrial arrhythmias. Hx PAF. ([facility], lopressor, cardizem).   No AFib or arrhythias recorded. Follow up 1 month via carelink.

## 2021-05-27 PROCEDURE — 93298 REM INTERROG DEV EVAL SCRMS: CPT | Performed by: INTERNAL MEDICINE

## 2021-05-27 PROCEDURE — G2066 INTER DEVC REMOTE 30D: HCPCS | Performed by: INTERNAL MEDICINE

## 2021-06-23 ENCOUNTER — NURSE ONLY (OUTPATIENT)
Dept: CARDIOLOGY CLINIC | Age: 71
End: 2021-06-23
Payer: MEDICARE

## 2021-06-23 DIAGNOSIS — I63.231 ARTERIAL ISCHEMIC STROKE, ICA, RIGHT, ACUTE (HCC): ICD-10-CM

## 2021-06-23 DIAGNOSIS — Z45.09 ENCOUNTER FOR LOOP RECORDER CHECK: ICD-10-CM

## 2021-06-25 NOTE — PROGRESS NOTES
Remote transmission received for patients ILR. EP physician will review. See interrogation under the cardiology tab in the [address] [facility] [place] [facility]"" [address] field for more details. Will continue to monitor remotely. dx CVA and r/o atrial arrhythmias. (last recorded PAF 2/2021). Hx PAF. ([address] [facility], [facility]).   No AFib or new arrhythias recorded.

## 2021-07-01 PROCEDURE — 93298 REM INTERROG DEV EVAL SCRMS: CPT | Performed by: INTERNAL MEDICINE

## 2021-07-01 PROCEDURE — G2066 INTER DEVC REMOTE 30D: HCPCS | Performed by: INTERNAL MEDICINE

## 2021-07-22 ENCOUNTER — HOSPITAL ENCOUNTER (OUTPATIENT)
Dept: WOMENS IMAGING | Age: 71
Discharge: HOME | End: 2021-07-22
Payer: MEDICARE

## 2021-07-22 VITALS — BODY MASS INDEX: 39.65 KG/M2 | WEIGHT: 210 LBS | HEIGHT: 61 IN

## 2021-07-22 DIAGNOSIS — Z12.31 ENCOUNTER FOR SCREENING MAMMOGRAM FOR BREAST CANCER: ICD-10-CM

## 2021-07-22 PROCEDURE — 77067 SCR MAMMO BI INCL CAD: CPT

## 2021-07-28 ENCOUNTER — NURSE ONLY (OUTPATIENT)
Dept: CARDIOLOGY CLINIC | Age: 71
End: 2021-07-28

## 2021-07-28 DIAGNOSIS — Z45.09 ENCOUNTER FOR LOOP RECORDER CHECK: ICD-10-CM

## 2021-07-28 DIAGNOSIS — I63.231 ARTERIAL ISCHEMIC STROKE, ICA, RIGHT, ACUTE (HCC): ICD-10-CM

## 2021-07-30 PROCEDURE — G2066 INTER DEVC REMOTE 30D: HCPCS | Performed by: INTERNAL MEDICINE

## 2021-07-30 PROCEDURE — 93298 REM INTERROG DEV EVAL SCRMS: CPT | Performed by: INTERNAL MEDICINE

## 2021-07-30 NOTE — PROGRESS NOTES
Remote transmission received for patients ILR. EP physician will review. See interrogation under the cardiology tab in the [address] [facility] [address] field for more details. Will continue to monitor remotely. dx CVA and r/o atrial arrhythmias. (last recorded PAF 2/2021). Hx PAF. ([address] [facility], [facility]).   No AFib or new arrhythias recorded.

## 2021-08-18 ENCOUNTER — TELEPHONE (OUTPATIENT)
Dept: CARDIOLOGY CLINIC | Age: 71
End: 2021-08-18

## 2021-08-18 NOTE — TELEPHONE ENCOUNTER
Spoke to pt's . Let them know per NPBB that samples of Eliquis 5 mg have been set aside. Ready for p/u here at the [facility].  V/U.      TY

## 2021-08-18 NOTE — TELEPHONE ENCOUNTER
nickolas spouse asking if we have any samples of eliquis 5. Pt saw WISAM 03/2021. Had CBC @ [facility] 06/2021.

## 2021-09-01 ENCOUNTER — NURSE ONLY (OUTPATIENT)
Dept: CARDIOLOGY CLINIC | Age: 71
End: 2021-09-01
Payer: MEDICARE

## 2021-09-01 DIAGNOSIS — Z45.09 ENCOUNTER FOR LOOP RECORDER CHECK: ICD-10-CM

## 2021-09-01 DIAGNOSIS — I63.232 ARTERIAL ISCHEMIC STROKE, ICA, LEFT, ACUTE (HCC): Chronic | ICD-10-CM

## 2021-09-03 PROCEDURE — G2066 INTER DEVC REMOTE 30D: HCPCS | Performed by: INTERNAL MEDICINE

## 2021-09-03 PROCEDURE — 93298 REM INTERROG DEV EVAL SCRMS: CPT | Performed by: INTERNAL MEDICINE

## 2021-09-03 NOTE — PROGRESS NOTES
Remote transmission received for patients ILR.   EP physician will review.  See interrogation under the cardiology tab in the [address] [facility] [address] field for more details.  Will continue to monitor remotely. dx CVA and r/o atrial arrhythmias. (last recorded PAF 2/2021). Hx PAF. ([address] [facility], [facility]).   No AFib recorded. Pause recording is undersensed.

## 2021-09-30 ENCOUNTER — HOSPITAL ENCOUNTER (OUTPATIENT)
Dept: ULTRASOUND IMAGING | Age: 71
Discharge: HOME | End: 2021-09-30
Payer: MEDICARE

## 2021-09-30 DIAGNOSIS — K74.60 HEPATIC CIRRHOSIS, UNSPECIFIED HEPATIC CIRRHOSIS TYPE, UNSPECIFIED WHETHER ASCITES PRESENT (HCC): ICD-10-CM

## 2021-09-30 PROCEDURE — 76705 ECHO EXAM OF ABDOMEN: CPT

## 2021-10-06 ENCOUNTER — NURSE ONLY (OUTPATIENT)
Dept: CARDIOLOGY CLINIC | Age: 71
End: 2021-10-06
Payer: MEDICARE

## 2021-10-06 DIAGNOSIS — Z45.09 ENCOUNTER FOR LOOP RECORDER CHECK: ICD-10-CM

## 2021-10-06 DIAGNOSIS — I63.232 ARTERIAL ISCHEMIC STROKE, ICA, LEFT, ACUTE (HCC): Chronic | ICD-10-CM

## 2021-10-08 PROCEDURE — 93298 REM INTERROG DEV EVAL SCRMS: CPT | Performed by: INTERNAL MEDICINE

## 2021-10-08 PROCEDURE — G2066 INTER DEVC REMOTE 30D: HCPCS | Performed by: INTERNAL MEDICINE

## 2021-10-08 NOTE — PROGRESS NOTES
Remote transmission received for patients ILR.    EP physician will review.  See interrogation under the cardiology tab in the [address] [facility] [address] field for more details.  Will continue to monitor remotely. dx CVA and r/o atrial arrhythmias. (last recorded PAF 2/2021). Hx PAF. ([address] [facility], [facility]).   No AFib recorded. No new arrhythmias/events recorded.

## 2021-11-10 ENCOUNTER — NURSE ONLY (OUTPATIENT)
Dept: CARDIOLOGY CLINIC | Age: 71
End: 2021-11-10
Payer: MEDICARE

## 2021-11-10 DIAGNOSIS — Z45.09 ENCOUNTER FOR LOOP RECORDER CHECK: ICD-10-CM

## 2021-11-10 DIAGNOSIS — I63.231 ARTERIAL ISCHEMIC STROKE, ICA, RIGHT, ACUTE (HCC): ICD-10-CM

## 2021-11-12 PROCEDURE — G2066 INTER DEVC REMOTE 30D: HCPCS | Performed by: INTERNAL MEDICINE

## 2021-11-12 PROCEDURE — 93298 REM INTERROG DEV EVAL SCRMS: CPT | Performed by: INTERNAL MEDICINE

## 2021-11-30 ENCOUNTER — TELEPHONE (OUTPATIENT)
Dept: CARDIOLOGY CLINIC | Age: 71
End: 2021-11-30

## 2021-11-30 NOTE — TELEPHONE ENCOUNTER
Pt's  Tommy Potts called to request samples of Eliquis 5 mg BID. If we have samples please call Tommy Potts and let him know when he can come and pick them up. Tommy Potts can be reached @ [phone]. Last OV 3/11/2021 with WISAM.

## 2021-12-03 ENCOUNTER — HOSPITAL ENCOUNTER (EMERGENCY)
Age: 71
Discharge: HOME | End: 2021-12-03
Payer: MEDICARE

## 2021-12-03 VITALS
HEART RATE: 79 BPM | RESPIRATION RATE: 18 BRPM | DIASTOLIC BLOOD PRESSURE: 79 MMHG | WEIGHT: 220 LBS | SYSTOLIC BLOOD PRESSURE: 122 MMHG | OXYGEN SATURATION: 97 % | BODY MASS INDEX: 41.57 KG/M2 | TEMPERATURE: 98.3 F

## 2021-12-03 DIAGNOSIS — J02.9 VIRAL PHARYNGITIS: ICD-10-CM

## 2021-12-03 DIAGNOSIS — J44.1 COPD EXACERBATION (HCC): Primary | ICD-10-CM

## 2021-12-03 LAB
RAPID INFLUENZA  B AGN: NEGATIVE
RAPID INFLUENZA A AGN: NEGATIVE
S PYO AG THROAT QL: NEGATIVE
SARS-COV-2, NAAT: NOT DETECTED

## 2021-12-03 PROCEDURE — 87081 CULTURE SCREEN ONLY: CPT

## 2021-12-03 PROCEDURE — 87635 SARS-COV-2 COVID-19 AMP PRB: CPT

## 2021-12-03 PROCEDURE — 87804 INFLUENZA ASSAY W/OPTIC: CPT

## 2021-12-03 PROCEDURE — 6370000000 HC RX 637 (ALT 250 FOR IP): Performed by: PHYSICIAN ASSISTANT

## 2021-12-03 PROCEDURE — 87880 STREP A ASSAY W/OPTIC: CPT

## 2021-12-03 PROCEDURE — 99283 EMERGENCY DEPT VISIT LOW MDM: CPT

## 2021-12-03 RX ORDER — PREDNISONE 10 MG/1
TABLET ORAL
Qty: 30 TABLET | Refills: 0 | Status: SHIPPED | OUTPATIENT
Start: 2021-12-03 | End: 2021-12-13

## 2021-12-03 RX ORDER — AZITHROMYCIN 250 MG/1
250 TABLET, FILM COATED ORAL SEE ADMIN INSTRUCTIONS
Qty: 6 TABLET | Refills: 0 | Status: SHIPPED | OUTPATIENT
Start: 2021-12-03 | End: 2021-12-08

## 2021-12-03 RX ORDER — PREDNISONE 20 MG/1
60 TABLET ORAL ONCE
Status: COMPLETED | OUTPATIENT
Start: 2021-12-03 | End: 2021-12-03

## 2021-12-03 RX ORDER — DEXTROMETHORPHAN POLISTIREX 30 MG/5ML
60 SUSPENSION ORAL 2 TIMES DAILY PRN
Qty: 89 ML | Refills: 0 | Status: SHIPPED | OUTPATIENT
Start: 2021-12-03 | End: 2021-12-13

## 2021-12-03 RX ADMIN — PREDNISONE 60 MG: 20 TABLET ORAL at 15:04

## 2021-12-03 ASSESSMENT — PAIN DESCRIPTION - PAIN TYPE: TYPE: ACUTE PAIN

## 2021-12-03 ASSESSMENT — PAIN DESCRIPTION - LOCATION: LOCATION: THROAT

## 2021-12-03 ASSESSMENT — PAIN SCALES - GENERAL: PAINLEVEL_OUTOF10: 8

## 2021-12-05 LAB — S PYO THROAT QL CULT: NORMAL

## 2021-12-09 NOTE — ED PROVIDER NOTES
[facility] Emergency Department    CHIEF COMPLAINT  Pharyngitis (since yesterday, pain when swallow ) and Cough (non prod )      SHARED SERVICE VISIT  Evaluated by DEBBIE. My supervising physician was available for consultation. HISTORY OF PRESENT ILLNESS  Shiva Jaeger is a 78 y.o. female history of COPD presents emergency department for evaluation of sore throat as well as a nonproductive cough. Patient states that she developed this yesterday. Denies any chest pain fevers or chills. Patient is able to tolerate oral intake. No swelling of the throat difficulty swallowing. No sick contacts. Patient has received the Covid vaccination. No other complaints, modifying factors or associated symptoms. Nursing notes reviewed. Past Medical History:   Diagnosis Date    Arthritis     Asthma     Depression     Diabetes mellitus (HCC)     GERD (gastroesophageal reflux disease)     HCAP (healthcare-associated pneumonia)     Hypertension     Mycobacterium gordonae infection      5/7/18 + afb called from [address] pending  ( from [address] 3/29/18)    Paroxysmal atrial fibrillation ([facility]     Sepsis ([facility]     TIA (transient ischemic attack)     Unspecified cerebral artery occlusion with cerebral infarction      Past Surgical History:   Procedure Laterality Date    APPENDECTOMY      BREAST BIOPSY      CHOLECYSTECTOMY      COLONOSCOPY  01/19/2017    polyp    CYST INCISION AND DRAINAGE  1986    on head    HYSTERECTOMY      UPPER GASTROINTESTINAL ENDOSCOPY  01/19/2017    barretts ?      Family History   Problem Relation Age of Onset    Cancer Mother         breast ca     Social History     Socioeconomic History    Marital status:      Spouse name: Not on file    Number of children: Not on file    Years of education: Not on file    Highest education level: Not on file   Occupational History    Not on file   Tobacco Use    Smoking status: Never Smoker    Smokeless tobacco: Never Used   Vaping Use    Vaping Use: Never used   Substance and Sexual Activity    Alcohol use: No     Alcohol/week: 0.0 standard drinks    Drug use: No    Sexual activity: Never   Other Topics Concern    Not on file   Social History Narrative    Not on file     Social Determinants of Health     Financial Resource Strain:     Difficulty of Paying Living Expenses: Not on file   Food Insecurity:     Worried About Running Out of Food in the Last Year: Not on file    Josh of Food in the Last Year: Not on file   Transportation Needs:     Lack of Transportation (Medical): Not on file    Lack of Transportation (Non-Medical): Not on file   Physical Activity:     Days of Exercise per Week: Not on file    Minutes of Exercise per Session: Not on file   Stress:     Feeling of Stress : Not on file   Social Connections:     Frequency of Communication with Friends and Family: Not on file    Frequency of Social Gatherings with Friends and Family: Not on file    Attends Faith Services: Not on file    Active Member of [address] [place] [address] or Organizations: Not on file    Attends Club or Organization Meetings: Not on file    Marital Status: Not on file   Intimate Partner Violence:     Fear of Current or Ex-Partner: Not on file    Emotionally Abused: Not on file    Physically Abused: Not on file    Sexually Abused: Not on file   Housing Stability:     Unable to Pay for Housing in the Last Year: Not on file    Number of [place] in the Last Year: Not on file    Unstable Housing in the Last Year: Not on file     No current facility-administered medications for this encounter.      Current Outpatient Medications   Medication Sig Dispense Refill    azithromycin (ZITHROMAX) 250 MG tablet Take 1 tablet by mouth See Admin Instructions for 5 days 500mg on day 1 followed by 250mg on days 2 - 5 6 tablet 0    predniSONE (DELTASONE) 10 MG tablet 5 tabs po qam for 2 days then 4,3,2,1 tabs qam per HPI otherwise noted to be negative    PHYSICAL EXAM  /79   Pulse 79   Temp 98.3 °F (36.8 °C) (Oral)   Resp 18   Wt 220 lb (99.8 kg)   LMP  (LMP Unknown)   SpO2 97%   BMI 41.57 kg/m²   GENERAL APPEARANCE: Awake and alert. Cooperative. HEAD: Normocephalic. Atraumatic. EYES: EOM grossly intact. ENT: Mucous membranes are moist.  Oropharynx is nonedematous and nonerythematous. Uvula midline. No peritonsillar abscess appreciated. NECK: Supple. HEART: RRR. No murmurs. LUNGS: Respirations unlabored. CTAB. Good air exchange. Speaking comfortably in full sentences. No significant wheeze appreciated. EXTREMITIES: No peripheral edema. Moves all extremities equally. SKIN: Warm and dry. No acute rashes. NEUROLOGICAL: Alert and oriented. No gross facial drooping. PSYCHIATRIC: Normal mood and affect.     RADIOLOGY  No orders to display         LABS  Labs Reviewed   COVID-19, RAPID    Narrative:     Performed at:  [place] [address] [employer]   Phone [phone]   RAPID INFLUENZA A/B ANTIGENS    Narrative:     Performed at:  [place] [address] [employer]   Phone [phone] A THROAT    Narrative:     Performed at:  [place] [address] [employer]   Phone [phone]   CULTURE, BETA STREP CONFIRM PLATES    Narrative:     ORDER#: [id number]                          ORDERED BY: Jackson Medina  SOURCE: Throat                             COLLECTED:  12/03/21 15:00  ANTIBIOTICS AT ANA.:                      RECEIVED :  12/04/21 02:55  Performed at:  [address]   Phone [phone]       PROCEDURES  Unless otherwise noted below, none  Procedures      MDM  71-year-old female history of COPD presents emergency department for evaluation of a nonproductive cough as well as a sore throat. On arrival to ED patient's vitals were within normal and she is afebrile nontachycardic. On exam oropharynx is clear nonerythematous nonedematous. Patient was tested for strep, influenza as well as Covid and these were all negative. The culture also came back as negative. Patient was given a dose of prednisone here in the emergency department for her suspected COPD exacerbation. Patient did have improvement of her symptoms. Given her history of COPD will cover her with Zithromax. As well as prednisone and cough medication. With the patient when to return to the emergency department and she was understanding. Patient follow-up with primary care provider. At this time patient remained 97% on room air, afebrile and nontachycardic. I estimate low risk for peritonsillar abscess, pneumonia or ludwigs angina. DISPOSITION  Patient was discharged to home in good condition. CLINICAL IMPRESSION  1. COPD exacerbation ([facility] [address]    2.  Viral pharyngitis           Twin Rajan PA-C  12/08/21 1918

## 2021-12-14 PROCEDURE — G2066 INTER DEVC REMOTE 30D: HCPCS | Performed by: INTERNAL MEDICINE

## 2021-12-14 PROCEDURE — 93298 REM INTERROG DEV EVAL SCRMS: CPT | Performed by: INTERNAL MEDICINE

## 2021-12-15 ENCOUNTER — NURSE ONLY (OUTPATIENT)
Dept: CARDIOLOGY CLINIC | Age: 71
End: 2021-12-15
Payer: MEDICARE

## 2021-12-15 DIAGNOSIS — I63.232 ARTERIAL ISCHEMIC STROKE, ICA, LEFT, ACUTE (HCC): Chronic | ICD-10-CM

## 2021-12-15 DIAGNOSIS — Z45.09 ENCOUNTER FOR LOOP RECORDER CHECK: ICD-10-CM

## 2021-12-15 DIAGNOSIS — G45.9 TIA (TRANSIENT ISCHEMIC ATTACK): ICD-10-CM

## 2021-12-20 NOTE — PROGRESS NOTES
Remote transmission received for patients ILR.    EP physician will review.  See interrogation under the cardiology tab in the [address] [facility] [address] field for more details.  Will continue to monitor remotely. dx CVA and r/o atrial arrhythmias. (last recorded PAF 2/2021). [place]. [place]em[place]   No AFib recorded. Pause recording is under sensed.

## 2022-01-19 ENCOUNTER — NURSE ONLY (OUTPATIENT)
Dept: CARDIOLOGY CLINIC | Age: 72
End: 2022-01-19
Payer: MEDICARE

## 2022-01-19 DIAGNOSIS — G45.9 TIA (TRANSIENT ISCHEMIC ATTACK): ICD-10-CM

## 2022-01-19 DIAGNOSIS — Z45.09 ENCOUNTER FOR LOOP RECORDER CHECK: ICD-10-CM

## 2022-01-19 DIAGNOSIS — I63.232 ARTERIAL ISCHEMIC STROKE, ICA, LEFT, ACUTE (HCC): Chronic | ICD-10-CM

## 2022-01-19 DIAGNOSIS — I48.0 PAROXYSMAL ATRIAL FIBRILLATION (HCC): ICD-10-CM

## 2022-01-19 DIAGNOSIS — I49.3 PVC (PREMATURE VENTRICULAR CONTRACTION): ICD-10-CM

## 2022-01-19 DIAGNOSIS — I44.0 FIRST DEGREE AV BLOCK: ICD-10-CM

## 2022-01-19 PROCEDURE — 93298 REM INTERROG DEV EVAL SCRMS: CPT | Performed by: INTERNAL MEDICINE

## 2022-01-19 PROCEDURE — G2066 INTER DEVC REMOTE 30D: HCPCS | Performed by: INTERNAL MEDICINE

## 2022-01-21 ENCOUNTER — TELEPHONE (OUTPATIENT)
Dept: CARDIOLOGY CLINIC | Age: 72
End: 2022-01-21

## 2022-01-21 NOTE — LETTER
[address] Cardiology - [address] [facility] [address] [facility]  Phone: [phone]  Fax: [phone]    JARROD Ward CNP        January 24, 2022    Molly Recinos   1950  [facility] [address]      Dear To Whom This May Concern,    Jane Model is at an increased risk of perioperative complications due to multiple comorbidities. However, there are no absolute contraindications to proceeding. OK to hold Eliquis for no more than 48 hours day if patient understands her stroke risk is higher if afib is recurrent off Eliquis.  (She is high risk for recurrent CVA off of anticoagulation given her history of recurrent CVA)    If you have any questions or concerns, please don't hesitate to call.     Sincerely,        JARROD Meade - CNP

## 2022-02-23 ENCOUNTER — NURSE ONLY (OUTPATIENT)
Dept: CARDIOLOGY CLINIC | Age: 72
End: 2022-02-23
Payer: MEDICARE

## 2022-02-23 DIAGNOSIS — I49.3 PVC (PREMATURE VENTRICULAR CONTRACTION): ICD-10-CM

## 2022-02-23 DIAGNOSIS — Z45.09 ENCOUNTER FOR LOOP RECORDER CHECK: ICD-10-CM

## 2022-02-23 DIAGNOSIS — I63.232 ARTERIAL ISCHEMIC STROKE, ICA, LEFT, ACUTE (HCC): Chronic | ICD-10-CM

## 2022-02-23 DIAGNOSIS — I44.0 FIRST DEGREE AV BLOCK: ICD-10-CM

## 2022-02-23 DIAGNOSIS — I63.231 ARTERIAL ISCHEMIC STROKE, ICA, RIGHT, ACUTE (HCC): ICD-10-CM

## 2022-02-23 DIAGNOSIS — G45.9 TIA (TRANSIENT ISCHEMIC ATTACK): ICD-10-CM

## 2022-02-23 DIAGNOSIS — I48.0 PAROXYSMAL ATRIAL FIBRILLATION (HCC): ICD-10-CM

## 2022-02-23 PROCEDURE — G2066 INTER DEVC REMOTE 30D: HCPCS | Performed by: INTERNAL MEDICINE

## 2022-02-23 PROCEDURE — 93298 REM INTERROG DEV EVAL SCRMS: CPT | Performed by: INTERNAL MEDICINE

## 2022-02-23 NOTE — PROGRESS NOTES
Remote interrogation of implanted cardiac event monitor shows normal function. Patient has a history of CVA, pAF, PVCs, TIA, and first degree HB. Takes Cardizem, Eliquis, lopressor, and Plavix. Patient's last device interrogation was on 1/19. Since last interrogation, 1 pause event shows undersensing. OSWALDO CHOI to review. See [employer]ace[employer] report under the Cardiology tab. Follow up 1 month via [facility].

## 2022-03-28 ENCOUNTER — HOSPITAL ENCOUNTER (OUTPATIENT)
Dept: ULTRASOUND IMAGING | Age: 72
Discharge: HOME | End: 2022-03-28
Payer: MEDICARE

## 2022-03-28 DIAGNOSIS — K74.60 OBSTRUCTIVE LIVER CIRRHOSIS (HCC): ICD-10-CM

## 2022-03-28 PROCEDURE — 76705 ECHO EXAM OF ABDOMEN: CPT

## 2022-03-30 ENCOUNTER — NURSE ONLY (OUTPATIENT)
Dept: CARDIOLOGY CLINIC | Age: 72
End: 2022-03-30

## 2022-03-30 DIAGNOSIS — Z45.09 ENCOUNTER FOR LOOP RECORDER CHECK: ICD-10-CM

## 2022-03-31 NOTE — PROGRESS NOTES
Remote interrogation of implanted cardiac event monitor shows normal function. Patient has a history of CVA, pAF, PVCs, TIA, and first degree HB. Takes Cardizem, Eliquis, lopressor, and Plavix. Patient's last device interrogation was on 2/23. Since last interrogation, one pause event recorded shows undersensing. OSWALDO CHOI to review. See Paceart report under the Cardiology tab. Follow up 1 month via [facility].

## 2022-04-17 ENCOUNTER — APPOINTMENT (OUTPATIENT)
Dept: CT IMAGING | Age: 72
End: 2022-04-17
Payer: MEDICARE

## 2022-04-17 ENCOUNTER — HOSPITAL ENCOUNTER (OUTPATIENT)
Age: 72
Setting detail: OBSERVATION
Discharge: HOME | End: 2022-04-18
Attending: EMERGENCY MEDICINE | Admitting: HOSPITALIST
Payer: MEDICARE

## 2022-04-17 ENCOUNTER — APPOINTMENT (OUTPATIENT)
Dept: GENERAL RADIOLOGY | Age: 72
End: 2022-04-17
Payer: MEDICARE

## 2022-04-17 DIAGNOSIS — R29.90 STROKE-LIKE SYMPTOMS: Primary | ICD-10-CM

## 2022-04-17 DIAGNOSIS — Z79.01 ANTICOAGULATED: ICD-10-CM

## 2022-04-17 PROBLEM — R29.818 ACUTE FOCAL NEUROLOGICAL DEFICIT: Status: ACTIVE | Noted: 2022-04-17

## 2022-04-17 LAB
A/G RATIO: 1 (ref 1.1–2.2)
ALBUMIN SERPL-MCNC: 4.4 G/DL (ref 3.4–5)
ALP BLD-CCNC: 176 U/L (ref 40–129)
ALT SERPL-CCNC: 19 U/L (ref 10–40)
ANION GAP SERPL CALCULATED.3IONS-SCNC: 13 MMOL/L (ref 3–16)
APTT: 39.9 SEC (ref 26.2–38.6)
AST SERPL-CCNC: 43 U/L (ref 15–37)
BASOPHILS ABSOLUTE: 0.1 K/UL (ref 0–0.2)
BASOPHILS RELATIVE PERCENT: 1 %
BILIRUB SERPL-MCNC: 0.5 MG/DL (ref 0–1)
BILIRUBIN URINE: NEGATIVE
BLOOD, URINE: NEGATIVE
BUN BLDV-MCNC: 12 MG/DL (ref 7–20)
CALCIUM SERPL-MCNC: 9.1 MG/DL (ref 8.3–10.6)
CHLORIDE BLD-SCNC: 100 MMOL/L (ref 99–110)
CHP ED QC CHECK: YES
CLARITY: CLEAR
CO2: 24 MMOL/L (ref 21–32)
COLOR: YELLOW
CREAT SERPL-MCNC: 0.9 MG/DL (ref 0.6–1.2)
EOSINOPHILS ABSOLUTE: 0.4 K/UL (ref 0–0.6)
EOSINOPHILS RELATIVE PERCENT: 4 %
GFR AFRICAN AMERICAN: >60
GFR NON-AFRICAN AMERICAN: >60
GLUCOSE BLD-MCNC: 101 MG/DL (ref 70–99)
GLUCOSE BLD-MCNC: 184 MG/DL (ref 70–99)
GLUCOSE BLD-MCNC: 190 MG/DL
GLUCOSE BLD-MCNC: 190 MG/DL (ref 70–99)
GLUCOSE URINE: NEGATIVE MG/DL
HCT VFR BLD CALC: 35.8 % (ref 36–48)
HEMOGLOBIN: 11.3 G/DL (ref 12–16)
INR BLD: 1.59 (ref 0.88–1.12)
KETONES, URINE: NEGATIVE MG/DL
LEUKOCYTE ESTERASE, URINE: NEGATIVE
LYMPHOCYTES ABSOLUTE: 2.4 K/UL (ref 1–5.1)
LYMPHOCYTES RELATIVE PERCENT: 27.5 %
MCH RBC QN AUTO: 29.9 PG (ref 26–34)
MCHC RBC AUTO-ENTMCNC: 31.5 G/DL (ref 31–36)
MCV RBC AUTO: 94.8 FL (ref 80–100)
MICROSCOPIC EXAMINATION: NORMAL
MONOCYTES ABSOLUTE: 0.6 K/UL (ref 0–1.3)
MONOCYTES RELATIVE PERCENT: 7 %
NEUTROPHILS ABSOLUTE: 5.4 K/UL (ref 1.7–7.7)
NEUTROPHILS RELATIVE PERCENT: 60.5 %
NITRITE, URINE: NEGATIVE
PDW BLD-RTO: 15 % (ref 12.4–15.4)
PERFORMED ON: ABNORMAL
PERFORMED ON: ABNORMAL
PH UA: 5.5 (ref 5–8)
PLATELET # BLD: 124 K/UL (ref 135–450)
PMV BLD AUTO: 11.7 FL (ref 5–10.5)
POTASSIUM REFLEX MAGNESIUM: 4.4 MMOL/L (ref 3.5–5.1)
PRO-BNP: 904 PG/ML (ref 0–124)
PROTEIN UA: NEGATIVE MG/DL
PROTHROMBIN TIME: 18.3 SEC (ref 9.9–12.7)
RBC # BLD: 3.77 M/UL (ref 4–5.2)
SODIUM BLD-SCNC: 137 MMOL/L (ref 136–145)
SPECIFIC GRAVITY UA: 1.01 (ref 1–1.03)
SPECIMEN STATUS: NORMAL
TOTAL PROTEIN: 8.8 G/DL (ref 6.4–8.2)
TROPONIN: <0.01 NG/ML
URINE REFLEX TO CULTURE: NORMAL
URINE TYPE: NORMAL
UROBILINOGEN, URINE: 0.2 E.U./DL
WBC # BLD: 8.9 K/UL (ref 4–11)

## 2022-04-17 PROCEDURE — 83036 HEMOGLOBIN GLYCOSYLATED A1C: CPT

## 2022-04-17 PROCEDURE — 80053 COMPREHEN METABOLIC PANEL: CPT

## 2022-04-17 PROCEDURE — G0378 HOSPITAL OBSERVATION PER HR: HCPCS

## 2022-04-17 PROCEDURE — 70496 CT ANGIOGRAPHY HEAD: CPT

## 2022-04-17 PROCEDURE — 85610 PROTHROMBIN TIME: CPT

## 2022-04-17 PROCEDURE — 6370000000 HC RX 637 (ALT 250 FOR IP): Performed by: HOSPITALIST

## 2022-04-17 PROCEDURE — 85730 THROMBOPLASTIN TIME PARTIAL: CPT

## 2022-04-17 PROCEDURE — 99285 EMERGENCY DEPT VISIT HI MDM: CPT

## 2022-04-17 PROCEDURE — 36415 COLL VENOUS BLD VENIPUNCTURE: CPT

## 2022-04-17 PROCEDURE — 71045 X-RAY EXAM CHEST 1 VIEW: CPT

## 2022-04-17 PROCEDURE — 84484 ASSAY OF TROPONIN QUANT: CPT

## 2022-04-17 PROCEDURE — 70450 CT HEAD/BRAIN W/O DYE: CPT

## 2022-04-17 PROCEDURE — 96374 THER/PROPH/DIAG INJ IV PUSH: CPT

## 2022-04-17 PROCEDURE — 85025 COMPLETE CBC W/AUTO DIFF WBC: CPT

## 2022-04-17 PROCEDURE — 93005 ELECTROCARDIOGRAM TRACING: CPT | Performed by: NURSE PRACTITIONER

## 2022-04-17 PROCEDURE — 2580000003 HC RX 258: Performed by: HOSPITALIST

## 2022-04-17 PROCEDURE — 6360000004 HC RX CONTRAST MEDICATION: Performed by: EMERGENCY MEDICINE

## 2022-04-17 PROCEDURE — 83880 ASSAY OF NATRIURETIC PEPTIDE: CPT

## 2022-04-17 PROCEDURE — 81003 URINALYSIS AUTO W/O SCOPE: CPT

## 2022-04-17 PROCEDURE — 6360000002 HC RX W HCPCS: Performed by: HOSPITALIST

## 2022-04-17 RX ORDER — LABETALOL HYDROCHLORIDE 5 MG/ML
10 INJECTION, SOLUTION INTRAVENOUS EVERY 10 MIN PRN
Status: DISCONTINUED | OUTPATIENT
Start: 2022-04-17 | End: 2022-04-18 | Stop reason: HOSPADM

## 2022-04-17 RX ORDER — ACETAMINOPHEN 325 MG/1
650 TABLET ORAL EVERY 4 HOURS PRN
Status: DISCONTINUED | OUTPATIENT
Start: 2022-04-17 | End: 2022-04-18 | Stop reason: HOSPADM

## 2022-04-17 RX ORDER — SODIUM CHLORIDE 0.9 % (FLUSH) 0.9 %
10 SYRINGE (ML) INJECTION PRN
Status: DISCONTINUED | OUTPATIENT
Start: 2022-04-17 | End: 2022-04-18 | Stop reason: HOSPADM

## 2022-04-17 RX ORDER — NICOTINE POLACRILEX 4 MG
15 LOZENGE BUCCAL PRN
Status: DISCONTINUED | OUTPATIENT
Start: 2022-04-17 | End: 2022-04-18 | Stop reason: RX

## 2022-04-17 RX ORDER — PROMETHAZINE HYDROCHLORIDE 25 MG/1
12.5 TABLET ORAL EVERY 6 HOURS PRN
Status: DISCONTINUED | OUTPATIENT
Start: 2022-04-17 | End: 2022-04-18 | Stop reason: HOSPADM

## 2022-04-17 RX ORDER — ONDANSETRON 2 MG/ML
4 INJECTION INTRAMUSCULAR; INTRAVENOUS EVERY 6 HOURS PRN
Status: DISCONTINUED | OUTPATIENT
Start: 2022-04-17 | End: 2022-04-18 | Stop reason: HOSPADM

## 2022-04-17 RX ORDER — ATORVASTATIN CALCIUM 10 MG/1
20 TABLET, FILM COATED ORAL NIGHTLY
Status: DISCONTINUED | OUTPATIENT
Start: 2022-04-17 | End: 2022-04-18

## 2022-04-17 RX ORDER — INSULIN GLARGINE 100 [IU]/ML
15 INJECTION, SOLUTION SUBCUTANEOUS NIGHTLY
Status: DISCONTINUED | OUTPATIENT
Start: 2022-04-17 | End: 2022-04-18 | Stop reason: HOSPADM

## 2022-04-17 RX ORDER — ASPIRIN 81 MG/1
81 TABLET ORAL DAILY
Status: DISCONTINUED | OUTPATIENT
Start: 2022-04-18 | End: 2022-04-18 | Stop reason: HOSPADM

## 2022-04-17 RX ORDER — ACETAMINOPHEN 650 MG/1
650 SUPPOSITORY RECTAL EVERY 4 HOURS PRN
Status: DISCONTINUED | OUTPATIENT
Start: 2022-04-17 | End: 2022-04-18 | Stop reason: HOSPADM

## 2022-04-17 RX ORDER — ASPIRIN 81 MG/1
81 TABLET, CHEWABLE ORAL ONCE
Status: DISCONTINUED | OUTPATIENT
Start: 2022-04-17 | End: 2022-04-18 | Stop reason: HOSPADM

## 2022-04-17 RX ORDER — DEXTROSE MONOHYDRATE 50 MG/ML
100 INJECTION, SOLUTION INTRAVENOUS PRN
Status: DISCONTINUED | OUTPATIENT
Start: 2022-04-17 | End: 2022-04-18 | Stop reason: HOSPADM

## 2022-04-17 RX ORDER — SODIUM CHLORIDE 0.9 % (FLUSH) 0.9 %
10 SYRINGE (ML) INJECTION EVERY 12 HOURS SCHEDULED
Status: DISCONTINUED | OUTPATIENT
Start: 2022-04-17 | End: 2022-04-18 | Stop reason: HOSPADM

## 2022-04-17 RX ORDER — SENNA PLUS 8.6 MG/1
1 TABLET ORAL DAILY PRN
Status: DISCONTINUED | OUTPATIENT
Start: 2022-04-17 | End: 2022-04-18 | Stop reason: HOSPADM

## 2022-04-17 RX ORDER — PANTOPRAZOLE SODIUM 40 MG/1
40 TABLET, DELAYED RELEASE ORAL
Status: DISCONTINUED | OUTPATIENT
Start: 2022-04-18 | End: 2022-04-18 | Stop reason: HOSPADM

## 2022-04-17 RX ORDER — BISACODYL 10 MG
10 SUPPOSITORY, RECTAL RECTAL DAILY PRN
Status: DISCONTINUED | OUTPATIENT
Start: 2022-04-17 | End: 2022-04-18 | Stop reason: HOSPADM

## 2022-04-17 RX ORDER — METOPROLOL TARTRATE 50 MG/1
50 TABLET, FILM COATED ORAL 2 TIMES DAILY
Status: DISCONTINUED | OUTPATIENT
Start: 2022-04-18 | End: 2022-04-18 | Stop reason: HOSPADM

## 2022-04-17 RX ORDER — DEXTROSE MONOHYDRATE 25 G/50ML
12.5 INJECTION, SOLUTION INTRAVENOUS PRN
Status: DISCONTINUED | OUTPATIENT
Start: 2022-04-17 | End: 2022-04-17 | Stop reason: CLARIF

## 2022-04-17 RX ORDER — SODIUM CHLORIDE 9 MG/ML
INJECTION, SOLUTION INTRAVENOUS PRN
Status: DISCONTINUED | OUTPATIENT
Start: 2022-04-17 | End: 2022-04-18 | Stop reason: HOSPADM

## 2022-04-17 RX ORDER — FERROUS SULFATE 325(65) MG
325 TABLET ORAL
Status: DISCONTINUED | OUTPATIENT
Start: 2022-04-18 | End: 2022-04-18 | Stop reason: HOSPADM

## 2022-04-17 RX ORDER — MONTELUKAST SODIUM 10 MG/1
10 TABLET ORAL NIGHTLY
Status: DISCONTINUED | OUTPATIENT
Start: 2022-04-17 | End: 2022-04-18 | Stop reason: HOSPADM

## 2022-04-17 RX ORDER — FOLIC ACID 1 MG/1
1 TABLET ORAL DAILY
Status: DISCONTINUED | OUTPATIENT
Start: 2022-04-17 | End: 2022-04-18 | Stop reason: HOSPADM

## 2022-04-17 RX ORDER — ASPIRIN 81 MG/1
81 TABLET, CHEWABLE ORAL ONCE
Status: COMPLETED | OUTPATIENT
Start: 2022-04-17 | End: 2022-04-17

## 2022-04-17 RX ORDER — FUROSEMIDE 10 MG/ML
20 INJECTION INTRAMUSCULAR; INTRAVENOUS ONCE
Status: COMPLETED | OUTPATIENT
Start: 2022-04-17 | End: 2022-04-17

## 2022-04-17 RX ORDER — DILTIAZEM HYDROCHLORIDE 120 MG/1
120 CAPSULE, COATED, EXTENDED RELEASE ORAL DAILY
Status: DISCONTINUED | OUTPATIENT
Start: 2022-04-18 | End: 2022-04-18 | Stop reason: HOSPADM

## 2022-04-17 RX ORDER — VENLAFAXINE HYDROCHLORIDE 37.5 MG/1
75 CAPSULE, EXTENDED RELEASE ORAL DAILY
Status: DISCONTINUED | OUTPATIENT
Start: 2022-04-18 | End: 2022-04-18 | Stop reason: HOSPADM

## 2022-04-17 RX ORDER — ASPIRIN 81 MG/1
324 TABLET, CHEWABLE ORAL ONCE
Status: DISCONTINUED | OUTPATIENT
Start: 2022-04-17 | End: 2022-04-17

## 2022-04-17 RX ADMIN — APIXABAN 5 MG: 5 TABLET, FILM COATED ORAL at 20:31

## 2022-04-17 RX ADMIN — Medication 10 ML: at 20:32

## 2022-04-17 RX ADMIN — INSULIN GLARGINE 15 UNITS: 100 INJECTION, SOLUTION SUBCUTANEOUS at 20:29

## 2022-04-17 RX ADMIN — MONTELUKAST SODIUM 10 MG: 10 TABLET ORAL at 20:31

## 2022-04-17 RX ADMIN — Medication 10 ML: at 21:56

## 2022-04-17 RX ADMIN — ASPIRIN 81 MG 81 MG: 81 TABLET ORAL at 16:46

## 2022-04-17 RX ADMIN — IOPAMIDOL 75 ML: 755 INJECTION, SOLUTION INTRAVENOUS at 14:27

## 2022-04-17 RX ADMIN — ATORVASTATIN CALCIUM 20 MG: 10 TABLET, FILM COATED ORAL at 20:31

## 2022-04-17 RX ADMIN — FUROSEMIDE 20 MG: 10 INJECTION, SOLUTION INTRAMUSCULAR; INTRAVENOUS at 21:55

## 2022-04-17 ASSESSMENT — PAIN SCALES - GENERAL
PAINLEVEL_OUTOF10: 0
PAINLEVEL_OUTOF10: 0

## 2022-04-17 NOTE — PROGRESS NOTES
4 Eyes Skin Assessment     The patient is being assess for   Admission    I agree that 2 RN's have performed a thorough Head to Toe Skin Assessment on the patient. ALL assessment sites listed below have been assessed. Areas assessed for pressure by both nurses:   [x]   Head, Face, and Ears   [x]   Shoulders, Back, and Chest, Abdomen  [x]   Arms, Elbows, and Hands   [x]   Coccyx, Sacrum, and Ischium  [x]   Legs, Feet, and Heels      BRUISING TO UPPER RIGHT THIGH. BLANCHABLE REDNESS TO ABDOMINAL FOLDS AND UNDER BREAST. Skin Assessed Under all Medical Devices by both nurses:  n/a              All Mepilex Borders were peeled back and area peeked at by both nurses:  No: N/A  Please list where Mepilex Borders are located:  ***             **SHARE this note so that the co-signing nurse is able to place an eSignature**    Co-signer eSignature: {Esignature:[id number]}    Does the Patient have Skin Breakdown related to pressure?   No     (Insert Photo here***)         Tony Prevention initiated:  No   Wound Care Orders initiated:  No      [facility] nurse consulted for Pressure Injury (Stage 3,4, Unstageable, DTI, NWPT, Complex wounds)and New or Established Ostomies:  No      Primary Nurse eSignature: Electronically signed by Ronak Gaspar RN on 4/17/22 at 5:29 PM EDT

## 2022-04-17 NOTE — ED PROVIDER NOTES
[address] [facility]  ED    CHIEF COMPLAINT  Numbness (pt states approx one hour ago (1300) had onset of right facial numbness and right arm weakness; unsteady gait)    HISTORY OF PRESENT ILLNESS  Loyce Carlos Merlin is a 73 y.o. female who presents to the ED complaining of right side facial numbness, slurred speech and unsteady gait that started at approximately 1 PM today while she was watching TV. Currently denies the right-sided facial numbness but does still feel like her speech is slurred and that her gait is off. Patient reports that she does see a neurologist for a history of multiple strokes in the past.  No changes to her vision. No chest pain or shortness of breath. No numbness or tingling into her extremities. Does report a history of asthma. Patient does have a history of paroxysmal atrial fib and is on Eliquis. The patient is currently rating their pain as 0/10. The patient arrived to the ED via private car. Nursing notes reviewed. Past Medical History:   Diagnosis Date    Arthritis     Asthma     Depression     Diabetes mellitus (HCC)     GERD (gastroesophageal reflux disease)     HCAP (healthcare-associated pneumonia)     Hypertension     Mycobacterium gordonae infection      5/7/18 + afb called from [address] pending  ( from [address] 3/29/18)    [facility]oxysmal atrial fibrillation ([facility] [address]     Sepsis ([facility] [address] [facility]     TIA (transient ischemic attack)     Unspecified cerebral artery occlusion with cerebral infarction      Past Surgical History:   Procedure Laterality Date    APPENDECTOMY      BREAST BIOPSY      CHOLECYSTECTOMY      COLONOSCOPY  01/19/2017    polyp    CYST INCISION AND DRAINAGE  1986    on head    HYSTERECTOMY      UPPER GASTROINTESTINAL ENDOSCOPY  01/19/2017    barretts ?      Family History   Problem Relation Age of Onset    Cancer Mother         breast ca     Social History     Socioeconomic History    Marital status:      Spouse name: Not on file    Number of children: Not on file    Years of education: Not on file    Highest education level: Not on file   Occupational History    Not on file   Tobacco Use    Smoking status: Never Smoker    Smokeless tobacco: Never Used   Vaping Use    Vaping Use: Never used   Substance and Sexual Activity    Alcohol use: No     Alcohol/week: 0.0 standard drinks    Drug use: No    Sexual activity: Never   Other Topics Concern    Not on file   Social History Narrative    Not on file     Social Determinants of Health     Financial Resource Strain:     Difficulty of Paying Living Expenses: Not on file   Food Insecurity:     Worried About Running Out of Food in the Last Year: Not on file    Josh of Food in the Last Year: Not on file   Transportation Needs:     Lack of Transportation (Medical): Not on file    Lack of Transportation (Non-Medical):  Not on file   Physical Activity:     Days of Exercise per Week: Not on file    Minutes of Exercise per Session: Not on file   Stress:     Feeling of Stress : Not on file   Social Connections:     Frequency of Communication with Friends and Family: Not on file    Frequency of Social Gatherings with Friends and Family: Not on file    Attends Yarsani Services: Not on file    Active Member of [address] [place] [address] [employer] or Organizations: Not on file    Attends Club or Organization Meetings: Not on file    Marital Status: Not on file   Intimate Partner Violence:     Fear of Current or Ex-Partner: Not on file    Emotionally Abused: Not on file    Physically Abused: Not on file    Sexually Abused: Not on file   Housing Stability:     Unable to Pay for Housing in the Last Year: Not on file    Number of [place] in the Last Year: Not on file    Unstable Housing in the Last Year: Not on file     Current Facility-Administered Medications   Medication Dose Route Frequency Provider Last Rate Last Admin    aspirin chewable tablet 324 mg  324 mg Oral Once [employer], APRN - CNP         Current Outpatient Medications   Medication Sig Dispense Refill    metoprolol tartrate (LOPRESSOR) 50 MG tablet TAKE 1 TABLET BY MOUTH TWO TIMES A DAY  180 tablet 3    dilTIAZem (CARDIZEM CD) 120 MG extended release capsule Take 1 capsule by mouth daily 30 capsule 11    aspirin EC 81 MG EC tablet Take 1 tablet by mouth daily 90 tablet 1    dextromethorphan-guaiFENesin (MUCINEX DM)  MG per extended release tablet Take 1 tablet by mouth every 12 hours as needed      apixaban (ELIQUIS) 5 MG TABS tablet Take 1 tablet by mouth 2 times daily 60 tablet 0    omeprazole (PRILOSEC) 40 MG delayed release capsule Take 40 mg by mouth daily      ferrous sulfate 325 (65 Fe) MG tablet Take 325 mg by mouth daily (with breakfast)      folic acid (FOLVITE) 1 MG tablet Take 1 mg by mouth daily      montelukast (SINGULAIR) 10 MG tablet Take 10 mg by mouth nightly      albuterol (PROVENTIL) (2.5 MG/3ML) 0.083% nebulizer solution Take 3 mLs by nebulization every 4 hours as needed for Wheezing or Shortness of Breath 120 each 3    metFORMIN ER (GLUCOPHAGE-XR) 500 MG XR tablet Take 1,000 mg by mouth Daily with supper       atorvastatin (LIPITOR) 20 MG tablet TAKE 1 TABLET BY MOUTH nightly AT BEDTIME      venlafaxine (EFFEXOR-XR) 75 MG XR capsule Take 75 mg by mouth daily.  albuterol (PROVENTIL HFA;VENTOLIN HFA) 108 (90 BASE) MCG/ACT inhaler Inhale 2 puffs into the lungs every 6 hours as needed.          Allergies   Allergen Reactions    Acyclovir Anaphylaxis     Redness face & back, swollen tounge, eyes and throat     Ace Inhibitors      Possible anaphylaxis    Amoxicillin      hives    Cephalosporins      anaphylaxis  Lip swelling    Oxycodone-Acetaminophen      Respiratory distress    Penicillins     Percocet [Oxycodone-Acetaminophen]        REVIEW OF SYSTEMS  10 systems reviewed, pertinent positives per HPI otherwise noted to be negative    PHYSICAL EXAM  BP (!) 114/48   Pulse 67   Temp 98.1 °F (36.7 °C)   Resp 14   Ht 5' (1.524 m)   Wt 210 lb (95.3 kg)   LMP  (LMP Unknown)   SpO2 98%   BMI 41.01 kg/m²   GENERAL APPEARANCE: Well developed, well nourished. Awake and alert. Cooperative. Observed resting in bed. No acute distress. HEAD: Normocephalic. Atraumatic. No facial asymmetry upon exam. Sensation is intact to light touch to the face. Smile is symmetrical, tongue is midline. Uvula is midline and elevates appropriately. Posterior oropharynx is without erythema, edema, or exudate. EYES: Sclera is non-icteric. Conjunctiva normal. EOMI, PERRL. No nystagmus. ENT: External ears are normal. Mucous membranes are moist.   NECK: Supple. Normal ROM. Trachea mid-line. Cardiac: Regular rate and rhythm. Capillary refill is brisk in bilateral upper extremities. S1/S2 is normal. There are no murmurs, rubs, or gallops to auscultation. LUNGS: Breathing is unlabored. Speaking comfortably in full sentences. Equal and symmetric chest rise. Breath sounds are clear throughout. There is no wheezing, rales, or rhonchi to auscultation. Abdomen: Non-distended. Non-tender to palpation. Bowel sounds are positive in all 4 quadrants. There is no hepatosplenomegaly to palpation. Musculoskeletal: Normal ROM. No gross deformities or trauma noted. Moving all extremities equally and appropriately. NEUROLOGICAL: Alert and oriented x3. Strength is normal. No focal motor or sensory deficits. Gait observed and without dragging either leg, no paralysis but is shuffling in nature. Strength is 5/5, equal and symmetric. Romberg is negative. No pronator drift. Finger to nose is intact bilaterally. Heel to shin is intact bilaterally. SKIN: Warm and dry. Skin is with good color. Skin is intact. Psychiatric: Mood and affect appropriate. Speech is clear and articulate.     LABS  Results for orders placed or performed during the hospital encounter of 04/17/22   CBC with Auto Differential   Result Value Ref Range    WBC 8.9 4.0 - 11.0 K/uL    RBC 3.77 (L) 4.00 - 5.20 M/uL    Hemoglobin 11.3 (L) 12.0 - 16.0 g/dL    Hematocrit 35.8 (L) 36.0 - 48.0 %    MCV 94.8 80.0 - 100.0 fL    MCH 29.9 26.0 - 34.0 pg    MCHC 31.5 31.0 - 36.0 g/dL    RDW 15.0 12.4 - 15.4 %    Platelets 978 (L) 897 - 450 K/uL    MPV 11.7 (H) 5.0 - 10.5 fL    Neutrophils % 60.5 %    Lymphocytes % 27.5 %    Monocytes % 7.0 %    Eosinophils % 4.0 %    Basophils % 1.0 %    Neutrophils Absolute 5.4 1.7 - 7.7 K/uL    Lymphocytes Absolute 2.4 1.0 - 5.1 K/uL    Monocytes Absolute 0.6 0.0 - 1.3 K/uL    Eosinophils Absolute 0.4 0.0 - 0.6 K/uL    Basophils Absolute 0.1 0.0 - 0.2 K/uL   Protime-INR   Result Value Ref Range    Protime 18.3 (H) 9.9 - 12.7 sec    INR 1.59 (H) 0.88 - 1.12   Comprehensive Metabolic Panel w/ Reflex to MG   Result Value Ref Range    Sodium 137 136 - 145 mmol/L    Potassium reflex Magnesium 4.4 3.5 - 5.1 mmol/L    Chloride 100 99 - 110 mmol/L    CO2 24 21 - 32 mmol/L    Anion Gap 13 3 - 16    Glucose 184 (H) 70 - 99 mg/dL    BUN 12 7 - 20 mg/dL    CREATININE 0.9 0.6 - 1.2 mg/dL    GFR Non-African American >60 >60    GFR African American >60 >60    Calcium 9.1 8.3 - 10.6 mg/dL    Total Protein 8.8 (H) 6.4 - 8.2 g/dL    Albumin 4.4 3.4 - 5.0 g/dL    Albumin/Globulin Ratio 1.0 (L) 1.1 - 2.2    Total Bilirubin 0.5 0.0 - 1.0 mg/dL    Alkaline Phosphatase 176 (H) 40 - 129 U/L    ALT 19 10 - 40 U/L    AST 43 (H) 15 - 37 U/L   Troponin   Result Value Ref Range    Troponin <0.01 <0.01 ng/mL   POCT glucose   Result Value Ref Range    Glucose 190 mg/dL    QC OK?  yes    POCT Glucose   Result Value Ref Range    POC Glucose 190 (H) 70 - 99 mg/dl    Performed on ACCU-CHEK    EKG 12 Lead   Result Value Ref Range    Ventricular Rate 66 BPM    Atrial Rate 66 BPM    P-R Interval 194 ms    QRS Duration 84 ms    Q-T Interval 416 ms    QTc Calculation (Bazett) 436 ms    R Axis 17 degrees    T Axis 54 degrees    Diagnosis       Normal sinus rhythmLow voltage QRSBorderline ECGWhen compared with ECG of 26-JUL-2019 07:09,Premature ventricular complexes are no longer PresentPremature atrial complexes are no longer PresentPR interval has decreased     PROCEDURES  None    RADIOLOGY  CT Head WO Contrast    Result Date: 4/17/2022  EXAMINATION: CT OF THE HEAD WITHOUT CONTRAST  4/17/2022 2:14 pm TECHNIQUE: CT of the head was performed without the administration of intravenous contrast. Dose modulation, iterative reconstruction, and/or weight based adjustment of the mA/kV was utilized to reduce the radiation dose to as low as reasonably achievable. COMPARISON: 04/12/2019 HISTORY: ORDERING SYSTEM PROVIDED HISTORY: facial numbness, slurred speech, off balance TECHNOLOGIST PROVIDED HISTORY: Reason for exam:->facial numbness, slurred speech, off balance Has a \"code stroke\" or \"stroke alert\" been called? ->Yes Decision Support Exception - unselect if not a suspected or confirmed emergency medical condition->Emergency Medical Condition (MA) Reason for Exam: FINDINGS: BRAIN/VENTRICLES: There is an old left cerebellar infarct. There are large infarcts involving the right frontotemporal lobe, right parietal lobe, and left parietal lobes. There is a chronic right frontal extra-axial collection, containing some calcification. The patient has had prior right frontal craniotomy. There is no acute intracranial hemorrhage, mass effect or midline shift. No abnormal extra-axial fluid collection. The gray-white differentiation is maintained without evidence of an acute infarct. There is no evidence of hydrocephalus. ORBITS: The visualized portion of the orbits demonstrate no acute abnormality. SINUSES: The visualized paranasal sinuses and mastoid air cells demonstrate no acute abnormality. SOFT TISSUES/SKULL:  No acute abnormality of the visualized skull or soft tissues. No acute intracranial abnormality.   Old infarcts noted in the left cerebellum, right frontotemporal lobes, right parietal lobe, and left on blood thinners, h/o large bleed FINDINGS: CTA NECK: AORTIC ARCH/ARCH VESSELS: No dissection or arterial injury. No significant stenosis of the brachiocephalic or subclavian arteries. CAROTID ARTERIES: No dissection, arterial injury. There is mild atherosclerotic calcification of bilateral carotid bulbs extending into the origin of internal carotid arteries not resulting in hemodynamically significant stenosis. Jose David Hilt VERTEBRAL ARTERIES: No dissection, arterial injury, or significant stenosis. SOFT TISSUES: The lung apices are clear. No cervical or superior mediastinal lymphadenopathy. The larynx and pharynx are unremarkable. No acute abnormality of the salivary and thyroid glands. BONES: No acute osseous abnormality. CTA HEAD: ANTERIOR CIRCULATION: The distal cortical branches of bilateral MCA cannot be well visualized. The finding may be sequela to old infarctions. No significant stenosis of the intracranial internal carotid, anterior cerebral, or middle cerebral arteries. No aneurysm. POSTERIOR CIRCULATION: No significant stenosis of the vertebral, basilar, or posterior cerebral arteries. No aneurysm. OTHER: No dural venous sinus thrombosis on this non-dedicated study. BRAIN: For detailed description of extensive areas of encephalomalacia of supra and infratentorial structures please refer to the same-day head CT report. .     Distal cortical branches of bilateral MCA cannot be well visualized. The finding is likely sequela to old infarction and appears unchanged since prior CTA of 2019. Otherwise unremarkable CTA of the head and neck. No hemodynamically significant stenosis. No aneurysm. . For detailed description of extensive areas of encephalomalacia of supra and infratentorial structures please refer to the same-day head CT report. Jose David Hilt RECOMMENDATIONS: Unavailable         PROCEDURES  None    ED COURSE/MDM  Patient seen and evaluated. Old records reviewed.  Diagnostic testing reviewed and results discussed. I have seen and evaluated this patient with supervising physician: Edis Radford MD. We thoroughly discussed the history, physical exam, diagnostic testing, emergency department course, plan and disposition. Parker Williamson presented to the ED today with above noted complaints. Arrival vital signs: Afebrile and hemodynamically stable. Well saturated on room air. Physical exam performed at 1413: Initial NIHSS is 0. Patient reports still continuing to feel like her speech is slurred and she is off balance however my initial examination is without deficit. Patient's gait was observed and somewhat shuffling but she is not dragging either leg and has no signs of paralysis. I did call code stroke after consulting with my attending physician, I spoke with stroke team, due to low level of deficits we would not perform an intervention at this time. They are aware and will monitor her imaging as it becomes available. Blood work: No evidence of systemic infection. Stable anemia. PT/INR elevated 18.3/1. 59. Patient is on Eliquis. No evidence of electrolyte abnormality. No evidence of acute kidney injury or transaminitis. Troponin is negative. Point-of-care glucose 190 on arrival to the ER. EKG: Shows normal sinus rhythm and no acute findings. Imaging: Chest x-ray shows possible mild degree of vascular congestion with minimal basilar lung scarring or atelectasis but otherwise no acute pulmonary infiltrate or significant change noted. CT head shows no acute intracranial abnormality. Old infarcts noted in the left cerebellum, right frontal temporal lobes, right parietal lobe and left parietal lobe. Small chronic right frontal extra-axial collection containing some calcifications. CTA of the head and neck shows distal cortical branches of bilateral MCA cannot be well visualized. This is likely sequela due to old infarct and appears unchanged since prior CT of 2019.   Otherwise unremarkable CTA of the head and neck. No hemodynamically significant stenosis. No aneurysm. Consults: Stroke team as per above. Medications given in the ED:   Medications   aspirin chewable tablet 324 mg (has no administration in time range)   iopamidol (ISOVUE-370) 76 % injection 75 mL (75 mLs IntraVENous Given 4/17/22 9883)      I do feel patient will require admission for further evaluation and management of her strokelike symptoms. She has no acute findings and diagnostic studies of stroke however she is reporting that her symptoms are still persisting. Patient will be given an aspirin here in the ER and admitted for further management. I spoke with Dr. Chel Jarvis. We thoroughly discussed the history, physical exam, laboratory and imaging studies, as well as, emergency department course. Based upon that discussion, we've decided to admit Luis Belle for further observation and evaluation of Shiva Oviedo's CVA-like symptoms. As I have deemed necessary from their history, physical and studies, I have considered and evaluated Luis Belle for the following diagnoses:  DIABETES, INTRACRANIAL HEMORRHAGE, MENINGITIS, SUBARACHNOID HEMORRHAGE, SUBDURAL HEMATOMA, & STROKE. The total critical care time I independently spent while evaluating and treating this patient was 38 minutes. This excludes time spent doing separately billable procedures. This includes time at the bedside, data interpretation, medication management, obtaining critical history from collateral sources if the patient is unable to provide it directly, and physician consultation. Specifics of interventions taken and potentially life-threatening diagnostic considerations are listed above in the medical decision making.       Clinical Impression  Final diagnoses:   Stroke-like symptoms   Anticoagulated     DISPOSITION  Admit    Comment: Please note this report has been produced using speech recognition software and may contain errors related to that system including errors in grammar, punctuation, and spelling, as well as words and phrases that may be inappropriate. If there are any questions or concerns please feel free to contact the dictating provider for clarification.         JARROD Servin - NATANAEL  04/17/22 9939

## 2022-04-17 NOTE — ED PROVIDER NOTES
I independently performed a history and physical on [address] [place] [address]. All diagnostic, treatment, and disposition decisions were made by myself in conjunction with the advanced practice provider. I have participated in the medical decision making and directed the treatment plan and disposition of the patient. For further details of [facility] emergency department encounter, please see the advanced practice provider's documentation. CHIEF COMPLAINT  Chief Complaint   Patient presents with    Numbness     pt states approx one hour ago (1300) had onset of right facial numbness and right arm weakness; unsteady gait       Briefly, [address] [place] [address] is a 73 y.o. female  who presents to the ED complaining of multiple medical comorbidities including prior stroke here today concern for the same. FOCUSED PHYSICAL EXAMINATION  BP (!) 137/58   Pulse 70   Temp 98.1 °F (36.7 °C)   Resp 19   Ht 5' (1.524 m)   Wt 210 lb (95.3 kg)   LMP  (LMP Unknown)   SpO2 98%   BMI 41.01 kg/m²      Focused physical examination:  General appearance:  Cooperative. No acute distress. Skin:  Warm. Dry. Eye:  Extraocular movements intact. Pupils are equally round and reactive to light and accommodation. Extraocular motions are intact. CN II-XII intact. Ears, nose, mouth and throat:  Oral mucosa moist,  Neck:  Trachea midline. Heart:  Regular rate and rhythm  Perfusion:  intact  Respiratory:  Lungs clear to auscultation bilaterally. Respirations nonlabored. Abdominal:   Non distended. Nontender  Neurological:  Alert and oriented x 3. Moves all extremities spontaneously. Intact sensation throughout. Intact finger-to-nose bilaterally. No drift in the upper or lower extremities. Gait not tested.   2+ bilateral patellar reflexes  Musculoskeletal:   Normal ROM, no deformities          Psychiatric:  Normal mood    NIH Stroke Scale     Interval: Baseline  Time: 2:49 PM  Person Administering Scale: Irwin Abreu MD 1a  Level of consciousness: 0=alert; keenly responsive   1b. LOC questions:  0=Performs both tasks correctly   1c. LOC commands: 0=Performs both tasks correctly   2. Best Gaze: 0=normal   3. Visual: 0=No visual loss   4. Facial Palsy: 0=Normal symmetric movement   5a. Motor left arm: 0=No drift, limb holds 90 (or 45) degrees for full 10 seconds   5b. Motor right arm: 0=No drift, limb holds 90 (or 45) degrees for full 10 seconds   6a. motor left le=No drift, limb holds 90 (or 45) degrees for full 10 seconds   6b  Motor right le=No drift, limb holds 90 (or 45) degrees for full 10 seconds   7. Limb Ataxia: 0=Absent   8. Sensory: 0=Normal; no sensory loss   9. Best Language:  0=No aphasia, normal   10. Dysarthria: 0=Normal   11. Extinction and Inattention: 0=No abnormality         Total:  0           EKG: Sinus rhythm rate of 66 bpm.  Low voltage QRS. No ST elevation. Q wave in lead III. When compared to prior from 2019 overall no change although previously seen PVCs are no longer present. MDM: Patient presents emergency department today complaining of some perioral numbness as well as speech changes. She still feels like her speech is off however I appreciate no obvious dysarthria or aphasia here. Family at bedside also does not appreciate any obvious changes. She states she was having some numbness and tingling on the right side of her face earlier but notes that is mostly resolved at present. Stroke scale 0. Code stroke was called and she was emergently evaluated but at this time given the stroke scale of 0 with improving symptoms she is not a candidate for TPA. Head CT was negative. Laboratory studies pending but plan to admit to the hospital for TIA/CVA work-up    CRITICAL CARE TIME   Total Critical Care time was 30 minutes, excluding separately reportable procedures.   There was a high probability of clinically significant/life threatening deterioration in the patient's condition which required my urgent intervention. This time was spent reviewing the patient chart, interpreting diagnostic/laboratory data, emergent evaluation for possible acute ischemic stroke      During the patient's ED course, the patient was given:  Medications   iopamidol (ISOVUE-370) 76 % injection 75 mL (75 mLs IntraVENous Given 4/17/22 0371)     This chart was created using Dragon dictation software. Efforts were made by me to ensure accuracy, however some errors may be present due to limitations of this technology.             Ho Post MD  04/17/22 2584

## 2022-04-17 NOTE — ED NOTES
Back from CT scan via stretcher with this RN. Pt's  to bedside. Pt tolerated CT without any issues.      Juan Morgan RN  04/17/22 8340

## 2022-04-17 NOTE — H&P
rhinorrhea, and sore throat. Respiratory: Negative for shortness of breath, wheezing, and cough  Cardiovascular: Negative for chest pain, palpitations, peripheral edema, orthopnea or PND  Gastrointestinal: Negative for N/V/D and abdominal pain; no hematemesis, hematochezia, or melena; no anorexia  Genitourinary: Positive urinary frequency and incontinence; negative for dysuria  Musculoskeletal: Negative for myalgias and arthalgias; able to ambulate without difficulty  Neurologic: Positive acute facial paresthesia, dysarthria, RUE weakness, and gait instability; negative for LOC, seizure activity  Skin: Negative for itching,rash, decubitus  Psychiatric: Negative for depression,anxiety, and agitation; no hallucinations; denies SI/HI  Endocrine: Negative for polyuria/polydipsia/polyphagia; no heat/cold intolerance    Past Medical History:   has a past medical history of Arthritis, Asthma, Depression, Diabetes mellitus ([facility] [address] GERD (gastroesophageal reflux disease), HCAP (healthcare-associated pneumonia), Hypertension, Mycobacterium gordonae infection, Paroxysmal atrial fibrillation ([facility] [address] Sepsis ([facility] [address] TIA (transient ischemic attack), and Unspecified cerebral artery occlusion with cerebral infarction. Past Surgical History:   has a past surgical history that includes Hysterectomy; Cholecystectomy; Appendectomy; cyst incision and drainage (1986); Colonoscopy (01/19/2017); Upper gastrointestinal endoscopy (01/19/2017); and Breast biopsy. Medications:  No current facility-administered medications on file prior to encounter.      Current Outpatient Medications on File Prior to Encounter   Medication Sig Dispense Refill    metoprolol tartrate (LOPRESSOR) 50 MG tablet TAKE 1 TABLET BY MOUTH TWO TIMES A DAY  180 tablet 3    dilTIAZem (CARDIZEM CD) 120 MG extended release capsule Take 1 capsule by mouth daily 30 capsule 11    aspirin EC 81 MG EC tablet Take 1 tablet by mouth daily 90 tablet 1    dextromethorphan-guaiFENesin (MUCINEX DM)  MG per extended release tablet Take 1 tablet by mouth every 12 hours as needed      apixaban (ELIQUIS) 5 MG TABS tablet Take 1 tablet by mouth 2 times daily 60 tablet 0    omeprazole (PRILOSEC) 40 MG delayed release capsule Take 40 mg by mouth daily      ferrous sulfate 325 (65 Fe) MG tablet Take 325 mg by mouth daily (with breakfast)      folic acid (FOLVITE) 1 MG tablet Take 1 mg by mouth daily      montelukast (SINGULAIR) 10 MG tablet Take 10 mg by mouth nightly      albuterol (PROVENTIL) (2.5 MG/3ML) 0.083% nebulizer solution Take 3 mLs by nebulization every 4 hours as needed for Wheezing or Shortness of Breath 120 each 3    metFORMIN ER (GLUCOPHAGE-XR) 500 MG XR tablet Take 1,000 mg by mouth Daily with supper       atorvastatin (LIPITOR) 20 MG tablet TAKE 1 TABLET BY MOUTH nightly AT BEDTIME      venlafaxine (EFFEXOR-XR) 75 MG XR capsule Take 75 mg by mouth daily.  albuterol (PROVENTIL HFA;VENTOLIN HFA) 108 (90 BASE) MCG/ACT inhaler Inhale 2 puffs into the lungs every 6 hours as needed. Allergies: Allergies   Allergen Reactions    Acyclovir Anaphylaxis     Redness face & back, swollen tounge, eyes and throat     Ace Inhibitors      Possible anaphylaxis    Amoxicillin      hives    Cephalosporins      anaphylaxis  Lip swelling    Oxycodone-Acetaminophen      Respiratory distress    Penicillins     Percocet [Oxycodone-Acetaminophen]         Social History:   reports that she has never smoked. She has never used smokeless tobacco. She reports that she does not drink alcohol and does not use drugs. Family History:  family history includes Cancer in her mother.      Physical Exam:  /60   Pulse 66   Temp 98 °F (36.7 °C) (Oral)   Resp 17   Ht 5' (1.524 m)   Wt 210 lb (95.3 kg)   LMP  (LMP Unknown)   SpO2 97%   BMI 41.01 kg/m²     General appearance: Pleasant obese female resting comfortably in bed without complaints  Eyes: Sclera clear without conjunctival injection; PERRLA; EOMI  ENT: Mucous membranes moist without thrush; poor dentition with multiple dental caries  Neck: Supple without meningismus; no goiter; no carotid bruit bilaterally  Cardiovascular: Regular rhythm without ectopy; normal S1-S2 with no murmurs; no peripheral edema; no JVD  Respiratory: No tachypnea; CTAB with adequate air exchange, no wheeze, rhonchi or rales; I:E intact  Gastrointestinal: Abdomen obese, non-tender, not distended; bowel sounds normal; no masses/organomegaly appreciated  Musculoskeletal: FROM spine and extremities x4; no gross deformity  Neurology: A&O x3; cranial nerves 2-12 grossly intact; motor 5/5  BUE/BLE; no seizure activity; finger-to-nose/heel-to-shin intact; no pronator drift  Psychiatry: Well-groomed with good eye contact; appropriate affect; no visual/auditory hallucination  Skin: Warm, dry, normal turgor, no rash  PV: 2/4 radial and dorsalis pedis bilaterally; brisk capillary refill    Labs:  CBC:   Lab Results   Component Value Date    WBC 8.9 04/17/2022    RBC 3.77 04/17/2022    HGB 11.3 04/17/2022    HCT 35.8 04/17/2022    MCV 94.8 04/17/2022    MCH 29.9 04/17/2022    MCHC 31.5 04/17/2022    RDW 15.0 04/17/2022     04/17/2022    MPV 11.7 04/17/2022     BMP:    Lab Results   Component Value Date     04/17/2022    K 4.4 04/17/2022     04/17/2022    CO2 24 04/17/2022    BUN 12 04/17/2022    CREATININE 0.9 04/17/2022    CALCIUM 9.1 04/17/2022    GFRAA >60 04/17/2022    GFRAA 54 12/17/2012    LABGLOM >60 04/17/2022    GLUCOSE 190 04/17/2022    GLUCOSE 184 04/17/2022     XR CHEST PORTABLE   Final Result      Probable mild degree of vascular congestion with minimal basilar lung   scarring or atelectasis but otherwise no acute pulmonary infiltrate or   significant change noted. CTA HEAD NECK W CONTRAST   Final Result   Distal cortical branches of bilateral MCA cannot be well visualized.   The finding is likely sequela to old infarction and appears unchanged since prior   CTA of 2019. Otherwise unremarkable CTA of the head and neck. No hemodynamically   significant stenosis. No aneurysm. .      For detailed description of extensive areas of encephalomalacia of supra and   infratentorial structures please refer to the same-day head CT report. Warner Jeffers RECOMMENDATIONS:   Unavailable         CT Head WO Contrast   Final Result   No acute intracranial abnormality. Old infarcts noted in the left   cerebellum, right frontotemporal lobes, right parietal lobe, and left   parietal lobe. Small chronic right frontal extra-axial collection containing   some calcifications. MRI brain without contrast    (Results Pending)         EKG:    Ventricular Rate 66 P BPM QTc Calculation (Bazett) 436 P ms   Atrial Rate 66 P BPM R Axis 17 P degrees   P-R Interval 194 P ms T [place] 54 P degrees   QRS Duration 84 P ms Diagnosis Normal sinus rhythmLow voltage QRSBorderline        I visualized CXR images and EKG strips personally and agree with documented interpretation    Discussed case  with ED provider    Problem List:  Principal Problem:    Acute focal neurological deficit  Active Problems: Morbid obesity with BMI of 40.0-44.9, adult (HCC)    Hx of L-ICA CVA    Essential hypertension    Paroxysmal atrial fibrillation (HCC)    JARRETT on CPAP    DM (diabetes mellitus), type 2, uncontrolled with complications (HCC)    CAD in native artery  Resolved Problems:    * No resolved hospital problems.  *        Consults:  IP CONSULT TO PHARMACY  PHARMACY TO CHANGE BASE FLUIDS  IP CONSULT TO HOSPITALIST  IP CONSULT TO NEUROLOGY      Assessment/Plan:     Acute focal neuro deficit  Patient with previous history of multiple CVAs (embolic and hemorrhagic)  Admit to telemetry under CVA pathway with Q4H neuro checks scheduled overnight  Aspiration precautions and fall protocol in place  Initiate EC ASA 81 mg daily antiplatelet agent as well as high-dose statin therapy; FLP pending  PRN Labetalol scheduled for extreme BP elevation; allow permissive HTN initially to ensure watershed blood flow remains intact  ECHO scheduled for a.m. to assess for intramural thrombus/emboli potential  MRI of the head with and without contrast scheduled for a.m. PT/OT/SLP consultation placed  NEURO consult placed for further recommendations    PAF/HTN with mild pulmonary congestion  Admit to medical floor for continuous telemetry and pulse oximetry monitoring  Patient with elevated chads 2 score therefore on chronic anticoagulation with Eliquis five-point milligrams twice daily   IV Lasix 20 mg x 1 administered overnight  Echo scheduled for the a.m. to further assess cardiac structure and function    Uncontrolled type II DM with hyperglycemia  A1c ordered with results pending at time of dictation  Home Metformin dosage placed on temporary hold  Weight-based basal insulin initiated QHS  Every 4 hour POC glucose checks while n.p.o., with medium dose Humalog SSI   Carbohydrate restriction to be placed on diet, once resumed    JARRETT on CPAP  Continuous pulse oximetry monitoring initiated with PRN supplemental O2   Continue home maintenance MDIs/nebulizer treatment including Singulair and albuterol   Encourage aggressive pulmonary toilet including incentive spirometry every 4H while awake    DVT prophylaxiscontinue twice daily Eliquis  Code statusfull code  Dietn.p.o. pending bedside swallow, then advance to cardiac IVETTE with carb restrictions  IV accessPIV established in ED      Admit as observation. I anticipate hospitalization spanning less than two midnights for investigation and treatment of the above medically necessary diagnoses.       Comment: Please note this report has been produced using speech recognition software and may contain errors related to that system including errors in grammar, punctuation, and spelling, as well as words and phrases that may be inappropriate. If there are any questions or concerns please feel free to contact the dictating provider for clarification.          Yareli Siegel MD    4/17/2022 5:09 PM

## 2022-04-17 NOTE — ED NOTES
Dr Duane Crane at bedside speaking with pt and her . Order rec'd to send Urine for a Urinalysis and to hold on giving Aspirin for now. Reports will edit order (decreasing doseage).      Ag Bryan RN  04/17/22 8300

## 2022-04-17 NOTE — ED NOTES
[facility] Code Stroke  @2948 Called  Stroke Team  @7774 Called CT, CT was ready right away in Room 2  [facility]  Called back from Stroke Team and talked to Tony Reno CNP

## 2022-04-18 ENCOUNTER — APPOINTMENT (OUTPATIENT)
Dept: MRI IMAGING | Age: 72
End: 2022-04-18
Payer: MEDICARE

## 2022-04-18 VITALS
TEMPERATURE: 97.5 F | BODY MASS INDEX: 41.23 KG/M2 | HEART RATE: 65 BPM | HEIGHT: 60 IN | WEIGHT: 210 LBS | SYSTOLIC BLOOD PRESSURE: 99 MMHG | RESPIRATION RATE: 16 BRPM | OXYGEN SATURATION: 92 % | DIASTOLIC BLOOD PRESSURE: 66 MMHG

## 2022-04-18 PROBLEM — I63.9 CEREBROVASCULAR ACCIDENT (CVA) (HCC): Status: ACTIVE | Noted: 2020-12-09

## 2022-04-18 LAB
A/G RATIO: 1 (ref 1.1–2.2)
ALBUMIN SERPL-MCNC: 4 G/DL (ref 3.4–5)
ALP BLD-CCNC: 151 U/L (ref 40–129)
ALT SERPL-CCNC: 15 U/L (ref 10–40)
ANION GAP SERPL CALCULATED.3IONS-SCNC: 10 MMOL/L (ref 3–16)
AST SERPL-CCNC: 25 U/L (ref 15–37)
BASOPHILS ABSOLUTE: 0 K/UL (ref 0–0.2)
BASOPHILS RELATIVE PERCENT: 0.7 %
BILIRUB SERPL-MCNC: 0.4 MG/DL (ref 0–1)
BUN BLDV-MCNC: 13 MG/DL (ref 7–20)
CALCIUM SERPL-MCNC: 9.1 MG/DL (ref 8.3–10.6)
CHLORIDE BLD-SCNC: 101 MMOL/L (ref 99–110)
CHOLESTEROL, TOTAL: 114 MG/DL (ref 0–199)
CO2: 27 MMOL/L (ref 21–32)
CREAT SERPL-MCNC: 0.9 MG/DL (ref 0.6–1.2)
EKG ATRIAL RATE: 66 BPM
EKG DIAGNOSIS: NORMAL
EKG P-R INTERVAL: 194 MS
EKG Q-T INTERVAL: 416 MS
EKG QRS DURATION: 84 MS
EKG QTC CALCULATION (BAZETT): 436 MS
EKG R AXIS: 17 DEGREES
EKG T AXIS: 54 DEGREES
EKG VENTRICULAR RATE: 66 BPM
EOSINOPHILS ABSOLUTE: 0.2 K/UL (ref 0–0.6)
EOSINOPHILS RELATIVE PERCENT: 3.8 %
ESTIMATED AVERAGE GLUCOSE: 139.9 MG/DL
GFR AFRICAN AMERICAN: >60
GFR NON-AFRICAN AMERICAN: >60
GLUCOSE BLD-MCNC: 114 MG/DL (ref 70–99)
GLUCOSE BLD-MCNC: 118 MG/DL (ref 70–99)
GLUCOSE BLD-MCNC: 140 MG/DL (ref 70–99)
GLUCOSE BLD-MCNC: 140 MG/DL (ref 70–99)
HBA1C MFR BLD: 6.5 %
HCT VFR BLD CALC: 31.4 % (ref 36–48)
HDLC SERPL-MCNC: 54 MG/DL (ref 40–60)
HEMOGLOBIN: 10.3 G/DL (ref 12–16)
LDL CHOLESTEROL CALCULATED: 43 MG/DL
LV EF: 58 %
LVEF MODALITY: NORMAL
LYMPHOCYTES ABSOLUTE: 1.7 K/UL (ref 1–5.1)
LYMPHOCYTES RELATIVE PERCENT: 28.6 %
MCH RBC QN AUTO: 30.1 PG (ref 26–34)
MCHC RBC AUTO-ENTMCNC: 32.7 G/DL (ref 31–36)
MCV RBC AUTO: 91.9 FL (ref 80–100)
MONOCYTES ABSOLUTE: 0.3 K/UL (ref 0–1.3)
MONOCYTES RELATIVE PERCENT: 5.6 %
NEUTROPHILS ABSOLUTE: 3.7 K/UL (ref 1.7–7.7)
NEUTROPHILS RELATIVE PERCENT: 61.3 %
PDW BLD-RTO: 14.6 % (ref 12.4–15.4)
PERFORMED ON: ABNORMAL
PLATELET # BLD: 91 K/UL (ref 135–450)
PLATELET SLIDE REVIEW: ABNORMAL
PMV BLD AUTO: 11.7 FL (ref 5–10.5)
POTASSIUM REFLEX MAGNESIUM: 3.9 MMOL/L (ref 3.5–5.1)
RBC # BLD: 3.42 M/UL (ref 4–5.2)
SLIDE REVIEW: ABNORMAL
SODIUM BLD-SCNC: 138 MMOL/L (ref 136–145)
TOTAL PROTEIN: 8 G/DL (ref 6.4–8.2)
TRIGL SERPL-MCNC: 85 MG/DL (ref 0–150)
VLDLC SERPL CALC-MCNC: 17 MG/DL
WBC # BLD: 6.1 K/UL (ref 4–11)

## 2022-04-18 PROCEDURE — 97110 THERAPEUTIC EXERCISES: CPT

## 2022-04-18 PROCEDURE — 80061 LIPID PANEL: CPT

## 2022-04-18 PROCEDURE — 97530 THERAPEUTIC ACTIVITIES: CPT

## 2022-04-18 PROCEDURE — 93010 ELECTROCARDIOGRAM REPORT: CPT | Performed by: INTERNAL MEDICINE

## 2022-04-18 PROCEDURE — 97535 SELF CARE MNGMENT TRAINING: CPT

## 2022-04-18 PROCEDURE — 97166 OT EVAL MOD COMPLEX 45 MIN: CPT

## 2022-04-18 PROCEDURE — 97162 PT EVAL MOD COMPLEX 30 MIN: CPT

## 2022-04-18 PROCEDURE — C8929 TTE W OR WO FOL WCON,DOPPLER: HCPCS

## 2022-04-18 PROCEDURE — 99222 1ST HOSP IP/OBS MODERATE 55: CPT | Performed by: PSYCHIATRY & NEUROLOGY

## 2022-04-18 PROCEDURE — 6370000000 HC RX 637 (ALT 250 FOR IP): Performed by: HOSPITALIST

## 2022-04-18 PROCEDURE — 6360000004 HC RX CONTRAST MEDICATION: Performed by: HOSPITALIST

## 2022-04-18 PROCEDURE — 2580000003 HC RX 258: Performed by: HOSPITALIST

## 2022-04-18 PROCEDURE — G0378 HOSPITAL OBSERVATION PER HR: HCPCS

## 2022-04-18 PROCEDURE — 85025 COMPLETE CBC W/AUTO DIFF WBC: CPT

## 2022-04-18 PROCEDURE — 70551 MRI BRAIN STEM W/O DYE: CPT

## 2022-04-18 PROCEDURE — 97116 GAIT TRAINING THERAPY: CPT

## 2022-04-18 PROCEDURE — 92610 EVALUATE SWALLOWING FUNCTION: CPT

## 2022-04-18 PROCEDURE — 36415 COLL VENOUS BLD VENIPUNCTURE: CPT

## 2022-04-18 PROCEDURE — 80053 COMPREHEN METABOLIC PANEL: CPT

## 2022-04-18 RX ORDER — ATORVASTATIN CALCIUM 40 MG/1
40 TABLET, FILM COATED ORAL NIGHTLY
Qty: 30 TABLET | Refills: 3 | Status: SHIPPED | OUTPATIENT
Start: 2022-04-18

## 2022-04-18 RX ORDER — ATORVASTATIN CALCIUM 40 MG/1
40 TABLET, FILM COATED ORAL NIGHTLY
Status: DISCONTINUED | OUTPATIENT
Start: 2022-04-18 | End: 2022-04-18 | Stop reason: HOSPADM

## 2022-04-18 RX ADMIN — FERROUS SULFATE TAB 325 MG (65 MG ELEMENTAL FE) 325 MG: 325 (65 FE) TAB at 08:52

## 2022-04-18 RX ADMIN — FOLIC ACID 1 MG: 1 TABLET ORAL at 08:50

## 2022-04-18 RX ADMIN — VENLAFAXINE HYDROCHLORIDE 75 MG: 37.5 CAPSULE, EXTENDED RELEASE ORAL at 08:50

## 2022-04-18 RX ADMIN — METOPROLOL TARTRATE 50 MG: 50 TABLET ORAL at 08:51

## 2022-04-18 RX ADMIN — DILTIAZEM HYDROCHLORIDE 120 MG: 120 CAPSULE, COATED, EXTENDED RELEASE ORAL at 08:51

## 2022-04-18 RX ADMIN — ASPIRIN 81 MG: 81 TABLET, COATED ORAL at 08:51

## 2022-04-18 RX ADMIN — APIXABAN 5 MG: 5 TABLET, FILM COATED ORAL at 08:52

## 2022-04-18 RX ADMIN — PERFLUTREN 1.65 MG: 6.52 INJECTION, SUSPENSION INTRAVENOUS at 08:34

## 2022-04-18 RX ADMIN — Medication 10 ML: at 08:51

## 2022-04-18 RX ADMIN — PANTOPRAZOLE SODIUM 40 MG: 40 TABLET, DELAYED RELEASE ORAL at 05:15

## 2022-04-18 NOTE — CONSULTS
Acute      In patient Neurology consult        [facility]      MD Elidia Chen  1950    Date of Service: 4/18/2022    Referring Physician: Jeremiah Kirkpatrick MD      Reason for the consult and CC: Acute right facial and arm paraesthesias    HPI:   The patient is a 73 y.o. female, with a PMH of remote CVA, PAF on Eliquis, HTN, DM2, and HLD, who presented to [facility] with acute right facial and arm paraesthesias. The patient was sitting at home with her , when suddenly the right side of her face became numb. She also noted her right arm was numb, so he brought her to the ED. She is unsure of how long her symptoms lasted, but suspects a few hours. She denies fever, chills, headache, dizziness, vision changes, dysarthria, dysphagia, tinnitus. At the time of my exam, she feels back to normal.          Family History   Problem Relation Age of Onset    Cancer Mother         breast ca     Past Surgical History:   Procedure Laterality Date    APPENDECTOMY      BREAST BIOPSY      CHOLECYSTECTOMY      COLONOSCOPY  01/19/2017    polyp    CYST INCISION AND DRAINAGE  1986    on head    HYSTERECTOMY      UPPER GASTROINTESTINAL ENDOSCOPY  01/19/2017    barretts ?         Past Medical History:   Diagnosis Date    Arthritis     Asthma     Depression     Diabetes mellitus (HCC)     GERD (gastroesophageal reflux disease)     HCAP (healthcare-associated pneumonia)     Hypertension     Mycobacterium gordonae infection      5/7/18 + afb called from [address] pending  ( [facility]    Paroxysmal atrial fibrillation ([facility] [address]     Sepsis ([facility] [address]     TIA (transient ischemic attack)     Unspecified cerebral artery occlusion with cerebral infarction      Social History     Tobacco Use    Smoking status: Never Smoker    Smokeless tobacco: Never Used   Vaping Use    Vaping Use: Never used   Substance Use Topics    Alcohol use: No     Alcohol/week: 0.0 standard drinks    SpO2: 98% 93% 94% 95%   Weight:       Height:           General appearance and observation: Normal development and appear in no acute distress. Neck: supple  Cardiovascular: No lower leg edema with good pulsation. Mental Status:   Oriented to person, place, problem, and time. Memory: Good immediate recall. Intact remote memory  Normal attention span and concentration. Language: intact naming, repeating and fluency   Good fund of Knowledge. Aware of current events and vocabulary   Cranial Nerves:   II: Visual fields: Bilateral, chronic, peripheral vision loss. Pupils: equal, round, reactive to light  III,IV,VI: Extra Ocular Movements are intact. No nystagmus  V: Facial sensation is intact  VII: Facial strength and movements: intact and symmetric  VIII: Hearing: Intact  IX: Palate elevation is symmetric  XI: Shoulder shrug is intact  XII: Tongue movements are normal  Musculoskeletal: 5/5 in all 4 extremities. Tone: Normal tone. Reflexes: Symmetric 2+ in the arms and 2+ in the legs   Planters: flexor bilaterally. Coordination: no pronator drift, no dysmetria with FNF in upper extremities. Normal REM. Sensation: normal to all modalities in both arms and legs. Gait/Posture: steady gait and normal posturing and station.      Data:  LABS:   Lab Results   Component Value Date     04/18/2022    K 3.9 04/18/2022     04/18/2022    CO2 27 04/18/2022    BUN 13 04/18/2022    CREATININE 0.9 04/18/2022    GFRAA >60 04/18/2022    GFRAA 54 12/17/2012    LABGLOM >60 04/18/2022    GLUCOSE 118 04/18/2022    PHOS 3.3 04/03/2018    MG 2.00 10/19/2018    CALCIUM 9.1 04/18/2022     Lab Results   Component Value Date    WBC 6.1 04/18/2022    RBC 3.42 04/18/2022    HGB 10.3 04/18/2022    HCT 31.4 04/18/2022    MCV 91.9 04/18/2022    RDW 14.6 04/18/2022    PLT 91 04/18/2022     Lab Results   Component Value Date    INR 1.59 (H) 04/17/2022    PROTIME 18.3 (H) 04/17/2022       Neuroimaging was independently reviewed by myself and discussed results with the patient and/or family  Reviewed notes from different physicians  Reviewed lab and blood testing    Impression:    Acute, transient right facial and arm paraesthesias - likely TIA. CT head revealed old infarcts. CTA head/neck without large vessel occlusion. ECHO without evidence of PFO or thrombus. HTN  HLD, controlled. LDL 33  DM2, controlled. A1c 6.5  Morbid obesity    Recommendation:     MRI of brain ordered. Monitor on tele. Continue home ASA, statin. Continue Eliquis as no focal deficits. Continue home BP meds. SSI while inpatient. PT/OT/SLP    May be discharged from a neurological perspective if MRI is negative and medically stable. Thank you for referring such patient. If you have any questions regarding my consult note, please don't hesitate to call me. Millicent Toth, CNP    Attending Supervising [de-identified] Attestation Statement      The patient was seen 4/18/2022 in conjunction with the nurse practitioner with independent history, evaluation and examination. I agree with the note which has been adjusted to reflect my findings, with the addition of the following: The patient is 73 y.o.  female  who was admitted for strokelike symptoms with right-sided paresthesia. Known to me from prior visit. History of AF on Eliquis. Degree was moderate and duration was persistent. Onset was hours prior to admission. Description right-sided numbness and tingling with speech impairment. No clear triggers. No other relieving or aggravating factors. CT showed no acute stroke or large vessel occlusion extracranially. Today she feels back to her baseline. Other review of system was unremarkable. On examination:  No acute distress  Awake and alert x3. Fluent speech. Appears appropriate with intact recent and remote memory.   Pupil reactive and symmetric, extraocular motor intact, no ophthalmoplegia, face is symmetric and tongue is midline  No focal weakness with symmetric DTR  Normal tone  No sensory disturbance or abnormal movement    Impression:   Acute right-sided paresthesia, possible TIA versus CVA  A. fib on Eliquis  Diabetes  Hypertension and hyperlipidemia      Recommendation:  Agree with MRI although would not change our management  Lengthy discussion with the patient and family regarding secondary stroke prevention. So far no additional benefit of adding more AP therapy to her Eliquis or switching her to a different DOAC. I would say to continue with Eliquis and aspirin for now. Statin  Stroke education with blood pressure, blood sugar, lifestyle adjustment and monitor these risk factors at home were discussed today  PT, OT and speech  Insulin sliding scale  DC planning after the above work-up          Electronically signed by Paz Moore MD on 4/18/22 at 3:08 PM EDT        This dictation was generated by voice recognition computer software.  Although all attempts are made to edit the dictation for accuracy, there may be errors in the  transcription that are not intended

## 2022-04-18 NOTE — CARE COORDINATION
[facility]    Referral received from  to follow for home care services. I will follow for needs, and speak with patient to verify demos.     Fracnisco Abdul RN, BSN CTN  [facility] [phone]

## 2022-04-18 NOTE — CARE COORDINATION
[facility]    DC order noted, all docs needed have been faxed to [facility] for home care services.     Home care to see patient within 24-48 hrs    Micha Manley RN, BSN CTN  [facility] [phone]

## 2022-04-18 NOTE — PROGRESS NOTES
Speech Language Pathology    SLP acknowledged order for evaluation, reviewed pt's chart, spoke with RN. Echo currently at bedside. ST to continue to follow and re-attempt eval at a later time.     Naomi Kruse M.S. [address] [facility]  Speech-language pathologist  [phone]  Phone: [phone]

## 2022-04-18 NOTE — PROGRESS NOTES
Physical Therapy    Facility/Department: [facility] C5 - MED SURG/ORTHO  Initial Assessment    NAME: Jose Chadwick  : 1950  MRN: [id number]    Date of Service: 2022    Discharge Recommendations:  Home with assist PRN,Home with Home health PT        Assessment   Body structures, Functions, Activity limitations: Decreased functional mobility ; Decreased posture;Decreased ADL status; Decreased high-level IADLs;Decreased endurance;Decreased ROM; Decreased sensation;Decreased coordination;Decreased strength;Decreased balance;Decreased safe awareness  Assessment: Pt is 74 y/o F who presents c/o numbness and weakness in RUE/R face with unsteady gait. She reports PLOF req no assistance from another person but that she did use RW for amb and cane at certain times (per pt, she used cane when walking the dog). Pt currently presents below baseline in that she req grossly CGA for OOB mobility and use of RW w/ cues for safety. Pt will cont to benefit from skilled PT services to improve function towards goals. Pt will benefit from home w/ PRN assist and HHPT  Treatment Diagnosis: Decreased functional mobility  Specific instructions for Next Treatment: progress activity  Prognosis: Good  Decision Making: Low Complexity  PT Education: Goals;PT Role;Plan of Care;Gait Training;General Safety; Energy Conservation;Disease Specific Education  Patient Education: Pt educated on importance of OOB mobility for current deficits and role of PT. Pt demos understanding  Barriers to Learning: none  REQUIRES PT FOLLOW UP: Yes  Activity Tolerance  Activity Tolerance: Patient Tolerated treatment well;Patient limited by fatigue       Patient Diagnosis(es): The primary encounter diagnosis was Stroke-like symptoms. A diagnosis of Anticoagulated was also pertinent to this visit.      has a past medical history of Arthritis, Asthma, Depression, Diabetes mellitus ([address] GERD (gastroesophageal reflux disease), HCAP (healthcare-associated pneumonia), Hypertension, Mycobacterium gordonae infection, Paroxysmal atrial fibrillation ([facility] [address] Sepsis ([facility] [address] TIA (transient ischemic attack), and Unspecified cerebral artery occlusion with cerebral infarction. has a past surgical history that includes Hysterectomy; Cholecystectomy; Appendectomy; cyst incision and drainage (1986); Colonoscopy (01/19/2017); Upper gastrointestinal endoscopy (01/19/2017); and Breast biopsy. Restrictions  Restrictions/Precautions  Restrictions/Precautions: Fall Risk  Position Activity Restriction  Other position/activity restrictions: Up with assist  Vision/Hearing  Vision: Impaired  Vision Exceptions: Wears glasses at all times  Hearing: Exceptions to [facility]  Hearing Exceptions: Hard of hearing/hearing concerns     Subjective  General  Chart Reviewed: Yes  Patient assessed for rehabilitation services?: Yes  Additional Pertinent Hx: History of Present Illness:  Hugh Morales is a 73 y.o. female who presented to the ED to be evaluated for sudden onset right facial paresthesia, RUE weakness, and gait instability. The patient has a remote history of recurrent CVAs, and was concerned that she was having another stroke. Patient has history of PAF and is on chronic Eliquis anticoagulation. Upon arrival to the ED EKG was obtained revealing NSR with low voltage QRS. Head CT revealed no acute intracranial abnormality; old infarcts noted in the left cerebellum, right frontotemporal, and bilateral parietal lobes. CXR demonstrated mild pulmonary vascular congestion with basilar atelectasis. Notable labs include: Serial troponin negative x2, , hyperglycemia 184, and H/H 11.3/35. 8. Full dose aspirin was ordered by ED provider prior to request for admission.   Response To Previous Treatment: Not applicable  Family / Caregiver Present: No  Referring Practitioner: Devonte Mac MD  Referral Date : 04/18/22  Diagnosis: No Encounter Dx  Follows Commands: Within Functional Limits  General Comment  Comments: Pt cleared for PT eval by RN  Subjective  Subjective: Pt agreeable to PT eval  Pain Screening  Patient Currently in Pain: Denies  Vital Signs  Pulse: 100  BP: 127/83  BP Location: Left upper arm  Patient Currently in Pain: Denies  Oxygen Therapy  SpO2: 94 %  Pulse Oximeter Device Mode: Continuous  O2 Device: None (Room air)       Orientation  Orientation  Overall Orientation Status: Within Normal Limits  Social/Functional History  Social/Functional History  Lives With: Spouse (Pt  can provide 24Hr Assist)  Type of Home: House  Home Layout: Two level,Performs ADL's on one level  Home Access: Level entry  Bathroom Shower/Tub: Tub/Shower unit,Shower chair with back  Bathroom Toilet: Standard  Bathroom Equipment: Shower chair,Grab bars in shower,Grab bars around toilet,Hand-held shower  Bathroom Accessibility: Accessible  Home Equipment: Cane,Rolling walker,Reacher,Alert Button (Pt reports that she mostly uses RW, sometimes uses the cane when she goeswalking with dog so that she has a free hand)  Receives Help From: Family  ADL Assistance: Independent  Homemaking Assistance: Independent  Homemaking Responsibilities: Yes  Meal Prep Responsibility: Primary  Laundry Responsibility: Primary  Cleaning Responsibility: Primary  Shopping Responsibility: Secondary  Ambulation Assistance: Independent  Transfer Assistance: Independent  Active : No  Patient's  Info: spouse drives as patient has R field cut and neglect from previous CVA  Occupation: Retired  Type of occupation: Pt worked at [address] [place] [address]: Pt reads and likes to be with her dog. Additional Comments: Pt reports no falls in the past 6 months.     Objective     Observation/Palpation  Posture: Good    AROM RLE (degrees)  RLE AROM: WNL  AROM LLE (degrees)  LLE AROM : WNL  Strength RLE  Strength RLE: WFL  Comment: grossly 4/5  Strength LLE  Strength LLE: WFL  Comment: grossly 4/5  Tone RLE  RLE Tone: Normotonic  Tone LLE  LLE Tone: Normotonic  Motor Control  Gross Motor?: WNL  Coordination  Rapid Alternating Movements: Dysdiadokinesia (on LUE)  Finger to Nose: Normal  Heel to Shin: Normal  Sensation  Overall Sensation Status: WFL  Bed mobility  Comment: SHANTEL, pt in chair at start and end of session  Transfers  Sit to Stand: Stand by assistance  Stand to sit: Stand by assistance  Comment: to RW  Ambulation  Ambulation?: Yes  Ambulation 1  Surface: level tile  Device: Rolling Walker  Assistance: Contact guard assistance  Quality of Gait: Pt presents w/ wide PANTERA and use of RW, one instance of LOB w/ turn that req CGA to correct  Gait Deviations: Slow Taylor; Increased PANTERA  Distance: 50 ft in room  Comments: Pt req CGA to correct 1 episode LOB     Balance  Posture: Good  Sitting - Static: Good  Sitting - Dynamic: Good  Standing - Static: Good  Standing - Dynamic: Fair  Comments: Pt able to tolerate seated and standing balance activities. PT presents w/ minor LOB that req CGA during turn while ambulating  Exercises  Hip Flexion: seated marches 2x10 bilat  Knee Long Arc Quad: 2x10 bilat  Comments: cues for sequencing and technique     Plan   Plan  Times per week: 3-5x/week  Times per day: Daily  Specific instructions for Next Treatment: progress activity  Current Treatment Recommendations: Joe Reese Re-education,Patient/Caregiver Education & Training,ADL/Self-care Training,Gait Training,IADL Training,Stair training  Safety Devices  Type of devices:  All fall risk precautions in place,Patient at risk for falls,Left in chair,Call light within reach,Chair alarm in place,Gait belt,Nurse notified    AM-PAC Score  AM-PAC Inpatient Mobility Raw Score : 21 (04/18/22 1138)  AM-PAC Inpatient T-Scale Score : 50.25 (04/18/22 1138)  Mobility Inpatient CMS 0-100% Score: 28.97 (04/18/22 1138)  Mobility Inpatient CMS G-Code Modifier : CJ (04/18/22 1138)          Goals  Short term goals  Time Frame for Short term goals: 1 week, 22  Short term goal 1: Pt will tolerate bed mobility w/ indep  Short term goal 2: Pt will perform sit to stand t/f w/ indep  Short term goal 3: Pt will perform 100 ft of amb w/ LRAD and Sup  Short term goal 4: Pt will tolerate 12-15 reps of BLE exercises to improve function towards goals by   Patient Goals   Patient goals : \"to go home\"       Therapy Time   Individual Concurrent Group Co-treatment   Time In 1001         Time Out 1035         Minutes 34         Timed Code Treatment Minutes: 24 Minutes plus 10 min jake Fenton [place], PT    If pt is unable to be seen after this session, please let this note serve as discharge summary. Please see case management note for discharge disposition. Thank you.

## 2022-04-18 NOTE — PLAN OF CARE
Problem: Nutrition  Intervention: Swallowing evaluation  Note: Bedside swallow evaluation completed this date. Fallon Marlow M.S. [id number] [facility]  Speech-language pathologist  [id number]      Intervention: Aspiration precautions  Note: Bedside swallow evaluation completed this date.     Fallon Marlow M.S. [id number] [facility]  Speech-language pathologist  [id number]

## 2022-04-18 NOTE — CONSULTS
Pharmacy Note  Pharmacy to change base solutions Consult      Pharmacy has been asked by Dr. Eveline Lares to adjust all drips to normal saline as appropriate based on compatibility to avoid fluid shifts since D5 is osmotically active. The following intermittent IV drips/infusions have been adjusted to saline: N/A    Please be aware that patient may have D5W ordered as part of hypoglycemia orderset. RP will follow daily to ensure all new IVPBs + drips are in NS. Please call with questions.     Thank you,     Adela Benoit, PharmD 4/18/2022  11:47 AM

## 2022-04-18 NOTE — PROGRESS NOTES
Speech Language Pathology  Facility/Department: [facility] C5 - MED SURG/ORTHO   CLINICAL BEDSIDE SWALLOW EVALUATION    Recommended Diet and Intervention  Diet Solids Recommendation: Dysphagia Soft and Bite-Sized (Dysphagia III)  Liquid Consistency Recommendation: Thin  Recommended Form of Meds: PO (whole with water or puree)  · Pt may benefit from speech therapy for word finding difficulties upon discharge if warranted/ for review of strategies. NAME: Delroy Heard  : 1950  MRN: [id number]    ADMISSION DATE: 2022  ADMITTING DIAGNOSIS: has Morbid obesity with BMI of 40.0-44.9, adult ([address] Hx of L-ICA CVA; Essential hypertension; Chronic thrombocytopenia; Chronic obstructive pulmonary disease ([address] Depression; Elevated alkaline phosphatase level; Chronic normocytic anemia; GERD (gastroesophageal reflux disease); Paroxysmal atrial fibrillation (HCC); JARRETT on CPAP; 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; Acute nonintractable headache; Meningoencephalitis; Mastoiditis of right side; Hyperkalemia; Splenomegaly; Hemorrhagic stroke ([address] DM (diabetes mellitus), type 2, uncontrolled with complications ([address] Mild malnutrition ([address] PVC (premature ventricular contraction); Nontraumatic cortical hemorrhage of left cerebral hemisphere [facility]); Dyslipidemia; CAD in native artery; COPD exacerbation ([address] TIA (transient ischemic attack); First degree AV block; Encounter for loop recorder check; Arterial ischemic stroke, ICA, right, acute ([address] and Acute focal neurological deficit on their problem list.  ONSET DATE:  22    Recent Chest Xray:  22  Impression       Probable mild degree of vascular congestion with minimal basilar lung   scarring or atelectasis but otherwise no acute pulmonary infiltrate or   significant change noted.      Date of Eval: 2022  Evaluating Therapist: Jimbo Began    Current Diet level:  Current Diet : Dysphagia Soft and Bite-Sized (Dysphagia III)  Current Liquid Diet : Thin    Primary Complaint  Patient Complaint: Pt denies dysphagia. Pt with complaints of occasional word finding difficulties. Per MD H&P, \" History of Present Illness:  Parker Williamson is a 73 y.o. female who presented to the ED to be evaluated for sudden onset right facial paresthesia, RUE weakness, and gait instability. The patient has a remote history of recurrent CVAs, and was concerned that she was having another stroke. Patient has history of PAF and is on chronic Eliquis anticoagulation.     Upon arrival to the ED EKG was obtained revealing NSR with low voltage QRS. Head CT revealed no acute intracranial abnormality; old infarcts noted in the left cerebellum, right frontotemporal, and bilateral parietal lobes. CXR demonstrated mild pulmonary vascular congestion with basilar atelectasis. Notable labs include: Serial troponin negative x2, , hyperglycemia 184, and H/H 11.3/35. 8. Full dose aspirin was ordered by ED provider prior to request for admission.     History obtained from patient and review of [facility] chart     Old medical records show patient's most recent echo was performed on 10/19/2018 with the following results:   Summary   Left ventricular systolic function is normal with an estimated ejection   fraction of 55%.   There is mild concentric left ventricular hypertrophy.   Moderate mitral regurgitation.  Mireille Collar is no evidence of mass or thrombus in the left atrium or appendage.   A bubble study was performed and showed no evidence of shunting. \"     Pain:  Pain Assessment  Pain Assessment: 0-10  Pain Level: 0  Patient's Stated Pain Goal: No pain    Reason for Referral  Parker Williamson was referred for a bedside swallow evaluation to assess the efficiency of her swallow function, identify signs and symptoms of aspiration and make recommendations regarding safe dietary consistencies, effective compensatory strategies, and safe eating environment.     Impression  Dysphagia Diagnosis: Mild oral stage dysphagia;Swallow function appears grossly intact  Dysphagia Outcome Severity Scale: Level 5: Mild dysphagia- Distant supervision. May need one diet consistency restricted     The pt was seen upright in bedside chair. RN OK'd SLP entry. Pt's OME was unremarkable, however pt's R eyelid was mostly shut. Per pt, R sided facial weakness has resolved. Pt educated on possible spillage and pocketing on R side if facial weakness comes back. Pt was seen with thins via straw, and eggs. Study was limited due to pt pleasantly refusing further PO trials. Pt denies recent dysphagia. Pt with audible swallow with thin liquids via straw, however no change in O2 sats, WOB, or vocal quality with PO this date. Due to pt's dentition softer foods may be easier to masticate. Recommend continuing soft and bite sized with thin liquids. Meds whole with thins or puree as needed. Pt reports having occasional difficulty with word finding and memory. Per pt, these deficits have been persisting since her last stroke in 2018, but have gotten much better. She reports having home therapy to assist with these changes, but had stopped. ST discussed possibility of continuing speech therapy services to assist with word finding difficulty if the pt chooses. However, only x 1 instance of slight word finding difficulty noted in conversation with therapist this date, and pt was able to use strategies to work through it. Word finding strategies were reviewed with pt. No further ST warranted at this time. Please re-refer if changes occur. Vitals:    04/18/22 1010   BP: 127/83   Pulse: 100   Resp:    Temp:    SpO2: 94%     Treatment Plan  Requires SLP Intervention: No  D/C Recommendations: To be determined (per PT/OT recs)     Recommended Diet and Intervention  Diet Solids Recommendation: Dysphagia Soft and Bite-Sized (Dysphagia III)  Liquid Consistency Recommendation:  Thin  Recommended Form of Meds: PO (whole with water or puree)    Compensatory Swallowing Strategies  Compensatory Swallowing Strategies: Upright as possible for all oral intake;Remain upright for 30-45 minutes after meals;Small bites/sips    Treatment/Goals:  n/a    General  Chart Reviewed: Yes  Comments: The pt was admitted to the hospital with acute focal neurological deficit. Subjective  Subjective: The pt was seen upright in bedside chair. She was alert, pleasant and cooperative. Behavior/Cognition: Alert; Cooperative;Pleasant mood  Respiratory Status: Room air  O2 Device: None (Room air)  Communication Observation: Functional (pt with complaints of occasional word finding difficulties)  Follows Directions: Simple  Dentition: Some missing teeth  Patient Positioning: Upright in chair  Baseline Vocal Quality: Normal  Volitional Cough: Strong;Congested (strong, congested cough at baseline (possibly due to asthma))  Prior Dysphagia History: Pt was seen in 2018 for a BSE, and was initially recommended Puree/Thin, but was upgraded to soft and bite sized/thin by the time of d/c. Consistencies Administered: Dysphagia Soft and Bite-Sized (Dysphagia III); Thin - straw    Vision/Hearing  Vision  Vision: Impaired  Vision Exceptions: Wears glasses at all times  Hearing  Hearing: Exceptions to [facility]  Hearing Exceptions: Hard of hearing/hearing concerns    Oral Motor Deficits  Oral/Motor  Oral Motor: Within functional limits (R eye lid was mostly closed during evaluation)    Oral Phase Dysfunction  Oral Phase  Oral Phase: Exceptions  Oral Phase Dysfunction  Suspected Premature Bolus Loss:  Thin - straw (audible swallow observed)     Indicators of Pharyngeal Phase Dysfunction   Pharyngeal Phase  Pharyngeal Phase: WFL    Prognosis  Prognosis  Prognosis for safe diet advancement: good  Individuals consulted  Consulted and agree with results and recommendations: Patient;RN    Education  Patient Education: Pt educated on reason for referral, role of ST, assessment results and recommendations.   Patient Education Response: Verbalizes understanding;Demonstrated understanding  Safety Devices in place: Yes  Type of devices: Left in chair;Call light within reach;Nurse notified       Therapy Time  SLP Individual Minutes  Time In: 2670  Time Out: 2914  Minutes: [address]

## 2022-04-18 NOTE — DISCHARGE INSTR - COC
Continuity of Care Form    Patient Name: Leta Dakin   :  1950  MRN:  [id number]    Admit date:  2022  Discharge date:  ***    Code Status Order: Full Code   Advance Directives:      Admitting Physician:  Carlota Murphy MD  PCP: Herman Del Castillo MD    Discharging Nurse: [facility] Unit/Room#: 5482/5164-51  Discharging Unit Phone Number: ***    Emergency Contact:   Extended Emergency Contact Information  Primary Emergency Contact: Timur Kavita  Address: [address] [place] [address] Phone: [phone]  Mobile Phone: [phone]  Relation: Spouse    Past Surgical History:  Past Surgical History:   Procedure Laterality Date    APPENDECTOMY      BREAST BIOPSY      CHOLECYSTECTOMY      COLONOSCOPY  2017    polyp    CYST INCISION AND DRAINAGE  1986    on head    HYSTERECTOMY      UPPER GASTROINTESTINAL ENDOSCOPY  2017    barretts ? Immunization History:   Immunization History   Administered Date(s) Administered    COVID-19, Bailey Herron, Primary or Immunocompromised, PF, 100mcg/0.5mL 2021, 04/15/2021    Influenza Virus Vaccine 12/10/2012, 2013, 2016    Influenza, Ora Jimbo, 6 mo and older, IM, PF (Flulaval, Fluarix) 10/15/2018    Influenza, Quadv, IM, PF (6 mo and older Fluzone, Flulaval, Fluarix, and 3 yrs and older Afluria) 2017    Pneumococcal Polysaccharide ([id number]) 12/10/2012, 2016       Active Problems:  Patient Active Problem List   Diagnosis Code    Morbid obesity with BMI of 40.0-44.9, adult ([facility] [address] E66.01, Z68.41    Hx of L-ICA CVA I63.232    Essential hypertension I10    Chronic thrombocytopenia D69.6    Chronic obstructive pulmonary disease ([facility] J44.9    Depression F32. 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    Mastoiditis of right side H70.91    Hyperkalemia E87.5    Splenomegaly R16.1    Hemorrhagic stroke (HCC) I61.9    DM (diabetes mellitus), type 2, uncontrolled with complications (HCC) G76.5, E11.65    Mild malnutrition (HCC) E44.1    PVC (premature ventricular contraction) I49.3    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    Dyslipidemia E78.5    CAD in native artery I25.10    COPD exacerbation ([facility] [facility] J44.1    TIA (transient ischemic attack) G45.9    First degree AV block I44.0    Encounter for loop recorder check Z45.09    Arterial ischemic stroke, ICA, right, acute ([facility] [address] [facility] I63.231    Acute focal neurological deficit R29.818       Isolation/Infection:   Isolation            No Isolation          Patient Infection Status       Infection Onset Added Last Indicated Last Indicated By Review Planned Expiration Resolved Resolved By    None active    Resolved    COVID-19 (Rule Out) 12/03/21 12/03/21 12/03/21 COVID-19, Rapid (Ordered)   12/03/21 Rule-Out Test Resulted    AFB  05/07/18 05/07/18 Stephanie Guerra RN   05/23/18 Sandeep Guthrie RN    + afb from [place], sent to [facility]              Nurse Assessment:  Last Vital Signs: /83   Pulse 100   Temp 98.4 °F (36.9 °C) (Oral)   Resp 17   Ht 5' (1.524 m)   Wt 210 lb (95.3 kg)   LMP  (LMP Unknown)   SpO2 94%   BMI 41.01 kg/m²     Last documented pain score (0-10 scale): Pain Level: 0  Last Weight:   Wt Readings from Last 1 Encounters:   04/17/22 210 lb (95.3 kg)     Mental Status:  oriented and alert    IV Access:  - None    Nursing Mobility/ADLs:  Walking   Assisted  Transfer  Assisted  Bathing  Assisted  Dressing  Assisted  Toileting  Assisted  Feeding  Assisted  Med Admin  Assisted  Med Delivery   whole    Wound Care Documentation and Therapy:        Elimination:  Continence:    Bowel: No at times  Bladder: No at times  Urinary Catheter: None   Colostomy/Ileostomy/Ileal Conduit: No       Date of Last BM: ***  No intake or output data in the 24 hours ending 04/18/22 1210  No intake/output data recorded. Safety Concerns: At Risk for Falls    Impairments/Disabilities:      Speech and Vision    Nutrition Therapy:  Current Nutrition Therapy:   - Oral Diet:  General    Routes of Feeding: Oral  Liquids: No Restrictions  Daily Fluid Restriction: no  Last Modified Barium Swallow with Video (Video Swallowing Test): not done    Treatments at the Time of Hospital Discharge:   Respiratory Treatments:   Oxygen Therapy:  is not on home oxygen therapy.   Ventilator:    - No ventilator support    Rehab Therapies: {THERAPEUTIC INTERVENTION:[id number]}  Weight Bearing Status/Restrictions: No weight bearing restrictions  Other Medical Equipment (for information only, NOT a DME order):  cane  Other Treatments: [facility]: LEVEL 1 STANDARD    Home health agency to establish plan of care for patient over 60 day period   Nursing  Initial home SN evaluation visit to occur within 24-48 hours for:  1)  medication management  2)  VS and clinical assessment  3)  S&S chronic disease exacerbation education + when to contact MD/NP  4)  care coordination  Medication Reconciliation during 1st SN visit    PT/OT   Evaluate with goal of regaining prior level of functioning   OT to evaluate if patient has [address] needs for personal care    PCP Visit scheduled within 7 days of hospital discharge   Telehealth-Homecare Vitals(If patient is agreeable and meets guidelines)      Patient's personal belongings (please select all that are sent with patient):  {[facility] DME Belongings:[id number]}    RN SIGNATURE:  Electronically signed by Virgil Ochoa RN on 4/18/22 at 4:43 PM EDT    CASE MANAGEMENT/SOCIAL WORK SECTION    Inpatient Status Date: 4/17/22    Readmission Risk Assessment Score:  Readmission Risk              Risk of Unplanned Readmission: 0           Discharging to Facility/ [address]   [address]   [address] [place] [address]   [phone]    / signature: {Esignature:[id number]}    PHYSICIAN SECTION    Prognosis: Fair    Condition at Discharge: Stable    Rehab Potential (if transferring to Rehab): N/A    Recommended Labs or Other Treatments After Discharge: [facility] with SN/PT/OT    Physician Certification: I certify the above information and transfer of Howard Gonsalez  is necessary for the continuing treatment of the diagnosis listed and that she requires Home Care for greater 30 days.      Update Admission H&P: No change in H&P    PHYSICIAN SIGNATURE:  Electronically signed by JARROD Tineo CNP on 4/18/22 at 4:32 PM EDT

## 2022-04-18 NOTE — PROGRESS NOTES
Occupational Therapy   Occupational Therapy Initial Assessment/Treatment  Date: 2022   Patient Name: Josie Erickson  MRN: [id number]     : 1950    Date of Service: 2022    Discharge Recommendations:  Home with assist PRN  OT Equipment Recommendations  Equipment Needed: No    Assessment   Performance deficits / Impairments: Decreased endurance;Decreased functional mobility ; Decreased ADL status; Decreased safe awareness;Decreased balance (Pt reports double vision at baseline.)    Assessment: Pt is a 71yo female with deficits in the areas listed above that reported to [facility] with N/T in b/l hands but sensation deficits have resolved. Pt is typically mod I with ADLS and functional mobility at baseline with no AD. Today, pt showing decreased balance requiring CGA with functional mobility and t/fs without AD and SBA with functional mobility and t/fs with SW. Pt requiring Carter for UB dressing and some mild tremors with her RUE but the tremors did not impact her ability to bring food accurately to her mouth. Pt would continue to benefit from skilled OT services to increase balance, safety and independence for ADLs and functional mobility. Prognosis: Good  Decision Making: Medium Complexity  OT Education: OT Role;Plan of Care;Transfer Training  Patient Education: disease specific: using RW at home for balance instead of furniture to prevent falls, safe t/fs, general safety during hospitalization, prevention of complications of bedrest, use of call light. Pt verbalized understanding. REQUIRES OT FOLLOW UP: Yes  Activity Tolerance  Activity Tolerance: Patient Tolerated treatment well  Activity Tolerance: /80, , O2 93%. Pt with SOB with activity but vitals WFL. Safety Devices  Safety Devices in place: Yes  Type of devices: Nurse notified;Gait belt;Call light within reach; Chair alarm in place; Left in chair           Patient Diagnosis(es): The primary encounter diagnosis was Stroke-like symptoms. A diagnosis of Anticoagulated was also pertinent to this visit. has a past medical history of Arthritis, Asthma, Depression, Diabetes mellitus ([facility] [address] GERD (gastroesophageal reflux disease), HCAP (healthcare-associated pneumonia), Hypertension, Mycobacterium gordonae infection, Paroxysmal atrial fibrillation ([facility] [address] Sepsis ([facility] [address] TIA (transient ischemic attack), and Unspecified cerebral artery occlusion with cerebral infarction. has a past surgical history that includes Hysterectomy; Cholecystectomy; Appendectomy; cyst incision and drainage (1986); Colonoscopy (01/19/2017); Upper gastrointestinal endoscopy (01/19/2017); and Breast biopsy. Restrictions  Restrictions/Precautions  Restrictions/Precautions: Fall Risk  Position Activity Restriction  Other position/activity restrictions: Up with assist    Subjective   General  Chart Reviewed: Yes  Patient assessed for rehabilitation services?: Yes  Additional Pertinent Hx: Per ED provider (4/17), \"Shiva Novoa is a 73 y.o. female who presents to the ED complaining of right side facial numbness, slurred speech and unsteady gait that started at approximately 1 PM today while she was watching TV. Currently denies the right-sided facial numbness but does still feel like her speech is slurred and that her gait is off. Patient reports that she does see a neurologist for a history of multiple strokes in the past.\"  Response to previous treatment: Patient with no complaints from previous session  Family / Caregiver Present: No  Referring Practitioner: Devonte Mac MD  Diagnosis: Acute focal neurological deficit  Subjective  Subjective: Pt in bed and agreeable to therapy. General Comment  Comments: RN approved therapy.   Patient Currently in Pain: Denies  Vital Signs  Temp: 98.4 °F (36.9 °C)  Temp Source: Oral  Pulse: 100  Heart Rate Source: Monitor  Resp: 17  BP: 126/80  BP Location: Left upper arm  MAP (mmHg): 96  Patient Position: Semi fowlers  Level of Consciousness: Alert (0)  MEWS Score: 1  Patient Currently in Pain: Denies  Oxygen Therapy  SpO2: 93 %  O2 Device: None (Room air)     Social/Functional History  Social/Functional History  Lives With: Spouse (Pt's  can provide 24hr A.)  Type of Home: House  Home Layout: Two level,Performs ADL's on one level  Home Access: Level entry  Bathroom Shower/Tub: Tub/Shower unit,Shower chair with back  Bathroom Toilet: Standard  Bathroom Equipment: Shower chair,Grab bars in shower,Grab bars around toilet,Hand-held shower  Home Equipment: Cane,Rolling walker,Reacher,Alert Button  ADL Assistance: Independent  Homemaking Assistance: Independent  Homemaking Responsibilities: Yes  Meal Prep Responsibility: Primary  Laundry Responsibility: Primary  Cleaning Responsibility: Primary  Shopping Responsibility: Secondary (Pt uses motorized shopping carts.)  Ambulation Assistance: Independent (Pt states that she does not use a device at home but her  often tells her she should use one.)  Transfer Assistance: Independent  Active : No  Patient's  Info: spouse drives as patient has R field cut and neglect from previous CVA  Occupation: Retired  Type of occupation: Pt worked at [address] [place] [address]: Pt reads and likes to be with her dog. Additional Comments: Pt reports no falls in the past 6 months. Objective   Vision: Impaired  Vision Exceptions: Wears glasses at all times  Hearing: Exceptions to [facility]  Hearing Exceptions: Hard of hearing/hearing concerns    Orientation  Overall Orientation Status: Within Functional Limits     Balance  Sitting Balance: Supervision  Standing Balance: Contact guard assistance (CGA with no AD, SBA with RW.)  Standing Balance  Time: ~1minute, ~45seconds  Activity: to/from restroom. Comment: CGA with no AD, SBA with RW. Functional Mobility  Functional - Mobility Device: Other (No AD to restroom, pt reaching for furniture and walls for balance.  Pt provided with RW on the way from restroom.)  Activity: To/from bathroom  Assist Level: Contact guard assistance  Functional Mobility Comments: CGA with no AD, SBA with RW. Toilet Transfers  Toilet - Technique: Ambulating  Equipment Used: Standard toilet (grab bar on R.)  Toilet Transfer: Contact guard assistance  Toilet Transfers Comments: CGA without AD, SBA with RW. ADL  Feeding: Setup  UE Dressing: Minimal assistance (SBA to doff gown, min A to don gown.)  Tone RUE  RUE Tone: Normotonic  Tone LUE  LUE Tone: Normotonic  Coordination  Movements Are Fluid And Coordinated: Yes  Quality of Movement Other  Comment: Mild tremor in RUE but not impacting daily activities. Pt requiring increased time for nose finger test d/t double vision (She has had double vision prior to the onset of symptoms). Bed mobility  Supine to Sit: Supervision (HOB elevated, use of bedrail)  Sit to Supine: Unable to assess  Scooting: Unable to assess  Comment: Pt ended session seated in recliner. Transfers  Sit to stand: Contact guard assistance  Stand to sit: Contact guard assistance  Transfer Comments: CGA-SBA, CGA without AD, SBA with RW. Min vc's for safe hand placement. Vision - Basic Assessment  Prior Vision: Wears glasses all the time  Visual History: Other (add comment) (Pt reports that she has had double vision for a while now and needs to go to the eye doctor.)  Cognition  Overall Cognitive Status: Exceptions  Arousal/Alertness: Appropriate responses to stimuli  Following Commands: Follows all commands without difficulty; Follows one step commands with repetition (Pt [place])  Attention Span: Appears intact  Memory: Appears intact  Safety Judgement: Decreased awareness of need for safety  Problem Solving: Decreased awareness of errors  Insights: Decreased awareness of deficits  Initiation: Does not require cues  Sequencing: Does not require cues        Sensation  Overall Sensation Status: WFL (Pt reports that her N/t in her hands has resolved.)        LUE AROM (degrees)  LUE AROM : WFL  Left Hand AROM (degrees)  Left Hand AROM: WFL  RUE AROM (degrees)  RUE AROM : WFL  Right Hand AROM (degrees)  Right Hand AROM: WFL  LUE Strength  Gross LUE Strength: WFL  L Hand General: 4+/5  LUE Strength Comment: 4 to 4+/5 grossly. Equal to RUE  RUE Strength  Gross RUE Strength: WFL  R Hand General: 4+/5  RUE Strength Comment: 4 to 4+/5 grossly. Equal to [address]  Times per week: 3x/week  Current Treatment Recommendations: Endurance Training,Balance Training,Functional Mobility Training,Self-Care / ADL,Home Management Training,Safety Education & Training,Patient/Caregiver Education & Training    AM-PAC Score        AM-PAC Inpatient Daily Activity Raw Score: 19 (04/18/22 1006)  AM-PAC Inpatient ADL T-Scale Score : 40.22 (04/18/22 1006)  ADL Inpatient CMS 0-100% Score: 42.8 (04/18/22 1006)  ADL Inpatient CMS G-Code Modifier : CK (04/18/22 1006)    Goals  Short term goals  Time Frame for Short term goals: 1 week (4/25) unless stated otherwise. Short term goal 1: Pt will perform bathroom mobility with supervision and AD PRN. Short term goal 2: Pt will toilet with supervision and AD PRN. Short term goal 3: Pt will LB dress with supervision and AD PRN. Short term goal 4: Pt will perform BUE ther ex x20 to increase endurance for functional activity (4/23). Patient Goals   Patient goals : \"to go home\", \"to eat my breakfast\"       Therapy Time   Individual Concurrent Group Co-treatment   Time In 0850         Time Out 0930         Minutes 40         Timed Code Treatment Minutes: 30 Minutes (10minute evaluation)       Asha Sena, OTR/L  If pt discharges prior to next session, this note will serve as discharge summary. See case management note for discharge disposition.

## 2022-04-18 NOTE — CARE COORDINATION
CASE MANAGEMENT INITIAL ASSESSMENT      Reviewed chart and completed assessment with patient: Yes  Family present:  Yes  Explained Case Management role/services. yes    Primary contact information:  [facility] Decision Maker :   Primary Decision Maker: Johan Paul Spouse - [phone]          Can this person be reached and be able to respond quickly, such as within a few minutes or hours? Yes    Admit date/status: OP, 4/17/22  Diagnosis: Anticoagulated, Stroke-like symptoms, Acute focal neurological deficit. Is this a Readmission?:  No      Insurance: [facility]. Precert required for SNF: Yes       3 night stay required: No    Living arrangements, Adls, care needs, prior to admission: Lives in a two story house with spouse and 26 YO grandson. Has some help with ADL's, walks independently and spouse is retired and there with pt 24/7 at home. Durable Medical Equipment at home:  Walker_2WW_Cane__RTS__ BSC__Shower Chair_X_  02__ HHN__ CPAP__  BiPap__  Hospital Bed__ W/C___ Other_grab bars____    Services in the home and/or outpatient, prior to admission: None    Current PCP: Maddie Gallo MD    Transportation needs:  Family      PT/OT recs: Home with assist PRN,Home with [address] Exemption Notification (HEN): Needed for SNF, not initiated. Barriers to discharge: None    Plan/comments: CM met with pt and spouse at bedside for initial assessment and to discuss therapy recs for Home PT/OT. Both in agreement and would like referral to [facility]. Referral placed to [facility] with [facility].  CM following-Jordana Thorne RN        ECOC on chart for MD signature

## 2022-04-18 NOTE — PROGRESS NOTES
Patient and family given discharge instructions. All questions and concerns were addressed. IV and tele removed without complications. Patient wheeled to car with all patient belongings.

## 2022-04-19 NOTE — DISCHARGE SUMMARY
Hospital Medicine Discharge Summary    Patient ID: Estefany Leigh      Patient's PCP: Sonam Kaur MD    Admit Date: 4/17/2022     Discharge Date: 4/18/2022      Admitting Provider: Farzaneh Brooks MD     Discharge Provider: JARROD Gibson - CNP     Discharge Diagnoses: Active Hospital Problems    Diagnosis     Stroke-like symptoms [R29.90]     Acute focal neurological deficit [R29.818]     Cerebrovascular accident (CVA) ([facility] [I63.9]     PAF (paroxysmal atrial fibrillation) ([facility] [I48.0]     HTN (hypertension), benign [I10]     CAD in native artery [I25.10]     [facility] 2, [facility] [J09.3]     JARRETT on CPAP [G47.33, Z99.89]     Paroxysmal atrial fibrillation ([facility] [I48.0]     Essential hypertension [I10]     Hx of L-ICA CVA [I63.232]     Morbid obesity with BMI of 40.0-44.9, adult ([facility] [E66.01, Z68.41]        The patient was seen and examined on day of discharge and this discharge summary is in conjunction with any daily progress note from day of discharge. Hospital Course:   Estefany Leigh is a 73 y.o. female who presented to the ED to be evaluated for sudden onset right facial paresthesia, RUE weakness, and gait instability. The pt has a remote history of recurrent CVAs, and was concerned that she was having another stroke. Pt has history of PAF and is on chronic Eliquis anticoagulation.     Upon arrival to the ED EKG was obtained revealing NSR with low voltage QRS. Head CT revealed no acute intracranial abnormality; old infarcts noted in the left cerebellum, right frontotemporal, and bilateral parietal lobes. CXR demonstrated mild pulmonary vascular congestion with basilar atelectasis. Notable labs include: Serial troponin negative x2, , hyperglycemia 184, and H/H 11.3/35. 8. Full dose aspirin was ordered by ED provider prior to request for admission.     Acute transient right facial and arm paresthesia, likely TIA:  - CT head revealed old infarcts.   - CTA head/neck without large vessel occlusion.   - ECHO without evidence of PFO or thrombus. - MRI brain showed no acute intracranial abnormality, extensive areas of chronic infarct involving the supratentorial and infratentorial comparments billaterally, mild global parenchymal volume loss with mod to severe chronic microvascular ischemic changes. - Continue aspirin, statin and eliquis. - Neurology consulted while inpt.   - PT/OT/SLP evaluations. Paroxsymal atrial fibrillation:  - Currently in NSR.   - Continue her home eliquis, metoprolol and cardizem. Hypertension:  - Controlled. Continue her home meds. Diabetes mellitus type 2:  - Controlled, A1C 6.5.  - SSI while inpt, resume home regimen at SD. - Carb-control diet. Obstructive sleep apnea:  - Continue CPAP QHS and during the day with naps. Morbid obesity:  - With Body mass index is 41.01 kg/m². Complicating assessment and treatment. Placing patient at risk for multiple co-morbidities as well as early death and contributing to the patient's presentation. Counseled on weight loss. Physical Exam Performed:     BP 99/66   Pulse 65   Temp 97.5 °F (36.4 °C) (Oral)   Resp 16   Ht 5' (1.524 m)   Wt 210 lb (95.3 kg)   LMP  (LMP Unknown)   SpO2 92%   BMI 41.01 kg/m²       General appearance:  No apparent distress, appears stated age and cooperative. HEENT:  Normal cephalic, atraumatic without obvious deformity. Pupils equal, round, and reactive to light. Extra ocular muscles intact. Conjunctivae/corneas clear. Neck: Supple, with full range of motion. No jugular venous distention. Trachea midline. Respiratory:  Normal respiratory effort. Clear to auscultation, bilaterally without Rales/Wheezes/Rhonchi. Cardiovascular:  Regular rate and rhythm with normal S1/S2 without murmurs, rubs or gallops. Abdomen: Soft, non-tender, non-distended with normal bowel sounds.   Musculoskeletal:  No clubbing, cyanosis or edema bilaterally. Full range of motion without deformity. Skin: Skin color, texture, turgor normal.  No rashes or lesions. Neurologic:  Neurovascularly intact without any focal sensory/motor deficits. Cranial nerves: II-XII intact, grossly non-focal.  Psychiatric:  Alert and oriented, thought content appropriate, normal insight  Capillary Refill: Brisk,< 3 seconds   Peripheral Pulses: +2 palpable, equal bilaterally       Labs: For convenience and continuity at follow-up the following most recent labs are provided:      CBC:    Lab Results   Component Value Date    WBC 6.1 04/18/2022    HGB 10.3 04/18/2022    HCT 31.4 04/18/2022    PLT 91 04/18/2022       Renal:    Lab Results   Component Value Date     04/18/2022    K 3.9 04/18/2022     04/18/2022    CO2 27 04/18/2022    BUN 13 04/18/2022    CREATININE 0.9 04/18/2022    CALCIUM 9.1 04/18/2022    PHOS 3.3 04/03/2018         Significant Diagnostic Studies    Radiology:   MRI brain without contrast   Final Result   1. No acute intracranial abnormality. No acute infarct. 2. Extensive areas of chronic infarct involving the supratentorial and   infratentorial compartments bilaterally. 3. Mild global parenchymal volume loss with moderate to severe chronic   microvascular ischemic changes. XR CHEST PORTABLE   Final Result      Probable mild degree of vascular congestion with minimal basilar lung   scarring or atelectasis but otherwise no acute pulmonary infiltrate or   significant change noted. CTA HEAD NECK W CONTRAST   Final Result   Distal cortical branches of bilateral MCA cannot be well visualized. The   finding is likely sequela to old infarction and appears unchanged since prior   CTA of 2019. Otherwise unremarkable CTA of the head and neck. No hemodynamically   significant stenosis. No aneurysm. .      For detailed description of extensive areas of encephalomalacia of supra and   infratentorial structures please refer to the same-day head CT report. Segundo Massey RECOMMENDATIONS:   Unavailable         CT Head WO Contrast   Final Result   No acute intracranial abnormality. Old infarcts noted in the left   cerebellum, right frontotemporal lobes, right parietal lobe, and left   parietal lobe. Small chronic right frontal extra-axial collection containing   some calcifications.                 Consults:     IP CONSULT TO PHARMACY  PHARMACY TO CHANGE BASE FLUIDS  IP CONSULT TO HOSPITALIST  IP CONSULT TO NEUROLOGY  IP CONSULT TO HOME CARE NEEDS    Disposition:  Home     Condition at Discharge: Stable    Discharge Instructions/Follow-up:  Follow-up with PCP     Code Status:  Full code     Activity: activity as tolerated    Diet: diabetic diet      Discharge Medications:     Discharge Medication List as of 4/18/2022  4:43 PM           Details   atorvastatin (LIPITOR) 40 MG tablet Take 1 tablet by mouth nightly, Disp-30 tablet, R-3Normal              Details   metoprolol tartrate (LOPRESSOR) 50 MG tablet TAKE 1 TABLET BY MOUTH TWO TIMES A DAY , Disp-180 tablet, R-3Normal      dilTIAZem (CARDIZEM CD) 120 MG extended release capsule Take 1 capsule by mouth daily, Disp-30 capsule, R-11Normal      aspirin EC 81 MG EC tablet Take 1 tablet by mouth daily, Disp-90 tablet, R-1OTC      dextromethorphan-guaiFENesin (MUCINEX DM)  MG per extended release tablet Take 1 tablet by mouth every 12 hours as neededHistorical Med      apixaban (ELIQUIS) 5 MG TABS tablet Take 1 tablet by mouth 2 times daily, Disp-60 tablet, R-0Normal      omeprazole (PRILOSEC) 40 MG delayed release capsule Take 40 mg by mouth dailyHistorical Med      ferrous sulfate 325 (65 Fe) MG tablet Take 325 mg by mouth daily (with breakfast)Historical Med      folic acid (FOLVITE) 1 MG tablet Take 1 mg by mouth dailyHistorical Med      montelukast (SINGULAIR) 10 MG tablet Take 10 mg by mouth nightlyHistorical Med      albuterol (PROVENTIL) (2.5 MG/3ML) 0.083% nebulizer solution Take 3 mLs by nebulization every 4 hours as needed for Wheezing or Shortness of Breath, Disp-120 each, R-3Print      metFORMIN ER (GLUCOPHAGE-XR) 500 MG XR tablet Take 2,000 mg by mouth Daily with supper Historical Med      venlafaxine (EFFEXOR-XR) 75 MG XR capsule Take 75 mg by mouth daily. albuterol (PROVENTIL HFA;VENTOLIN HFA) 108 (90 BASE) MCG/ACT inhaler Inhale 2 puffs into the lungs every 6 hours as needed. Time Spent on discharge is more than 45 minutes in the examination, evaluation, counseling and review of medications and discharge plan. Signed:    JARROD Ruggiero CNP   4/18/2022      Thank you Bal Kelly MD for the opportunity to be involved in this patient's care. If you have any questions or concerns please feel free to contact me at [phone].

## 2022-04-22 DIAGNOSIS — I48.0 PAROXYSMAL ATRIAL FIBRILLATION (HCC): Primary | ICD-10-CM

## 2022-04-22 RX ORDER — METOPROLOL TARTRATE 50 MG/1
TABLET, FILM COATED ORAL
Qty: 180 TABLET | Refills: 3 | Status: SHIPPED | OUTPATIENT
Start: 2022-04-22

## 2022-05-04 ENCOUNTER — NURSE ONLY (OUTPATIENT)
Dept: CARDIOLOGY CLINIC | Age: 72
End: 2022-05-04
Payer: MEDICARE

## 2022-05-04 DIAGNOSIS — I63.232 ARTERIAL ISCHEMIC STROKE, ICA, LEFT, ACUTE (HCC): Chronic | ICD-10-CM

## 2022-05-04 DIAGNOSIS — Z45.09 ENCOUNTER FOR LOOP RECORDER CHECK: ICD-10-CM

## 2022-05-05 NOTE — PROGRESS NOTES
Remote transmission received for patients ILR.    EP physician will review.  See interrogation under the cardiology tab in the [address] [facility] [address] field for more details.  Will continue to monitor remotely. dx CVA and r/o atrial arrhythmias. (last recorded PAF 2/2021). Hx PAF. ([facility], lopressor, cardizem).   No AFib recorded. No sustained arrhythmias recorded.

## 2022-05-06 PROCEDURE — G2066 INTER DEVC REMOTE 30D: HCPCS | Performed by: INTERNAL MEDICINE

## 2022-05-06 PROCEDURE — 93298 REM INTERROG DEV EVAL SCRMS: CPT | Performed by: INTERNAL MEDICINE

## 2022-06-08 ENCOUNTER — NURSE ONLY (OUTPATIENT)
Dept: CARDIOLOGY CLINIC | Age: 72
End: 2022-06-08
Payer: MEDICARE

## 2022-06-08 DIAGNOSIS — I48.0 PAROXYSMAL ATRIAL FIBRILLATION (HCC): ICD-10-CM

## 2022-06-08 DIAGNOSIS — I44.0 FIRST DEGREE AV BLOCK: ICD-10-CM

## 2022-06-08 DIAGNOSIS — I63.232 ARTERIAL ISCHEMIC STROKE, ICA, LEFT, ACUTE (HCC): Chronic | ICD-10-CM

## 2022-06-08 DIAGNOSIS — I48.0 PAF (PAROXYSMAL ATRIAL FIBRILLATION) (HCC): ICD-10-CM

## 2022-06-08 DIAGNOSIS — R29.90 STROKE-LIKE SYMPTOMS: ICD-10-CM

## 2022-06-08 DIAGNOSIS — G45.9 TIA (TRANSIENT ISCHEMIC ATTACK): ICD-10-CM

## 2022-06-08 DIAGNOSIS — Z45.09 ENCOUNTER FOR LOOP RECORDER CHECK: ICD-10-CM

## 2022-06-08 PROCEDURE — 93298 REM INTERROG DEV EVAL SCRMS: CPT | Performed by: INTERNAL MEDICINE

## 2022-06-08 PROCEDURE — G2066 INTER DEVC REMOTE 30D: HCPCS | Performed by: INTERNAL MEDICINE

## 2022-06-09 NOTE — PROGRESS NOTES
Remote transmission received for patients ILR.    EP physician will review.  See interrogation under the cardiology tab in the [address] [facility] [address] field for more details.  Will continue to monitor remotely. dx CVA and r/o atrial arrhythmias. (last recorded PAF 2/2021). Hx PAF. ([facility], [facility]).   No AFib recorded. 1 event recorded as AF shows sinus w/ V bigeminy. No symptom activated events.

## 2022-06-17 ENCOUNTER — TELEPHONE (OUTPATIENT)
Dept: CARDIOLOGY CLINIC | Age: 72
End: 2022-06-17

## 2022-06-17 NOTE — TELEPHONE ENCOUNTER
Called and spoke with pt's , relayed to him that samples of Eliquis 5 mg tablets (2 boxes) are available here at the office and ready for .     Pt  verbalized understanding of being able to  samples for wife

## 2022-06-17 NOTE — TELEPHONE ENCOUNTER
Pt's  is requesting samples apixaban (ELIQUIS) 5 MG TABS tablet   Take 1 tablet by mouth 2 times daily. Establishing care on 07/06/22 with NIKI previous pt of Libra Lara. Please advise & thank you.

## 2022-07-06 ENCOUNTER — NURSE ONLY (OUTPATIENT)
Dept: CARDIOLOGY CLINIC | Age: 72
End: 2022-07-06

## 2022-07-06 ENCOUNTER — OFFICE VISIT (OUTPATIENT)
Dept: CARDIOLOGY CLINIC | Age: 72
End: 2022-07-06
Payer: MEDICARE

## 2022-07-06 VITALS
HEIGHT: 60 IN | BODY MASS INDEX: 41.03 KG/M2 | SYSTOLIC BLOOD PRESSURE: 104 MMHG | WEIGHT: 209 LBS | HEART RATE: 63 BPM | DIASTOLIC BLOOD PRESSURE: 64 MMHG | OXYGEN SATURATION: 97 %

## 2022-07-06 DIAGNOSIS — I44.0 FIRST DEGREE AV BLOCK: ICD-10-CM

## 2022-07-06 DIAGNOSIS — I49.3 PVC (PREMATURE VENTRICULAR CONTRACTION): ICD-10-CM

## 2022-07-06 DIAGNOSIS — Z45.09 ENCOUNTER FOR LOOP RECORDER CHECK: ICD-10-CM

## 2022-07-06 DIAGNOSIS — I63.9 CEREBROVASCULAR ACCIDENT (CVA), UNSPECIFIED MECHANISM (HCC): ICD-10-CM

## 2022-07-06 DIAGNOSIS — I48.0 PAROXYSMAL ATRIAL FIBRILLATION (HCC): Primary | ICD-10-CM

## 2022-07-06 PROCEDURE — 99214 OFFICE O/P EST MOD 30 MIN: CPT | Performed by: INTERNAL MEDICINE

## 2022-07-06 PROCEDURE — G8400 PT W/DXA NO RESULTS DOC: HCPCS | Performed by: INTERNAL MEDICINE

## 2022-07-06 PROCEDURE — 1123F ACP DISCUSS/DSCN MKR DOCD: CPT | Performed by: INTERNAL MEDICINE

## 2022-07-06 PROCEDURE — G8427 DOCREV CUR MEDS BY ELIG CLIN: HCPCS | Performed by: INTERNAL MEDICINE

## 2022-07-06 PROCEDURE — 93000 ELECTROCARDIOGRAM COMPLETE: CPT | Performed by: INTERNAL MEDICINE

## 2022-07-06 PROCEDURE — 3017F COLORECTAL CA SCREEN DOC REV: CPT | Performed by: INTERNAL MEDICINE

## 2022-07-06 PROCEDURE — 1090F PRES/ABSN URINE INCON ASSESS: CPT | Performed by: INTERNAL MEDICINE

## 2022-07-06 PROCEDURE — 1036F TOBACCO NON-USER: CPT | Performed by: INTERNAL MEDICINE

## 2022-07-06 PROCEDURE — G8417 CALC BMI ABV UP PARAM F/U: HCPCS | Performed by: INTERNAL MEDICINE

## 2022-07-06 RX ORDER — CYCLOSPORINE 0.5 MG/ML
EMULSION OPHTHALMIC
COMMUNITY
Start: 2022-06-10

## 2022-07-06 NOTE — PROGRESS NOTES
Patient comes in for interrogation of their implanted loop recorder. Patient has a history of CVA, 1st degree AVB, pAF, TIA, and PVCs. Takes lopressor, Eliquis, and Cardizem. Patient's last device interrogation was on 6/8. Since last interrogation, 1 pause recorded shows undersensing. Patient will see Dr. Delfin Carter today in office. See [employer] report under the Cardiology tab. We will follow the patient remotely.

## 2022-07-06 NOTE — PROGRESS NOTES
[facility]   Cardiac Consultation    Date: 7/6/22  Patient Name: Isela Rios  YOB: 1950    Primary Care Physician: Jaime Diaz MD    CHIEF COMPLAINT:   Chief Complaint   Patient presents with    New Patient    Device Check    Atrial Fibrillation    Coronary Artery Disease    Other     PVCs     Cerebrovascular Accident     HPI:  Isela Rios is a 73 y.o. female with a past medical history of PAF, HTN, recurrent CVA, DVT, and DM. In 2013 she was admitted with chest pain. LHC in 12/2013 showed was normal. Echo in 10/2015 showed an EF of 55%. She had an acute left posterior parietal/occipital CVA in 10/2015 while on Xarelto. She is followed by neurology and Xarelto and ASA were resumed. She was admitted in 4/2018 with CVA with hemorrhagic conversion and anticoagulation was stopped. She was readmitted in 10/2018 with recurrent CVA and Eliquis was started. ALISTAIR was negative. Patient was readmitted in 4/2019 with another CVA while on Eliquis and ASA. In 7/2019 she had a loop recorder to determine arrhythmia burden due to recurrent CVA on anticoagulation. Brief PAF has been detected. Echo 4/2022 demonstrated an LVEF of 55-60%. Device interrogation 5/7/2022 demonstrated possible AF vs. Artifact. Today, patient's device interrogation demonstrated normal function with no new events. Patient reports that she is doing well. She reports she has some residual memory loss, confusion, and trouble with reading since her CVA in 2018. She reports that several months ago she felt her heart beating irregularly for several seconds but has not felt anything since. She reports she is not very active and attributes this to chronic knee pain. Patient is taking all cardiac medications as prescribed and tolerates them well. Patient denies current edema, chest pain, sob, dizziness or syncope.      Past Medical History:   has a past medical history of Arthritis, Asthma, Depression, Diabetes mellitus ([facility] [address] GERD (gastroesophageal reflux disease), HCAP (healthcare-associated pneumonia), Hypertension, Mycobacterium gordonae infection, Paroxysmal atrial fibrillation ([facility] [address] Sepsis ([facility] [address] TIA (transient ischemic attack), and Unspecified cerebral artery occlusion with cerebral infarction. Surgical History:   has a past surgical history that includes Hysterectomy; Cholecystectomy; Appendectomy; Breast cyst incision and drainage (1986); Colonoscopy (01/19/2017); Upper gastrointestinal endoscopy (01/19/2017); and Breast biopsy. Social History:   reports that she has never smoked. She has never used smokeless tobacco. She reports that she does not drink alcohol and does not use drugs. Family History:  family history includes Cancer in her mother.      Home Medications:  Outpatient Encounter Medications as of 7/6/2022   Medication Sig Dispense Refill    RESTASIS 0.05 % ophthalmic emulsion INSTILL 1 DROP IN EACH EYE EVERY 12 HOURS      metoprolol tartrate (LOPRESSOR) 50 MG tablet TAKE 1 TABLET BY MOUTH TWO TIMES A  tablet 3    atorvastatin (LIPITOR) 40 MG tablet Take 1 tablet by mouth nightly 30 tablet 3    dilTIAZem (CARDIZEM CD) 120 MG extended release capsule Take 1 capsule by mouth daily 30 capsule 11    aspirin EC 81 MG EC tablet Take 1 tablet by mouth daily 90 tablet 1    dextromethorphan-guaiFENesin (MUCINEX DM)  MG per extended release tablet Take 1 tablet by mouth every 12 hours as needed      apixaban (ELIQUIS) 5 MG TABS tablet Take 1 tablet by mouth 2 times daily 60 tablet 0    ferrous sulfate 325 (65 Fe) MG tablet Take 325 mg by mouth daily (with breakfast)      folic acid (FOLVITE) 1 MG tablet Take 1 mg by mouth daily      montelukast (SINGULAIR) 10 MG tablet Take 10 mg by mouth nightly      albuterol (PROVENTIL) (2.5 MG/3ML) 0.083% nebulizer solution Take 3 mLs by nebulization every 4 hours as needed for Wheezing or Shortness of Breath 120 each 3    metFORMIN ER (GLUCOPHAGE-XR) 500 MG XR tablet Take 2,000 mg by mouth Daily with supper       venlafaxine (EFFEXOR-XR) 75 MG XR capsule Take 75 mg by mouth daily.  albuterol (PROVENTIL HFA;VENTOLIN HFA) 108 (90 BASE) MCG/ACT inhaler Inhale 2 puffs into the lungs every 6 hours as needed.  omeprazole (PRILOSEC) 40 MG delayed release capsule Take 40 mg by mouth daily (Patient not taking: Reported on 4/17/2022)       No facility-administered encounter medications on file as of 7/6/2022. Allergies:  Acyclovir, Ace inhibitors, Amoxicillin, Cephalosporins, Oxycodone-acetaminophen, Penicillins, and Percocet [oxycodone-acetaminophen]     Review of Systems   Constitutional: Negative. HENT: Negative. Eyes: Negative. Respiratory: Negative. Cardiovascular: Negative. Gastrointestinal: Negative. Genitourinary: Negative. Musculoskeletal: Negative. Skin: Negative. Neurological: Negative. Hematological: Negative. Psychiatric/Behavioral: Negative. /64   Pulse 63   Ht 5' (1.524 m)   Wt 209 lb (94.8 kg)   LMP  (LMP Unknown)   SpO2 97%   BMI 40.82 kg/m²     DATA:    ECG 7/6/22: Personally reviewed. All current cardiac medications reviewed and adjusted accordingly  7/6/22    Device interrogation reviewed and adjusted accordingly 7/6/22    ECHO 4/2022:   Summary   Technically difficult study due to body surface area and poor acoustic   window. Normal left ventricular systolic function with ejection fraction of 55-60%. No regional wall motion abnormalites are seen. Normal left ventricular size with mild concentric left ventricular   hypertrophy. Grade I diastolic dysfunction with normal filling pressure. A bubble study was performed and fails to show evidence of shunting. Objective:  Physical Exam   Constitutional: She is oriented to person, place, and time. She appears well-developed and well-nourished. HENT:   Head: Normocephalic and atraumatic.    Eyes: Pupils are equal, round, and reactive to light. Neck: Normal range of motion. Cardiovascular: Normal rate, regular rhythm and normal heart sounds. Pulmonary/Chest: Effort normal and breath sounds normal.   Abdominal: Soft. No tenderness. Musculoskeletal: Normal range of motion. She exhibits no edema. Neurological: She is alert and oriented to person, place, and time. Skin: Skin is warm and dry. Psychiatric: She has a normal mood and affect. Assessment:  1. CVA- x3 (2015 while on Xarelto, 4/2018 hemorrhagic CVA while on xarelto (discontinued), 10/2018 (Eliquis started). ILR implanted in 2019 and has not shown a clear recurrence of AF since implant. 2. PAF- diagnosed in 2013. No clear recurrence since. ILR 5/7/2022 did show possible AF vs. Artifact. Continue Eliquis as patient has had no recurrence of CVA while on taking Eliquis. Plan:  1. Continue remote device checks every month. 2. Continue cardiac medications as prescribed. 3. Follow up with EP NP in 1 year. QUALITY MEASURES  1. Tobacco Cessation Counseling: NA  2. Retake of BP if >140/90:   NA  3. Documentation to PCP/referring for new patient:  Sent to PCP at close of office visit  4. CAD patient on anti-platelet: NA  5. CAD patient on STATIN therapy:  NA  6. Patient with CHF and aFib on anticoagulation:  Yes    This note has been scribed in the presence of Pina Santamaria MD, by Lainey Saldana RN.     I, Dr. Pina Santamaria, personally performed the services described in this documentation as scribed by Lainey Saldana RN in my presence, and it is both accurate and complete.       Pina Santamaria M.D.

## 2022-07-13 ENCOUNTER — NURSE ONLY (OUTPATIENT)
Dept: CARDIOLOGY CLINIC | Age: 72
End: 2022-07-13

## 2022-07-13 DIAGNOSIS — Z45.09 ENCOUNTER FOR LOOP RECORDER CHECK: ICD-10-CM

## 2022-07-15 NOTE — PROGRESS NOTES
Remote transmission received for patients ILR. EP physician will review. See interrogation under the cardiology tab in the [address] [facility] field for more details. Will continue to monitor remotely. dx CVA and r/o atrial arrhythmias. (last recorded PAF 2/2021). Hx PAF. ([address] [facility], [facility]). No AFib recorded. 1 event recorded as AF shows sinus w/ V bigeminy. End of 31-day monitoring period 7/13/22. Pause recording is under sensed.

## 2022-07-25 ENCOUNTER — TELEPHONE (OUTPATIENT)
Dept: CARDIOLOGY CLINIC | Age: 72
End: 2022-07-25

## 2022-07-25 NOTE — TELEPHONE ENCOUNTER
Spoke with the patient and explained we are out of eliquis samples at this time.  Patient placed on the call back list. She V/U.

## 2022-08-17 ENCOUNTER — NURSE ONLY (OUTPATIENT)
Dept: CARDIOLOGY CLINIC | Age: 72
End: 2022-08-17
Payer: MEDICARE

## 2022-08-17 DIAGNOSIS — Z45.09 ENCOUNTER FOR LOOP RECORDER CHECK: ICD-10-CM

## 2022-08-17 DIAGNOSIS — I63.232 ARTERIAL ISCHEMIC STROKE, ICA, LEFT, ACUTE (HCC): Primary | Chronic | ICD-10-CM

## 2022-08-17 PROCEDURE — 93298 REM INTERROG DEV EVAL SCRMS: CPT | Performed by: INTERNAL MEDICINE

## 2022-08-17 PROCEDURE — G2066 INTER DEVC REMOTE 30D: HCPCS | Performed by: INTERNAL MEDICINE

## 2022-08-19 ENCOUNTER — HOSPITAL ENCOUNTER (EMERGENCY)
Age: 72
Discharge: HOME | End: 2022-08-19
Payer: MEDICARE

## 2022-08-19 VITALS
HEIGHT: 60 IN | RESPIRATION RATE: 16 BRPM | BODY MASS INDEX: 40.82 KG/M2 | TEMPERATURE: 98 F | DIASTOLIC BLOOD PRESSURE: 61 MMHG | HEART RATE: 80 BPM | OXYGEN SATURATION: 98 % | SYSTOLIC BLOOD PRESSURE: 134 MMHG

## 2022-08-19 DIAGNOSIS — H61.22 IMPACTED CERUMEN OF LEFT EAR: Primary | ICD-10-CM

## 2022-08-19 PROCEDURE — 69209 REMOVE IMPACTED EAR WAX UNI: CPT

## 2022-08-19 PROCEDURE — 99283 EMERGENCY DEPT VISIT LOW MDM: CPT

## 2022-08-19 ASSESSMENT — PAIN - FUNCTIONAL ASSESSMENT: PAIN_FUNCTIONAL_ASSESSMENT: NONE - DENIES PAIN

## 2022-08-19 NOTE — PROGRESS NOTES
Remote transmission received for patients ILR. EP physician will review. See interrogation under the cardiology tab in the [address] [facility] [address] field for more details. Will continue to monitor remotely. dx CVA and r/o atrial arrhythmias. (last recorded PAF 2/2021). Hx PAF. ([address] [facility], [facility]). No AFib recorded. 1 event recorded as AF shows sinus w/ V bigeminy. End of 31-day monitoring period 8/17/22  Pause recording is under sensed.

## 2022-08-21 NOTE — ED PROVIDER NOTES
[address] [place] [address]  ED  EMERGENCY DEPARTMENT ENCOUNTER      This patient was not seen and evaluated by the attending physician. 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  states patient \"is not herself. \" States s/s x 1 week. HISTORY OF PRESENT ILLNESS:      Paz Dominguez is a 73 y.o. female who presents to [address] [place] [address]  ED with complaints of hearing loss. Patient has been states the patient is deaf in her right ear but now seems to be having trouble hearing out of the left ear, states that she is not talking as much because he thinks that she cannot hear. States that this been going on for about a week. No fevers, patient does not appear to be distressed, she is hard of hearing. Here for further evaluation. LOCATION:-  QUALITY:-  SEVERITY:-  DURATION:-  MODIFYING FACTORS:-    Nursing Notes were reviewed     REVIEW OF SYSTEMS:     Review of Systems  All systems, a total of 10, are reviewed and negative except for those that were just noted in history present illness.         PAST MEDICAL HISTORY:     Past Medical History:   Diagnosis Date    Arthritis     Asthma     Depression     Diabetes mellitus (HCC)     GERD (gastroesophageal reflux disease)     HCAP (healthcare-associated pneumonia)     Hypertension     Mycobacterium gordonae infection      5/7/18 + afb called from [address] pending  ( from [address] 3/29/18)    Paroxysmal atrial fibrillation (HCC)     Sepsis ([facility] [address]     TIA (transient ischemic attack)     Unspecified cerebral artery occlusion with cerebral infarction          SURGICAL HISTORY:      Past Surgical History: Procedure Laterality Date    APPENDECTOMY      BREAST BIOPSY      CHOLECYSTECTOMY      COLONOSCOPY  01/19/2017    polyp    CYST INCISION AND DRAINAGE  1986    on head    HYSTERECTOMY (CERVIX STATUS UNKNOWN)      UPPER GASTROINTESTINAL ENDOSCOPY  01/19/2017    barretts ? CURRENT MEDICATIONS:       Discharge Medication List as of 8/19/2022 11:25 PM        CONTINUE these medications which have NOT CHANGED    Details   RESTASIS 0.05 % ophthalmic emulsion INSTILL 1 DROP IN EACH EYE EVERY 12 HOURS, DAWHistorical Med      metoprolol tartrate (LOPRESSOR) 50 MG tablet TAKE 1 TABLET BY MOUTH TWO TIMES A DAY, Disp-180 tablet, R-3Normal      atorvastatin (LIPITOR) 40 MG tablet Take 1 tablet by mouth nightly, Disp-30 tablet, R-3Normal      dilTIAZem (CARDIZEM CD) 120 MG extended release capsule Take 1 capsule by mouth daily, Disp-30 capsule, R-11Normal      aspirin EC 81 MG EC tablet Take 1 tablet by mouth daily, Disp-90 tablet, R-1OTC      dextromethorphan-guaiFENesin (MUCINEX DM)  MG per extended release tablet Take 1 tablet by mouth every 12 hours as neededHistorical Med      apixaban (ELIQUIS) 5 MG TABS tablet Take 1 tablet by mouth 2 times daily, Disp-60 tablet, R-0Normal      omeprazole (PRILOSEC) 40 MG delayed release capsule Take 40 mg by mouth dailyHistorical Med      ferrous sulfate 325 (65 Fe) MG tablet Take 325 mg by mouth daily (with breakfast)Historical Med      folic acid (FOLVITE) 1 MG tablet Take 1 mg by mouth dailyHistorical Med      montelukast (SINGULAIR) 10 MG tablet Take 10 mg by mouth nightlyHistorical Med      albuterol (PROVENTIL) (2.5 MG/3ML) 0.083% nebulizer solution Take 3 mLs by nebulization every 4 hours as needed for Wheezing or Shortness of Breath, Disp-120 each, R-3Print      metFORMIN ER (GLUCOPHAGE-XR) 500 MG XR tablet Take 2,000 mg by mouth Daily with supper Historical Med      venlafaxine (EFFEXOR-XR) 75 MG XR capsule Take 75 mg by mouth daily.         albuterol (PROVENTIL HFA;VENTOLIN HFA) 108 (90 BASE) MCG/ACT inhaler Inhale 2 puffs into the lungs every 6 hours as needed. ALLERGIES:    Acyclovir, Ace inhibitors, Amoxicillin, Cephalosporins, Oxycodone-acetaminophen, Penicillins, and Percocet [oxycodone-acetaminophen]    FAMILY HISTORY:       Family History   Problem Relation Age of Onset    Cancer Mother         breast ca          SOCIAL HISTORY:     Social History     Socioeconomic History    Marital status:      Spouse name: None    Number of children: None    Years of education: None    Highest education level: None   Tobacco Use    Smoking status: Never    Smokeless tobacco: Never   Vaping Use    Vaping Use: Never used   Substance and Sexual Activity    Alcohol use: No     Alcohol/week: 0.0 standard drinks    Drug use: No    Sexual activity: Never       SCREENINGS:    Debby Coma Scale  Eye Opening: Spontaneous  Best Verbal Response: Oriented  Best Motor Response: Obeys commands  Hamilton Coma Scale Score: 15        PHYSICAL EXAM:       ED Triage Vitals [08/19/22 2151]   BP Temp Temp Source Heart Rate Resp SpO2 Height Weight   134/61 98 °F (36.7 °C) Oral 80 16 98 % 5' (1.524 m) --       Physical Exam    CONSTITUTIONAL: Awake and alert. Cooperative. Well-developed. Well-nourished. Vitals:    08/19/22 2151   BP: 134/61   Pulse: 80   Resp: 16   Temp: 98 °F (36.7 °C)   TempSrc: Oral   SpO2: 98%   Height: 5' (1.524 m)     HENT: Normocephalic. Atraumatic. External ears normal, without discharge. There is a large amount of cerumen present within the left external auditory canal, obscuring the tympanic membrane. Nonasal discharge. Mucous membranes moist.  EYES: Conjunctiva non-injected, no lid abnormalities noted. No scleral icterus. EOM's grossly intact. Anterior chambers clear. NECK: Supple. Normal ROM. No meningismus. CARDIOVASCULAR: no tachycardia per vital signs. PULMONARY/CHEST WALL: Effort normal. No tachypnea.  No audible adventitious breath sounds. ABDOMEN: No obvious abdominal distention, no obvious hernias. Back: Spine is midline. No obvious trauma or outward signs of cauda equina  /ANORECTAL: Not assessed  MUSKULOSKELETAL: Normal ROM. No acute deformities. No edema. SKIN: Warm and dry. NEUROLOGICAL:  GCS 15. No obvious focal neurological deficits. PSYCHIATRIC: Normal affect        DIAGNOSTIC RESULTS:     LABS:    No results found for this visit on 08/19/22. RADIOLOGY:  All x-ray studies are viewed/reviewed by me. Formal interpretations per the radiologist are as follows: No orders to display           EKG:  See EKG interpretation by an attending physician. PROCEDURES:   N/A    CRITICAL CARE TIME:   N/A    CONSULTS:  None      EMERGENCY DEPARTMENT COURSE andDIFFERENTIAL DIAGNOSIS/MDM:   Vitals:    Vitals:    08/19/22 2151   BP: 134/61   Pulse: 80   Resp: 16   Temp: 98 °F (36.7 °C)   TempSrc: Oral   SpO2: 98%   Height: 5' (1.524 m)       Patient wasgiven the following medications:  Medications - No data to display      Patient was evaluated independently by myself with the attending physician available for consultation. Patient presented to the emergency room today with complaints of hearing loss in the left ear, patient already is deaf in the right ear. I was able to remove a moderate amount of cerumen however there is still remains a significant amount of cerumen within the external auditory canal, I did start the patient on Debrox as an outpatient. I discussed this with patient  who agrees with plan for discharge home to use Debrox as an outpatient, they were given strict return precautions. They do a follow-up with PCP as an outpatient. Patient discharged in good condition. Patient laboratory studies, radiographic imaging, and assessment were all discussed with the patient and/orpatient family.   There was shared decision-making between myself as well as the patient and/or their surrogate and we are all in agreement with discharge home. There was an opportunity for questions and all questions were answered tothe best of my ability and to the satisfaction of the patient and/or patient family. FINAL IMPRESSION:      1.  Impacted cerumen of left ear          DISPOSITION/PLAN:   DISPOSITION Decision To Discharge      PATIENT REFERRED TO:  Trista Gilliam  [address] [place] [address]  [phone]    Call   For follow up      DISCHARGE MEDICATIONS:  Discharge Medication List as of 8/19/2022 11:25 PM        START taking these medications    Details   carbamide peroxide (DEBROX) 6.5 % otic solution Place 5 drops into the left ear 2 times daily, Disp-15 mL, R-0Normal                        (Please note thatportions of this note were completed with a voice recognition program.  Efforts were made to edit the dictations, but occasionally words are mis-transcribed.)    JARROD Vegsa - CNP-C (electronicallysigned)      JARROD Vegas CNP  08/20/22 2039

## 2022-08-22 ENCOUNTER — HOSPITAL ENCOUNTER (INPATIENT)
Age: 72
LOS: 2 days | Discharge: HOME HEALTH | DRG: 065 | End: 2022-08-25
Attending: STUDENT IN AN ORGANIZED HEALTH CARE EDUCATION/TRAINING PROGRAM | Admitting: INTERNAL MEDICINE
Payer: MEDICARE

## 2022-08-22 ENCOUNTER — APPOINTMENT (OUTPATIENT)
Dept: GENERAL RADIOLOGY | Age: 72
DRG: 065 | End: 2022-08-22
Payer: MEDICARE

## 2022-08-22 ENCOUNTER — APPOINTMENT (OUTPATIENT)
Dept: CT IMAGING | Age: 72
DRG: 065 | End: 2022-08-22
Payer: MEDICARE

## 2022-08-22 DIAGNOSIS — R41.82 ALTERED MENTAL STATUS, UNSPECIFIED ALTERED MENTAL STATUS TYPE: Primary | ICD-10-CM

## 2022-08-22 PROBLEM — R41.3 MEMORY LOSS DUE TO MEDICAL CONDITION: Status: ACTIVE | Noted: 2022-08-22

## 2022-08-22 PROBLEM — R41.89 COGNITIVE DEFICITS: Status: ACTIVE | Noted: 2022-08-22

## 2022-08-22 LAB
A/G RATIO: 0.9 (ref 1.1–2.2)
ALBUMIN SERPL-MCNC: 4.4 G/DL (ref 3.4–5)
ALP BLD-CCNC: 174 U/L (ref 40–129)
ALT SERPL-CCNC: 19 U/L (ref 10–40)
ANION GAP SERPL CALCULATED.3IONS-SCNC: 13 MMOL/L (ref 3–16)
AST SERPL-CCNC: 36 U/L (ref 15–37)
BASOPHILS ABSOLUTE: 0.1 K/UL (ref 0–0.2)
BASOPHILS RELATIVE PERCENT: 0.8 %
BILIRUB SERPL-MCNC: 0.5 MG/DL (ref 0–1)
BILIRUBIN URINE: ABNORMAL
BLOOD, URINE: NEGATIVE
BUN BLDV-MCNC: 18 MG/DL (ref 7–20)
CALCIUM SERPL-MCNC: 10.6 MG/DL (ref 8.3–10.6)
CHLORIDE BLD-SCNC: 100 MMOL/L (ref 99–110)
CLARITY: ABNORMAL
CO2: 23 MMOL/L (ref 21–32)
COLOR: YELLOW
CREAT SERPL-MCNC: 1 MG/DL (ref 0.6–1.2)
EOSINOPHILS ABSOLUTE: 0.2 K/UL (ref 0–0.6)
EOSINOPHILS RELATIVE PERCENT: 2.5 %
EPITHELIAL CELLS, UA: ABNORMAL /HPF (ref 0–5)
GFR AFRICAN AMERICAN: >60
GFR NON-AFRICAN AMERICAN: 55
GLUCOSE BLD-MCNC: 147 MG/DL (ref 70–99)
GLUCOSE BLD-MCNC: 174 MG/DL (ref 70–99)
GLUCOSE URINE: NEGATIVE MG/DL
HCT VFR BLD CALC: 36.1 % (ref 36–48)
HEMOGLOBIN: 11.6 G/DL (ref 12–16)
HYALINE CASTS: ABNORMAL /LPF (ref 0–2)
KETONES, URINE: ABNORMAL MG/DL
LEUKOCYTE ESTERASE, URINE: NEGATIVE
LYMPHOCYTES ABSOLUTE: 2.3 K/UL (ref 1–5.1)
LYMPHOCYTES RELATIVE PERCENT: 28.3 %
MCH RBC QN AUTO: 29.5 PG (ref 26–34)
MCHC RBC AUTO-ENTMCNC: 32.2 G/DL (ref 31–36)
MCV RBC AUTO: 91.6 FL (ref 80–100)
MICROSCOPIC EXAMINATION: YES
MONOCYTES ABSOLUTE: 0.5 K/UL (ref 0–1.3)
MONOCYTES RELATIVE PERCENT: 6.6 %
MUCUS: ABNORMAL /LPF
NEUTROPHILS ABSOLUTE: 5.1 K/UL (ref 1.7–7.7)
NEUTROPHILS RELATIVE PERCENT: 61.8 %
NITRITE, URINE: NEGATIVE
PDW BLD-RTO: 14.8 % (ref 12.4–15.4)
PERFORMED ON: ABNORMAL
PH UA: 6 (ref 5–8)
PLATELET # BLD: 122 K/UL (ref 135–450)
PMV BLD AUTO: 12.4 FL (ref 5–10.5)
POTASSIUM SERPL-SCNC: 4.1 MMOL/L (ref 3.5–5.1)
PRO-BNP: 512 PG/ML (ref 0–124)
PROTEIN UA: ABNORMAL MG/DL
RBC # BLD: 3.94 M/UL (ref 4–5.2)
RBC UA: ABNORMAL /HPF (ref 0–4)
SODIUM BLD-SCNC: 136 MMOL/L (ref 136–145)
SPECIFIC GRAVITY UA: >=1.03 (ref 1–1.03)
TOTAL PROTEIN: 9.2 G/DL (ref 6.4–8.2)
TROPONIN: <0.01 NG/ML
URINE TYPE: ABNORMAL
UROBILINOGEN, URINE: 1 E.U./DL
WBC # BLD: 8.3 K/UL (ref 4–11)
WBC UA: ABNORMAL /HPF (ref 0–5)

## 2022-08-22 PROCEDURE — G0378 HOSPITAL OBSERVATION PER HR: HCPCS

## 2022-08-22 PROCEDURE — 71045 X-RAY EXAM CHEST 1 VIEW: CPT

## 2022-08-22 PROCEDURE — 85025 COMPLETE CBC W/AUTO DIFF WBC: CPT

## 2022-08-22 PROCEDURE — 70450 CT HEAD/BRAIN W/O DYE: CPT

## 2022-08-22 PROCEDURE — 99285 EMERGENCY DEPT VISIT HI MDM: CPT

## 2022-08-22 PROCEDURE — P9612 CATHETERIZE FOR URINE SPEC: HCPCS

## 2022-08-22 PROCEDURE — 6370000000 HC RX 637 (ALT 250 FOR IP): Performed by: INTERNAL MEDICINE

## 2022-08-22 PROCEDURE — 80053 COMPREHEN METABOLIC PANEL: CPT

## 2022-08-22 PROCEDURE — 83880 ASSAY OF NATRIURETIC PEPTIDE: CPT

## 2022-08-22 PROCEDURE — 81001 URINALYSIS AUTO W/SCOPE: CPT

## 2022-08-22 PROCEDURE — 93005 ELECTROCARDIOGRAM TRACING: CPT | Performed by: PHYSICIAN ASSISTANT

## 2022-08-22 PROCEDURE — 84484 ASSAY OF TROPONIN QUANT: CPT

## 2022-08-22 RX ORDER — PANTOPRAZOLE SODIUM 40 MG/1
40 TABLET, DELAYED RELEASE ORAL
Status: DISCONTINUED | OUTPATIENT
Start: 2022-08-23 | End: 2022-08-25 | Stop reason: HOSPADM

## 2022-08-22 RX ORDER — INSULIN LISPRO 100 [IU]/ML
0-8 INJECTION, SOLUTION INTRAVENOUS; SUBCUTANEOUS
Status: DISCONTINUED | OUTPATIENT
Start: 2022-08-23 | End: 2022-08-25 | Stop reason: HOSPADM

## 2022-08-22 RX ORDER — VENLAFAXINE HYDROCHLORIDE 37.5 MG/1
75 CAPSULE, EXTENDED RELEASE ORAL DAILY
Status: DISCONTINUED | OUTPATIENT
Start: 2022-08-23 | End: 2022-08-25 | Stop reason: HOSPADM

## 2022-08-22 RX ORDER — ALBUTEROL SULFATE 90 UG/1
2 AEROSOL, METERED RESPIRATORY (INHALATION) EVERY 6 HOURS PRN
Status: DISCONTINUED | OUTPATIENT
Start: 2022-08-22 | End: 2022-08-23

## 2022-08-22 RX ORDER — MONTELUKAST SODIUM 10 MG/1
10 TABLET ORAL NIGHTLY
Status: DISCONTINUED | OUTPATIENT
Start: 2022-08-22 | End: 2022-08-25 | Stop reason: HOSPADM

## 2022-08-22 RX ORDER — DEXTROSE MONOHYDRATE 100 MG/ML
INJECTION, SOLUTION INTRAVENOUS CONTINUOUS PRN
Status: DISCONTINUED | OUTPATIENT
Start: 2022-08-22 | End: 2022-08-25 | Stop reason: HOSPADM

## 2022-08-22 RX ORDER — INSULIN LISPRO 100 [IU]/ML
0-4 INJECTION, SOLUTION INTRAVENOUS; SUBCUTANEOUS NIGHTLY
Status: DISCONTINUED | OUTPATIENT
Start: 2022-08-22 | End: 2022-08-25 | Stop reason: HOSPADM

## 2022-08-22 RX ORDER — ALBUTEROL SULFATE 2.5 MG/3ML
2.5 SOLUTION RESPIRATORY (INHALATION) EVERY 4 HOURS PRN
Status: DISCONTINUED | OUTPATIENT
Start: 2022-08-22 | End: 2022-08-22

## 2022-08-22 RX ORDER — POLYETHYLENE GLYCOL 3350 17 G/17G
17 POWDER, FOR SOLUTION ORAL DAILY PRN
Status: DISCONTINUED | OUTPATIENT
Start: 2022-08-22 | End: 2022-08-25 | Stop reason: HOSPADM

## 2022-08-22 RX ORDER — METOPROLOL TARTRATE 50 MG/1
50 TABLET, FILM COATED ORAL 2 TIMES DAILY
Status: DISCONTINUED | OUTPATIENT
Start: 2022-08-22 | End: 2022-08-25 | Stop reason: HOSPADM

## 2022-08-22 RX ORDER — ONDANSETRON 4 MG/1
4 TABLET, ORALLY DISINTEGRATING ORAL EVERY 8 HOURS PRN
Status: DISCONTINUED | OUTPATIENT
Start: 2022-08-22 | End: 2022-08-25 | Stop reason: HOSPADM

## 2022-08-22 RX ORDER — DEXTROSE MONOHYDRATE 100 MG/ML
INJECTION, SOLUTION INTRAVENOUS CONTINUOUS PRN
Status: DISCONTINUED | OUTPATIENT
Start: 2022-08-22 | End: 2022-08-22 | Stop reason: SDUPTHER

## 2022-08-22 RX ORDER — POLYVINYL ALCOHOL 14 MG/ML
1 SOLUTION/ DROPS OPHTHALMIC 2 TIMES DAILY
Status: DISCONTINUED | OUTPATIENT
Start: 2022-08-22 | End: 2022-08-25 | Stop reason: HOSPADM

## 2022-08-22 RX ORDER — DILTIAZEM HYDROCHLORIDE 120 MG/1
120 CAPSULE, COATED, EXTENDED RELEASE ORAL DAILY
Status: DISCONTINUED | OUTPATIENT
Start: 2022-08-23 | End: 2022-08-25 | Stop reason: HOSPADM

## 2022-08-22 RX ORDER — ONDANSETRON 2 MG/ML
4 INJECTION INTRAMUSCULAR; INTRAVENOUS EVERY 6 HOURS PRN
Status: DISCONTINUED | OUTPATIENT
Start: 2022-08-22 | End: 2022-08-25 | Stop reason: HOSPADM

## 2022-08-22 RX ORDER — ATORVASTATIN CALCIUM 40 MG/1
40 TABLET, FILM COATED ORAL NIGHTLY
Status: DISCONTINUED | OUTPATIENT
Start: 2022-08-22 | End: 2022-08-25 | Stop reason: HOSPADM

## 2022-08-22 RX ORDER — ASPIRIN 81 MG/1
81 TABLET ORAL DAILY
Status: DISCONTINUED | OUTPATIENT
Start: 2022-08-23 | End: 2022-08-24

## 2022-08-22 RX ORDER — FERROUS SULFATE 325(65) MG
325 TABLET ORAL
Status: DISCONTINUED | OUTPATIENT
Start: 2022-08-23 | End: 2022-08-25 | Stop reason: HOSPADM

## 2022-08-22 RX ORDER — FOLIC ACID 1 MG/1
1 TABLET ORAL DAILY
Status: DISCONTINUED | OUTPATIENT
Start: 2022-08-23 | End: 2022-08-25 | Stop reason: HOSPADM

## 2022-08-22 RX ADMIN — MONTELUKAST SODIUM 10 MG: 10 TABLET ORAL at 23:34

## 2022-08-22 RX ADMIN — APIXABAN 5 MG: 5 TABLET, FILM COATED ORAL at 23:34

## 2022-08-22 RX ADMIN — METOPROLOL TARTRATE 50 MG: 50 TABLET ORAL at 23:34

## 2022-08-22 ASSESSMENT — PAIN - FUNCTIONAL ASSESSMENT: PAIN_FUNCTIONAL_ASSESSMENT: NONE - DENIES PAIN

## 2022-08-22 NOTE — ED PROVIDER NOTES
I independently performed a history and physical on [address] [place] [address]. All diagnostic, treatment, and disposition decisions were made by myself in conjunction with the advanced practice provider/resident. Labs Reviewed   CBC WITH AUTO DIFFERENTIAL - Abnormal; Notable for the following components:       Result Value    RBC 3.94 (*)     Hemoglobin 11.6 (*)     Platelets 539 (*)     MPV 12.4 (*)     All other components within normal limits   COMPREHENSIVE METABOLIC PANEL - Abnormal; Notable for the following components:    Glucose 147 (*)     GFR Non- 55 (*)     Total Protein 9.2 (*)     Albumin/Globulin Ratio 0.9 (*)     Alkaline Phosphatase 174 (*)     All other components within normal limits   BRAIN NATRIURETIC PEPTIDE - Abnormal; Notable for the following components:    Pro- (*)     All other components within normal limits   URINALYSIS - Abnormal; Notable for the following components:    Clarity, UA SL CLOUDY (*)     Bilirubin Urine SMALL (*)     Ketones, Urine TRACE (*)     Protein, UA TRACE (*)     All other components within normal limits   MICROSCOPIC URINALYSIS - Abnormal; Notable for the following components:    Mucus, UA 4+ (*)     All other components within normal limits   TROPONIN     XR CHEST PORTABLE   Final Result   No acute cardiopulmonary findings         CT HEAD WO CONTRAST   Final Result   No acute intracranial abnormality. Status post right frontal craniotomy. Areas of encephalomalacia in the bilateral frontal parietal and occipital   lobes and the bilateral cerebellar hemispheres. Mild parenchymal volume loss. Moderate chronic microvascular disease. For further details of [facility] emergency department encounter, please see the DEBBIE/resident's documentation. I personally saw the patient and performed a substantive portion of the visit including all aspects of the medical decision making.  Briefly, this is a 81-year-old female with

## 2022-08-22 NOTE — ED PROVIDER NOTES
[facility] Emergency Department    CHIEF COMPLAINT  Memory Loss (Patient's  reports patient has had some \"memory issues\" for the past week. He states pt was watching a movie over the weekend and states \"She couldn't remember who Robert Sandoval was\"  states pt was seen in the ED on Saturday for ear wax issues and states he forgot to mention her memory problems. States he called her neurologist and has an appt 11/22/22 was told to call her PCP who told  to bring pt to the ED for evaluation. )      SHARED SERVICE VISIT  I have seen and evaluated this patient with my supervising physician, Dr. Luis Dominguez. HISTORY OF PRESENT ILLNESS  Shiva De Leon is a 73 y.o. female who presents to the ED complaining of 1 week history of progressively worsening confusion. Brought in by  today for evaluation.  reports that she has been confused and speech has been not been making sense. Forgot her son's name. Patient at this time poor historian. Patient states that she has been eating and drinking okay. He has noted no fevers or chills. Denies any nausea or vomiting. States that he she has had some black stool. Uses no blood thinners. He has noted no urinary symptoms. No other complaints, modifying factors or associated symptoms. Nursing notes reviewed.    Past Medical History:   Diagnosis Date    Arthritis     Asthma     Depression     Diabetes mellitus (HCC)     GERD (gastroesophageal reflux disease)     HCAP (healthcare-associated pneumonia)     Hypertension     Mycobacterium gordonae infection      5/7/18 + afb called from [address] pending  ( from [address] 3/29/18)    Paroxysmal atrial fibrillation (HCC)     Sepsis ([facility] [address]     TIA (transient ischemic attack)     Unspecified cerebral artery occlusion with cerebral infarction      Past Surgical History:   Procedure Laterality Date    APPENDECTOMY      BREAST BIOPSY      CHOLECYSTECTOMY COLONOSCOPY  01/19/2017    polyp    CYST INCISION AND DRAINAGE  1986    on head    HYSTERECTOMY (CERVIX STATUS UNKNOWN)      UPPER GASTROINTESTINAL ENDOSCOPY  01/19/2017    barretts ? Family History   Problem Relation Age of Onset    Cancer Mother         breast ca     Social History     Socioeconomic History    Marital status:      Spouse name: Not on file    Number of children: Not on file    Years of education: Not on file    Highest education level: Not on file   Occupational History    Not on file   Tobacco Use    Smoking status: Never    Smokeless tobacco: Never   Vaping Use    Vaping Use: Never used   Substance and Sexual Activity    Alcohol use: No     Alcohol/week: 0.0 standard drinks    Drug use: No    Sexual activity: Never   Other Topics Concern    Not on file   Social History Narrative    Not on file     Social Determinants of Health     Financial Resource Strain: Not on file   Food Insecurity: Not on file   Transportation Needs: Not on file   Physical Activity: Not on file   Stress: Not on file   Social Connections: Not on file   Intimate Partner Violence: Not on file   Housing Stability: Not on file     No current facility-administered medications for this encounter.      Current Outpatient Medications   Medication Sig Dispense Refill    carbamide peroxide (DEBROX) 6.5 % otic solution Place 5 drops into the left ear 2 times daily 15 mL 0    RESTASIS 0.05 % ophthalmic emulsion INSTILL 1 DROP IN EACH EYE EVERY 12 HOURS      metoprolol tartrate (LOPRESSOR) 50 MG tablet TAKE 1 TABLET BY MOUTH TWO TIMES A  tablet 3    atorvastatin (LIPITOR) 40 MG tablet Take 1 tablet by mouth nightly 30 tablet 3    dilTIAZem (CARDIZEM CD) 120 MG extended release capsule Take 1 capsule by mouth daily 30 capsule 11    aspirin EC 81 MG EC tablet Take 1 tablet by mouth daily 90 tablet 1    dextromethorphan-guaiFENesin (MUCINEX DM)  MG per extended release tablet Take 1 tablet by mouth every 12 hours as needed      apixaban (ELIQUIS) 5 MG TABS tablet Take 1 tablet by mouth 2 times daily 60 tablet 0    omeprazole (PRILOSEC) 40 MG delayed release capsule Take 40 mg by mouth daily (Patient not taking: Reported on 4/17/2022)      ferrous sulfate 325 (65 Fe) MG tablet Take 325 mg by mouth daily (with breakfast)      folic acid (FOLVITE) 1 MG tablet Take 1 mg by mouth daily      montelukast (SINGULAIR) 10 MG tablet Take 10 mg by mouth nightly      albuterol (PROVENTIL) (2.5 MG/3ML) 0.083% nebulizer solution Take 3 mLs by nebulization every 4 hours as needed for Wheezing or Shortness of Breath 120 each 3    metFORMIN ER (GLUCOPHAGE-XR) 500 MG XR tablet Take 2,000 mg by mouth Daily with supper       venlafaxine (EFFEXOR-XR) 75 MG XR capsule Take 75 mg by mouth daily. albuterol (PROVENTIL HFA;VENTOLIN HFA) 108 (90 BASE) MCG/ACT inhaler Inhale 2 puffs into the lungs every 6 hours as needed. Allergies   Allergen Reactions    Acyclovir Anaphylaxis     Redness face & back, swollen tounge, eyes and throat     Ace Inhibitors      Possible anaphylaxis    Amoxicillin      hives    Cephalosporins      anaphylaxis  Lip swelling    Oxycodone-Acetaminophen      Respiratory distress    Penicillins     Percocet [Oxycodone-Acetaminophen]        REVIEW OF SYSTEMS  10 systems reviewed, pertinent positives per HPI otherwise noted to be negative    PHYSICAL EXAM  /79   Pulse 66   Temp 97.7 °F (36.5 °C)   Resp 18   Ht 5' (1.524 m)   Wt 209 lb (94.8 kg)   LMP  (LMP Unknown)   SpO2 100%   BMI 40.82 kg/m²   GENERAL APPEARANCE: Awake and alert. Cooperative. No acute distress. HEAD: Normocephalic. Atraumatic. No hematomas, lesions, lacerations or abrasions. Negative for jason signs or raccoon's eyes. EYES: PERRL. EOM's grossly intact. ENT: Mucous membranes are moist.. NECK: Supple. No meningismus. HEART: RRR. No murmurs. LUNGS: Respirations unlabored. CTAB. Good air exchange.  Speaking comfortably in full sentences. ABDOMEN: Soft. Non-distended. Non-tender. No guarding or rebound. Negative Young's, McBurney's and Rovsing's. No fluid waves or ascites. No hernias or masses. Bowel sounds normal all quadrants. No CVA tenderness. EXTREMITIES: No peripheral edema. No unilateral calf pain, redness or swelling. Moves all extremities equally. All extremities neurovascularly intact. SKIN: Warm and dry. No acute rashes. NEUROLOGICAL: Alert. CN's 2-12 intact. No gross facial drooping. Strength 5/5, sensation intact. Confused. Does not follow commands well. RADIOLOGY  CT HEAD WO CONTRAST    Result Date: 8/22/2022  EXAMINATION: CT OF THE HEAD WITHOUT CONTRAST  8/22/2022 4:37 pm TECHNIQUE: CT of the head was performed without the administration of intravenous contrast. Automated exposure control, iterative reconstruction, and/or weight based adjustment of the mA/kV was utilized to reduce the radiation dose to as low as reasonably achievable. COMPARISON: MRI brain April 18, 2022 HISTORY: ORDERING SYSTEM PROVIDED HISTORY: ams TECHNOLOGIST PROVIDED HISTORY: Reason for exam:->ams Has a \"code stroke\" or \"stroke alert\" been called? ->No Decision Support Exception - unselect if not a suspected or confirmed emergency medical condition->Emergency Medical Condition (MA) Reason for Exam: AMS-memory loss x 1 week Relevant Medical/Surgical History: hx-cva with hemorrhage 3-4 yrs ago FINDINGS: BRAIN/VENTRICLES: The patient is status post right frontal craniotomy. There is chronic subdural fluid collection along the right frontotemporal convexity measuring up to 1 cm in thickness, likely related to postoperative changes. There are areas of encephalomalacia in the bilateral frontal parietal and occipital lobes and the bilateral cerebellar hemispheres. There is mild parenchymal volume loss. There is periventricular white matter low attenuation, likely related to moderate chronic microvascular disease.  There is no acute intracranial hemorrhage, mass effect or midline shift. The gray-white differentiation is maintained without evidence of an acute infarct. There is no hydrocephalus. ORBITS: The visualized portion of the orbits demonstrate no acute abnormality. SINUSES: The visualized paranasal sinuses and mastoid air cells demonstrate no acute abnormality. SOFT TISSUES/SKULL: The patient is status post right frontal craniotomy. No acute abnormality of the visualized skull or soft tissues. No acute intracranial abnormality. Status post right frontal craniotomy. Areas of encephalomalacia in the bilateral frontal parietal and occipital lobes and the bilateral cerebellar hemispheres. Mild parenchymal volume loss. Moderate chronic microvascular disease. XR CHEST PORTABLE    Result Date: 8/22/2022  EXAMINATION: ONE XRAY VIEW OF THE CHEST 8/22/2022 4:31 pm COMPARISON: None. HISTORY: ORDERING SYSTEM PROVIDED HISTORY: ams TECHNOLOGIST PROVIDED HISTORY: Reason for exam:->ams Reason for Exam: ams, memory loss FINDINGS: No acute airspace infiltrate. No pneumothorax or pleural effusion. Normal cardiomediastinal silhouette     No acute cardiopulmonary findings      ED COURSE  Pain control was not required while here in the emergency department. Triage vitals stable. Urinalysis trace ketonuria. No evidence for infectious process at this time. CBC without leukocytosis or anemia. CMP unremarkable. No anion gap. Troponin less than 0.01. BNP approximately 500. Stable portable chest x-ray without evidence to suggest acute cardiopulmonary disease. Head CT without evidence to suggest intercranial abnormality. EKG normal sinus rhythm without evidence for acute ischemic change. Based on persistent confusion without clear source at this time we are recommending admission for further observation, evaluation and treatment. Have discussed case with hospital medicine regarding admission and orders placed.   A discussion was had with Ms. Talon Contreras  regarding her acute confusion, ED findings and recommendations for admission. Risk management discussed and shared decision making had with patient and/or surrogate. All questions were answered. Patient in agreement. CRITICAL CARE TIME  30 Minutes of critical care time spent not including separately billable procedures.     MDM  Results for orders placed or performed during the hospital encounter of 08/22/22   CBC with Auto Differential   Result Value Ref Range    WBC 8.3 4.0 - 11.0 K/uL    RBC 3.94 (L) 4.00 - 5.20 M/uL    Hemoglobin 11.6 (L) 12.0 - 16.0 g/dL    Hematocrit 36.1 36.0 - 48.0 %    MCV 91.6 80.0 - 100.0 fL    MCH 29.5 26.0 - 34.0 pg    MCHC 32.2 31.0 - 36.0 g/dL    RDW 14.8 12.4 - 15.4 %    Platelets 393 (L) 433 - 450 K/uL    MPV 12.4 (H) 5.0 - 10.5 fL    Neutrophils % 61.8 %    Lymphocytes % 28.3 %    Monocytes % 6.6 %    Eosinophils % 2.5 %    Basophils % 0.8 %    Neutrophils Absolute 5.1 1.7 - 7.7 K/uL    Lymphocytes Absolute 2.3 1.0 - 5.1 K/uL    Monocytes Absolute 0.5 0.0 - 1.3 K/uL    Eosinophils Absolute 0.2 0.0 - 0.6 K/uL    Basophils Absolute 0.1 0.0 - 0.2 K/uL   Comprehensive Metabolic Panel   Result Value Ref Range    Sodium 136 136 - 145 mmol/L    Potassium 4.1 3.5 - 5.1 mmol/L    Chloride 100 99 - 110 mmol/L    CO2 23 21 - 32 mmol/L    Anion Gap 13 3 - 16    Glucose 147 (H) 70 - 99 mg/dL    BUN 18 7 - 20 mg/dL    Creatinine 1.0 0.6 - 1.2 mg/dL    GFR Non-African American 55 (A) >60    GFR African American >60 >60    Calcium 10.6 8.3 - 10.6 mg/dL    Total Protein 9.2 (H) 6.4 - 8.2 g/dL    Albumin 4.4 3.4 - 5.0 g/dL    Albumin/Globulin Ratio 0.9 (L) 1.1 - 2.2    Total Bilirubin 0.5 0.0 - 1.0 mg/dL    Alkaline Phosphatase 174 (H) 40 - 129 U/L    ALT 19 10 - 40 U/L    AST 36 15 - 37 U/L   Troponin   Result Value Ref Range    Troponin <0.01 <0.01 ng/mL   Brain Natriuretic Peptide   Result Value Ref Range    Pro- (H) 0 - 124 pg/mL   Urinalysis   Result Value Ref Range Color, UA Yellow Straw/Yellow    Clarity, UA SL CLOUDY (A) Clear    Glucose, Ur Negative Negative mg/dL    Bilirubin Urine SMALL (A) Negative    Ketones, Urine TRACE (A) Negative mg/dL    Specific Gravity, UA >=1.030 1.005 - 1.030    Blood, Urine Negative Negative    pH, UA 6.0 5.0 - 8.0    Protein, UA TRACE (A) Negative mg/dL    Urobilinogen, Urine 1.0 <2.0 E.U./dL    Nitrite, Urine Negative Negative    Leukocyte Esterase, Urine Negative Negative    Microscopic Examination YES     Urine Type NotGiven    Microscopic Urinalysis   Result Value Ref Range    Hyaline Casts, UA 0-2 0 - 2 /LPF    Mucus, UA 4+ (A) None Seen /LPF    WBC, UA 0-2 0 - 5 /HPF    RBC, UA None seen 0 - 4 /HPF    Epithelial Cells, UA 2-5 0 - 5 /HPF   EKG 12 Lead   Result Value Ref Range    Ventricular Rate 61 BPM    Atrial Rate 62 BPM    QRS Duration 72 ms    Q-T Interval 460 ms    QTc Calculation (Bazett) 463 ms    R Axis -14 degrees    T Axis 21 degrees    Diagnosis       Junctional rhythmInferior infarct , age undeterminedCannot rule out Anterior infarct , age undeterminedAbnormal ECGWhen compared with ECG of 17-APR-2022 14:34,Junctional rhythm has replaced Sinus rhythmInferior infarct is now Present     I spoke with Jazz Suero. We thoroughly discussed the history, physical exam, laboratory and imaging studies, as well as, emergency department course. Based upon that discussion, we've decided to admit Aries Bland to the hospital for further observation, evaluation and treatment. Final Impression  1. Altered mental status, unspecified altered mental status type      Blood pressure (!) 132/47, pulse 66, temperature 97.9 °F (36.6 °C), temperature source Oral, resp. rate 26, height 5' (1.524 m), weight 209 lb (94.8 kg), SpO2 99 %, not currently breastfeeding. DISPOSITION  Patient was admitted to the hospital in stable condition.          Kathrin Priestma  08/22/22 1946

## 2022-08-22 NOTE — H&P
Internal Medicine History and Physical    Pt evaluated on:  8/22/2022    CHIEF COMPLAINT:  memory issues over past week    History of Present Illness: This is a 73 y.o. WF with PMHx sig for Arthritis, Asthma, Depression, Diabetes mellitus 2, GERD (gastroesophageal reflux disease),  Hypertension, , Paroxysmal atrial fibrillation ([facility] [address]  TIA (transient ischemic attack), and Unspecified cerebral artery occlusion with cerebral infarction despite being on xarelto in 2015, now on eliquis. She had a LHC in 2013 which was normal.    She was admitted in 4/2018 with CVA with hemorrhagic conversion and anticoagulation was stopped. She was readmitted in 10/2018 with recurrent CVA and Eliquis was started. Patient was readmitted in 4/2019 with another CVA while on Eliquis and ASA. In 7/2019 she had a loop recorder to determine arrhythmia burden due to recurrent CVA on anticoagulation. Brief PAF has been detected. Echo 4/2022 demonstrated an LVEF of 55-60%. Device interrogation 5/7/2022 demonstrated possible AF vs. Artifact. She reports she has some residual memory loss, confusion, and trouble with reading since her CVA in 2018. She reports that several months ago she felt her heart beating irregularly for several seconds but has not felt anything since  Pt  brings her in for worsening memory loss over last week. He states that she was in ED on Saturday for inability to hear (had clogged ear wax), however he did not think to mention her memory loss then. He then called her neurologist, but was unable to get an appt until November. He called PCP, who instead of ordering MRI, sent her to ED for further workup. Pt did not have CVA on head Ct, but likely needs MRI And cognitive testing to further delineate the issues that he has observed. On my examination, the patient is having some word finding difficulty although she knows where she is.   Her  also notes that she is having difficulty with simple tasks 04/17/2022    Stroke-like symptoms      Resolved Ambulatory Problems     Diagnosis Date Noted    Asthma exacerbation 12/09/2012    Anaphylaxis 12/13/2012    Pneumococcal sepsis ([address] 12/13/2012    Pneumococcal pneumonia ([address] 12/13/2012    Acute respiratory failure with hypoxia ([address] 12/13/2012    Asthma/COPD 12/02/2013    Chest pain 12/02/2013    Encephalopathy, metabolic     Type 2 diabetes mellitus with hyperglycemia (HCC)     Asthma exacerbation     Respiratory failure with hypoxia ([address] 01/25/2016    Bronchitis 01/25/2016    Tachycardia 01/25/2016    Sepsis ([address] 02/09/2016    Possible Pneumonia 02/24/2016    Pleuritic chest pain 02/24/2016    Leukocytosis 02/24/2016    Palpitations 12/29/2016    Pneumonia 10/14/2018     Past Medical History:   Diagnosis Date    Arthritis     Diabetes mellitus ([address]     HCAP (healthcare-associated pneumonia)     Hypertension     Mycobacterium gordonae infection     Unspecified cerebral artery occlusion with cerebral infarction          Past Medical History:   Diagnosis Date    Arthritis     Asthma     Depression     Diabetes mellitus ([address]     GERD (gastroesophageal reflux disease)     HCAP (healthcare-associated pneumonia)     Hypertension     Mycobacterium gordonae infection      5/7/18 + afb called from [address] pending  ( from [address] 3/29/18)    Paroxysmal atrial fibrillation (HCC)     Sepsis ([address]     TIA (transient ischemic attack)     Unspecified cerebral artery occlusion with cerebral infarction      Past Surgical History:   Procedure Laterality Date    APPENDECTOMY      BREAST BIOPSY      CHOLECYSTECTOMY      COLONOSCOPY  01/19/2017    polyp    CYST INCISION AND DRAINAGE  1986    on head    HYSTERECTOMY (CERVIX STATUS UNKNOWN)      UPPER GASTROINTESTINAL ENDOSCOPY  01/19/2017    barretts ?          Medications Prior to Admission:    Medications Prior to Admission: carbamide peroxide (DEBROX) 6.5 % otic solution, Place 5 drops into the left ear 2 times daily  RESTASIS 0.05 % ophthalmic emulsion, INSTILL 1 DROP IN EACH EYE EVERY 12 HOURS  metoprolol tartrate (LOPRESSOR) 50 MG tablet, TAKE 1 TABLET BY MOUTH TWO TIMES A DAY  atorvastatin (LIPITOR) 40 MG tablet, Take 1 tablet by mouth nightly  dilTIAZem (CARDIZEM CD) 120 MG extended release capsule, Take 1 capsule by mouth daily  aspirin EC 81 MG EC tablet, Take 1 tablet by mouth daily  dextromethorphan-guaiFENesin (MUCINEX DM)  MG per extended release tablet, Take 1 tablet by mouth every 12 hours as needed  apixaban (ELIQUIS) 5 MG TABS tablet, Take 1 tablet by mouth 2 times daily  omeprazole (PRILOSEC) 40 MG delayed release capsule, Take 40 mg by mouth daily (Patient not taking: Reported on 4/17/2022)  ferrous sulfate 325 (65 Fe) MG tablet, Take 325 mg by mouth daily (with breakfast)  folic acid (FOLVITE) 1 MG tablet, Take 1 mg by mouth daily  montelukast (SINGULAIR) 10 MG tablet, Take 10 mg by mouth nightly  albuterol (PROVENTIL) (2.5 MG/3ML) 0.083% nebulizer solution, Take 3 mLs by nebulization every 4 hours as needed for Wheezing or Shortness of Breath  metFORMIN ER (GLUCOPHAGE-XR) 500 MG XR tablet, Take 2,000 mg by mouth Daily with supper   venlafaxine (EFFEXOR-XR) 75 MG XR capsule, Take 75 mg by mouth daily. albuterol (PROVENTIL HFA;VENTOLIN HFA) 108 (90 BASE) MCG/ACT inhaler, Inhale 2 puffs into the lungs every 6 hours as needed.     Current Facility-Administered Medications   Medication Dose Route Frequency Provider Last Rate Last Admin    albuterol sulfate HFA (PROVENTIL;VENTOLIN;PROAIR) 108 (90 Base) MCG/ACT inhaler 2 puff  2 puff Inhalation Q6H PRN Paola Melissa MD        albuterol (PROVENTIL) nebulizer solution 2.5 mg  2.5 mg Nebulization Q4H PRN Paola Melissa MD        apixaban Kristian Phalen) tablet 5 mg  5 mg Oral BID Paola Melissa MD        [START ON 8/23/2022] aspirin EC tablet 81 mg  81 mg Oral Daily Paola Melissa MD        atorvastatin (LIPITOR) tablet 40 mg  40 mg Oral Nightly Lucrecia Agudelo MD        carbamide peroxide (DEBROX) 6.5 % otic solution 5 drop  5 drop Left Ear BID MD Matias Salguero ON 8/23/2022] dilTIAZem (CARDIZEM CD) extended release capsule 120 mg  120 mg Oral Daily MD Matias Salguero ON 8/23/2022] ferrous sulfate (IRON 325) tablet 325 mg  325 mg Oral Daily with breakfast Lucrecia Agudelo MD        [START ON 3/30/2546] folic acid (FOLVITE) tablet 1 mg  1 mg Oral Daily Lucrecia Agudelo MD        montelukast (SINGULAIR) tablet 10 mg  10 mg Oral Nightly Lucrecia Agudelo MD        polyvinyl alcohol (LIQUIFILM TEARS) 1.4 % ophthalmic solution 1 drop  1 drop Both Eyes BID Lucrecia Agudelo MD        [START ON 8/23/2022] venlafaxine (EFFEXOR XR) extended release capsule 75 mg  75 mg Oral Daily Lucrecia Agudelo MD        [START ON 8/23/2022] pantoprazole (PROTONIX) tablet 40 mg  40 mg Oral QAM AC Lucrecia Agudelo MD        [START ON 8/23/2022] insulin lispro (HUMALOG) injection vial 0-8 Units  0-8 Units SubCUTAneous TID WC Lucrecia Agudelo MD        insulin lispro (HUMALOG) injection vial 0-4 Units  0-4 Units SubCUTAneous Nightly Lucrecia Agudelo MD        metoprolol tartrate (LOPRESSOR) tablet 50 mg  50 mg Oral BID Lucrecia Agudelo MD        ondansetron (ZOFRAN-ODT) disintegrating tablet 4 mg  4 mg Oral Q8H PRN Lucrecia Agudelo MD        Or    ondansetron [facility]) injection 4 mg  4 mg IntraVENous Q6H PRN Lucrecia Agudelo MD        polyethylene glycol (GLYCOLAX) packet 17 g  17 g Oral Daily PRN Lucrecia Agudelo MD           Prior to Admission medications    Medication Sig Start Date End Date Taking?  Authorizing Provider   carbamide peroxide (DEBROX) 6.5 % otic solution Place 5 drops into the left ear 2 times daily 8/19/22 9/18/22  JARROD Marmolejo - CNP   RESTASIS 0.05 % ophthalmic emulsion INSTILL 1 DROP IN [facility] EYE EVERY 12 HOURS 6/10/22   Historical Provider, MD   metoprolol tartrate (LOPRESSOR) 50 MG tablet TAKE 1 TABLET BY MOUTH TWO TIMES A DAY 4/22/22   Fercho Canales MD   atorvastatin (LIPITOR) 40 MG tablet Take 1 tablet by mouth nightly 4/18/22   JARROD Bush CNP   dilTIAZem (CARDIZEM CD) 120 MG extended release capsule Take 1 capsule by mouth daily 3/11/21   JARROD Rose CNP   aspirin EC 81 MG EC tablet Take 1 tablet by mouth daily 4/18/19   JARROD Rose CNP   dextromethorphan-guaiFENesin (MUCINEX DM)  MG per extended release tablet Take 1 tablet by mouth every 12 hours as needed    Historical Provider, MD   apixaban (ELIQUIS) 5 MG TABS tablet Take 1 tablet by mouth 2 times daily 10/21/18   Angely Beck MD   omeprazole (PRILOSEC) 40 MG delayed release capsule Take 40 mg by mouth daily  Patient not taking: Reported on 4/17/2022    Historical Provider, MD   ferrous sulfate 325 (65 Fe) MG tablet Take 325 mg by mouth daily (with breakfast)    Historical Provider, MD   folic acid (FOLVITE) 1 MG tablet Take 1 mg by mouth daily    Historical Provider, MD   montelukast (SINGULAIR) 10 MG tablet Take 10 mg by mouth nightly    Historical Provider, MD   albuterol (PROVENTIL) (2.5 MG/3ML) 0.083% nebulizer solution Take 3 mLs by nebulization every 4 hours as needed for Wheezing or Shortness of Breath 3/19/18   Rick Manuel DO   metFORMIN ER (GLUCOPHAGE-XR) 500 MG XR tablet Take 2,000 mg by mouth Daily with supper     Historical Provider, MD   venlafaxine (EFFEXOR-XR) 75 MG XR capsule Take 75 mg by mouth daily. Historical Provider, MD   albuterol (PROVENTIL HFA;VENTOLIN HFA) 108 (90 BASE) MCG/ACT inhaler Inhale 2 puffs into the lungs every 6 hours as needed. Historical Provider, MD         Allergies:    Acyclovir, Ace inhibitors, Amoxicillin, Cephalosporins, Oxycodone-acetaminophen, Penicillins, and Percocet [oxycodone-acetaminophen]    Social History:      reports that she has never smoked.  She has never used smokeless tobacco. She reports that she does not drink alcohol and does not use drugs. Family History:   Family History   Problem Relation Age of Onset    Cancer Mother         breast ca          REVIEW OF SYSTEMS:  As above in the HPI. All other review of systems were asked and were negative except for denies pain or SOB, she knows she is in the hospital, but not the year, she repeats crackers multiple times. PHYSICAL EXAM:    Vitals:  /89   Pulse 73   Temp 97.4 °F (36.3 °C) (Oral)   Resp 18   Ht 5' (1.524 m)   Wt 209 lb (94.8 kg)   LMP  (LMP Unknown)   SpO2 98%   BMI 40.82 kg/m²     General:  obese, chronically ill appearing  HEENT:  Normocephalic, atraumatic. Pupils equal, round, reactive to light. No scleral icterus. No conjunctival injection. Normal lips, teeth, and gums. No nasal discharge. Neck:  Supple  Heart:  Normal s1/s2, RRR, no murmurs, gallops, or rubs. no leg edema  Lungs:  mostly clear bilaterally, no wheeze, no rales, no rhonchi, no use of accessory muscles  Abd: bowel sounds present, soft, nontender, nondistended, no masses  Extrem:  No clubbing, cyanosis,  no edema, brisk pedal pulses, 2+ capillary refill  Skin:  Warm and dry, no open lesions or rash,  normal color/perfusion  Psych:  A&O x 32, does not know year or date, appropriate mood and affect. Neuro: grossly intact, moves all four extremities. Some cognitive and word finding difficulty.     Breast: deferred  Rectal: deferred  Genitalia:  deferred    LABS:  Lab Results   Component Value Date/Time    WBC 8.3 08/22/2022 04:27 PM    RBC 3.94 08/22/2022 04:27 PM    HGB 11.6 08/22/2022 04:27 PM    HCT 36.1 08/22/2022 04:27 PM    MCV 91.6 08/22/2022 04:27 PM    MCH 29.5 08/22/2022 04:27 PM    MCHC 32.2 08/22/2022 04:27 PM    RDW 14.8 08/22/2022 04:27 PM     08/22/2022 04:27 PM    MPV 12.4 08/22/2022 04:27 PM     Lab Results   Component Value Date/Time     08/22/2022 04:27 PM    K 4.1 08/22/2022 04:27 PM    K 3.9 04/18/2022 06:01 AM     08/22/2022 04:27 PM    CO2 23 08/22/2022 04:27 PM    BUN 18 08/22/2022 04:27 PM    CREATININE 1.0 08/22/2022 04:27 PM    GFRAA >60 08/22/2022 04:27 PM    GFRAA 54 12/17/2012 05:00 AM    AGRATIO 0.9 08/22/2022 04:27 PM    LABGLOM 55 08/22/2022 04:27 PM    GLUCOSE 147 08/22/2022 04:27 PM    PROT 9.2 08/22/2022 04:27 PM    PROT 6.5 12/17/2012 05:00 AM    LABALBU 4.4 08/22/2022 04:27 PM    CALCIUM 10.6 08/22/2022 04:27 PM    BILITOT 0.5 08/22/2022 04:27 PM    ALKPHOS 174 08/22/2022 04:27 PM    AST 36 08/22/2022 04:27 PM    ALT 19 08/22/2022 04:27 PM     Lab Results   Component Value Date/Time    PROTIME 18.3 04/17/2022 02:04 PM    INR 1.59 04/17/2022 02:04 PM     Recent Labs     08/22/22  1627   TROPONINI <0.01     Lab Results   Component Value Date/Time    NITRU Negative 08/22/2022 06:41 PM    COLORU Yellow 08/22/2022 06:41 PM    PHUR 6.0 08/22/2022 06:41 PM    WBCUA 0-2 08/22/2022 06:41 PM    RBCUA None seen 08/22/2022 06:41 PM    MUCUS 4+ 08/22/2022 06:41 PM    BACTERIA Rare 10/14/2018 06:28 PM    CLARITYU SL CLOUDY 08/22/2022 06:41 PM    SPECGRAV >=1.030 08/22/2022 06:41 PM    LEUKOCYTESUR Negative 08/22/2022 06:41 PM    UROBILINOGEN 1.0 08/22/2022 06:41 PM    BILIRUBINUR SMALL 08/22/2022 06:41 PM    BILIRUBINUR neg 12/07/2012 10:22 AM    BLOODU Negative 08/22/2022 06:41 PM    GLUCOSEU Negative 08/22/2022 06:41 PM    KETUA TRACE 08/22/2022 06:41 PM    AMORPHOUS 1+ 05/11/2017 08:25 PM     Lab Results   Component Value Date/Time    MG 2.00 10/19/2018 05:16 AM    PHOS 3.3 04/03/2018 06:55 PM     Lab Results   Component Value Date/Time    LABA1C 6.5 04/17/2022 05:36 PM     Lab Results   Component Value Date/Time    XNGNHWYB45 >2000 04/09/2018 12:42 PM    FOLATE >20.00 04/09/2018 12:42 PM    FERRITIN 74.6 04/09/2018 12:42 PM    IRON 27 04/09/2018 12:42 PM    TIBC 253 04/09/2018 12:42 PM     Lab Results   Component Value Date/Time    TRIG 85 04/18/2022 06:01 AM    HDL 54 04/18/2022 06:01 AM    LDLCALC 43 04/18/2022 06:01 AM    LABVLDL 17 04/18/2022 06:01 AM     Lab Results   Component Value Date/Time    LIPASE 22.0 05/11/2017 06:07 PM     Lab Results   Component Value Date/Time    BNP 42 12/02/2013 08:52 AM     Lab Results   Component Value Date/Time    LACTA 1.6 10/15/2018 05:19 AM     Lab Results   Component Value Date/Time    PHART 7.424 10/14/2018 05:19 PM    TOL9LQD 35.9 10/14/2018 05:19 PM    PO2ART 61.8 10/14/2018 05:19 PM    IBG3WTK 23.0 10/14/2018 05:19 PM    BEART -1.1 10/14/2018 05:19 PM    THGBART 11.8 12/13/2012 05:00 AM    KDI9JSN 24.1 10/14/2018 05:19 PM    H0IZENOS 92.5 10/14/2018 05:19 PM     Lab Results   Component Value Date/Time    LABAMPH Neg 04/12/2019 09:00 PM    BARBSCNU Neg 04/12/2019 09:00 PM    LABBENZ Neg 04/12/2019 09:00 PM    LABMETH Neg 04/12/2019 09:00 PM    OPIATESCREENURINE Neg 04/12/2019 09:00 PM    PHENCYCLIDINESCREENURINE Neg 04/12/2019 09:00 PM    ETOH None Detected 04/12/2019 08:07 PM     Lab Results   Component Value Date/Time    DDIMER <200 02/23/2016 10:15 PM     No results found for: VITD25      RADIOLOGY:  CT HEAD WO CONTRAST    Result Date: 8/22/2022  EXAMINATION: CT OF THE HEAD WITHOUT CONTRAST  8/22/2022 4:37 pm TECHNIQUE: CT of the head was performed without the administration of intravenous contrast. Automated exposure control, iterative reconstruction, and/or weight based adjustment of the mA/kV was utilized to reduce the radiation dose to as low as reasonably achievable. COMPARISON: MRI brain April 18, 2022 HISTORY: ORDERING SYSTEM PROVIDED HISTORY: ams TECHNOLOGIST PROVIDED HISTORY: Reason for exam:->ams Has a \"code stroke\" or \"stroke alert\" been called? ->No Decision Support Exception - unselect if not a suspected or confirmed emergency medical condition->Emergency Medical Condition (MA) Reason for Exam: AMS-memory loss x 1 week Relevant Medical/Surgical History: hx-cva with hemorrhage 3-4 yrs ago FINDINGS: BRAIN/VENTRICLES: The patient is status post right frontal craniotomy. There is chronic subdural fluid collection along the right frontotemporal convexity measuring up to 1 cm in thickness, likely related to postoperative changes. There are areas of encephalomalacia in the bilateral frontal parietal and occipital lobes and the bilateral cerebellar hemispheres. There is mild parenchymal volume loss. There is periventricular white matter low attenuation, likely related to moderate chronic microvascular disease. There is no acute intracranial hemorrhage, mass effect or midline shift. The gray-white differentiation is maintained without evidence of an acute infarct. There is no hydrocephalus. ORBITS: The visualized portion of the orbits demonstrate no acute abnormality. SINUSES: The visualized paranasal sinuses and mastoid air cells demonstrate no acute abnormality. SOFT TISSUES/SKULL: The patient is status post right frontal craniotomy. No acute abnormality of the visualized skull or soft tissues. No acute intracranial abnormality. Status post right frontal craniotomy. Areas of encephalomalacia in the bilateral frontal parietal and occipital lobes and the bilateral cerebellar hemispheres. Mild parenchymal volume loss. Moderate chronic microvascular disease. XR CHEST PORTABLE    Result Date: 8/22/2022  EXAMINATION: ONE XRAY VIEW OF THE CHEST 8/22/2022 4:31 pm COMPARISON: None. HISTORY: ORDERING SYSTEM PROVIDED HISTORY: ams TECHNOLOGIST PROVIDED HISTORY: Reason for exam:->ams Reason for Exam: ams, memory loss FINDINGS: No acute airspace infiltrate. No pneumothorax or pleural effusion.   Normal cardiomediastinal silhouette     No acute cardiopulmonary findings       EKG:  Junctional rhythmInferior infarct , age undeterminedCannot rule out Anterior infarct , age undeterminedAbnormal ECGWhen compared with ECG of 17-APR-2022 14:34,Junctional rhythm has replaced Sinus rhythmInferior infarct is now Present    PHYSICIAN

## 2022-08-23 ENCOUNTER — APPOINTMENT (OUTPATIENT)
Dept: MRI IMAGING | Age: 72
DRG: 065 | End: 2022-08-23
Payer: MEDICARE

## 2022-08-23 PROBLEM — I63.9 ACUTE CVA (CEREBROVASCULAR ACCIDENT) (HCC): Status: ACTIVE | Noted: 2022-08-23

## 2022-08-23 LAB
CHOLESTEROL, TOTAL: 104 MG/DL (ref 0–199)
EKG ATRIAL RATE: 62 BPM
EKG ATRIAL RATE: 73 BPM
EKG DIAGNOSIS: NORMAL
EKG DIAGNOSIS: NORMAL
EKG P AXIS: 68 DEGREES
EKG P-R INTERVAL: 206 MS
EKG Q-T INTERVAL: 416 MS
EKG Q-T INTERVAL: 460 MS
EKG QRS DURATION: 72 MS
EKG QRS DURATION: 76 MS
EKG QTC CALCULATION (BAZETT): 458 MS
EKG QTC CALCULATION (BAZETT): 463 MS
EKG R AXIS: -14 DEGREES
EKG R AXIS: -3 DEGREES
EKG T AXIS: 21 DEGREES
EKG T AXIS: 45 DEGREES
EKG VENTRICULAR RATE: 61 BPM
EKG VENTRICULAR RATE: 73 BPM
GLUCOSE BLD-MCNC: 109 MG/DL (ref 70–99)
GLUCOSE BLD-MCNC: 117 MG/DL (ref 70–99)
GLUCOSE BLD-MCNC: 151 MG/DL (ref 70–99)
GLUCOSE BLD-MCNC: 165 MG/DL (ref 70–99)
HCT VFR BLD CALC: 32.1 % (ref 36–48)
HDLC SERPL-MCNC: 49 MG/DL (ref 40–60)
HEMOGLOBIN: 10.5 G/DL (ref 12–16)
LDL CHOLESTEROL CALCULATED: 37 MG/DL
MCH RBC QN AUTO: 30.2 PG (ref 26–34)
MCHC RBC AUTO-ENTMCNC: 32.7 G/DL (ref 31–36)
MCV RBC AUTO: 92.1 FL (ref 80–100)
PDW BLD-RTO: 14.7 % (ref 12.4–15.4)
PERFORMED ON: ABNORMAL
PLATELET # BLD: 88 K/UL (ref 135–450)
PLATELET SLIDE REVIEW: ABNORMAL
PMV BLD AUTO: 11.5 FL (ref 5–10.5)
RBC # BLD: 3.48 M/UL (ref 4–5.2)
TRIGL SERPL-MCNC: 89 MG/DL (ref 0–150)
VLDLC SERPL CALC-MCNC: 18 MG/DL
WBC # BLD: 5.1 K/UL (ref 4–11)

## 2022-08-23 PROCEDURE — 92526 ORAL FUNCTION THERAPY: CPT

## 2022-08-23 PROCEDURE — 97116 GAIT TRAINING THERAPY: CPT

## 2022-08-23 PROCEDURE — 6370000000 HC RX 637 (ALT 250 FOR IP): Performed by: INTERNAL MEDICINE

## 2022-08-23 PROCEDURE — 93010 ELECTROCARDIOGRAM REPORT: CPT | Performed by: INTERNAL MEDICINE

## 2022-08-23 PROCEDURE — 97535 SELF CARE MNGMENT TRAINING: CPT

## 2022-08-23 PROCEDURE — 93005 ELECTROCARDIOGRAM TRACING: CPT | Performed by: INTERNAL MEDICINE

## 2022-08-23 PROCEDURE — 97162 PT EVAL MOD COMPLEX 30 MIN: CPT

## 2022-08-23 PROCEDURE — 1200000000 HC SEMI PRIVATE

## 2022-08-23 PROCEDURE — 83036 HEMOGLOBIN GLYCOSYLATED A1C: CPT

## 2022-08-23 PROCEDURE — 80061 LIPID PANEL: CPT

## 2022-08-23 PROCEDURE — 99220 PR INITIAL OBSERVATION CARE/DAY 70 MINUTES: CPT | Performed by: PSYCHIATRY & NEUROLOGY

## 2022-08-23 PROCEDURE — 36415 COLL VENOUS BLD VENIPUNCTURE: CPT

## 2022-08-23 PROCEDURE — 85027 COMPLETE CBC AUTOMATED: CPT

## 2022-08-23 PROCEDURE — 70553 MRI BRAIN STEM W/O & W/DYE: CPT

## 2022-08-23 PROCEDURE — 70549 MR ANGIOGRAPH NECK W/O&W/DYE: CPT

## 2022-08-23 PROCEDURE — A9579 GAD-BASE MR CONTRAST NOS,1ML: HCPCS | Performed by: INTERNAL MEDICINE

## 2022-08-23 PROCEDURE — G0378 HOSPITAL OBSERVATION PER HR: HCPCS

## 2022-08-23 PROCEDURE — 6360000004 HC RX CONTRAST MEDICATION: Performed by: INTERNAL MEDICINE

## 2022-08-23 PROCEDURE — 92523 SPEECH SOUND LANG COMPREHEN: CPT

## 2022-08-23 PROCEDURE — 97166 OT EVAL MOD COMPLEX 45 MIN: CPT

## 2022-08-23 PROCEDURE — 92610 EVALUATE SWALLOWING FUNCTION: CPT

## 2022-08-23 RX ORDER — VENLAFAXINE HYDROCHLORIDE 150 MG/1
150 CAPSULE, EXTENDED RELEASE ORAL DAILY
COMMUNITY
Start: 2022-08-07

## 2022-08-23 RX ORDER — ALBUTEROL SULFATE 90 UG/1
2 AEROSOL, METERED RESPIRATORY (INHALATION) EVERY 4 HOURS PRN
Status: DISCONTINUED | OUTPATIENT
Start: 2022-08-23 | End: 2022-08-25 | Stop reason: HOSPADM

## 2022-08-23 RX ORDER — OXYBUTYNIN CHLORIDE 10 MG/1
10 TABLET, EXTENDED RELEASE ORAL NIGHTLY
COMMUNITY
Start: 2022-08-10

## 2022-08-23 RX ADMIN — METOPROLOL TARTRATE 50 MG: 50 TABLET ORAL at 09:28

## 2022-08-23 RX ADMIN — FOLIC ACID 1 MG: 1 TABLET ORAL at 09:29

## 2022-08-23 RX ADMIN — CARBAMIDE PEROXIDE 6.5% 5 DROP: 6.5 LIQUID AURICULAR (OTIC) at 21:08

## 2022-08-23 RX ADMIN — DILTIAZEM HYDROCHLORIDE 120 MG: 120 CAPSULE, COATED, EXTENDED RELEASE ORAL at 09:28

## 2022-08-23 RX ADMIN — ASPIRIN 81 MG: 81 TABLET, COATED ORAL at 09:28

## 2022-08-23 RX ADMIN — FERROUS SULFATE TAB 325 MG (65 MG ELEMENTAL FE) 325 MG: 325 (65 FE) TAB at 09:36

## 2022-08-23 RX ADMIN — APIXABAN 5 MG: 5 TABLET, FILM COATED ORAL at 09:28

## 2022-08-23 RX ADMIN — GADOTERIDOL 19 ML: 279.3 INJECTION, SOLUTION INTRAVENOUS at 16:34

## 2022-08-23 RX ADMIN — POLYVINYL ALCOHOL 1 DROP: 14 SOLUTION/ DROPS OPHTHALMIC at 12:16

## 2022-08-23 RX ADMIN — PANTOPRAZOLE SODIUM 40 MG: 40 TABLET, DELAYED RELEASE ORAL at 09:36

## 2022-08-23 RX ADMIN — MONTELUKAST SODIUM 10 MG: 10 TABLET ORAL at 21:09

## 2022-08-23 RX ADMIN — POLYVINYL ALCOHOL 1 DROP: 14 SOLUTION/ DROPS OPHTHALMIC at 21:20

## 2022-08-23 NOTE — PROGRESS NOTES
Speech Language Pathology  Facility/Department: [place] [address] - MED SURG/ORTHO  Initial Speech/Language/Cognitive Assessment    NAME: Leonides Burch  : 1950   MRN: [id number]  ADMISSION DATE: 2022  ADMITTING DIAGNOSIS: has Morbid obesity with BMI of 40.0-44.9, adult ([address] Hx of L-ICA CVA; Essential hypertension; Chronic thrombocytopenia; Chronic obstructive pulmonary disease ([facility] Depression; Elevated alkaline phosphatase level; Chronic normocytic anemia; GERD (gastroesophageal reflux disease); Paroxysmal atrial fibrillation (HCC); JARRETT on CPAP; 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; Acute nonintractable headache; Meningoencephalitis; Mastoiditis of right side; Hyperkalemia; Splenomegaly; Hemorrhagic stroke ([facility] DM type 2, controlled, with complication ([facility] Mild malnutrition ([facility] PVC (premature ventricular contraction); Nontraumatic cortical hemorrhage of left cerebral hemisphere [facility]); Dyslipidemia; CAD in native artery; COPD exacerbation ([facility] TIA (transient ischemic attack); First degree AV block; Encounter for loop recorder check; Cerebrovascular accident (CVA) ([facility] PAF (paroxysmal atrial fibrillation) ([facility] HTN (hypertension), benign; Acute focal neurological deficit; Stroke-like symptoms; Memory loss due to medical condition; and Cognitive deficits on their problem list.  DATE ONSET: Pt admitted to [facility] 2022    Date of Eval: 2022   Evaluating Therapist: SUBHASH Jane    RECENT RESULTS  CT OF HEAD/MRI: CT of head from 2022. MRI pending  Impression   No acute intracranial abnormality. Status post right frontal craniotomy. Areas of encephalomalacia in the bilateral frontal parietal and occipital   lobes and the bilateral cerebellar hemispheres. Mild parenchymal volume loss. Moderate chronic microvascular disease. Primary Complaint:   Medical record review/interview: Per MD H&P: \"This is a 73 y.o.  WF with PMHx sig for Arthritis, Asthma, Depression, Diabetes mellitus 2, GERD (gastroesophageal reflux disease),  Hypertension, , Paroxysmal atrial fibrillation ([address]  TIA (transient ischemic attack), and Unspecified cerebral artery occlusion with cerebral infarction despite being on xarelto in 2015, now on eliquis. She had a LHC in 2013 which was normal.    She was admitted in 4/2018 with CVA with hemorrhagic conversion and anticoagulation was stopped. She was readmitted in 10/2018 with recurrent CVA and Eliquis was started. Patient was readmitted in 4/2019 with another CVA while on Eliquis and ASA. In 7/2019 she had a loop recorder to determine arrhythmia burden due to recurrent CVA on anticoagulation. Brief PAF has been detected. Echo 4/2022 demonstrated an LVEF of 55-60%. Device interrogation 5/7/2022 demonstrated possible AF vs. Artifact. She reports she has some residual memory loss, confusion, and trouble with reading since her CVA in 2018. She reports that several months ago she felt her heart beating irregularly for several seconds but has not felt anything since  Pt  brings her in for worsening memory loss over last week. He states that she was in ED on Saturday for inability to hear (had clogged ear wax), however he did not think to mention her memory loss then. He then called her neurologist, but was unable to get an appt until November. He called PCP, who instead of ordering MRI, sent her to ED for further workup. Pt did not have CVA on head Ct, but likely needs MRI And cognitive testing to further delineate the issues that he has observed. On my examination, the patient is having some word finding difficulty although she knows where she is. Her  also notes that she is having difficulty with simple tasks such as putting on clothes or carrying a bowl of cookies. All has started suddenly in past week. 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 Pt able to report to SLP she has been having difficulty with memory for ~1 week. Pt did answer the following questions re: PLOF, but difficult to determine accuracy of answers. Pt also with some difficulty with word finding when attempting to answer. Pt's difficulty hearing also a negative impact. Pt reports living with her  in a house. Pt attempting to tell SLP someone else lives with her - unable to state who (kept stating different family members - e.g. grandfather, uncle - asking if that was right). Pt reports having 3 children. Pt reports her  manages pt's medications, the finances, cooking, grocery shopping, and yard work. Pt reports doing some cleaning. Pt does not drive. Pt reports previously working at [employer]. Pt enjoys spending time with her dog. The pt was administered the [address] ([address]) Examination this date to assess cognition. The pt scored a 8/30 with a score of 27 or greater considered WNL per the SLUMS. This score is judged to be a severe cognitive deficit when considering PLOF. See pt's scores on each subtest below:    [address] [facility] Status ([facility]) Examination   Section / subtest   Score Comments   Orientation   2/3 Pt oriented to year, city and state indep.     Pt aware she is in the hospital; unable to provide name despite [address] cues     Month - pt initially stated Sep, then stated \"when school starts' Unable to verbalize August     Pt off by 1 for ABRAM     Short-term recall   2/5 Immediate recall trials I and 2: 2/5 words indep    5 min delay: 2/5 words indep; +1 given semantic cues     Calculations   0/3 Difficulty with addition and subtraction     Naming   0/3 Pt named 2 animals in 1 min     Attention: number repetition   0/2 Pt with difficulty saying numbers both forwards and backwards     Visuospatial tasks: Clock drawing and shape identification   2/6 Difficulty with hands and numbers on clock. Pt unable to write numbers - scribbles on clock. Able to follow visuospatial commands (x in triangle)    Auditory comprehension 2/8     Total score:  8/10       The pt's strengths included pragmatics, following simple commands, answering yes/no questions. The pt demonstrated difficulty with word finding, orientation, calculation, thought organization, recall, attention, executive function. Pt able to state her first and last name; difficulty with . Pt required [facility] cues for month for , then able to provide the date. Pt indep counted 1-10. Pt required initiation cue for  to say the days of the week. Pt named objects in room with 70% acc. Pt with apraxic like speech tendencies - suspect underlying apraxia. For example, pt stating \"thraw\" and \"strow\" for straw. Pt overall with inconsistent errors; difficulty / unable to repeat SLP. Pt overall with reduced word finding, thought org, recall, and suspect understanding apraxia. Pt will benefit from ongoing ST services during admission and following d/c to promote improvements in these areas and achieve safe and independent return home at [facility] if safe and bale. SLP suspects pt will require 24 hr supervision at d/c. Discussed areas of deficit, goals, POC for ST with pt who is in agreement for participation in therapy.        Recommendations:  Recommendations  Requires SLP Intervention: Yes  Patient Education: SLP edu pt re: role of SLP, rationale of cog eval, results of cog eval, goals, POC  Patient Education Response: Verbalizes understanding;Needs reinforcement      Plan:   Speech Therapy Prognosis  Prognosis: Fair  Prognosis Considerations: Severity of Impairments  Individuals consulted  Consulted and agree with results and recommendations: Patient;RN;Family member  Family member consulted: pt's  Micah Edwardo) over the phone  Safety Devices  Safety Devices in place: Yes  Type of devices: All fall risk precautions in place; Bed alarm in place;Call light within reach; Left in bed    Goals:  Long-term Goals  Timeframe for Long-term Goals: 10 days (09/02/2022)  Goal 1: Pt will improve overall cognitive-linguistic abilities to promote safe and independent return home PLOF  Short-term Goals  Timeframe for Short-term Goals: 7 days (08/30/2022)  Goal 1: Pt will verbalize orientation concepts x4 with use of visual aids with 50% acc given mod cues  Goal 2: Pt will complete problem solving and thought organization tasks with 50% acc given mod cues  Goal 3: Pt will complete graded recall tasks using compensatory strategies with 50% acc given mod cues   Patient/family involved in developing goals and treatment plan: yes     Subjective:   Previous level of function and limitations: lives with    Pt alert and cooperative, agreeable to participate. Social/Functional History  Lives With: Spouse  Type of Home: House  Receives Help From: Family  Homemaking Assistance: Needs assistance  Homemaking Responsibilities: Yes  Meal Prep Responsibility: No  Cleaning Responsibility: Secondary  Bill Paying/Finance Responsibility: No  Shopping Responsibility: No  Health Care Management: No  Active : No  Patient's  Info: spouse drives as patient has R field cut and neglect from previous CVA  Occupation: Retired  Type of Occupation: worked at [email or web address]  Additional Comments: pt reports  recently retired and can provide 24 hr supervision  Vision  Vision: Impaired  Vision Exceptions: Wears glasses at all times; Visual field cut  Hearing  Hearing: Exceptions to [facility] [address] [facility]  Hearing Exceptions: Hard of hearing/hearing concerns           Objective:  Oral Motor   Labial: No impairment  Dentition: Limited;Poor  Oral Hygiene: Clean  Lingual: No impairment  Mandible: No impairment  Gag: No Impairment    Motor Speech  Apraxic Characteristics: Oral;Verbal;Marked difficulty initiating speech; Phonemic errors  Dysarthric Characteristics: None  Intelligibility: No impairment  Overall Impairment Severity: None    Auditory Comprehension  Comprehension: Exceptions  Basic Questions: WFL  Complex Questions: Mild  One Step Commands: Mild  Two Step Commands: Mild    Reading Comprehension  Reading Status:  (did not fully assess)    Expression  Primary Mode of Expression: Verbal    Verbal Expression  Verbal Expression: Exceptions to functional limits  Initiation: Mild   Automatic Speech: Mild  Confrontation: Mild  Convergent: Mild  Divergent: Severe  Responsive: Moderate  Conversation: Moderate  Interfering Components: Attention; Impaired thought organization;Right neglect    Written Expression  Dominant Hand: Right  Written Expression:  (did not formally assess)    Pragmatics/Social Functioning  Pragmatics: Within functional limits    Cognition:      Orientation  Overall Orientation Status: Impaired  Orientation Level: Oriented to person;Oriented to place;Oriented to situation  Attention  Attention: Exceptions to [facility]  Memory  Memory: Exceptions to [facility]  Short-term Memory: Severe  Working Memory: Severe  Problem Solving  Problem Solving: Exceptions to [facility]  Simple Functional Tasks: Moderate  Verbal Reasoning Skills: Moderate  Initiation: Mild  Sequencing:  Moderate  Executive Function Skills: Severe  Numeric Reasoning  Numeric Reasoning: Exceptions to [facility]   Calculations: Severe  Time: Severe  Abstract Reasoning  Abstract Reasoning: Exceptions to [facility]  Convergent Thinking: Severe  Divergent Thinking: Severe  Safety/Judgment  Safety/Judgment: Exceptions to [facility]  Insight: Mild    Impulsive: Mild  Flexibility of Thought: Moderate    Prognosis:  Speech Therapy Prognosis  Prognosis: Fair  Prognosis Considerations: Severity of Impairments  Individuals consulted  Consulted and agree with results and recommendations: Patient;RN;Family member  Family member consulted: pt's  Evi Jean) over the phone    Education:  Patient Education: SLP edu pt re: role of SLP, rationale of cog eval, results of cog eval, goals, POC  Patient Education Response: Verbalizes understanding;Needs reinforcement  Safety Devices in place: Yes  Type of devices: All fall risk precautions in place; Bed alarm in place;Call light within reach; Left in bed    Therapy Time:   Individual   Time In  0820   Time Out 0845   Minutes  25  min            Gilberto Simmons MA CCC-SLP #[id number]  Speech Language Pathologist

## 2022-08-23 NOTE — PROGRESS NOTES
Physical Therapy  Facility/Department: [facility] - MED SURG/ORTHO  Physical Therapy Initial Assessment/ Treatment    Name: Miguel Figueroa  : 1950  MRN: [id number]  Date of Service: 2022    Discharge Recommendations:  24 hour supervision or assist, Home with Home health PT   PT Equipment Recommendations  Equipment Needed: No (Owns RW (if this report from pt is accurate))    If pt discharges prior to next PT session this note will serve as discharge summary. Patient Diagnosis(es): The encounter diagnosis was Altered mental status, unspecified altered mental status type. Past Medical History:  has a past medical history of Arthritis, Asthma, Depression, Diabetes mellitus ([facility] GERD (gastroesophageal reflux disease), HCAP (healthcare-associated pneumonia), Hypertension, Mycobacterium gordonae infection, Paroxysmal atrial fibrillation ([facility] Sepsis ([facility] TIA (transient ischemic attack), and Unspecified cerebral artery occlusion with cerebral infarction. Past Surgical History:  has a past surgical history that includes Hysterectomy; Cholecystectomy; Appendectomy; Breast cyst incision and drainage (); Colonoscopy (2017); Upper gastrointestinal endoscopy (2017); and Breast biopsy. If pt discharges prior to next PT session this note will serve as discharge summary. Assessment   Body Structures, Functions, Activity Limitations Requiring Skilled Therapeutic Intervention: Decreased functional mobility ; Decreased endurance;Decreased safe awareness;Decreased strength;Decreased balance  Assessment: 72 yo female on OBS with cognitive deficits. PLOF: lives with spouse who can provide 24 hr assist per case management. Pt states she ambulates without device but has a RW at home. Unsure if info provided by pt is accurate. She is [place] and wears glasses. She denies pain. Today pt amb with RW and min assist to navigate walker and for balance at gait belt.  She will benefit from skilled PT to address current deficits. Recommend home 24 hr assist and home PT at discharge. Treatment Diagnosis: decreased cognition and mobilitiy  Therapy Prognosis: Good  Decision Making: Medium Complexity  Barriers to Learning: Koi, Cognition  Requires PT Follow-Up: Yes  Activity Tolerance  Activity Tolerance: Patient tolerated treatment well;Patient tolerated evaluation without incident     Plan   Plan: 3-5 times per week  Current Treatment Recommendations: Strengthening, Balance training, Functional mobility training, Transfer training, Gait training, Endurance training, Home exercise program, Safety education & training, Therapeutic activities  Safety Devices  Type of Devices:  All fall risk precautions in place, Call light within reach, Chair alarm in place, Patient at risk for falls, Gait belt, Left in chair, Nurse notified     Restrictions  Restrictions/Precautions  Restrictions/Precautions: Up as Tolerated, General Precautions  Position Activity Restriction  Other position/activity restrictions: tele     Subjective   Chart Reviewed: Yes  Patient assessed for rehabilitation services?: Yes  Family / Caregiver Present: No  Referring Practitioner: Rajinder Tan  Referral Date : 08/22/22  Diagnosis: cognitive deficits  Follows Commands: Within Functional Limits  Comments: RN cleared pt for therapy, pt OOB on approach after working with OT  Subjective: Pt agrees to therapy     PAIN: Pt denies pain at rest and will ambulation         Social/Functional History  Social/Functional History  Lives With: Spouse  Type of Home: House  Home Layout: One level  Bathroom Shower/Tub: Tub/Shower unit  Receives Help From: Family  ADL Assistance: Independent (pt reports  supervises bathing)  Homemaking Assistance: Needs assistance  Homemaking Responsibilities: Yes  Meal Prep Responsibility: No  Cleaning Responsibility: Secondary  Bill Paying/Finance Responsibility: No  Shopping Responsibility: No  Health Care Management: No  Active : No  Patient's  Info: spouse drives as patient has R field cut and neglect from previous CVA  Occupation: Retired  Type of Occupation: worked at [employer]  Additional Comments: pt reports  recently retired and can provide 24 hr supervision  Vision/Hearing  Vision  Vision: Impaired  Vision Exceptions: Wears glasses at all times; Visual field cut  Hearing  Hearing: Exceptions to [facility]  Hearing Exceptions: Hard of hearing/hearing concerns    Cognition   Orientation  Overall Orientation Status: Impaired  Orientation Level: Disoriented to situation;Disoriented to time;Oriented to place; Disoriented to person     Objective   Heart Rate: 73  Heart Rate Source: Monitor  BP: 131/62  BP Location: Right upper arm  BP Method: Automatic  Patient Position: Up in chair  MAP (Calculated): 85  Resp: 18  SpO2: 97 %  O2 Device: None (Room air)      Strength  BLEs grossly 3+/5 at hips, 4/5 knees, 4-/5 ankles        Bed Mobility Training  Bed Mobility Training: Yes  Bed mobility  Supine to Sit: Contact guard assistance  Sit to Supine: Contact guard assistance    Transfers  Sit to Stand: Contact guard assistance  Stand to sit: Contact guard assistance  Bed to Chair: Minimal assistance    Ambulation  Surface: level tile  Device: Rolling Walker  Assistance: Minimal assistance  Quality of Gait: slow pace, shortened stride with minimal foot clearance, assist at gait belt for balance and assist to navigate walker(jocelyn on turns)  Distance: 70 ft     Balance  Sitting - Static: Good  Standing - Static: Good;-  Standing - Dynamic: Fair;+    Exercise Treatment:   Ankle pumps: 10x B    LAQ: 10 x B    Standing bilat heel raise: 10 x B holding walker     Standing slow march: 10 x B holding walker    Sit><stand :3 x with CG        AM-PAC Score  AM-PAC Inpatient Mobility Raw Score : 17 (08/23/22 1129)  AM-PAC Inpatient T-Scale Score : 42.13 (08/23/22 1129)  Mobility Inpatient CMS 0-100% Score: 50.57 (08/23/22 1129)  Mobility Inpatient CMS G-Code Modifier : CK (08/23/22 1129)        Goals  Short Term Goals  Time Frame for Short term goals: 1 week (8/30) unless otherwise specified  Short term goal 1: pt to perform bed mob modified indep  Short term goal 2: pt to perform transfers supervised  Short term goal 3: pt to amb with least restrictive or no device 120 ft  Short term goal 4: pt to particpate in 10-12 reps therapeutic ex by 8/26  Patient Goals   Patient goals : \"to walk\"     Education  Patient Education  Education Given To: Patient  Education Provided: Role of Therapy;Plan of Care;Transfer Training  Education Provided Comments: Disease specific: pt educated in role of therapy, prevention of complication of bedrest, general safety, up with staff assist only, use of call light.  She voices understanding, recommend reinforcement  Barriers to Learning: Cognition    Therapy Time   Individual Concurrent Group Co-treatment   Time In 1109         Time Out 1107         Minutes 18         Timed Code Treatment Minutes: [id number], PT

## 2022-08-23 NOTE — PLAN OF CARE
SLP completed evaluation. Please refer to notes in EMR.       Rj Carmen MA [address] [facility]  Speech Language Pathologist

## 2022-08-23 NOTE — CONSULTS
In patient Neurology consult        [facility] Neurology      Deny Hook MD      Delrosalva Range  1950    Date of Service: 8/23/2022    Referring Physician: Sarah Conti MD    Most of the history was obtained from detailed chart reviewing and discussion with the patient. The patient is currently confused and unable to provide me with accurate history. Reason for the consult and CC: Acute confusion    HPI:   The patient is a 73y.o.  years old female with history of hypertension, A. fib and prior TIA who was admitted to the hospital yesterday with acute confusion and memory impairment. Symptoms started few days ago. Description daily episode of forgetfulness and not following direction mainly short-term. Degree was severe and persistent. She was not able to follow simple direction at home and  decided to bring her to the ER. No clear triggers, relieving or aggravating factors. Other associated symptom including word finding difficulties. No falling or injury, fever chills or chest pain. Patient was admitted for further work-up. Initial imaging with CT head showed no acute findings. Today she is currently by herself. She was able to answer questions but not a good historian. She is on Eliquis and aspirin. Other review of system was unremarkable.     Family History   Problem Relation Age of Onset    Cancer Mother         breast ca         Past Medical History:   Diagnosis Date    Arthritis     Asthma     Depression     Diabetes mellitus (HCC)     GERD (gastroesophageal reflux disease)     HCAP (healthcare-associated pneumonia)     Hypertension     Mycobacterium gordonae infection      5/7/18 + afb called from [address] pending  ( from [address] 3/29/18)    Paroxysmal atrial fibrillation (HCC)     Sepsis ([facility] [address]     TIA (transient ischemic attack)     Unspecified cerebral artery occlusion with cerebral infarction      Past Surgical History:   Procedure Laterality Date APPENDECTOMY      BREAST BIOPSY      CHOLECYSTECTOMY      COLONOSCOPY  01/19/2017    polyp    CYST INCISION AND DRAINAGE  1986    on head    HYSTERECTOMY (CERVIX STATUS UNKNOWN)      UPPER GASTROINTESTINAL ENDOSCOPY  01/19/2017    barretts ?      Social History     Tobacco Use    Smoking status: Never    Smokeless tobacco: Never   Vaping Use    Vaping Use: Never used   Substance Use Topics    Alcohol use: No     Alcohol/week: 0.0 standard drinks    Drug use: No     Allergies   Allergen Reactions    Acyclovir Anaphylaxis     Redness face & back, swollen tounge, eyes and throat     Ace Inhibitors      Possible anaphylaxis    Amoxicillin      hives    Cephalosporins      anaphylaxis  Lip swelling    Oxycodone-Acetaminophen      Respiratory distress    Penicillins     Percocet [Oxycodone-Acetaminophen]      Current Facility-Administered Medications   Medication Dose Route Frequency Provider Last Rate Last Admin    albuterol sulfate HFA (PROVENTIL;VENTOLIN;PROAIR) 108 (90 Base) MCG/ACT inhaler 2 puff  2 puff Inhalation Q4H PRN Etienne Briones MD        apixaban Verneita Rinard) tablet 5 mg  5 mg Oral BID Etienne Briones MD   5 mg at 08/23/22 3522    aspirin EC tablet 81 mg  81 mg Oral Daily Etienne Briones MD   81 mg at 08/23/22 0795    atorvastatin (LIPITOR) tablet 40 mg  40 mg Oral Nightly Etienne Briones MD        carbamide peroxide (DEBROX) 6.5 % otic solution 5 drop  5 drop Left Ear BID Etienne Briones MD        dilTIAZem (CARDIZEM CD) extended release capsule 120 mg  120 mg Oral Daily Etienne Briones MD   120 mg at 08/23/22 0281    ferrous sulfate (IRON 325) tablet 325 mg  325 mg Oral Daily with breakfast Etienne Briones MD   325 mg at 08/32/48 2047    folic acid (FOLVITE) tablet 1 mg  1 mg Oral Daily Etienne Briones MD   1 mg at 08/23/22 0929    montelukast (SINGULAIR) tablet 10 mg  10 mg Oral Nightly Etienne Briones MD   10 mg at 08/22/22 8534    polyvinyl alcohol irises: Pupil round, regular and reactive bilaterally direct and consensual. Iris is symmetric. Neck: Neck is supple. No thyromegaly  Cardiovascular: No lower leg edema with good pulsation. No carotid bruit  Neurological: Mental status: Awake, alert oriented x1 only to herself. Paraphasic error and perseveration. Poor attention concentration poor immediate recall and fund of knowledge. Cranial nerves: Pupil round regular and reactive, extraocular muscle intact, no gaze preference, face is symmetric, uvula midline, tongue is midline. DTRs: Symmetric 2+ throughout arms and legs                          Sensation: No sensory deficit or abnormal sensation                          Plantars: Flexor bilaterally. Musculoskeletal:  The patient can move all 4 extremities. No apparent focal weakness. Normal tone and range of motion. Psychiatric: Poor judgment and insight. Poor orientation, waxing and waning. Easily distracted. Labile affect. Respiratory: Respiratory effort: Normal.  Palpation: No abnormal deformities. Skin: Inspection of the skin: Normal , no abnormal lesion. Palpation: No palpable nodules  ENT: No abnormalities with nasal mucosa.   No abnormalities with oropharynx and palate is symmetric    Data:  LABS:   Lab Results   Component Value Date/Time     08/22/2022 04:27 PM    K 4.1 08/22/2022 04:27 PM    K 3.9 04/18/2022 06:01 AM     08/22/2022 04:27 PM    CO2 23 08/22/2022 04:27 PM    BUN 18 08/22/2022 04:27 PM    CREATININE 1.0 08/22/2022 04:27 PM    GFRAA >60 08/22/2022 04:27 PM    GFRAA 54 12/17/2012 05:00 AM    LABGLOM 55 08/22/2022 04:27 PM    GLUCOSE 147 08/22/2022 04:27 PM    PHOS 3.3 04/03/2018 06:55 PM    MG 2.00 10/19/2018 05:16 AM    CALCIUM 10.6 08/22/2022 04:27 PM     Lab Results   Component Value Date/Time    WBC 5.1 08/23/2022 06:18 AM    RBC 3.48 08/23/2022 06:18 AM    HGB 10.5 08/23/2022 06:18 AM    HCT 32.1 08/23/2022 06:18 AM    MCV 92.1 08/23/2022 06:18 AM    RDW 14.7 08/23/2022 06:18 AM    PLT 88 08/23/2022 06:18 AM     Lab Results   Component Value Date    INR 1.59 (H) 04/17/2022    PROTIME 18.3 (H) 04/17/2022       Neuroimaging were independently reviewed by me. Reviewed notes from different physicians  Reviewed lab and blood testing    Impression:  Acute confusion with aphasia. Possible new ischemic stroke versus TIA. History of TIA/CVA  A. fib on Eliquis  Hypertension  Hyperlipidemia    Recommendation:  MRI brain  Speech evaluation  PT and OT  Continue Eliquis  Statin  Telemetry  Neurochecks  Blood pressure monitor continue current medications  Blood sugar control with insulin sliding scale  Metabolic work-up with TFT and B12  A1c and lipid panel  Anemia work-up  If MRI brain showed acute stroke, she will need to repeat her carotid Doppler      Thank you for referring such patient. If you have any questions regarding my consult note, please don't hesitate to call me. Margaret Rojas MD  [phone]    This dictation was generated by voice recognition computer software.  Although all attempts are made to edit the dictation for accuracy, there may be errors in the  transcription that are not intended

## 2022-08-23 NOTE — PROGRESS NOTES
Occupational Therapy  Facility/Department: [place] [id number] - MED SURG/ORTHO  Occupational Therapy Initial Assessment and Treatment    Name: Jt Bain  : 1950  MRN: [id number]  Date of Service: 2022    Discharge Recommendations:  24 hour supervision or assist, Home with Home health OT  OT Equipment Recommendations  Equipment Needed: Yes  Other: ensure that pt has shower chair       Patient Diagnosis(es): The encounter diagnosis was Altered mental status, unspecified altered mental status type. Past Medical History:  has a past medical history of Arthritis, Asthma, Depression, Diabetes mellitus ([address] GERD (gastroesophageal reflux disease), HCAP (healthcare-associated pneumonia), Hypertension, Mycobacterium gordonae infection, Paroxysmal atrial fibrillation ([facility] [address] Sepsis ([facility] [address] TIA (transient ischemic attack), and Unspecified cerebral artery occlusion with cerebral infarction. Past Surgical History:  has a past surgical history that includes Hysterectomy; Cholecystectomy; Appendectomy; Breast cyst incision and drainage (); Colonoscopy (2017); Upper gastrointestinal endoscopy (2017); and Breast biopsy. Treatment Diagnosis: decreased participation in ADLs and functional mobility      Assessment   Performance deficits / Impairments: Decreased functional mobility ; Decreased endurance;Decreased coordination;Decreased ADL status; Decreased vision/visual deficit; Decreased safe awareness;Decreased strength;Decreased cognition;Decreased balance  Assessment: Jt Bain is a(n) 73 y.o. female admitted to [facility] with c/o AMS, Dx cognitive deficits. PLOF: pt largely unable to report PLOF, per chart review pt at home with   has cane, walker, and grab bars  Due to cognitive deficits, pt required:   Functional Mobility:  CGA for sit <> stand, Min A with RW bed to toilet to chair, Min A for toilet transfer  ADL Training:  Mod A for anna care after BM, Mod A for LB dressing: brief exchange, Min A for grooming (in stance at sink for hand washing)  Pt required consistent verbal and manual cues for safety, including RW management d/t R sided visual cut. Pt is functioning below baseline and would benefit from cont OT while in acute care. Once medically appropriate, rec Home with 24/7 Assist/Supervision  and HHOT upon d/c.      Treatment Diagnosis: decreased participation in ADLs and functional mobility  Prognosis: Fair  Decision Making: Medium Complexity  REQUIRES OT FOLLOW-UP: Yes        Plan   Plan  Times per Week: 3-5  Current Treatment Recommendations: Strengthening, Balance training, Functional mobility training, Endurance training, Equipment evaluation, education, & procurement, Patient/Caregiver education & training, Safety education & training, Return to work related activity, Self-Care / ADL, Cognitive/Perceptual training, Coordination training     Restrictions  Restrictions/Precautions  Restrictions/Precautions: Up as Tolerated, General Precautions  Position Activity Restriction  Other position/activity restrictions: tele    Subjective   General  Chart Reviewed: Yes  Patient assessed for rehabilitation services?: Yes  Family / Caregiver Present: No  Referring Practitioner: Uriah Kruse MD  Diagnosis: cognitive deficits  Subjective  Subjective: pt attempted to get out of bed at OT arrival, denied any acute pain during session; RN approved therapy       Social/Functional History  Social/Functional History  Lives With: Spouse  Type of Home: House  Home Layout: One level  Bathroom Shower/Tub: Tub/Shower unit  Receives Help From: Family  ADL Assistance: Independent (pt reports  supervises bathing)  Homemaking Assistance: Needs assistance  Homemaking Responsibilities: Yes  Meal Prep Responsibility: No  Cleaning Responsibility: Secondary  Bill Paying/Finance Responsibility: No  Shopping Responsibility: No  Health Care Management: No  Active : No  Patient's  Info: spouse drives as patient has R field cut and neglect from previous CVA  Occupation: Retired  Type of Occupation: worked at [employer]  Additional Comments: pt reports  recently retired and can provide 24 hr supervision       Objective   Heart Rate: 73  Heart Rate Source: Monitor  BP: 131/62  BP Location: Right upper arm  BP Method: Automatic  Patient Position: Up in chair  MAP (Calculated): 85  Resp: 18  SpO2: 97 %  O2 Device: None (Room air)          Observation/Palpation  Posture: Good  Safety Devices  Type of Devices: All fall risk precautions in place;Call light within reach; Chair alarm in place; Patient at risk for falls;Gait belt;Left in chair;Nurse notified    Bed Mobility Training  Bed Mobility Training: Yes (likely ind for supine to sit, pt sitting EOB at arrival; in chair at end of session)  Transfer Training  Transfer Training: Yes  Stand to Sit: Contact-guard assistance  Stand Pivot Transfers: Contact-guard assistance  Bed to Chair: Minimum assistance; Adaptive equipment (RW)  Toilet Transfer: Minimum assistance;Assist X1 (with RW)     AROM: Generally decreased, functional  PROM: Generally decreased, functional  Strength: Generally decreased, functional (4-/5)  Coordination: Grossly decreased, non-functional  Tone: Normal  Sensation: Intact    ADL  Grooming: Minimal assistance  Grooming Skilled Clinical Factors: in stance at sink for handwashing  LE Dressing: Moderate assistance  LE Dressing Skilled Clinical Factors: A for threading brief onto legs  Toileting: Moderate assistance  Toileting Skilled Clinical Factors: A for anna care after BM     Activity Tolerance  Activity Tolerance: Patient tolerated treatment well;Patient tolerated evaluation without incident        Vision  Vision: Impaired  Vision Exceptions: Wears glasses at all times; Visual field cut  Hearing  Hearing: Exceptions to [facility]  Hearing Exceptions: Hard of hearing/hearing concerns  Cognition  Overall Cognitive Status: days by 8/30 unless noted  Short Term Goal 1: pt will perform toilet transfer with SBA and LRAD  Short Term Goal 2: pt will perform grooming at sink with SBA  Short Term Goal 3: pt will perform LB dressing with CGA  Short Term Goal 4: pt will follow 2 step commands with 75% acuracy during session by 8/28  Patient Goals   Patient goals : unable to state       Therapy Time   Individual Concurrent Group Co-treatment   Time In 1050         Time Out 1108         Minutes 18         Timed Code Treatment Minutes: 8 Minutes (10 minute eval)     If pt is unable to be seen after this session, please let this note serve as discharge summary. Please see case management note for discharge disposition. Thank you.    Ghassan Duncan, OT

## 2022-08-23 NOTE — PROGRESS NOTES
Hospitalist Progress Note      PCP: Claudette Pichardo MD    Date of Admission: 8/22/2022    Chief Complaint: memory issues    Hospital Course: reviewed     Subjective: sitting in chair, pleasantly confused, ao x 1       Medications:  Reviewed    Infusion Medications    dextrose       Scheduled Medications    apixaban  5 mg Oral BID    aspirin EC  81 mg Oral Daily    atorvastatin  40 mg Oral Nightly    carbamide peroxide  5 drop Left Ear BID    dilTIAZem  120 mg Oral Daily    ferrous sulfate  325 mg Oral Daily with breakfast    folic acid  1 mg Oral Daily    montelukast  10 mg Oral Nightly    polyvinyl alcohol  1 drop Both Eyes BID    venlafaxine  75 mg Oral Daily    pantoprazole  40 mg Oral QAM AC    insulin lispro  0-8 Units SubCUTAneous TID WC    insulin lispro  0-4 Units SubCUTAneous Nightly    metoprolol tartrate  50 mg Oral BID     PRN Meds: albuterol sulfate HFA, ondansetron **OR** ondansetron, polyethylene glycol, dextrose bolus **OR** dextrose bolus, dextrose, glucose, glucagon (rDNA)    No intake or output data in the 24 hours ending 08/23/22 1057    Physical Exam Performed:    /89   Pulse 74   Temp 97.4 °F (36.3 °C) (Oral)   Resp 18   Ht 5' (1.524 m)   Wt 209 lb (94.8 kg)   LMP  (LMP Unknown)   SpO2 98%   BMI 40.82 kg/m²     General appearance: No apparent distress, appears stated age and cooperative. HEENT: Pupils equal, round, and reactive to light. Conjunctivae/corneas clear. Neck: Supple, with full range of motion. No jugular venous distention. Trachea midline. Respiratory:  Normal respiratory effort. Clear to auscultation, bilaterally without Rales/Wheezes/Rhonchi. Cardiovascular: Regular rate and rhythm with normal S1/S2 without murmurs, rubs or gallops. Abdomen: Soft, non-tender, non-distended with normal bowel sounds. Musculoskeletal: No clubbing, cyanosis or edema bilaterally. Full range of motion without deformity.   Skin: Skin color, texture, turgor normal.  No rashes or lesions. Neurologic:  Neurovascularly intact without any focal sensory/motor deficits. Cranial nerves: II-XII intact, grossly non-focal.  Psychiatric: Alert and oriented x1, thought content not appropriate, not normal insight  Capillary Refill: Brisk,3 seconds, normal   Peripheral Pulses: +2 palpable, equal bilaterally       Labs:   Recent Labs     08/22/22 1627 08/23/22 0618   WBC 8.3 5.1   HGB 11.6* 10.5*   HCT 36.1 32.1*   * 88*     Recent Labs     08/22/22  1627      K 4.1      CO2 23   BUN 18   CREATININE 1.0   CALCIUM 10.6     Recent Labs     08/22/22  1627   AST 36   ALT 19   BILITOT 0.5   ALKPHOS 174*     No results for input(s): INR in the last 72 hours. Recent Labs     08/22/22 1627   TROPONINI <0.01       Urinalysis:      Lab Results   Component Value Date/Time    NITRU Negative 08/22/2022 06:41 PM    WBCUA 0-2 08/22/2022 06:41 PM    BACTERIA Rare 10/14/2018 06:28 PM    RBCUA None seen 08/22/2022 06:41 PM    BLOODU Negative 08/22/2022 06:41 PM    SPECGRAV >=1.030 08/22/2022 06:41 PM    GLUCOSEU Negative 08/22/2022 06:41 PM       Radiology:  XR CHEST PORTABLE   Final Result   No acute cardiopulmonary findings         CT HEAD WO CONTRAST   Final Result   No acute intracranial abnormality. Status post right frontal craniotomy. Areas of encephalomalacia in the bilateral frontal parietal and occipital   lobes and the bilateral cerebellar hemispheres. Mild parenchymal volume loss. Moderate chronic microvascular disease.          MRA neck with and without contrast    (Results Pending)   MRI brain with and without contrast    (Results Pending)           Assessment/Plan:    Active Hospital Problems    Diagnosis     Memory loss due to medical condition [R41.3]      Priority: Medium    Cognitive deficits [R41.89]      Priority: Medium    First degree AV block [I44.0]     Paroxysmal atrial fibrillation (HCC) [I48.0]     Chronic obstructive pulmonary disease ([facility] [address] [J44.9] Essential hypertension [I10]     Morbid obesity with BMI of 40.0-44.9, adult (HCC) [E66.01, Z68.41]          Cognitive deficit- since this has lasted more that a week -concern for acute CVA vs TIA, no evidence of UTI or other infection - she is already on maximal medications, but may benefit from SLP  -pending MRI brain/neck w and w/o contrast  -- does not need ECHO currently(last done 4/22- ef 55%, no wm abn, g1dd, neg bubble study)  -neuro consulted, apprec recs  -check b12/folate/tsh    Paroxymsal afib - continue with cardizem CD and metoprolol    HTN - reasonably controlled on current meds    Possible EKG change - appears new since April , pt without sx of MI or acute cardiac issue   -consult placed to cardiology overnight but held off for now  -repeat ekg 8/23 pending    DVT Prophylaxis: on home eliquis  Diet: ADULT DIET; Regular; 4 carb choices (60 gm/meal); Low Fat/Low Chol/High Fiber/2 gm Na;  Low Sodium (2 gm)  Code Status: Prior    PT/OT Eval Status: ordered 8/22 and pending    Dispo - repeat EKG, pending neuro recs, MRI, check b12/folate/tsh    Vanessa Cramer MD    Addendum: repeat ekg was wnl(nsr), cancelled cards consult

## 2022-08-23 NOTE — RT PROTOCOL NOTE
RT Inhaler-Nebulizer Bronchodilator Protocol Note    There is a bronchodilator order in the chart from a provider indicating to follow the RT Bronchodilator Protocol and there is an Initiate RT Inhaler-Nebulizer Bronchodilator Protocol order as well (see protocol at bottom of note). CXR Findings:  XR CHEST PORTABLE    Result Date: 8/22/2022  No acute cardiopulmonary findings       The findings from the last RT Protocol Assessment were as follows:   History Pulmonary Disease: None or smoker <15 pack years  Respiratory Pattern: Regular pattern and RR 12-20 bpm  Breath Sounds: Slightly diminished and/or crackles  Cough: Strong, spontaneous, non-productive  Indication for Bronchodilator Therapy: On home bronchodilators  Bronchodilator Assessment Score: 2    Aerosolized bronchodilator medication orders have been revised according to the RT Inhaler-Nebulizer Bronchodilator Protocol below. Respiratory Therapist to perform RT Therapy Protocol Assessment initially then follow the protocol. Repeat RT Therapy Protocol Assessment PRN for score 0-3 or on second treatment, BID, and PRN for scores above 3. No Indications - adjust the frequency to every 6 hours PRN wheezing or bronchospasm, if no treatments needed after 48 hours then discontinue using Per Protocol order mode. If indication present, adjust the RT bronchodilator orders based on the Bronchodilator Assessment Score as indicated below. Use Inhaler orders unless patient has one or more of the following: on home nebulizer, not able to hold breath for 10 seconds, is not alert and oriented, cannot activate and use MDI correctly, or respiratory rate 25 breaths per minute or more, then use the equivalent nebulizer order(s) with same Frequency and PRN reasons based on the score. If a patient is on this medication at home then do not decrease Frequency below that used at home.     0-3 - enter or revise RT bronchodilator order(s) to equivalent RT Bronchodilator order with Frequency of every 4 hours PRN for wheezing or increased work of breathing using Per Protocol order mode. 4-6 - enter or revise RT Bronchodilator order(s) to two equivalent RT bronchodilator orders with one order with BID Frequency and one order with Frequency of every 4 hours PRN wheezing or increased work of breathing using Per Protocol order mode. 7-10 - enter or revise RT Bronchodilator order(s) to two equivalent RT bronchodilator orders with one order with TID Frequency and one order with Frequency of every 4 hours PRN wheezing or increased work of breathing using Per Protocol order mode. 11-13 - enter or revise RT Bronchodilator order(s) to one equivalent RT bronchodilator order with QID Frequency and an Albuterol order with Frequency of every 4 hours PRN wheezing or increased work of breathing using Per Protocol order mode. Greater than 13 - enter or revise RT Bronchodilator order(s) to one equivalent RT bronchodilator order with every 4 hours Frequency and an Albuterol order with Frequency of every 2 hours PRN wheezing or increased work of breathing using Per Protocol order mode. RT to enter RT Home Evaluation for COPD & MDI Assessment order using Per Protocol order mode.     Electronically signed by Cadence Talavera RCP on 8/23/2022 at 12:26 AM

## 2022-08-23 NOTE — CARE COORDINATION
CASE MANAGEMENT INITIAL ASSESSMENT    Reviewed chart and completed assessment with patient's , Radha Demarco at bedside. Family present: , Radha Demarco  Explained Case Management role/services    Health Care Decision Maker :   Primary Decision Maker: Constantin Cope Spouse - [phone]        Admit date/status:8/22 Observation   Diagnosis:Memory Loss Due To Medical Condition    Is this a Readmission?:  No      Insurance:[employer] Medicare   Precert required for SNF: Yes       3 night stay required: No    Living arrangements, Adls, care needs, prior to admission:Pt lives at home with , he is her 24/7 assist.  Pt has cane, walker, grab bars. Services in the home and/or outpatient, prior to admission:none currently    Current PCP:Dr Brittney Mills                             Medications: Prescription coverage? Yes Will pt require financial assistance with medications No     Transportation needs:  transports     PT/OT recs:24 hour supervision or assist, Home with Home health PT     Barriers to discharge:MRI/MRA ordered, no CM barriers    Plan/comments:Pt's  agrees with pt returning home with his 24hr assist and new home care. No agency preference, agrees with referral to [facility]. Writer gave referral to [facility], she will follow and can staff pt. ECOC on chart for MD signature.    ABDOULAYE Mane-RN

## 2022-08-23 NOTE — CARE COORDINATION
[facility]    Referral received from  to follow for home care services. I will follow for needs, and speak with patient to verify demos.     Twin Domínguez RN, BSN CTN  [facility] [phone]

## 2022-08-23 NOTE — PROGRESS NOTES
Upon Admission NIHS scale was unable to be assessed due to altered mental status and the inability for patient to follow direction. Admission questions were also unanswered. This nurse will continue to monitor patient.

## 2022-08-23 NOTE — PROGRESS NOTES
Speech Language Pathology  Clinical Bedside Swallow Assessment  Facility/Department: [facility] C5 - MED SURG/ORTHO        Recommendations:  Diet recommendation: IDDSI 7 Regular Solids; IDDSI 0 Thin Liquids; Meds whole with thin liquids  Instrumentation: Not clinically indicated   Risk management: upright for all intake, stay upright for at least 30 mins after intake, small bites/sips, alternate bites/sips, slow rate of intake, general GERD precautions, and general aspiration precautions      NAME:Shiva Mcgraw  : 1950 (73 y.o.)   MRN: [id number]  ROOM: [address] [place] [address]  ADMISSION DATE: 2022  PATIENT DIAGNOSIS(ES): Memory loss due to medical condition [R41.3]  Altered mental status, unspecified altered mental status type [R41.82]  Chief Complaint   Patient presents with    Memory Loss     Patient's  reports patient has had some \"memory issues\" for the past week. He states pt was watching a movie over the weekend and states \"She couldn't remember who Sal Farris was\"  states pt was seen in the ED on Saturday for ear wax issues and states he forgot to mention her memory problems. States he called her neurologist and has an appt 22 was told to call her PCP who told  to bring pt to the ED for evaluation.       Patient Active Problem List    Diagnosis Date Noted    Memory loss due to medical condition 2022    Cognitive deficits 2022    Stroke-like symptoms     Acute focal neurological deficit 2022    Cerebrovascular accident (CVA) ([address] 2020    PAF (paroxysmal atrial fibrillation) ([address] 2020    HTN (hypertension), benign 2020    Encounter for loop recorder check 2019    First degree AV block 2019    TIA (transient ischemic attack) 2019    COPD exacerbation ([address] 10/22/2018    Nontraumatic cortical hemorrhage of left cerebral hemisphere ([address] 2018    Dyslipidemia 2018    CAD in native artery 2018    Mild malnutrition ([facility] 04/09/2018    PVC (premature ventricular contraction) 04/09/2018    DM type 2, controlled, with complication ([facility]     Hemorrhagic stroke ([facility]     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 03/28/2018    Acute nonintractable headache     Meningoencephalitis     Mastoiditis of right side     Hyperkalemia     Splenomegaly     JARRETT on CPAP 03/27/2018    Paroxysmal atrial fibrillation (HCC)     Elevated alkaline phosphatase level 05/11/2017    Chronic normocytic anemia 05/11/2017    GERD (gastroesophageal reflux disease) 05/11/2017    Chronic obstructive pulmonary disease ([facility] 01/29/2017    Depression 01/29/2017    Chronic thrombocytopenia 01/25/2016    Essential hypertension 12/17/2015    Hx of L-ICA CVA     Morbid obesity with BMI of 40.0-44.9, adult ([facility] 05/19/2014     Past Medical History:   Diagnosis Date    Arthritis     Asthma     Depression     Diabetes mellitus ([facility]     GERD (gastroesophageal reflux disease)     HCAP (healthcare-associated pneumonia)     Hypertension     Mycobacterium gordonae infection      5/7/18 + afb called from [address] pending  ( from [address] 3/29/18)    Paroxysmal atrial fibrillation (HCC)     Sepsis ([facility]     TIA (transient ischemic attack)     Unspecified cerebral artery occlusion with cerebral infarction      Past Surgical History:   Procedure Laterality Date    APPENDECTOMY      BREAST BIOPSY      CHOLECYSTECTOMY      COLONOSCOPY  01/19/2017    polyp    CYST INCISION AND DRAINAGE  1986    on head    HYSTERECTOMY (CERVIX STATUS UNKNOWN)      UPPER GASTROINTESTINAL ENDOSCOPY  01/19/2017    barretts ?      Allergies   Allergen Reactions    Acyclovir Anaphylaxis     Redness face & back, swollen tounge, eyes and throat     Ace Inhibitors      Possible anaphylaxis    Amoxicillin      hives    Cephalosporins      anaphylaxis  Lip swelling    Oxycodone-Acetaminophen      Respiratory distress    Penicillins     Percocet [Oxycodone-Acetaminophen]        DATE ONSET: Pt admitted to [facility] 08/22/2022    Date of Evaluation: 8/23/2022   Evaluating Therapist: SUBHASH Lyons    Chart Reviewed: : [x] Yes [] No    Current Diet: ADULT DIET; Regular; 4 carb choices (60 gm/meal); Low Fat/Low Chol/High Fiber/2 gm Na; Low Sodium (2 gm)    Recent Chest Radiography: [x] Chest XR   [] CT of Chest  Date: 08/22/2022     Impression   No acute cardiopulmonary findings       Pain: none indicated     Reason for Referral  Deysi Eddy was referred for a bedside swallow evaluation to assess the efficiency of their swallow function, identify signs and symptoms of aspiration and make recommendations regarding safe dietary consistencies, effective compensatory strategies, and safe eating environment. Assessment    Medical record review/interview: Per MD H&P: \"This is a 73 y.o. WF with PMHx sig for Arthritis, Asthma, Depression, Diabetes mellitus 2, GERD (gastroesophageal reflux disease),  Hypertension, , Paroxysmal atrial fibrillation ([address]  TIA (transient ischemic attack), and Unspecified cerebral artery occlusion with cerebral infarction despite being on xarelto in 2015, now on eliquis. She had a LHC in 2013 which was normal.    She was admitted in 4/2018 with CVA with hemorrhagic conversion and anticoagulation was stopped. She was readmitted in 10/2018 with recurrent CVA and Eliquis was started. Patient was readmitted in 4/2019 with another CVA while on Eliquis and ASA. In 7/2019 she had a loop recorder to determine arrhythmia burden due to recurrent CVA on anticoagulation. Brief PAF has been detected. Echo 4/2022 demonstrated an LVEF of 55-60%. Device interrogation 5/7/2022 demonstrated possible AF vs. Artifact. She reports she has some residual memory loss, confusion, and trouble with reading since her CVA in 2018.  She reports that several months ago she felt her heart beating irregularly for several seconds but has not felt anything since  Pt  brings her in for worsening memory facial sensation- WNL  CN VII (facial): WNL  CN IX/X (glossopharyngeal/vagus): MPT: DNT; pitch range: DNT; vocal quality: WNL; cough: Strong-perceptually  CN XII (hypoglossal): WNL    Laryngeal function exam:   Secretions:   Vocal quality: See CN exam above  MPT: See CN exam above  S/Z ratio:   Pitch range: See CN exam above  Cough: See CN exam above    Oral Care Status:    [] Oral Care [facility]  [x] Poor oral care status  [x] Edentulous - pt with few teeth   [] Upper Dentures  [] Lower Dentures  [] Missing/Broken Teeth  [] Evidence of dental cavities/carries    PO trials:   IDDSI 0 (thin): via cup. Suspect timely swallow and adequate laryngeal elevation. No overt s/s of aspiration - no coughing, throat clearing, wet vocal quality. IDDSI 7 (regular): Slow and prolonged mastication, but adequate A-P transit and oral clearance given increased time. No overt s/s of aspiration. Impressions:  RN ok'd entry of SLP; reported no swallowing concerns. SLP completed BSE since pt with previously hx of dysphagia, CVA, GERD. Pt did indicate swallowing has improved since previous admission. [facility]. Pt with poor dentition. Thin liquid and regular solid PO trials given. low and prolonged mastication, but adequate A-P transit and oral clearance given increased time. Suspect timely swallow and adequate laryngeal elevation. No overt s/s of aspiration - no coughing, throat clearing, wet vocal quality. SLP edu pt re: safe swallow strategies - small bites/sips, slow rate, alternate liquids and solids, fully upright. Pt verbalized understanding; will benefit from ongoing edu. SLP recommends continue regular solids, thin liquids, meds PO. SLP to continue to follow for diet tolerance monitoring. Hold PO and contact SLP if s/s of aspiration develop. Recommendations:  Diet recommendation: IDDSI 7 Regular Solids; IDDSI 0 Thin Liquids;  Meds whole with thin liquids  Instrumentation: Not clinically indicated   Risk management: upright for all intake, stay upright for at least 30 mins after intake, small bites/sips, alternate bites/sips, slow rate of intake, general GERD precautions, and general aspiration precautions    Prognosis: Good    Recommended Intervention:   [x] Dysphagia tx  [] Videostroboscopy                      [] NPO   [] MBS       [] Speech/Cog Eval   [] Therapeutic PO Trials     [] Ice Chips   [] Other:  [] FEES                                                 Dysphagia Therapeutic Intervention:   []  Bolus control Exercises  []  Oral Motor Exercises  []  Dunn Water Protocol  []  Thermal Stimulation  []  Oral Care    []  Vital Stim/NMES  []  Laryngeal Exercises  [x]  Patient/Family Education  []  Pharyngeal Exercises  []  Therapeutic PO trials with SLP  [x]  Diet tolerance monitoring  []  Other:     Referrals:  N/A    Goals:  Short Term Goals:  Timeframe for Short Term Goals: (5 days; 08/28/2022)  Goal 1: The patient will tolerate recommended diet with no clinical s/s of aspiration 5/5  Goal 2: The patient/caregiver will demonstrate understanding of compensatory swallow strategies, for improved swallow safety      Long Term Goals:   Timeframe for Long Term Goals: (7 days; 08/30/2022)  Goal 1: The patient will tolerate least restrictive diet with no clinical s/s of aspiration or worsening respiratory/pulmonary status    Treatment:  Skilled instruction completed with patient re: evidenced based practice regarding recommendations and POC, importance of oral care to reduce adverse affects in the event of aspiration, and instruction of recommended compensatory strategies developed based upon clinical exam. Pt able to recall/demonstrate compensatory strategies with mod cues.       Pt Education: SLP educated the patient re: Role of SLP, rationale for completion of assessment, results of assessment, recommendations, and POC  Pt Education Response: verbalized understanding and would benefit from ongoing education    Duration/Frequency of Tx: 3-5x/week for dysphagia and cog tx    Individuals Consulted:   [x]  Patient     []  NP         [x]  RN   []  RD                   []  MD      [x]  Family Member                        []  PA    []  Other:      Safety Devices / Report:  [x]  All fall risk precautions in place []  Safety handoff completed with RN  [x]  Bed alarm in place  []  Left in bed     []  Chair alarm in place  []  Left in chair   [x]  Call light in reach   []  Other:                        Total Treatment Time / Charges       Time in Time out Total Time / units   Swallow Eval/Tx Time  5510 2534 25 min / 2 units (DE/DT)     Signature:  Robert Martini MA [facility] [facility]  Speech Language Pathologist

## 2022-08-24 ENCOUNTER — APPOINTMENT (OUTPATIENT)
Dept: VASCULAR LAB | Age: 72
DRG: 065 | End: 2022-08-24
Payer: MEDICARE

## 2022-08-24 LAB
A/G RATIO: 0.9 (ref 1.1–2.2)
ALBUMIN SERPL-MCNC: 3.9 G/DL (ref 3.4–5)
ALP BLD-CCNC: 143 U/L (ref 40–129)
ALT SERPL-CCNC: 17 U/L (ref 10–40)
AMMONIA: 21 UMOL/L (ref 11–51)
ANION GAP SERPL CALCULATED.3IONS-SCNC: 10 MMOL/L (ref 3–16)
AST SERPL-CCNC: 30 U/L (ref 15–37)
BILIRUB SERPL-MCNC: 0.7 MG/DL (ref 0–1)
BUN BLDV-MCNC: 13 MG/DL (ref 7–20)
CALCIUM SERPL-MCNC: 9.1 MG/DL (ref 8.3–10.6)
CHLORIDE BLD-SCNC: 102 MMOL/L (ref 99–110)
CO2: 24 MMOL/L (ref 21–32)
CREAT SERPL-MCNC: 0.9 MG/DL (ref 0.6–1.2)
ESTIMATED AVERAGE GLUCOSE: 139.9 MG/DL
FOLATE: >20 NG/ML (ref 4.78–24.2)
GFR AFRICAN AMERICAN: >60
GFR NON-AFRICAN AMERICAN: >60
GLUCOSE BLD-MCNC: 131 MG/DL (ref 70–99)
GLUCOSE BLD-MCNC: 143 MG/DL (ref 70–99)
GLUCOSE BLD-MCNC: 166 MG/DL (ref 70–99)
GLUCOSE BLD-MCNC: 169 MG/DL (ref 70–99)
GLUCOSE BLD-MCNC: 229 MG/DL (ref 70–99)
HBA1C MFR BLD: 6.5 %
HCT VFR BLD CALC: 33.4 % (ref 36–48)
HEMOGLOBIN: 10.8 G/DL (ref 12–16)
MCH RBC QN AUTO: 29.8 PG (ref 26–34)
MCHC RBC AUTO-ENTMCNC: 32.3 G/DL (ref 31–36)
MCV RBC AUTO: 92.2 FL (ref 80–100)
PDW BLD-RTO: 15 % (ref 12.4–15.4)
PERFORMED ON: ABNORMAL
PLATELET # BLD: 87 K/UL (ref 135–450)
PMV BLD AUTO: 11 FL (ref 5–10.5)
POTASSIUM SERPL-SCNC: 4.3 MMOL/L (ref 3.5–5.1)
RBC # BLD: 3.62 M/UL (ref 4–5.2)
SODIUM BLD-SCNC: 136 MMOL/L (ref 136–145)
TOTAL PROTEIN: 8.2 G/DL (ref 6.4–8.2)
TSH SERPL DL<=0.05 MIU/L-ACNC: 2.1 UIU/ML (ref 0.27–4.2)
VITAMIN B-12: >2000 PG/ML (ref 211–911)
WBC # BLD: 4.5 K/UL (ref 4–11)

## 2022-08-24 PROCEDURE — 84443 ASSAY THYROID STIM HORMONE: CPT

## 2022-08-24 PROCEDURE — 36415 COLL VENOUS BLD VENIPUNCTURE: CPT

## 2022-08-24 PROCEDURE — 1200000000 HC SEMI PRIVATE

## 2022-08-24 PROCEDURE — 85027 COMPLETE CBC AUTOMATED: CPT

## 2022-08-24 PROCEDURE — 97116 GAIT TRAINING THERAPY: CPT

## 2022-08-24 PROCEDURE — 93880 EXTRACRANIAL BILAT STUDY: CPT

## 2022-08-24 PROCEDURE — 82607 VITAMIN B-12: CPT

## 2022-08-24 PROCEDURE — 80053 COMPREHEN METABOLIC PANEL: CPT

## 2022-08-24 PROCEDURE — 6370000000 HC RX 637 (ALT 250 FOR IP): Performed by: INTERNAL MEDICINE

## 2022-08-24 PROCEDURE — 97110 THERAPEUTIC EXERCISES: CPT

## 2022-08-24 PROCEDURE — 99232 SBSQ HOSP IP/OBS MODERATE 35: CPT | Performed by: PSYCHIATRY & NEUROLOGY

## 2022-08-24 PROCEDURE — 82746 ASSAY OF FOLIC ACID SERUM: CPT

## 2022-08-24 PROCEDURE — 82140 ASSAY OF AMMONIA: CPT

## 2022-08-24 RX ADMIN — POLYVINYL ALCOHOL 1 DROP: 14 SOLUTION/ DROPS OPHTHALMIC at 09:16

## 2022-08-24 RX ADMIN — FOLIC ACID 1 MG: 1 TABLET ORAL at 09:15

## 2022-08-24 RX ADMIN — CARBAMIDE PEROXIDE 6.5% 5 DROP: 6.5 LIQUID AURICULAR (OTIC) at 09:16

## 2022-08-24 RX ADMIN — FERROUS SULFATE TAB 325 MG (65 MG ELEMENTAL FE) 325 MG: 325 (65 FE) TAB at 09:18

## 2022-08-24 RX ADMIN — METOPROLOL TARTRATE 50 MG: 50 TABLET ORAL at 21:22

## 2022-08-24 RX ADMIN — DILTIAZEM HYDROCHLORIDE 120 MG: 120 CAPSULE, COATED, EXTENDED RELEASE ORAL at 09:15

## 2022-08-24 RX ADMIN — PANTOPRAZOLE SODIUM 40 MG: 40 TABLET, DELAYED RELEASE ORAL at 09:17

## 2022-08-24 RX ADMIN — ASPIRIN 81 MG: 81 TABLET, COATED ORAL at 09:15

## 2022-08-24 RX ADMIN — METOPROLOL TARTRATE 50 MG: 50 TABLET ORAL at 09:15

## 2022-08-24 RX ADMIN — INSULIN LISPRO 2 UNITS: 100 INJECTION, SOLUTION INTRAVENOUS; SUBCUTANEOUS at 11:58

## 2022-08-24 RX ADMIN — POLYVINYL ALCOHOL 1 DROP: 14 SOLUTION/ DROPS OPHTHALMIC at 21:22

## 2022-08-24 RX ADMIN — APIXABAN 5 MG: 5 TABLET, FILM COATED ORAL at 21:22

## 2022-08-24 RX ADMIN — APIXABAN 5 MG: 5 TABLET, FILM COATED ORAL at 11:42

## 2022-08-24 RX ADMIN — CARBAMIDE PEROXIDE 6.5% 5 DROP: 6.5 LIQUID AURICULAR (OTIC) at 21:22

## 2022-08-24 RX ADMIN — MONTELUKAST SODIUM 10 MG: 10 TABLET ORAL at 21:22

## 2022-08-24 NOTE — PROGRESS NOTES
Eloykenyinna Castano  Neurology Follow-up  [facility] Neurology    Date of Service: 8/24/2022    Subjective:   CC: Follow up today regarding: Acute confusion and speech impairment    Events noted. Chart and lab reviewed. The patient had an MRI of the brain which showed acute small left parietal stroke in addition to bilateral remote strokes. No new symptoms today. She is currently by herself. Not a good historian. No headache or chest pain or dysphagia. The same verbal fluency. Other review of system was unremarkable. ROS : A 10-12 system review obtained and updated today and is unremarkable except as mentioned  in my interval history. family history includes Cancer in her mother.     Past Medical History:   Diagnosis Date    Arthritis     Asthma     Depression     Diabetes mellitus ([facility]     GERD (gastroesophageal reflux disease)     HCAP (healthcare-associated pneumonia)     Hypertension     Mycobacterium gordonae infection      5/7/18 + afb called from [address] pending  ( from [address] 3/29/18)    Paroxysmal atrial fibrillation (HCC)     Sepsis ([facility]     TIA (transient ischemic attack)     Unspecified cerebral artery occlusion with cerebral infarction      Current Facility-Administered Medications   Medication Dose Route Frequency Provider Last Rate Last Admin    albuterol sulfate HFA (PROVENTIL;VENTOLIN;PROAIR) 108 (90 Base) MCG/ACT inhaler 2 puff  2 puff Inhalation Q4H PRN Abundio Hernandez MD        apixaban (ELIQUIS) tablet 5 mg  5 mg Oral BID Abundio Hernandez MD   5 mg at 08/24/22 1142    atorvastatin (LIPITOR) tablet 40 mg  40 mg Oral Nightly Abundio Hernandez MD        carbamide peroxide (DEBROX) 6.5 % otic solution 5 drop  5 drop Left Ear BID Abundio Hernandez MD   5 drop at 08/24/22 0916    dilTIAZem (CARDIZEM CD) extended release capsule 120 mg  120 mg Oral Daily Abundio Hernandez MD   120 mg at 08/24/22 0915    ferrous sulfate (IRON 325) tablet 325 mg  325 mg Oral Daily with breakfast Georgie Mcardle, MD   325 mg at 97/96/96 3369    folic acid (FOLVITE) tablet 1 mg  1 mg Oral Daily Georgie Mcardle, MD   1 mg at 08/24/22 0915    montelukast (SINGULAIR) tablet 10 mg  10 mg Oral Nightly Georgie Mcardle, MD   10 mg at 08/23/22 2109    polyvinyl alcohol (LIQUIFILM TEARS) 1.4 % ophthalmic solution 1 drop  1 drop Both Eyes BID Georgie Mcardle, MD   1 drop at 08/24/22 0916    venlafaxine (EFFEXOR XR) extended release capsule 75 mg  75 mg Oral Daily Georgie Mcardle, MD        pantoprazole (PROTONIX) tablet 40 mg  40 mg Oral QAM AC Georgie Mcardle, MD   40 mg at 08/24/22 0917    insulin lispro (HUMALOG) injection vial 0-8 Units  0-8 Units SubCUTAneous TID WC Georgie Mcardle, MD   2 Units at 08/24/22 1158    insulin lispro (HUMALOG) injection vial 0-4 Units  0-4 Units SubCUTAneous Nightly Georgie Mcardle, MD        metoprolol tartrate (LOPRESSOR) tablet 50 mg  50 mg Oral BID Georgie Mcardle, MD   50 mg at 08/24/22 0915    ondansetron (ZOFRAN-ODT) disintegrating tablet 4 mg  4 mg Oral Q8H PRN Georgie Mcardle, MD        Or    ondansetron [facility]) injection 4 mg  4 mg IntraVENous Q6H PRN Georgie Mcardle, MD        polyethylene glycol (GLYCOLAX) packet 17 g  17 g Oral Daily PRN Georgie Mcardle, MD        dextrose bolus 10% 125 mL  125 mL IntraVENous PRN Georgie Mcardle, MD        Or    dextrose bolus 10% 250 mL  250 mL IntraVENous PRN Georgie Mcardle, MD        dextrose 10 % infusion   IntraVENous Continuous PRN Georgie Mcardle, MD        glucose chewable tablet 16 g  4 tablet Oral PRN Georgie Mcardle, MD        glucagon (rDNA) injection 1 mg  1 mg SubCUTAneous PRN Georgie Mcardle, MD         Allergies   Allergen Reactions    Acyclovir Anaphylaxis     Redness face & back, swollen tounge, eyes and throat     Ace Inhibitors      Possible anaphylaxis    Amoxicillin      hives    Cephalosporins anaphylaxis  Lip swelling    Oxycodone-Acetaminophen      Respiratory distress    Penicillins     Percocet [Oxycodone-Acetaminophen]       reports that she has never smoked. She has never used smokeless tobacco. She reports that she does not drink alcohol and does not use drugs. Objective:  Exam:   Constitutional:   Vitals:    08/23/22 2108 08/24/22 0009 08/24/22 0900 08/24/22 1136   BP: (!) 117/59 (!) 142/84 125/71 93/70   Pulse: 70 84 96 72   Resp: 18 16 16 16   Temp: 98 °F (36.7 °C) 98.2 °F (36.8 °C) 97.6 °F (36.4 °C) 98.3 °F (36.8 °C)   TempSrc: Oral Oral Oral Oral   SpO2: 92% 93% 92% 94%   Weight:       Height:         General appearance:  Normal development and appear in no acute distress. Mental Status:   Oriented to person  Good attention but easily distracted  Poor fund of knowledge and immediate recall  Language is fluent but poor vocabulary    Cranial Nerves:   II: Visual fields: Full. Pupils: equal, round, reactive to light  III,IV,VI: Extra Ocular Movements are intact. No nystagmus  V: Facial sensation is intact  VII: Facial strength and movements: intact and symmetric  IX: Palate elevation is symmetric  XI: Shoulder shrug is intact  XII: Tongue movements are normal  Musculoskeletal: 5/5 in all 4 extremities. Tone: Normal tone. Reflexes: Symmetric 2+ in both arms and legs.   No tremors  No sensory disturbance        Data:  LABS:   Lab Results   Component Value Date/Time     08/24/2022 07:48 AM    K 4.3 08/24/2022 07:48 AM    K 3.9 04/18/2022 06:01 AM     08/24/2022 07:48 AM    CO2 24 08/24/2022 07:48 AM    BUN 13 08/24/2022 07:48 AM    CREATININE 0.9 08/24/2022 07:48 AM    GFRAA >60 08/24/2022 07:48 AM    GFRAA 54 12/17/2012 05:00 AM    LABGLOM >60 08/24/2022 07:48 AM    GLUCOSE 131 08/24/2022 07:48 AM    PHOS 3.3 04/03/2018 06:55 PM    MG 2.00 10/19/2018 05:16 AM    CALCIUM 9.1 08/24/2022 07:48 AM     Lab Results   Component Value Date/Time    WBC 4.5 08/24/2022 07:48 AM    RBC

## 2022-08-24 NOTE — PROGRESS NOTES
Physical Therapy  Facility/Department: [facility] C5 - MED SURG/ORTHO  Daily Treatment Note  NAME: Isela Rios  : 1950  MRN: [id number]    Date of Service: 2022    Discharge Recommendations:  24 hour supervision or assist, Home with Home health PT   PT Equipment Recommendations  Equipment Needed: No    Patient Diagnosis(es): The encounter diagnosis was Altered mental status, unspecified altered mental status type. Assessment   Assessment: pt is progressing toward Acute PT goals as evidenced by increased gait distance, pt is CGA for transfers and 45ft with RW, pt able to tolerate seated therex after gait training, pt will benefit from continued Acute Inpatient Skilled PT to address functional mobility deficits, agree with previous PT recommendation to return home with 24hr assist and HHPT,  Activity Tolerance: Patient tolerated treatment well  Equipment Needed: No     Plan    Plan  Plan: 3-5 times per week  Current Treatment Recommendations: Strengthening;Balance training;Functional mobility training;Transfer training;Gait training; Endurance training;Home exercise program;Safety education & training; Therapeutic activities     Restrictions  Restrictions/Precautions  Restrictions/Precautions: Up as Tolerated, General Precautions  Position Activity Restriction  Other position/activity restrictions: tele     Subjective    Subjective  Subjective: pt agreeable to PT treat  Pain: none reported throughout session  Orientation  Overall Orientation Status: Impaired  Orientation Level: Oriented to person;Oriented to place; Disoriented to time;Disoriented to situation  Cognition  Overall Cognitive Status: Exceptions  Arousal/Alertness: Appropriate responses to stimuli  Following Commands: Follows one step commands with repetition  Attention Span: Attends with cues to redirect; Difficulty attending to directions  Memory: Decreased recall of precautions;Decreased recall of recent events;Decreased short term in 10-12 reps therapeutic ex by 8/26 - 8/24 MET, ongoing   Patient Goals   Patient goals : \"to walk\"    Education  Patient Education  Education Given To: Patient; Family  Education Provided: Role of Therapy;Plan of Care;Transfer Training  Education Provided Comments: educated pt's spouse regarding HHPT , verbalized understanding  Education Method: Verbal  Barriers to Learning: Cognition (spouse no barriers)  Education Outcome: Verbalized understanding;Continued education needed  [facility] 6 Clicks Inpatient Mobility:  [address] Mobility Inpatient   How much difficulty turning over in bed?: A Little  How much difficulty sitting down on / standing up from a chair with arms?: A Little  How much difficulty moving from lying on back to sitting on side of bed?: None  How much help from another person moving to and from a bed to a chair?: A Little  How much help from another person needed to walk in hospital room?: A Little  How much help from another person for climbing 3-5 steps with a railing?: A Lot  AM-PAC Inpatient Mobility Raw Score : 18  AM-PAC Inpatient T-Scale Score : 43.63  Mobility Inpatient CMS 0-100% Score: 46.58  Mobility Inpatient CMS G-Code Modifier : CK   Therapy Time   Individual Concurrent Group Co-treatment   Time In 1330         Time Out 1355         Minutes 25         Timed Code Treatment Minutes: 25 Minutes       Kleber Luo PT

## 2022-08-24 NOTE — PROGRESS NOTES
Hospitalist Progress Note      PCP: Jaime Diaz MD    Date of Admission: 8/22/2022    Chief Complaint: Confusion    Hospital Course: Presented with confusion. Diagnosed with CVA. Also noted past diagnosis of cirrhosis. Subjective: No chest pain, no shortness of breath, no nausea, no vomiting      Medications:  Reviewed    Infusion Medications    dextrose       Scheduled Medications    apixaban  5 mg Oral BID    atorvastatin  40 mg Oral Nightly    carbamide peroxide  5 drop Left Ear BID    dilTIAZem  120 mg Oral Daily    ferrous sulfate  325 mg Oral Daily with breakfast    folic acid  1 mg Oral Daily    montelukast  10 mg Oral Nightly    polyvinyl alcohol  1 drop Both Eyes BID    venlafaxine  75 mg Oral Daily    pantoprazole  40 mg Oral QAM AC    insulin lispro  0-8 Units SubCUTAneous TID WC    insulin lispro  0-4 Units SubCUTAneous Nightly    metoprolol tartrate  50 mg Oral BID     PRN Meds: albuterol sulfate HFA, ondansetron **OR** ondansetron, polyethylene glycol, dextrose bolus **OR** dextrose bolus, dextrose, glucose, glucagon (rDNA)      Intake/Output Summary (Last 24 hours) at 8/24/2022 1312  Last data filed at 8/24/2022 1008  Gross per 24 hour   Intake 60 ml   Output 500 ml   Net -440 ml       Physical Exam Performed:    BP 93/70   Pulse 72   Temp 98.3 °F (36.8 °C) (Oral)   Resp 16   Ht 5' (1.524 m)   Wt 209 lb (94.8 kg)   LMP  (LMP Unknown)   SpO2 94%   BMI 40.82 kg/m²     General appearance: No apparent distress, appears stated age and cooperative. HEENT: Pupils equal, round, and reactive to light. Conjunctivae/corneas clear. Neck: Supple, with full range of motion. No jugular venous distention. Trachea midline. Respiratory:  Normal respiratory effort. Clear to auscultation, bilaterally without Rales/Wheezes/Rhonchi. Cardiovascular: Regular rate and rhythm with normal S1/S2 without murmurs, rubs or gallops.   Abdomen: Soft, non-tender, non-distended with normal bowel sounds. Musculoskeletal: No clubbing, cyanosis or edema bilaterally. Full range of motion without deformity. Skin: Skin color, texture, turgor normal.  No rashes or lesions. Neurologic:  Neurovascularly intact without any focal sensory/motor deficits. Cranial nerves: II-XII intact, grossly non-focal.  Psychiatric: Alert, pleasantly confused  Capillary Refill: Brisk,3 seconds, normal   Peripheral Pulses: +2 palpable, equal bilaterally       Labs:   Recent Labs     08/22/22  1627 08/23/22  0618 08/24/22  0748   WBC 8.3 5.1 4.5   HGB 11.6* 10.5* 10.8*   HCT 36.1 32.1* 33.4*   * 88* 87*     Recent Labs     08/22/22  1627 08/24/22  0748    136   K 4.1 4.3    102   CO2 23 24   BUN 18 13   CREATININE 1.0 0.9   CALCIUM 10.6 9.1     Recent Labs     08/22/22  1627 08/24/22  0748   AST 36 30   ALT 19 17   BILITOT 0.5 0.7   ALKPHOS 174* 143*     No results for input(s): INR in the last 72 hours. Recent Labs     08/22/22 1627   TROPONINI <0.01       Urinalysis:      Lab Results   Component Value Date/Time    NITRU Negative 08/22/2022 06:41 PM    WBCUA 0-2 08/22/2022 06:41 PM    BACTERIA Rare 10/14/2018 06:28 PM    RBCUA None seen 08/22/2022 06:41 PM    BLOODU Negative 08/22/2022 06:41 PM    SPECGRAV >=1.030 08/22/2022 06:41 PM    GLUCOSEU Negative 08/22/2022 06:41 PM       Radiology:  MRA neck with and without contrast   Final Result   No significant stenosis or occlusion within the limitations of motion. MRI brain with and without contrast   Final Result   Small acute infarct in the left parietal lobe. Extensive bilateral encephalomalacia. Chronic small vessel ischemic disease   and moderate atrophy. XR CHEST PORTABLE   Final Result   No acute cardiopulmonary findings         CT HEAD WO CONTRAST   Final Result   No acute intracranial abnormality. Status post right frontal craniotomy.       Areas of encephalomalacia in the bilateral frontal parietal and occipital   lobes and the

## 2022-08-24 NOTE — EC ADMISSION CRITERIA
AdmissionCare    Guideline: Stroke: Ischemic, Inpatient    Based on the indications selected for the patient, the bed status of Admit to Inpatient was determined to be MET    The following indications were selected as present at the time of evaluation of the patient:   - Admission is indicated for:    - Acute ischemic stroke with neurologic findings that warrant inpatient care, as indicated by:     - Altered mental status (ie, different from baseline), as indicated by:      - Confusional state (eg, disorientation, difficulty following commands, deficit in attention)    AdmissionCare documentation entered by: [facility], 26th edition, Copyright © 2022 [address] [place] [address].  [id number]

## 2022-08-24 NOTE — PLAN OF CARE
Problem: Discharge Planning  Goal: Discharge to home or other facility with appropriate resources  Outcome: Progressing  Flowsheets  Taken 8/24/2022 8188 by Charles Pryor RN  Discharge to home or other facility with appropriate resources:   Identify barriers to discharge with patient and caregiver   Refer to discharge planning if patient needs post-hospital services based on physician order or complex needs related to functional status, cognitive ability or social support system  Taken 8/24/2022 0400 by Karoline Dakin  Discharge to home or other facility with appropriate resources: Identify barriers to discharge with patient and caregiver     Problem: Skin/Tissue Integrity  Goal: Absence of new skin breakdown  Description: 1. Monitor for areas of redness and/or skin breakdown  2. Assess vascular access sites hourly  3. Every 4-6 hours minimum:  Change oxygen saturation probe site  4. Every 4-6 hours:  If on nasal continuous positive airway pressure, respiratory therapy assess nares and determine need for appliance change or resting period.   Outcome: Progressing     Problem: Safety - Adult  Goal: Free from fall injury  Outcome: Progressing  Flowsheets (Taken 8/24/2022 0258 by Karoline Dakin)  Free From Fall Injury: Based on caregiver fall risk screen, instruct family/caregiver to ask for assistance with transferring infant if caregiver noted to have fall risk factors

## 2022-08-25 VITALS
WEIGHT: 209 LBS | TEMPERATURE: 97.6 F | HEART RATE: 64 BPM | BODY MASS INDEX: 41.03 KG/M2 | SYSTOLIC BLOOD PRESSURE: 102 MMHG | DIASTOLIC BLOOD PRESSURE: 62 MMHG | RESPIRATION RATE: 18 BRPM | OXYGEN SATURATION: 97 % | HEIGHT: 60 IN

## 2022-08-25 LAB
GLUCOSE BLD-MCNC: 139 MG/DL (ref 70–99)
GLUCOSE BLD-MCNC: 146 MG/DL (ref 70–99)
GLUCOSE BLD-MCNC: 211 MG/DL (ref 70–99)
PERFORMED ON: ABNORMAL

## 2022-08-25 PROCEDURE — 97530 THERAPEUTIC ACTIVITIES: CPT

## 2022-08-25 PROCEDURE — 97535 SELF CARE MNGMENT TRAINING: CPT

## 2022-08-25 PROCEDURE — 6370000000 HC RX 637 (ALT 250 FOR IP): Performed by: INTERNAL MEDICINE

## 2022-08-25 RX ADMIN — DILTIAZEM HYDROCHLORIDE 120 MG: 120 CAPSULE, COATED, EXTENDED RELEASE ORAL at 08:19

## 2022-08-25 RX ADMIN — METOPROLOL TARTRATE 50 MG: 50 TABLET ORAL at 08:19

## 2022-08-25 RX ADMIN — PANTOPRAZOLE SODIUM 40 MG: 40 TABLET, DELAYED RELEASE ORAL at 06:02

## 2022-08-25 RX ADMIN — CARBAMIDE PEROXIDE 6.5% 5 DROP: 6.5 LIQUID AURICULAR (OTIC) at 08:19

## 2022-08-25 RX ADMIN — FOLIC ACID 1 MG: 1 TABLET ORAL at 08:19

## 2022-08-25 RX ADMIN — POLYVINYL ALCOHOL 1 DROP: 14 SOLUTION/ DROPS OPHTHALMIC at 08:19

## 2022-08-25 RX ADMIN — APIXABAN 5 MG: 5 TABLET, FILM COATED ORAL at 08:18

## 2022-08-25 RX ADMIN — INSULIN LISPRO 2 UNITS: 100 INJECTION, SOLUTION INTRAVENOUS; SUBCUTANEOUS at 12:35

## 2022-08-25 RX ADMIN — FERROUS SULFATE TAB 325 MG (65 MG ELEMENTAL FE) 325 MG: 325 (65 FE) TAB at 08:19

## 2022-08-25 RX ADMIN — ATORVASTATIN CALCIUM 40 MG: 40 TABLET, FILM COATED ORAL at 01:14

## 2022-08-25 NOTE — PLAN OF CARE
Problem: Discharge Planning  Goal: Discharge to home or other facility with appropriate resources  8/25/2022 1432 by Bebeto Estrella RN  Outcome: Adequate for Discharge  8/25/2022 1057 by Bebeto Estrella RN  Outcome: Progressing     Problem: Skin/Tissue Integrity  Goal: Absence of new skin breakdown  Description: 1. Monitor for areas of redness and/or skin breakdown  2. Assess vascular access sites hourly  3. Every 4-6 hours minimum:  Change oxygen saturation probe site  4. Every 4-6 hours:  If on nasal continuous positive airway pressure, respiratory therapy assess nares and determine need for appliance change or resting period.   8/25/2022 1432 by Bebeto Estrella RN  Outcome: Adequate for Discharge  8/25/2022 1057 by Bebeto Estrella RN  Outcome: Progressing     Problem: Safety - Adult  Goal: Free from fall injury  8/25/2022 1432 by Bebeto Estrella RN  Outcome: Adequate for Discharge  8/25/2022 1057 by Bebeto Estrella RN  Outcome: Progressing

## 2022-08-25 NOTE — CARE COORDINATION
CASE MANAGEMENT DISCHARGE SUMMARY    Discharge to: Home with [address] and  as 24hr assist    Transportation:    Family/car:  at bedside will transport home    Confirmed discharge plan with:   Patient: yes     Family:  yes     Facility/Agency, name:  [facility] to be pulled by [facility] via [employer].     RN, name: Gifty Choudhury    Pt/ deny any other discharge needs.   ABDOULAYE Castellanos-NIVIA

## 2022-08-25 NOTE — DISCHARGE INSTR - COC
Continuity of Care Form    Patient Name: Jose J Dutta   :  1950  MRN:  [id number]    Admit date:  2022  Discharge date:  22    Code Status Order: Full Code   Advance Directives:     Admitting Physician:  Georgie Mcardle, MD  PCP: Harsh Melgar MD    Discharging Nurse: Suzanna Primrose, [facility] Unit/Room#: 0138/3610-70  Discharging Unit Phone Number: [phone]    Emergency Contact:   Extended Emergency Contact Information  Primary Emergency Contact: Tasneem Reyes  Address: [address] [place] [address] Phone: [phone]  Mobile Phone: [phone]  Relation: Spouse    Past Surgical History:  Past Surgical History:   Procedure Laterality Date    APPENDECTOMY      BREAST BIOPSY      CHOLECYSTECTOMY      COLONOSCOPY  2017    polyp    CYST INCISION AND DRAINAGE  1986    on head    HYSTERECTOMY (CERVIX STATUS UNKNOWN)      UPPER GASTROINTESTINAL ENDOSCOPY  2017    barretts ? Immunization History:   Immunization History   Administered Date(s) Administered    COVID-19, MODERNA BLUE border, Primary or Immunocompromised, (age 12y+), IM, 100 mcg/0.5mL 2021, 04/15/2021    Influenza Virus Vaccine 12/10/2012, 2013, 2016    Influenza, FLUARIX, FLULAVAL, (age 10 mo+) AND AFLURIA, FLUZONE (age 1 y+), PF 2017    Influenza, Quadv, 6 mo and older, IM, PF (Flulaval, Fluarix) 10/15/2018    Pneumococcal Polysaccharide ([place]) 12/10/2012, 2016       Active Problems:  Patient Active Problem List   Diagnosis Code    Morbid obesity with BMI of 40.0-44.9, adult ([facility] E66.01, Z68.41    Hx of L-ICA CVA I63.232    Essential hypertension I10    Chronic thrombocytopenia D69.6    Chronic obstructive pulmonary disease ([facility] J44.9    Depression F32. 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    Mastoiditis of right side H70.91    Hyperkalemia E87.5    Splenomegaly R16.1    Hemorrhagic stroke (HCC) I61.9    DM type 2, controlled, with complication (HCC) T02.2    Mild malnutrition (HCC) E44.1    PVC (premature ventricular contraction) I49.3    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    Dyslipidemia E78.5    CAD in native artery I25.10    COPD exacerbation (HCC) J44.1    TIA (transient ischemic attack) G45.9    First degree AV block I44.0    Encounter for loop recorder check Z45.09    Cerebrovascular accident (CVA) ([facility] I63.9    PAF (paroxysmal atrial fibrillation) ([facility]) I48.0    HTN (hypertension), benign I10    Acute focal neurological deficit R29.818    Stroke-like symptoms R29.90    Memory loss due to medical condition R41.3    Cognitive deficits R41.89    Acute CVA (cerebrovascular accident) ([facility] I63.9       Isolation/Infection:   Isolation            No Isolation          Patient Infection Status       Infection Onset Added Last Indicated Last Indicated By Review Planned Expiration Resolved Resolved By    None active    Resolved    COVID-19 (Rule Out) 12/03/21 12/03/21 12/03/21 COVID-19, Rapid (Ordered)   12/03/21 Rule-Out Test Resulted    AFB  05/07/18 05/07/18 Stephanie Guerra, RN   05/23/18 Belen Steiner RN    + afb from [place], sent to [facility]              Nurse Assessment:  Last Vital Signs: /70   Pulse 74   Temp 98.2 °F (36.8 °C) (Oral)   Resp 18   Ht 5' (1.524 m)   Wt 209 lb (94.8 kg)   LMP  (LMP Unknown)   SpO2 96%   BMI 40.82 kg/m²     Last documented pain score (0-10 scale):    Last Weight:   Wt Readings from Last 1 Encounters:   08/22/22 209 lb (94.8 kg)     Mental Status:  oriented and alert    IV Access:  - None    Nursing Mobility/ADLs:  Walking   Assisted  Transfer Assisted  Bathing  Assisted  Dressing  Assisted  Toileting  Assisted  Feeding  Independent  Med Admin  Assisted  Med Delivery   whole    Wound Care Documentation and Therapy:        Elimination:  Continence: Bowel: Yes  Bladder: Yes  Urinary Catheter: None   Colostomy/Ileostomy/Ileal Conduit: No       Date of Last BM: 8/25/2022    Intake/Output Summary (Last 24 hours) at 8/25/2022 1251  Last data filed at 8/25/2022 0902  Gross per 24 hour   Intake 60 ml   Output 700 ml   Net -640 ml     I/O last 3 completed shifts: In: 61 [P.O.:60]  Out: 500 [Urine:500]    Safety Concerns: At Risk for Falls    Impairments/Disabilities:      Hearing    Nutrition Therapy:  Current Nutrition Therapy:   - Oral Diet:  General    Routes of Feeding: Oral  Liquids: Thin Liquids  Daily Fluid Restriction: no  Last Modified Barium Swallow with Video (Video Swallowing Test): not done    Treatments at the Time of Hospital Discharge:   Respiratory Treatments: No  Oxygen Therapy:  is not on home oxygen therapy.   Ventilator:    - No ventilator support    Rehab Therapies: Physical Therapy, Occupational Therapy, and Nurse  Weight Bearing Status/Restrictions: No weight bearing restrictions  Other Medical Equipment (for information only, NOT a DME order):  walker  Other Treatments: [facility]: LEVEL 3 [address] to establish plan of care for patient over 60 day period   Nursing  Initial home SN evaluation visit to occur within 24-48 hours for:  1)  medication management  2)  VS and clinical assessment  3)  S&S chronic disease exacerbation education + when to contact MD/NP  4)  care coordination  Medication Reconciliation during 1st SN visit  PT/OT/Speech   Evaluations in home within 24-48 hours of discharge to include DME and home safety   Frontload therapy 5 days, then 3x a week   OT to evaluate if patient has [address] needs for personal care    evaluation within 24-48 hours to evaluate resources & insurance for potential AL, [place], LTC, and Medicaid options   Palliative Care referral within 5 days of hospital discharge   PCP Visit scheduled within 3 - 7 days of hospital discharge    [address] (If patient is agreeable and meets guidelines)      Patient's personal belongings (please select all that are sent with patient):  Glasses    RN SIGNATURE:  Electronically signed by Tiffanie Dawn RN on 8/25/22 at 4:09 PM EDT    CASE MANAGEMENT/SOCIAL WORK SECTION    Inpatient Status Date: 8/23/22    Readmission Risk Assessment Score:  Readmission Risk              Risk of Unplanned Readmission:  18           Discharging to Facility/ Agency   Name: [facility]  Address:  Phone: [phone]  Fax:    / signature: Electronically signed by Saida West RN on 8/25/22 at 1:15 PM EDT    PHYSICIAN SECTION    Prognosis: Fair    Condition at Discharge: Stable    Rehab Potential (if transferring to Rehab): Good    Recommended Labs or Other Treatments After Discharge:PCP, monitor BP/ BS    Physician Certification: I certify the above information and transfer of [place] Officer  is necessary for the continuing treatment of the diagnosis listed and that she requires Home Care for less 30 days.      Update Admission H&P: No change in H&P    PHYSICIAN SIGNATURE:  Electronically signed by Karin Preciado MD on 8/25/22 at 12:52 PM EDT

## 2022-08-25 NOTE — DISCHARGE SUMMARY
Hospital Medicine Discharge Summary    Patient ID: Daisha Nicole      Patient's PCP: Evon Aguilar MD    Admit Date: 8/22/2022     Discharge Date: 8/25/2022      Admitting Provider: Panfilo Colvin MD     Discharge Provider: Karime Hu MD     Discharge Diagnoses: Active Hospital Problems    Diagnosis     Acute CVA (cerebrovascular accident) [facility]) [I63.9]      Priority: Medium    Memory loss due to medical condition [R41.3]      Priority: Medium    Cognitive deficits [R41.89]      Priority: Medium    First degree AV block [I44.0]     Paroxysmal atrial fibrillation (HCC) [I48.0]     Chronic obstructive pulmonary disease ([facility] [J44.9]     Essential hypertension [I10]     Morbid obesity with BMI of 40.0-44.9, adult ([facility] [E66.01, Z68.41]        The patient was seen and examined on day of discharge and this discharge summary is in conjunction with any daily progress note from day of discharge. Hospital Course:   Confusion 2/2 CVA_ improved        Acute aphasia and confusion secondary to ischemic left parietal stroke. Acute CVA    neurology recommendation, reviewed   On Eliquis and statin. aspirin     Paroxysmal atrial fibrillation  Continue Eliquis, metoprolol and long-acting diltiazem. Dyslipidemia  Continue statin     Thrombocytopenia  Noted patient has past diagnosis of cirrhosis and thrombocytopenia is chronic. Patient is under the care over hematologist at [facility].  . Physical Exam Performed:     /62   Pulse 64   Temp 97.6 °F (36.4 °C) (Oral)   Resp 18   Ht 5' (1.524 m)   Wt 209 lb (94.8 kg)   LMP  (LMP Unknown)   SpO2 97%   BMI 40.82 kg/m²       General appearance:  No apparent distress, appears stated age and cooperative. HEENT:  Normal cephalic, atraumatic without obvious deformity. t. Extra ocular muscles intact. Conjunctivae/corneas clear. Neck: Supple, with full range of motion. No jugular venous distention. Trachea midline.   Respiratory:  Normal respiratory effort. Clear to auscultation, bilaterally without Rales/Wheezes/Rhonchi. Cardiovascular:  Regular rate and rhythm with normal S1/S2 without murmurs, rubs or gallops. Abdomen: Soft, non-tender, non-distended with normal bowel sounds. Musculoskeletal:  No clubbing, cyanosis or edema bilaterally. Full range of motion without deformity. Skin: Skin color, texture, turgor normal.  No rashes or lesions. Neurologic:  Neurovascularly intact without any focal sensory/motor deficits. Psychiatric:  Alert and o fogetful  Capillary Refill: Brisk,< 3 seconds   Peripheral Pulses: +2 palpable, equal bilaterally       Labs: For convenience and continuity at follow-up the following most recent labs are provided:      CBC:    Lab Results   Component Value Date/Time    WBC 4.5 08/24/2022 07:48 AM    HGB 10.8 08/24/2022 07:48 AM    HCT 33.4 08/24/2022 07:48 AM    PLT 87 08/24/2022 07:48 AM       Renal:    Lab Results   Component Value Date/Time     08/24/2022 07:48 AM    K 4.3 08/24/2022 07:48 AM    K 3.9 04/18/2022 06:01 AM     08/24/2022 07:48 AM    CO2 24 08/24/2022 07:48 AM    BUN 13 08/24/2022 07:48 AM    CREATININE 0.9 08/24/2022 07:48 AM    CALCIUM 9.1 08/24/2022 07:48 AM    PHOS 3.3 04/03/2018 06:55 PM         Significant Diagnostic Studies    Radiology:   VL DUP CAROTID BILATERAL   Final Result      MRA neck with and without contrast   Final Result   No significant stenosis or occlusion within the limitations of motion. MRI brain with and without contrast   Final Result   Small acute infarct in the left parietal lobe. Extensive bilateral encephalomalacia. Chronic small vessel ischemic disease   and moderate atrophy. XR CHEST PORTABLE   Final Result   No acute cardiopulmonary findings         CT HEAD WO CONTRAST   Final Result   No acute intracranial abnormality. Status post right frontal craniotomy.       Areas of encephalomalacia in the bilateral frontal parietal and occipital   lobes and the bilateral cerebellar hemispheres. Mild parenchymal volume loss. Moderate chronic microvascular disease.                 Consults:     IP CONSULT TO NEUROLOGY  IP CONSULT TO HOME CARE NEEDS    Disposition:  home HC    Condition at Discharge: Stable    Discharge Instructions/Follow-up:  pcp    Code Status:  Prior     Activity: activity as tolerated    Diet: cardiac diet      Discharge Medications:     Discharge Medication List as of 8/25/2022  4:09 PM             Details   venlafaxine (EFFEXOR XR) 150 MG extended release capsule Take 150 mg by mouth dailyHistorical Med      oxybutynin (DITROPAN-XL) 10 MG extended release tablet Take 10 mg by mouth at bedtimeHistorical Med      carbamide peroxide (DEBROX) 6.5 % otic solution Place 5 drops into the left ear 2 times daily, Disp-15 mL, R-0Normal      RESTASIS 0.05 % ophthalmic emulsion INSTILL 1 DROP IN EACH EYE EVERY 12 HOURS, DAWHistorical Med      metoprolol tartrate (LOPRESSOR) 50 MG tablet TAKE 1 TABLET BY MOUTH TWO TIMES A DAY, Disp-180 tablet, R-3Normal      atorvastatin (LIPITOR) 40 MG tablet Take 1 tablet by mouth nightly, Disp-30 tablet, R-3Normal      dilTIAZem (CARDIZEM CD) 120 MG extended release capsule Take 1 capsule by mouth daily, Disp-30 capsule, R-11Normal      aspirin EC 81 MG EC tablet Take 1 tablet by mouth daily, Disp-90 tablet, R-1OTC      dextromethorphan-guaiFENesin (MUCINEX DM)  MG per extended release tablet Take 1 tablet by mouth every 12 hours as neededHistorical Med      apixaban (ELIQUIS) 5 MG TABS tablet Take 1 tablet by mouth 2 times daily, Disp-60 tablet, R-0Normal      ferrous sulfate 325 (65 Fe) MG tablet Take 325 mg by mouth daily (with breakfast)Historical Med      folic acid (FOLVITE) 1 MG tablet Take 1 mg by mouth dailyHistorical Med      montelukast (SINGULAIR) 10 MG tablet Take 10 mg by mouth nightlyHistorical Med      albuterol (PROVENTIL) (2.5 MG/3ML) 0.083% nebulizer solution Take 3 mLs by nebulization every 4 hours as needed for Wheezing or Shortness of Breath, Disp-120 each, R-3Print      metFORMIN ER (GLUCOPHAGE-XR) 500 MG XR tablet Take 2,000 mg by mouth Daily with supper Historical Med      albuterol (PROVENTIL HFA;VENTOLIN HFA) 108 (90 BASE) MCG/ACT inhaler Inhale 2 puffs into the lungs every 6 hours as needed. Historical Med             Time Spent on discharge is   35 minutes in the examination, evaluation, counseling and review of medications and discharge plan. Signed:    Greg Somers MD   9/11/2022      Thank you Rabia Noyola MD for the opportunity to be involved in this patient's care. If you have any questions or concerns, please feel free to contact me at [phone].

## 2022-08-25 NOTE — PROGRESS NOTES
Occupational Therapy  Facility/Department: [facility] C5 - MED SURG/ORTHO  Daily Treatment Note  NAME: Ashkan Fuentes  : 1950  MRN: [id number]    Date of Service: 2022    Discharge Recommendations:  24 hour supervision or assist, Home with Home health OT       AM-PAC score  AM-PAC Inpatient Daily Activity Raw Score: 16 (22)  AM-PAC Inpatient ADL T-Scale Score : 35.96 (22)  ADL Inpatient CMS 0-100% Score: 53.32 (22)  ADL Inpatient CMS G-Code Modifier : CK (22)      Patient Diagnosis(es): The encounter diagnosis was Altered mental status, unspecified altered mental status type. Assessment    Assessment: Pt tolerated OT session well, communication limited at times by hearing, speech, and cognitive deficits. Pt running into walls and objects with RW on R side multiple times during session, spouse reports this is baseline for pt, pt educated on scanning environment, verbalizes understanding. Pt functioning below her baseline with decreased insight into deficits, continue to recommend pt return home with 24 hr A and HHOT. Activity Tolerance: Patient tolerated treatment well  Discharge Recommendations: 24 hour supervision or assist;Home with [facility]  Times per Week: 3-5     Restrictions  Restrictions/Precautions  Restrictions/Precautions: Up as Tolerated;General Precautions    Subjective   Subjective  Subjective: Pt resting in bed, pleasant and agreeable to OT treatment. Pain: Pt denies pain. Orientation  Overall Orientation Status: Impaired  Orientation Level: Oriented to person;Disoriented to place;Oriented to situation;Disoriented to time    Cognition  Overall Cognitive Status: Exceptions  Arousal/Alertness: Appropriate responses to stimuli  Following Commands:  Follows one step commands with repetition  Attention Span: Attends with cues to redirect  Memory: Decreased short term memory  Safety Judgement: Decreased awareness of need for assistance;Decreased awareness of need for safety  Problem Solving: Decreased awareness of errors;Assistance required to implement solutions;Assistance required to generate solutions;Assistance required to correct errors made;Assistance required to identify errors made  Insights: Decreased awareness of deficits  Initiation: Requires cues for some  Sequencing: Requires cues for some  Cognition Comment: cognitive assessment somewhat limited by hearing and speech deficits        Objective    Vitals  Vitals  Heart Rate: 63  Heart Rate Source: Monitor  BP: (!) 109/54  BP Location: Right upper arm  BP Method: Automatic  Patient Position: Semi fowlers  MAP (Calculated): 72.33  SpO2: 94 %  O2 Device: None (Room air)    Bed Mobility Training  Bed Mobility Training: Yes  Supine to Sit: Minimum assistance (to L with HOB elevated)    Balance  Sitting: Impaired  Sitting - Static: Good (unsupported)  Sitting - Dynamic: Fair (occasional)  Standing: Impaired (RW for functional mobility in/out of bathroom, toileting, sink level grooming)  Standing - Static: Good  Standing - Dynamic: Fair    Transfer Training  Transfer Training: Yes  Interventions: Safety awareness training  Sit to Stand: Contact-guard assistance  Stand to Sit: Contact-guard assistance  Toilet Transfer: Contact-guard assistance; Adaptive equipment (with grab bars)       ADL  Grooming: Contact guard assistance;Verbal cueing; Increased time to complete  Grooming Skilled Clinical Factors: in stance at sink for handwashing  LE Dressing: Minimal assistance; Increased time to complete  LE Dressing Skilled Clinical Factors: brief  Toileting: Minimal assistance; Increased time to complete          Safety Devices  Type of Devices: Left in chair;Chair alarm in place;Call light within reach;Nurse notified;Gait belt       Patient Education  Education Given To: Patient  Education Provided: Role of Therapy;Plan of Care;ADL Adaptive Strategies;Transfer Training; Fall Prevention Strategies; Family Education;Orientation  Education Method: Demonstration;Verbal  Barriers to Learning: Cognition; Hearing  Education Outcome: Verbalized understanding;Demonstrated understanding;Continued education needed    Goals  Short Term Goals  Time Frame for Short term goals: within 7 days by 8/30 unless noted-- all goals ongoing 8/25/22  Short Term Goal 1: pt will perform toilet transfer with SBA and LRAD  Short Term Goal 2: pt will perform grooming at sink with SBA  Short Term Goal 3: pt will perform LB dressing with CGA  Short Term Goal 4: pt will follow 2 step commands with 75% acuracy during session by 8/28  Patient Goals   Patient goals : unable to state       Therapy Time   Individual Concurrent Group Co-treatment   Time In 1100         Time Out 1140         Minutes [address], ELAM/L

## 2022-08-25 NOTE — CARE COORDINATION
[facility]    Referral received from CM to follow for home care services. I will follow for needs, and speak with patient to verify demos.     Previous pt discharged 6/2022    Orders faxed [facility]; home care to see patient within 2 days    [address] [facility], [place]  [facility] [phone]

## 2022-08-31 ENCOUNTER — TELEPHONE (OUTPATIENT)
Dept: CARDIOLOGY CLINIC | Age: 72
End: 2022-08-31

## 2022-08-31 DIAGNOSIS — Z79.899 MEDICATION MANAGEMENT: ICD-10-CM

## 2022-08-31 DIAGNOSIS — I63.9 ACUTE CVA (CEREBROVASCULAR ACCIDENT) (HCC): Primary | ICD-10-CM

## 2022-08-31 NOTE — TELEPHONE ENCOUNTER
Pts  is requesting samples of Eliquis 5mg. Last ov 07/06/2022 brenda. Pt can be reached at [phone].

## 2022-08-31 NOTE — TELEPHONE ENCOUNTER
LOV 7/6/22 with NIKI  Last BMP 4/18/18  Last CBC 8/22/22  NOV pt to follow up in 1 year      Called pt  Moisés Stahl. Informed him of samples being ready for Shiva and that pt will need to get a BMP done for current medication management of Eliquis. He verbalized understanding and will have pt get BMP done soon.      Order for BMP in chart

## 2022-09-16 ENCOUNTER — HOSPITAL ENCOUNTER (OUTPATIENT)
Dept: ULTRASOUND IMAGING | Age: 72
Discharge: HOME | End: 2022-09-16
Payer: MEDICARE

## 2022-09-16 DIAGNOSIS — K74.60 HEPATIC CIRRHOSIS, UNSPECIFIED HEPATIC CIRRHOSIS TYPE, UNSPECIFIED WHETHER ASCITES PRESENT (HCC): ICD-10-CM

## 2022-09-16 PROCEDURE — 76705 ECHO EXAM OF ABDOMEN: CPT

## 2022-09-21 ENCOUNTER — NURSE ONLY (OUTPATIENT)
Dept: CARDIOLOGY CLINIC | Age: 72
End: 2022-09-21
Payer: MEDICARE

## 2022-09-21 DIAGNOSIS — I63.9 ACUTE CVA (CEREBROVASCULAR ACCIDENT) (HCC): Primary | ICD-10-CM

## 2022-09-21 DIAGNOSIS — I44.0 FIRST DEGREE AV BLOCK: ICD-10-CM

## 2022-09-21 DIAGNOSIS — I48.0 PAROXYSMAL ATRIAL FIBRILLATION (HCC): ICD-10-CM

## 2022-09-21 DIAGNOSIS — I49.3 PVC (PREMATURE VENTRICULAR CONTRACTION): ICD-10-CM

## 2022-09-21 DIAGNOSIS — G45.9 TIA (TRANSIENT ISCHEMIC ATTACK): ICD-10-CM

## 2022-09-21 DIAGNOSIS — I48.0 PAF (PAROXYSMAL ATRIAL FIBRILLATION) (HCC): ICD-10-CM

## 2022-09-21 DIAGNOSIS — Z45.09 ENCOUNTER FOR LOOP RECORDER CHECK: ICD-10-CM

## 2022-09-21 PROCEDURE — G2066 INTER DEVC REMOTE 30D: HCPCS | Performed by: INTERNAL MEDICINE

## 2022-09-21 PROCEDURE — 93298 REM INTERROG DEV EVAL SCRMS: CPT | Performed by: INTERNAL MEDICINE

## 2022-09-21 NOTE — PROGRESS NOTES
Remote interrogation of implanted cardiac event monitor shows normal function. Patient has a history of CVA, 1st degree AVB, pAF, TIA, and PVCs. Takes lopressor, Eliquis, and Cardizem. Patient's last device interrogation was on 8/17. Since last interrogation, 1 pause event shows undersensing. 2 AF events recorded appears to show SR w/ ectopy. Presenting shows SR w/ ectopy. EP MD to review. See [employer] report under the Cardiology tab. Follow up 1 month via [employer].

## 2022-10-03 ENCOUNTER — TELEPHONE (OUTPATIENT)
Dept: CARDIOLOGY CLINIC | Age: 72
End: 2022-10-03

## 2022-10-20 ENCOUNTER — HOSPITAL ENCOUNTER (OUTPATIENT)
Dept: SPEECH THERAPY | Age: 72
Setting detail: THERAPIES SERIES
Discharge: HOME | End: 2022-10-20
Payer: MEDICARE

## 2022-10-20 NOTE — FLOWSHEET NOTE
Sarbjit  [facility]. [facility] Therapy, [facility],  Abhilash[address]armelo Aponte, [address]  Phone: [phone]  Fax [phone]      Speech Therapy  Cancellation/No-show Note  Patient Name:  Leah Jim  :  1950   Date:  10/20/2022  Cancels to Date: 1  No-shows to Date: 0    Patient status for today's appointment patient:  []  Cancelled  [x]  Rescheduled appointment  []  No-show     Reason given by patient:  [x]  Patient ill  []  Conflicting appointment  []  No transportation    []  Conflict with work  []  No reason given  []  Other:     Comments: The pt's initial evaluation was re-scheduled for 10/27/22 due to the pt being ill today.         Electronically signed by:  Cass Romano, SLP

## 2022-10-26 ENCOUNTER — NURSE ONLY (OUTPATIENT)
Dept: CARDIOLOGY CLINIC | Age: 72
End: 2022-10-26
Payer: MEDICARE

## 2022-10-26 DIAGNOSIS — I63.9 ACUTE CVA (CEREBROVASCULAR ACCIDENT) (HCC): Primary | ICD-10-CM

## 2022-10-26 DIAGNOSIS — Z45.09 ENCOUNTER FOR LOOP RECORDER CHECK: ICD-10-CM

## 2022-10-26 PROCEDURE — 93298 REM INTERROG DEV EVAL SCRMS: CPT | Performed by: INTERNAL MEDICINE

## 2022-10-26 PROCEDURE — G2066 INTER DEVC REMOTE 30D: HCPCS | Performed by: INTERNAL MEDICINE

## 2022-10-27 ENCOUNTER — HOSPITAL ENCOUNTER (OUTPATIENT)
Dept: SPEECH THERAPY | Age: 72
Setting detail: THERAPIES SERIES
Discharge: HOME | End: 2022-10-27
Payer: MEDICARE

## 2022-10-27 DIAGNOSIS — I69.319 COGNITIVE DEFICITS FOLLOWING CEREBRAL INFARCTION: Primary | ICD-10-CM

## 2022-10-27 PROCEDURE — 92523 SPEECH SOUND LANG COMPREHEN: CPT

## 2022-10-27 NOTE — PROGRESS NOTES
End of 31-day monitoring period 10/26/22. No new arrhythmias/events recorded. Remote transmission received for patients ILR. EP physician will review. See interrogation under the cardiology tab in the [facility] [address] field for more details. Will continue to monitor remotely. dx CVA and r/o atrial arrhythmias. (last recorded PAF 2/2021). Hx PAF. ([address] [facility], [facility]). No AFib recorded. 1 event recorded as AF shows sinus w/ V bigeminy.

## 2022-10-27 NOTE — PROGRESS NOTES
Sarbjit  79. and Therapy, [facility], [address] [facility], [address] [place] [address]   Phone: [phone]  Fax [phone]     [facility]    Dear Dr. Cassy Gomez,    We had the pleasure of evaluating the following patient for speech therapy services at [facility]. A summary of our findings can be found in the initial assessment below. This includes our plan of care. If you have any questions or concerns regarding these findings, please do not hesitate to contact me at the office phone number checked above. Thank you for the referral.       Physician Signature:_______________________________Date:__________________  By signing above (or electronic signature), therapist's plan is approved by physician      Patient: Jie Henley   : 1950   MRN: [id number]  Referring Physician: Fredy Jean-Baptiste MD      Evaluation Date: 10/27/2022      Treatment Diagnosis:   Diagnosis: Aphasia following cerebral infarction (X07.756); Cognitive deficits following cerebral infarction (Q80.018)    Treatment Diagnosis: Aphasia (R47.01); Cognitive deficits (R41.89)                                             Insurance information: [employer] Gold Plus      Precautions/ Contra-indications: Significant Aphasia reducing her ability to communicate. Latex Allergy:  [x]NO      []YES  Preferred Language for Healthcare:   [x]English       []other:    SUBJECTIVE:  ONSET: 22    Recent MRI Brain (22): Impression   Small acute infarct in the left parietal lobe. Extensive bilateral encephalomalacia. Chronic small vessel ischemic disease   and moderate atrophy. Case History:   The pt has had multiple CVA's in the past. Her last CVA on 22 resulted in significant Aphasia. She reports being often confused about what she is saying.  She reports that she knows what the word is that she is trying to say but that it comes out wrong. Her  was present who reported that communication is often difficult with the patient due to her Aphasia. The pt reportedly had home health Speech Therapy since being discharged home from her CVA. This was discontinued resulting in referral to outpatient Speech Therapy. The pt also has had cognitive deficits since her initial CVA in 2018. The pt and her  are much more concerned about her Aphasia which is of new onset since her 8/22/22 CVA. Living Status: Lives with her   Occupation/School: Previously worked as a  at [employer]. Medication Management: :  Dependent on caregiver  Finance Management: Dependent on caregiver  Hearing: Hard of hearing concerns  Vision: Wears glasses at all times; reported visual field cut    Relevant Medical History:  [x] Patient history, allergies, meds reviewed. Medical chart reviewed. See intake form. Review Of Systems (ROS):  [x]Performed Review of systems (Integumentary, CardioPulmonary, Neurological) by intake and observation. Intake form has been scanned into medical record. Patient has been instructed to contact their primary care physician regarding ROS issues if not already being addressed at this time. Co-morbidities/Complexities (which will affect course of rehabilitation): Hypertension and Depression    Functional Outcome:  NOMS - Spoken Language Expression:   []  Level 1 The individual attempts to speak, but verbalizations are not meaningful to familiar or unfamiliar communication partners at any time    []  Level 2 The individual attempts to speak, although few attempts are accurate or appropriate.   The communication partner must assume responsibility for structuring the communication exchange, and with consistent and maximal cueing, the individual can only occasionally produce automatic and/or imitative words and phrases that are rarely meaningful in context    []  Level 3  The communication partner must assume responsibility for structuring the communication exchange, and with consistent and moderate cueing, the individual can produce words and phrases that are appropriate and meaningful in context. [x]  Level 4 The individual is successfully able to initiate communication using spoken language in simple, structured conversations in routine daily activities with familiar communication partners. The individual usually requires moderate cueing, but is able to demonstrate use of simple sentences (i.e., semantics, syntax, and morphology) and rarely uses complex sentences/messages. []  Level 5 The individual is successfully able to initiate communication using spoken language in structured conversations with both familiar and unfamiliar communication partners. The individual occasionally requires minimal cueing to frame more complex sentences in messages. The individual occasionally self-cues when encountering difficulty     []  Level 6 The individual is successfully able to communicate in most activities, but some limitations in spoken language are still apparent in vocational, avocational, and social activities . The individual rarely requires minimal cueing to frame complex sentences. The individual usually self-cues when encountering difficulty. []  Level 7 The individual's ability to independently participate in vocational, avocational, and social activities are not limited by spoken language skills . Independent functioning may occasionally include use of self-cueing. PAIN:   Pain Scale: 0/10    OBJECTIVE:        Barriers to/and or personal factors that will affect rehab potential: None        ASSESSMENT / IMPRESSIONS:   The pt demonstrates moderate expressive and receptive Aphasia.  Her verbal expression is marked by frequent paraphasias (both phonemic and semantic) that interrupt her speech and greatly reduce her intelligibility. The pt is usually aware of these errors, but is usually unable to self-correct her mistakes. She also has verbal perseveration at times, repeatedly calling items the same word that was said earlier in the session. Her writing and reading also reflect her Aphasias and these paraphasias. When reading aloud, she frequently says paraphasias for the words she is trying to read. With writing, she frequently writes paraphasia like responses with frequent spelling errors. Auditory comprehension is also significantly impacted. She struggles to follow more than 1-step commands and also has difficulty compreheding more linguistically complex information. In addition to her Aphasia, cognitive deficits are also noted, especially in the areas of memory. The complete ability to assess her cognition is limited due to her Aphasia. Both the pt and her  are more concerned with the pt's Aphasia and difficulty communicating. Therefore, treatment will focus on her communication at this time, not cognition. The pt was assessed via informal measures and the Western Aphasia Battery-Revised (WAB-R). She received a total bedside language score of 44 out of a possible 100. See a summary of the WAB-R results below. Oral motor exam:  [facility]      COMPREHENSION  Auditory Comprehension: Moderate   Yes/No Questions, Complex, Two-step Commands, Multi-step commands, and Complex/Abstract Commands    Reading Comprehension: Severe  Paragraph      EXPRESSION  Primary Mode of Expression: Verbal  Verbal Expression: Moderate   Initiation, Confrontational, Convergent, Divergent, Responsive, and Conversation    Written Expression: Moderate  Dominant Hand: Right   Legibility  and Self-formulation     Pragmatics/Social Functioning: [facility]         MOTOR SPEECH  Motor Speech: A component of verbal apraxia if a consideration.  No actual groping behavior noted during the session       COGNITION  Overall Orientation : .Mild       Attention: Mild       Memory: Moderate       Problem Solving: To be assessed       Numeric Reasoning: To be assessed       Abstract Reasoning: To be assessed       Safety/Judgement: To be assessed         ADDITIONAL ASSESSMENT:  Western Aphasia Battery Revised:  Subtest: Score: Comment   Spontaneous Speech: Content 5/10 Frequent paraphasias      Spontaneous Speech: Fluency 5/10       Auditory Verbal Comprehension: Y/N Questions 9/10 Answered 9 of 10 questions correctly but struggled later when more complex yes/no questions were given   Sequential Commands 3/10 Struggled with any commands more than 1-step      Repetition 7/10 Repeated all except lengthier sentences      Object Naming 5.5/10 Frequent paraphasias and some perseverations   Reading 0/10 Unable to accurately read or answer any questions about what she read   Writing 1/10 Many spelling errors. Even with her first name she wrote 2 \"e\"'s at the end instead of 1. Bedside Language Score: 44/100                 Treatment Diagnosis:  Speech-Language:  Moderate Expressive Aphasia and Moderate Receptive Aphasia        Cognitive-Linguistic: Mild Cognitive-Linguistic Deficit      Prognosis/Rehab Potential: Good        Tolerance of evaluation/treatment: Good       PLAN:    Frequency/Duration:  1-2 days per week for 12 Weeks:    Therapeutic Interventions:  [x]  Speech-Language Evaluation/Treatment  [x]  Cognitive-Linguistic Skills Development as indicated  []  Voice Evaluation and Treatment  []  Ravinder Parker Voice Treatment (LSVT LOUD)  []  Dysphagia Evaluation/Treatment  []  Modified Barium Swallow Study  []  Fiberoptic Endoscopic Evaluation of Swallowing (FEES)  []  Videostroboscopy (VLS)  []  Dysphagia Treatment via Neuromuscular Electrical Stimulation (NMES)  []  Deep Pharyngeal Neuromuscular Stimulation (DPNS)  []  Evaluation, Modifications, and Training in Voice Prosthetic  []  Evaluation for Speech-Generating Augmentative and Alternative Communication Device   [] Therapeutic Services for the use of Speech-Generating Device  []  Myofascial release (manual) massage for the voice &/or swallow  []  Use of surface EMG (sEMG) for the treatment of oropharyngeal dysphagia  []  Other:      HEP instruction: Written HEP instructions provided and reviewed:  Pt verbalized understanding and agreement. GOALS:  Patient stated goal: To speak more normal again  [] Progressing: [] Met: [] Not Met: [] Adjusted    Therapist goals for Patient:   Short Term Goals:   1. The pt will complete basic confrontational naming tasks with 90% accuracy, min-mod cues  [] Progressing: [] Met: [] Not Met: [] Adjusted  2. The pt will say individual single sentences with 90% accuracy, mod cues  [] Progressing: [] Met: [] Not Met: [] Adjusted  3. The pt write basic single words with 90% accuracy, mod cues  [] Progressing: [] Met: [] Not Met: [] Adjusted  4. The pt will accurately read single sentences out loud and demonstrate reading comprehension of this information with 90% accuracy, min-mod cues  [] Progressing: [] Met: [] Not Met: [] Adjusted    Long Term Goals:   1. Patient will improve expressive/receptive language to highest functional level to decrease burden of care and improve quality of life. [] Progressing: [] Met: [] Not Met: [] Adjusted    C-SSRS Triggered by Intake questionnaire (Past 2 wk assessment):   [x] No, Questionnaire did not trigger screening.   [] Yes, Patient intake triggered further evaluation      [] C-SSRS Screening completed  [] PCP notified via Plan of Care  [] Emergency services notified    Total Treatment Time / Charges     Time in Time out Total Time / units   Speech Sound/Lang Comp Eval  1300  1355  55 min / 1 unit   Swallow Eval         Behav Qualitative Analysis of Voice      Eval Speech Sound Production      Cognitive Tx      Speech Tx      Dysphagia Tx             Thank you for this referral. Please do not hesitate to call with any questions or concerns. Electronically signed by:  Tom Banks, [address] [facility], [place], Alondra Nicole  KR[address][id number]  Speech-Language Pathologist  Phone #: [phone]  Fax #: [phone]      Note: If patient does not return for scheduled/ recommended follow up visits, this note will serve as a discharge summary

## 2022-11-03 ENCOUNTER — HOSPITAL ENCOUNTER (OUTPATIENT)
Dept: SPEECH THERAPY | Age: 72
Setting detail: THERAPIES SERIES
Discharge: HOME | End: 2022-11-03
Payer: MEDICARE

## 2022-11-03 PROCEDURE — 92507 TX SP LANG VOICE COMM INDIV: CPT

## 2022-11-03 NOTE — FLOWSHEET NOTE
Sarbjit  81. and Therapy, [facility],  Abhilashcarmelo Judealdair Aponte, [address]  Phone: [phone]  Fax [phone]      Speech-Language Pathology  Daily Treatment Note    Date:  11/3/2022    Patient Name:  Charanjit Monroy    :  1950  MRN: [id number]  Restrictions/Precautions:  Aphasia  Diagnosis:  Aphasia following cerebral infarction (I69.320); Cognitive deficits following cerebral infarction (Y47.458)    Treatment Diagnosis: Aphasia (R47.01); Cognitive deficits (R41.89)           Insurance/Certification information: [employer] Gold Plus; 24 visits approved from 10/27/22 to 23  Referring Physician:  Caity Fong MD  Plan of care signed (Y/N): Faxed   Visit# / total visits:   from 10/27/22 to 23   Pain level: No report of pain     Progress Note: []  Yes  [x]  No  Next due by: Visit #10: 2/10 or 22     Subjective: The pt was pleasant and cooperative, accompanied by her . The pt was highly motivated during today's session with significant improvements toward each goal noted. Objective:   Short Term Goals:   1. The pt will complete basic confrontational naming tasks with 90% accuracy, min-mod cues:  - 80% (16/20), min-mod cues   [x] Progressing: [] Met: [] Not Met: [] Adjusted    2. The pt will say individual single sentences with 90% accuracy, mod cues:  - 73% (11/15), mod cues; targeted via picture descriptions  [x] Progressing: [] Met: [] Not Met: [] Adjusted    3. The pt write basic single words with 90% accuracy, mod cues:  - 40% (6/15), mod cues  [x] Progressing: [] Met: [] Not Met: [] Adjusted    4.  The pt will accurately read single sentences out loud and demonstrate reading comprehension of this information with 90% accuracy, min-mod cues:  - Task requiring her to read a sentence aloud and point to the correct picture from a field of 3 that best matches the sentence: 70% (7/10) but needed mod cues and extra time  [x] Progressing: [] Met: [] Not Met: [] Adjusted     Long Term Goals:   1. Patient will improve expressive/receptive language to highest functional level to decrease burden of care and improve quality of life. [x] Progressing: [] Met: [] Not Met: [] Adjusted       Assessment:   Noted overall improved verbal expression, writing and reading this date. The pt greatly benefited from given cues this date. Plan:   Continued Speech Therapy 1-2 days per week for 12 weeks    Total Treatment Time / Charges     Time in Time out Total Time / units   Cognitive Tx         Speech Tx 1503 1603 60 min / 1 unit   Dysphagia Tx           Signature:     Alen Sims [place], [address] [facility] [place]  TG#[id number]  Speech-Language Pathologist  Phone #: [phone]  Fax #: [phone]

## 2022-11-08 ENCOUNTER — TELEPHONE (OUTPATIENT)
Dept: CARDIOLOGY CLINIC | Age: 72
End: 2022-11-08

## 2022-11-10 ENCOUNTER — HOSPITAL ENCOUNTER (OUTPATIENT)
Dept: SPEECH THERAPY | Age: 72
Setting detail: THERAPIES SERIES
Discharge: HOME | End: 2022-11-10
Payer: MEDICARE

## 2022-11-10 PROCEDURE — 92507 TX SP LANG VOICE COMM INDIV: CPT

## 2022-11-10 NOTE — FLOWSHEET NOTE
Sarbjit  79. and Therapy, [facility],  Cynthia Aponte, [address]  Phone: [phone]  Fax [phone]      Speech-Language Pathology  Daily Treatment Note    Date:  11/10/2022    Patient Name:  Klarissa Green    :  1950  MRN: [id number]  Restrictions/Precautions:  Aphasia  Diagnosis:  Aphasia following cerebral infarction (I69.320); Cognitive deficits following cerebral infarction (M60.418)    Treatment Diagnosis: Aphasia (R47.01); Cognitive deficits (R41.89)           Insurance/Certification information: [employer] Gold Plus; 24 visits approved from 10/27/22 to 23  Referring Physician:  Art Arceo MD  Plan of care signed (Y/N): Faxed   Visit# / total visits:   from 10/27/22 to 23   Pain level: No report of pain     Progress Note: []  Yes  [x]  No  Next due by: Visit #10: 3/10 or 22     Subjective: The pt was accompanied by her . Both were in good spirits. Objective:   Short Term Goals:   1. The pt will complete basic confrontational naming tasks with 90% accuracy, min-mod cues:  - 100% (13/), min-mod cues  GOAL MET   [] Progressing: [x] Met: [] Not Met: [] Adjusted    2. The pt will say individual single sentences with 90% accuracy, mod cues:  - 80% (12/15), mod cues; targeted via picture descriptions  [x] Progressing: [] Met: [] Not Met: [] Adjusted    3. The pt write basic single words with 90% accuracy, mod cues:  - 80% (12/15), mod cues  [x] Progressing: [] Met: [] Not Met: [] Adjusted    4. The pt will accurately read single sentences out loud and demonstrate reading comprehension of this information with 90% accuracy, min-mod cues:  - 50% (4/8), min-mod cues; targeted by having her read x 3 sentences and point to the one that best matches a given picture; Her reduced accuracy this date is likely due to the increased complexity of this task.     [x] Progressing: [] Met: [] Not Met: [] Adjusted     Long Term Goals:   1. Patient will improve expressive/receptive language to highest functional level to decrease burden of care and improve quality of life. [x] Progressing: [] Met: [] Not Met: [] Adjusted       Assessment:   The pt continues to demonstrate progress meeting another short-term goal this date. Plan:   Continued Speech Therapy 1-2 days per week for 12 weeks    Total Treatment Time / Charges     Time in Time out Total Time / units   Cognitive Tx         Speech Tx 1500 1600 60 min / 1 unit   Dysphagia Tx           Signature:     Alan Mandujano MA, [address] [facility][id number]  Speech-Language Pathologist  Phone #: [phone]  Fax #: [phone]

## 2022-11-15 ENCOUNTER — HOSPITAL ENCOUNTER (OUTPATIENT)
Dept: SPEECH THERAPY | Age: 72
Setting detail: THERAPIES SERIES
Discharge: HOME | End: 2022-11-15
Payer: MEDICARE

## 2022-11-15 NOTE — FLOWSHEET NOTE
Sarbjit  79. and Therapy, [facility], [address], [address]  Phone: [phone]  Fax [phone]      Speech Therapy  Cancellation/No-show Note  Patient Name:  Artem Almonte  :  1950   Date:  11/15/2022  Cancels to Date: 2  No-shows to Date: 0    Patient status for today's appointment patient:  [x]  Cancelled  []  Rescheduled appointment  []  No-show     Reason given by patient:  [x]  Patient ill  []  Conflicting appointment  []  No transportation    []  Conflict with work  []  No reason given  []  Other:     Comments:        Electronically signed by:  Thiago Guzmán SLP

## 2022-11-17 ENCOUNTER — APPOINTMENT (OUTPATIENT)
Dept: SPEECH THERAPY | Age: 72
End: 2022-11-17
Payer: MEDICARE

## 2022-11-23 ENCOUNTER — HOSPITAL ENCOUNTER (OUTPATIENT)
Dept: SPEECH THERAPY | Age: 72
Setting detail: THERAPIES SERIES
Discharge: HOME | End: 2022-11-23
Payer: MEDICARE

## 2022-11-23 PROCEDURE — 92507 TX SP LANG VOICE COMM INDIV: CPT

## 2022-12-01 ENCOUNTER — APPOINTMENT (OUTPATIENT)
Dept: SPEECH THERAPY | Age: 72
End: 2022-12-01
Payer: MEDICARE

## 2022-12-08 ENCOUNTER — HOSPITAL ENCOUNTER (OUTPATIENT)
Dept: SPEECH THERAPY | Age: 72
Setting detail: THERAPIES SERIES
Discharge: HOME | End: 2022-12-08

## 2022-12-08 NOTE — FLOWSHEET NOTE
[facility], [facility], [address] Cyn[address]  [place], [address] [facility]  Phone: [phone]  Fax [phone]      Speech Therapy  Cancellation/No-show Note  Patient Name:  Maria Eugenia Franco  :  1950   Date:  2022  Cancels to Date: 3  No-shows to Date: 0    Patient status for today's appointment patient:  [x]  Cancelled  []  Rescheduled appointment  []  No-show     Reason given by patient:  []  Patient ill  []  Conflicting appointment  []  No transportation    []  Conflict with work  []  No reason given  [x]  Other:     Comments:        Electronically signed by:  Carley Howard SLP

## 2022-12-15 ENCOUNTER — HOSPITAL ENCOUNTER (OUTPATIENT)
Dept: SPEECH THERAPY | Age: 72
Setting detail: THERAPIES SERIES
Discharge: HOME | End: 2022-12-15
Payer: MEDICARE

## 2022-12-15 PROCEDURE — 92507 TX SP LANG VOICE COMM INDIV: CPT

## 2022-12-15 NOTE — FLOWSHEET NOTE
Sarbjit  79. and Therapy, [facility], [address], [address]  Phone: [phone]  Fax [phone]      Speech-Language Pathology  Daily Treatment Note    Date:  12/15/2022    Patient Name:  Leah Jim    :  1950  MRN: [id number]  Restrictions/Precautions:  Aphasia  Diagnosis:  Aphasia following cerebral infarction (I69.320); Cognitive deficits following cerebral infarction (C23.628)    Treatment Diagnosis: Aphasia (R47.01); Cognitive deficits (R41.89)           Insurance/Certification information: [employer] Medicare Gold Plus; 24 visits approved from 10/27/22 to 23  Referring Physician:  Josef Stone MD  Plan of care signed (Y/N): Faxed   Visit# / total visits:   from 10/27/22 to 23   Pain level: No report of pain     Progress Note: []  Yes  [x]  No  Next due by: 2/10 or 22    Subjective: The pt was accompanied by her  like usual. Her  said that the pt is speaking better, being much successful when recently speaking to her two brothers on the phone. Objective:   Short Term Goals:   1. The pt will complete moderately-complex confrontational naming tasks with 90% accuracy, min-mod cues:  - 70% (14/20), min-mod cues  [x] Progressing: [] Met: [] Not Met: [] Adjusted    2. The pt will say individual single sentences with 90% accuracy, mod cues:  - 92% (12/), mod cues; targeted via picture descriptions  [x] Progressing: [] Met: [] Not Met: [] Adjusted    3. The pt write basic single words with 90% accuracy, mod cues:  - 70% (7/10), mod cues  [x] Progressing: [] Met: [] Not Met: [] Adjusted    4. The pt will accurately read single sentences out loud and demonstrate reading comprehension of this information with 90% accuracy, min-mod cues:  - 80% (4/5), min-mod cues and extra time; targeted by having her read x 3 sentences and point to the one that best matches a given picture;  Her reduced accuracy

## 2022-12-22 ENCOUNTER — HOSPITAL ENCOUNTER (OUTPATIENT)
Dept: SPEECH THERAPY | Age: 72
Setting detail: THERAPIES SERIES
Discharge: HOME | End: 2022-12-22
Payer: MEDICARE

## 2022-12-22 NOTE — PROGRESS NOTES
Sar[facility]it  [facility]. and Therapy, [facility],  Cynthia Aopnte, [address]  Phone: [phone]  Fax [phone]        Speech Therapy Progress Note         The following patient has been evaluated for therapy services. Please review the attached summary of the patient's plan of care, and verify that you agree with plan for additional therapy services at this time. Thank you for the referral of this patient. Please sign the attached certification form. 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)    Treatment Diagnosis: Aphasia (R47.01); Cognitive deficits (R41.89)           Insurance/Certification information: [employer] Gold Plus; 24 visits approved from 10/27/22 to 23  Referring Physician:  Sonali Estevez MD  Plan of care signed (Y/N):  Faxed   Visit# / total visits:   from 10/27/22 to 23   Pain level: No report of pain     Time Period for Report:  22 to 22 (5 total sessions, including her evaluation)  Cancels/No-shows to date:  x 4 cancellations    Plan of Care/Treatment to date:  [x] Speech-Language Evaluation/Treatment    [] Dysphagia Evaluation/Treatment        [] Dysphagia Treatment via Neuromuscular Electrical Stimulation (NMES)   [] Modified Barium Swallowing Study  [] Fiberoptic Endoscopic Evaluation of Swallowing (FEES)    [] Cognitive-Linguistic Skills Development  [] Voice evaluation and Treatment      [] Evaluation, modification, and Training of Voice Prosthetic     [] Evaluation for Speech-Generating Augmentative and Alternative Communication Device   [] Therapeutic Services for the use of Speech-Generating Device. [] Other:     Significant Findings At Last Visit/Comments:    Subjective: The pt's attendance has been poor this month. She is currently only being seen 1 x per week but has had to cancel due to a medical emergency with her  and being ill. Objective:  Observation: The pt continues to struggle with letter to phoneme accuracy. She often can say the correct letter but struggles to accurately write the letter. This was especially true with \"f\" and \"h\". She was given home activities to practice writing these letters accurately. Assessment:  Summary: Despite her limited attendance the pt has made overall significant progress toward her goals. She has demonstrated improvement toward all of her goals including in the areas of verbal expression, writing and reading. Considering the progress made, continued Speech Therapy services are recommended. Patient's response to treatment: Pleasant and hardworking    Progress towards goals:    Short Term Goals:   1. The pt will complete moderately-complex confrontational naming tasks with 90% accuracy, min-mod cues:  - 70%, min-mod cues  [x] Progressing: [] Met: [] Not Met: [] Adjusted     2. The pt will say individual single sentences with 90% accuracy, mod cues:  - 92%, mod cues  WILL D/C IF ABLE TO MAINTAIN ACCURACY  [x] Progressing: [] Met: [] Not Met: [] Adjusted     3. The pt write basic single words with 90% accuracy, mod cues:  - 70%, mod cues  [x] Progressing: [] Met: [] Not Met: [] Adjusted     4. The pt will accurately read single sentences out loud and demonstrate reading comprehension of this information with 90% accuracy, min-mod cues:  - 80%, min-mod cues and extra time  [x] Progressing: [] Met: [] Not Met: [] Adjusted     5.  The pt will complete basic confrontational naming tasks with 90% accuracy, min-mod cues:  - GOAL MET   [] Progressing: [x] Met: [] Not Met: [] Adjusted     Long Term Goals: 1. Patient will improve expressive/receptive language to highest functional level to decrease burden of care and improve quality of life. [x] Progressing: [] Met: [] Not Met: [] Adjusted       Current Frequency/Duration:  # Days per week: [x] 1-2 days # Weeks: [] 1 week [] 4 weeks      [] 2 days   [] 2 weeks [] 5 weeks      [] 3 days   [] 3 weeks [x] 12 weeks     Rehab Potential: [] Excellent [x] Good [] Fair  [] Poor     Goal Status:  [] Achieved [x] Partially Achieved  [] Not Achieved     Patient Status: [] Continue per initial plan of Care     [] Patient now discharged     [x] Additional visits requested, Please re-certify for additional visits:      Requested frequency/duration:  1-2 X/week for 12 weeks    Electronically signed by:  Zully Chauhan, [address] [place] [address] [id number]  Speech-Language Pathologist  Phone #: [phone]  Fax #: [phone]    If you have any questions or concerns, please don't hesitate to call.   Thank you for your referral.

## 2022-12-22 NOTE — FLOWSHEET NOTE
Sarbjit  [facility]. and Therapy, [facility],  Abhilashcarmelo Aponte, [address]  Phone: [phone]  Fax [phone]      Speech Therapy  Cancellation/No-show Note  Patient Name:  Manuel Valle  :  1950   Date:  2022  Cancels to Date: 4  No-shows to Date: 0    Patient status for today's appointment patient:  [x]  Cancelled  []  Rescheduled appointment  []  No-show     Reason given by patient:  [x]  Patient ill  []  Conflicting appointment  []  No transportation    []  Conflict with work  []  No reason given  []  Other:     Comments:        Electronically signed by:  Lucas Juan, SLP

## 2022-12-29 ENCOUNTER — HOSPITAL ENCOUNTER (OUTPATIENT)
Dept: SPEECH THERAPY | Age: 72
Setting detail: THERAPIES SERIES
Discharge: HOME | End: 2022-12-29
Payer: MEDICARE

## 2022-12-29 NOTE — FLOWSHEET NOTE
[facility], [facility], [address], [address] [facility]  Phone: [phone]  Fax [phone]      Speech Therapy  Cancellation/No-show Note  Patient Name:  Radha Ellington  :  1950   Date:  2022  Cancels to Date: 5  No-shows to Date: 0    Patient status for today's appointment patient:  [x]  Cancelled  []  Rescheduled appointment  []  No-show     Reason given by patient:  []  Patient ill  [x]  Conflicting appointment: eye doctors  []  No transportation    []  Conflict with work  []  No reason given  []  Other:     Comments:        Electronically signed by:  Simeon Castro, SLP

## 2022-12-30 NOTE — TELEPHONE ENCOUNTER
Spoke with pt , let him know we currently do not have samples. He would like a 30 day supply sent to [address] [place] [address]..    7/6/2022 NIKI

## 2023-01-01 ENCOUNTER — TELEPHONE (OUTPATIENT)
Dept: CARDIOLOGY CLINIC | Age: 73
End: 2023-01-01

## 2023-01-04 ENCOUNTER — NURSE ONLY (OUTPATIENT)
Dept: CARDIOLOGY CLINIC | Age: 73
End: 2023-01-04
Payer: MEDICARE

## 2023-01-04 DIAGNOSIS — Z45.09 ENCOUNTER FOR LOOP RECORDER CHECK: ICD-10-CM

## 2023-01-04 DIAGNOSIS — I63.9 ACUTE CVA (CEREBROVASCULAR ACCIDENT) (HCC): Primary | ICD-10-CM

## 2023-01-04 PROCEDURE — 93298 REM INTERROG DEV EVAL SCRMS: CPT | Performed by: INTERNAL MEDICINE

## 2023-01-04 PROCEDURE — G2066 INTER DEVC REMOTE 30D: HCPCS | Performed by: INTERNAL MEDICINE

## 2023-01-05 ENCOUNTER — TELEPHONE (OUTPATIENT)
Dept: CARDIOLOGY CLINIC | Age: 73
End: 2023-01-05

## 2023-01-05 NOTE — PROGRESS NOTES
Pt woke up around 0330 reported that this was due to a nightmare. Pt stated that the nightmare was regarding his mom and family fighting; reporting nightmares happen periodically. Pt reported feeling anxious about this and requested PRN hydroxyzine. Pt given PRN hydroxyzine syrup 100 mg at 0333 followed by water and a snack. Pt was able to fall back asleep. Will continue to monitor.    We received a remote transmission from patient's monitor at home. Remote Linq report shows short AT and SR with ectopy. AF is not real. Pt has reached the ANDREE. Message sent to office staff. EP physician to review. We will continue to monitor remotely. Implanted for CVA. End of 31-day monitoring period 1-4-23.

## 2023-01-06 ENCOUNTER — HOSPITAL ENCOUNTER (OUTPATIENT)
Dept: SPEECH THERAPY | Age: 73
Setting detail: THERAPIES SERIES
Discharge: HOME | End: 2023-01-06
Payer: MEDICARE

## 2023-01-06 NOTE — TELEPHONE ENCOUNTER
Next available GIFTYB 02/23 just wanted to make sure next available appt was okay before scheduling pt

## 2023-01-06 NOTE — TELEPHONE ENCOUNTER
Noted. Will discuss ILR options at scheduled office visit.    Sending to [facility] as an [place]

## 2023-01-06 NOTE — TELEPHONE ENCOUNTER
Called and spoke with patient's , Moisés Stahl. He stated he will speak with Shiva about proceeding with options regarding ILR and give our office a call back. OK to schedule 2/2023. Of note, AF hasn't been reported on the device since 2021. Patient is on [address] [place] [address] Road and rate control medications.

## 2023-01-06 NOTE — TELEPHONE ENCOUNTER
Roshan Briggs MD  You 33 minutes ago (2:08 PM)     Curly Russ  We will discuss on OV. Its not clear that another ILR will provide substantial benefit.         noted

## 2023-01-06 NOTE — FLOWSHEET NOTE
[facility], [facility], [address] [place] [address]  Phone: [phone]  Fax [phone]      Speech Therapy  Cancellation/No-show Note  Patient Name:  Raj Carmona  :  1950   Date:  2023  Cancels to Date: 6  No-shows to Date: 0    Patient status for today's appointment patient:  [x]  Cancelled  []  Rescheduled appointment  []  No-show     Reason given by patient:  []  Patient ill  []  Conflicting appointment:   []  No transportation    []  Conflict with work  []  No reason given  [x]  Other:     Comments:  New onset of leg pain today preventing her from walking today.       Electronically signed by:  Chapin Gonzalez SLP

## 2023-01-06 NOTE — TELEPHONE ENCOUNTER
Pt has been scheduled for 02/23/2023 at 11:30 am w/NIKI at [facility]. ** Pt is interested in proceeding w/ILR options, please advise.

## 2023-01-06 NOTE — TELEPHONE ENCOUNTER
We received a remote transmission from patient's monitor at home. Remote Linq report shows short AT and SR with ectopy. AF is not real. Pt battery has reached the ANDREE. Message sent to office staff. EP physician to review. We will continue to monitor remotely. Implanted for CVA. He is on Eliquis, cardizem cd, lopressor.   (last recorded PAF 2/2021). 2 AF recordings lasting 6 min on 2/3 and 2/6/21. Please have pt f/u @  his convenience w/ NIKI to discuss.

## 2023-01-13 ENCOUNTER — HOSPITAL ENCOUNTER (OUTPATIENT)
Dept: SPEECH THERAPY | Age: 73
Setting detail: THERAPIES SERIES
Discharge: HOME | End: 2023-01-13
Payer: MEDICARE

## 2023-01-13 PROCEDURE — 92507 TX SP LANG VOICE COMM INDIV: CPT

## 2023-01-13 NOTE — FLOWSHEET NOTE
Sarbjit  79. and Therapy, [place] [address] Cynthia Aponte, [address]  Phone: [phone]  Fax [phone]      Speech-Language Pathology  Daily Treatment Note    Date:  2023    Patient Name:  Sandy Crandall    :  1950  MRN: [id number]  Restrictions/Precautions:  Aphasia  Diagnosis:  Aphasia following cerebral infarction (I69.320); Cognitive deficits following cerebral infarction (L34.583)    Treatment Diagnosis: Aphasia (R47.01); Cognitive deficits (R41.89)           Insurance/Certification information: [employer] Gold Plus; 24 visits approved from 10/27/22 to 23  Referring Physician:  Carmela Fraser MD  Plan of care signed (Y/N): Faxed   Visit# / total visits:   from 10/27/22 to 23   Pain level: No report of pain     Progress Note: []  Yes  [x]  No  Next due by: 3/10 or 23    Subjective: The pt was seen today for the first time since 12/15/22 due to the pt's knee pain and other health issues. The pt said that she feels like her speech has recently been worse. This type of significant change was not noted by the therapist.    Objective:   Short Term Goals:   1. The pt will complete moderately-complex confrontational naming tasks with 90% accuracy, min-mod cues:  - 85% (17/20), min-mod cues  [x] Progressing: [] Met: [] Not Met: [] Adjusted    2. The pt will say individual single sentences with 90% accuracy, mod cues:  - 90% (9/10), mod cues; targeted via having her say a sentence containing a given word  GOAL MET  [x] Progressing: [] Met: [] Not Met: [] Adjusted    3. The pt write basic single words with 90% accuracy, mod cues:  - 67% (12/18), mod cues  [x] Progressing: [] Met: [] Not Met: [] Adjusted    4.  The pt will accurately read single sentences out loud and demonstrate reading comprehension of this information with 90% accuracy, min-mod cues:  - 50% (3/6), min-mod cues and extra time; targeted by having her read longer sentences and then identify information in the sentence that answered a question. [x] Progressing: [] Met: [] Not Met: [] Adjusted     5. The pt will complete basic confrontational naming tasks with 90% accuracy, min-mod cues:  - GOAL MET   [] Progressing: [x] Met: [] Not Met: [] Adjusted      NEW SHORT-TERM GOAL:  - The pt will complete moderate verbal descriptions with 90% accuracy, mod cues:      Long Term Goals:   1. Patient will improve expressive/receptive language to highest functional level to decrease burden of care and improve quality of life. [x] Progressing: [] Met: [] Not Met: [] Adjusted       Assessment:   Overall gain in verbal expression continue to be noted. Plan:   Continued Speech Therapy 1-2 days per week for 12 weeks    Total Treatment Time / Charges     Time in Time out Total Time / units   Cognitive Tx         Speech Tx 6625 6513 60 min / 1 unit   Dysphagia Tx           Signature:     [place], [address] [id number]  Speech-Language Pathologist  Phone #: [phone]  Fax #: [phone]

## 2023-01-19 ENCOUNTER — APPOINTMENT (OUTPATIENT)
Dept: SPEECH THERAPY | Age: 73
End: 2023-01-19
Payer: MEDICARE

## 2023-01-20 ENCOUNTER — HOSPITAL ENCOUNTER (OUTPATIENT)
Dept: SPEECH THERAPY | Age: 73
Setting detail: THERAPIES SERIES
Discharge: HOME | End: 2023-01-20
Payer: MEDICARE

## 2023-01-20 NOTE — FLOWSHEET NOTE
Sarbjit  79. and Therapy, [facility],  Abhilashcarmelo Wangmike  [facility], [address]  Phone: [phone]  Fax [phone]      Speech Therapy  Cancellation/No-show Note  Patient Name:  Sharon Mcnally  :  1950   Date:  2023  Cancels to Date: 7  No-shows to Date: 0    Patient status for today's appointment patient:  [x]  Cancelled  []  Rescheduled appointment  []  No-show     Reason given by patient:  [x]  Patient ill  []  Conflicting appointment:   []  No transportation    []  Conflict with work  []  No reason given  []  Other:     Comments:        Electronically signed by:  Ford Cobb, SLP

## 2023-01-24 ENCOUNTER — OFFICE VISIT (OUTPATIENT)
Dept: NEUROLOGY | Age: 73
End: 2023-01-24
Payer: MEDICARE

## 2023-01-24 VITALS
HEART RATE: 67 BPM | SYSTOLIC BLOOD PRESSURE: 99 MMHG | BODY MASS INDEX: 40.84 KG/M2 | DIASTOLIC BLOOD PRESSURE: 63 MMHG | WEIGHT: 208 LBS | OXYGEN SATURATION: 97 % | HEIGHT: 60 IN

## 2023-01-24 DIAGNOSIS — I48.0 PAF (PAROXYSMAL ATRIAL FIBRILLATION) (HCC): ICD-10-CM

## 2023-01-24 DIAGNOSIS — I69.30 LATE EFFECT OF STROKE: ICD-10-CM

## 2023-01-24 DIAGNOSIS — Z86.73 REMOTE HISTORY OF STROKE: Primary | ICD-10-CM

## 2023-01-24 DIAGNOSIS — I10 HTN (HYPERTENSION), BENIGN: ICD-10-CM

## 2023-01-24 PROCEDURE — 3017F COLORECTAL CA SCREEN DOC REV: CPT | Performed by: PSYCHIATRY & NEUROLOGY

## 2023-01-24 PROCEDURE — 3074F SYST BP LT 130 MM HG: CPT | Performed by: PSYCHIATRY & NEUROLOGY

## 2023-01-24 PROCEDURE — G8484 FLU IMMUNIZE NO ADMIN: HCPCS | Performed by: PSYCHIATRY & NEUROLOGY

## 2023-01-24 PROCEDURE — G8427 DOCREV CUR MEDS BY ELIG CLIN: HCPCS | Performed by: PSYCHIATRY & NEUROLOGY

## 2023-01-24 PROCEDURE — 1090F PRES/ABSN URINE INCON ASSESS: CPT | Performed by: PSYCHIATRY & NEUROLOGY

## 2023-01-24 PROCEDURE — 3078F DIAST BP <80 MM HG: CPT | Performed by: PSYCHIATRY & NEUROLOGY

## 2023-01-24 PROCEDURE — 1036F TOBACCO NON-USER: CPT | Performed by: PSYCHIATRY & NEUROLOGY

## 2023-01-24 PROCEDURE — G8417 CALC BMI ABV UP PARAM F/U: HCPCS | Performed by: PSYCHIATRY & NEUROLOGY

## 2023-01-24 PROCEDURE — 1123F ACP DISCUSS/DSCN MKR DOCD: CPT | Performed by: PSYCHIATRY & NEUROLOGY

## 2023-01-24 PROCEDURE — G8400 PT W/DXA NO RESULTS DOC: HCPCS | Performed by: PSYCHIATRY & NEUROLOGY

## 2023-01-24 PROCEDURE — 99214 OFFICE O/P EST MOD 30 MIN: CPT | Performed by: PSYCHIATRY & NEUROLOGY

## 2023-01-24 NOTE — PROGRESS NOTES
The patient came today for follow up regarding: Recent stroke Hospital follow-up    The patient came today with her . The patient was last seen in August of last year. She came in to [facility] with acute confusion and memory impairment. Further work-up with MRI showed acute left ischemic parietal stroke and underwent to bilateral strokes. She was discharged home on Eliquis in addition to baby aspirin. She continues to have intermittent visual disturbance mainly with right visual field deficit at home. No recent falling or injury. Memory is intermittent but on and off especially with short-term memory. She needs assistant with ADL. No recent falling or injury. Occasional insomnia. Has been denies any severe psychosis or parasomnia. He is taking care of her medicine and legal issues. She can be forgetful and is found frequently. Degree is moderate. No hallucination. Other review of system was unremarkable. Exam:   Constitutional:   Vitals:    01/24/23 1307   BP: 99/63   Site: Right Wrist   Position: Sitting   Pulse: 67   SpO2: 97%   Weight: 208 lb (94.3 kg)   Height: 5' (1.524 m)       General appearance:  Normal development and appear in no acute distress. Mental Status:   Oriented to person, place, problem, and time. Memory: Impaired immediate recall. Intact remote memory  Normal attention span and concentration. Language: intact naming, repeating and fluency   Poor fund of Knowledge. unaware of current events and vocabulary   Cranial Nerves:   II: Pupils: equal, round, reactive to light. Rt visual deficit. III,IV,VI: Extra Ocular Movements are intact. No nystagmus  V: Facial sensation is intact   VII: Facial strength and movements: intact and symmetric  XII: Tongue movements are normal  Musculoskeletal: 5/5 in all 4 extremities. Tone: Normal tone.    Coordination: no tremors or drift  DTR: symmetric in UE and 1 in LL  Sensation: normal to all modalities in both arms and legs. Gait/Posture: steady gait     ROS : A 10-14 system review of constitutional, cardiovascular, respiratory, GI, eyes, , ENT, musculoskeletal, endocrine, hematological, skin, SHEENT, genitourinary, psychiatric and neurologic systems was obtained and updated today which is unremarkable except as mentioned in my HPI      Medical decision making:  I personally reviewed and updated social history, past medical history, medications, allergy, surgical history, and family history as documented in the patient's electronic health records. Labs and/or neuroimaging and other test results reviewed and discussed with the patient. Reviewed notes from other physicians. Provided patient education regarding risk, benefits and treatment options as well as adherence to medication regimen and side effect from these medications. Assessment:  1. Remote history of stroke with residual visual field deficit  Secondary stroke prevention discussed  Follow-up with neurology outpatient  Discussed risk of forming blood thinner  Continue Eliquis  Watch blood pressure at home  PT and OT    2. Late effect of stroke with cognitive impairment. Not unusual to have memory impairment after her recent stroke or history multiple strokes in the past. ? Vascular versus Alzheimer dementia. I would not start new medication at this time as has been believe that she is improving somewhat compared to receive last year. Continues to seem second stroke prevention  Speech, PT and OT    3. PAF (paroxysmal atrial fibrillation) (HCC)  Continue Eliquis  Discussed risk of bleeding on dual therapy    4.  HTN (hypertension), benign  Blood pressure monitor at home  Continue current blood pressure medication  Stroke prevention    Follow-up 6-month

## 2023-01-27 ENCOUNTER — HOSPITAL ENCOUNTER (OUTPATIENT)
Dept: SPEECH THERAPY | Age: 73
Setting detail: THERAPIES SERIES
Discharge: HOME | End: 2023-01-27
Payer: MEDICARE

## 2023-01-27 NOTE — FLOWSHEET NOTE
Sarbjit  79. and Therapy, [facility], [address], [address]  Phone: [phone]  Fax [phone]      Speech Therapy  Cancellation/No-show Note  Patient Name:  Gayla Caceres  :  1950   Date:  2023  Cancels to Date: 8  No-shows to Date: 0    Patient status for today's appointment patient:  [x]  Cancelled  []  Rescheduled appointment  []  No-show     Reason given by patient:  [x]  Patient ill  []  Conflicting appointment:   []  No transportation    []  Conflict with work  []  No reason given  []  Other:     Comments:        Electronically signed by:  Kevin Owens, SLP

## 2023-02-02 ENCOUNTER — HOSPITAL ENCOUNTER (OUTPATIENT)
Dept: SPEECH THERAPY | Age: 73
Setting detail: THERAPIES SERIES
Discharge: HOME | End: 2023-02-02
Payer: MEDICARE

## 2023-02-02 PROCEDURE — 92507 TX SP LANG VOICE COMM INDIV: CPT

## 2023-02-02 NOTE — PROGRESS NOTES
Sarbjit  79. and Therapy, [facility],  Cynthia Aponte, [address]  Phone: [phone]  Fax [phone]        Speech Therapy Progress Note         The following patient has been evaluated for therapy services. Please review the attached summary of the patient's plan of care, and verify that you agree with plan for additional therapy services at this time. Thank you for the referral of this patient. Please sign the attached certification form. Physician signature_______________________ Date________________      Fax to: [facility] [phone]         Date: 2023        Patient Name:  Baylee Lopez                    :  1950                   MRN: [id number]  Restrictions/Precautions:  Aphasia  Diagnosis:  Aphasia following cerebral infarction (I69.320); Cognitive deficits following cerebral infarction (E02.213)    Treatment Diagnosis: Aphasia (R47.01); Cognitive deficits (R41.89)           Insurance/Certification information: [employer] Medicare Gold Plus; 24 visits approved from 10/27/22 to 3/17/23  Referring Physician:  Jani Jacobo MD  Plan of care signed (Y/N):  Faxed   Visit# / total visits:   from 10/27/22 to 3/17/23   Pain level: No report of pain      Progress Note:          [x]  Yes                        []  No              Next due by: 4/10 or 3/17/23    Time Period for Report:  22 to 23 (including 2 sessions)     Cancels/No-shows to date:  x 3 cancellations    Plan of Care/Treatment to date:  [x] Speech-Language Evaluation/Treatment    [] Dysphagia Evaluation/Treatment        [] Dysphagia Treatment via Neuromuscular Electrical Stimulation (NMES)   [] Modified Barium Swallowing Study  [] Fiberoptic Endoscopic Evaluation of Swallowing (FEES)    [] Cognitive-Linguistic Skills Development  [] Voice evaluation and Treatment      [] Evaluation, modification, and Training of Voice Prosthetic     [] Evaluation for Speech-Generating Augmentative and Alternative Communication Device   [] Therapeutic Services for the use of Speech-Generating Device. [] Other:     Significant Findings At Last Visit/Comments:    Subjective: The pt's attendance has been rather poor this past month. She is currently being seen 1-2 times per week but has had to cancel due to illness from the pt and her . Objective:  Observation: The pt continues to have increased difficulty with word finding accuracy. She often can say the correct name of a word while using a carrier phrase to accurately label the word. The pt also has increased difficulty with letter to phoneme accuracy. She often can say the correct letter but struggles to accurately write the letter. This was especially true with \"e\" and \"h\". She was given home activities to practice writing these letters accurately. Assessment:  Summary: Despite her limited attendance in said time period, the pt has made overall significant progress toward her goals and continues to have a positive attitude when participating in therapy. She has demonstrated improvement in all of her goals including the areas of: writing and reading as well as confrontational naming tasks. Considering the progress made, continued Speech Therapy services are recommended. Patient's response to treatment: Pleasant and hardworking with great participation. Progress towards goals:    Short Term Goals:   1. The pt will complete moderately-complex confrontational naming tasks with 90% accuracy, min-mod cues:  - 73%, min-mod cues  [x] Progressing: [] Met: [] Not Met: [] Adjusted     2. The pt write basic single words with 90% accuracy, mod cues:  - 78%, mod cues  [x] Progressing: [] Met: [] Not Met: [] Adjusted     3.  The pt will accurately read single sentences out loud and demonstrate reading comprehension of this information with 90% accuracy, min-mod cues:  - 80%, min-mod cues and extra time  [x] Progressing: [] Met: [] Not Met: [] Adjusted     4. The pt will complete basic confrontational naming tasks with 90% accuracy, min-mod cues:  - GOAL MET   [] Progressing: [x] Met: [] Not Met: [] Adjusted    5. The pt will say individual single sentences with 90% accuracy, mod cues:  - GOAL MET   WILL D/C IF ABLE TO MAINTAIN ACCURACY  [] Progressing: [x] Met: [] Not Met: [] Adjusted     Long Term Goals:   1. Patient will improve expressive/receptive language to highest functional level to decrease burden of care and improve quality of life. [x] Progressing: [] Met: [] Not Met: [] Adjusted       Current Frequency/Duration:  # Days per week: [x] 1-2 days # Weeks: [] 1 week [] 4 weeks      [] 2 days   [] 2 weeks [] 5 weeks      [] 3 days   [] 3 weeks [x] 12 weeks     Rehab Potential: [] Excellent [x] Good [] Fair  [] Poor     Goal Status:  [] Achieved [x] Partially Achieved  [] Not Achieved     Patient Status: [] Continue per initial plan of Care     [] Patient now discharged     [x] Additional visits requested, Please re-certify for additional visits:      Requested frequency/duration:  1-2 X/week for 12 weeks    Electronically signed by:  Que Rodarte MA, CCC-SLP  [id number]  Speech-Language Pathologist  Phone #: [phone]  Fax #: [phone]    If you have any questions or concerns, please don't hesitate to call.   Thank you for your referral.

## 2023-02-02 NOTE — FLOWSHEET NOTE
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 The pt reported that her speech feels worse when she is rushed or nervous. The pt's  mentioned wanting to get a set of hearing aids to help with the hearing loss. Word finding was more successful when the patient used a carrier phrase to retrieve the word. Overall the pt was attentive throughout the session with great participation with both clinician and . Objective:   Short Term Goals:   1. The pt will complete moderately-complex confrontational naming tasks with 90% accuracy, min-mod cues:  -86% accuracy (), mod cues  [x] Progressing: [] Met: [] Not Met: [] Adjusted    2. The pt will write basic single words with 90% accuracy, mod cues:  - 76% accuracy (10/13), min-mod cues  [x] Progressing: [] Met: [] Not Met: [] Adjusted    3.  The pt will accurately read single sentences out loud and demonstrate reading comprehension of this information with 90% accuracy, min-mod cues:  Goal not targeted during today's session. [x] Progressing: [] Met: [] Not Met: [] Adjusted    4. The pt will say individual single sentences with 90% accuracy, mod cues:  - GOAL MET  [] Progressing: [x] Met: [] Not Met: [] Adjusted    5. The pt will complete basic confrontational naming tasks with 90% accuracy, min-mod cues:  - GOAL MET   [] Progressing: [x] Met: [] Not Met: [] Adjusted      Long Term Goals:   1. Patient will improve expressive/receptive language to highest functional level to decrease burden of care and improve quality of life. [x] Progressing: [] Met: [] Not Met: [] Adjusted       Assessment:   Overall gain in verbal expression and word finding continue to be documented.      Plan:   Continued Speech Therapy 1-2 days per week for 12 weeks    Total Treatment Time / Charges     Time in Time out Total Time / units   Cognitive Tx         Speech Tx 1500 1600 60 min / 1 unit   Dysphagia Tx          Signature:    Poncho Mason, [address]   Clinician     Misha Inman [facility]  Speech-Language Pathologist  Phone #: [phone]  Fax #: [phone]

## 2023-02-08 ENCOUNTER — NURSE ONLY (OUTPATIENT)
Dept: CARDIOLOGY CLINIC | Age: 73
End: 2023-02-08
Payer: MEDICARE

## 2023-02-08 ENCOUNTER — APPOINTMENT (OUTPATIENT)
Dept: GENERAL RADIOLOGY | Age: 73
End: 2023-02-08
Payer: MEDICARE

## 2023-02-08 ENCOUNTER — APPOINTMENT (OUTPATIENT)
Dept: CT IMAGING | Age: 73
End: 2023-02-08
Payer: MEDICARE

## 2023-02-08 ENCOUNTER — HOSPITAL ENCOUNTER (EMERGENCY)
Age: 73
Discharge: HOME | End: 2023-02-09
Payer: MEDICARE

## 2023-02-08 DIAGNOSIS — R42 LIGHTHEADEDNESS: ICD-10-CM

## 2023-02-08 DIAGNOSIS — I63.9 CEREBROVASCULAR ACCIDENT (CVA), UNSPECIFIED MECHANISM (HCC): Primary | ICD-10-CM

## 2023-02-08 DIAGNOSIS — Z45.09 ENCOUNTER FOR LOOP RECORDER CHECK: ICD-10-CM
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
## 2023-02-08 DIAGNOSIS — N30.01 ACUTE CYSTITIS WITH HEMATURIA: ICD-10-CM

## 2023-02-08 LAB
BASOPHILS ABSOLUTE: 0 K/UL (ref 0–0.2)
BASOPHILS RELATIVE PERCENT: 0.4 %
EOSINOPHILS ABSOLUTE: 0.1 K/UL (ref 0–0.6)
EOSINOPHILS RELATIVE PERCENT: 2.4 %
HCT VFR BLD CALC: 33.7 % (ref 36–48)
HEMOGLOBIN: 11.1 G/DL (ref 12–16)
LYMPHOCYTES ABSOLUTE: 1.2 K/UL (ref 1–5.1)
LYMPHOCYTES RELATIVE PERCENT: 19.6 %
MCH RBC QN AUTO: 31.5 PG (ref 26–34)
MCHC RBC AUTO-ENTMCNC: 32.8 G/DL (ref 31–36)
MCV RBC AUTO: 95.9 FL (ref 80–100)
MONOCYTES ABSOLUTE: 0.4 K/UL (ref 0–1.3)
MONOCYTES RELATIVE PERCENT: 7.3 %
NEUTROPHILS ABSOLUTE: 4.3 K/UL (ref 1.7–7.7)
NEUTROPHILS RELATIVE PERCENT: 70.3 %
PDW BLD-RTO: 14.9 % (ref 12.4–15.4)
PLATELET # BLD: 122 K/UL (ref 135–450)
PMV BLD AUTO: 11.8 FL (ref 5–10.5)
RBC # BLD: 3.52 M/UL (ref 4–5.2)
WBC # BLD: 6.1 K/UL (ref 4–11)

## 2023-02-08 PROCEDURE — 73110 X-RAY EXAM OF WRIST: CPT

## 2023-02-08 PROCEDURE — G2066 INTER DEVC REMOTE 30D: HCPCS | Performed by: INTERNAL MEDICINE

## 2023-02-08 PROCEDURE — 99285 EMERGENCY DEPT VISIT HI MDM: CPT

## 2023-02-08 PROCEDURE — 93005 ELECTROCARDIOGRAM TRACING: CPT | Performed by: PHYSICIAN ASSISTANT

## 2023-02-08 PROCEDURE — 84484 ASSAY OF TROPONIN QUANT: CPT

## 2023-02-08 PROCEDURE — 80053 COMPREHEN METABOLIC PANEL: CPT

## 2023-02-08 PROCEDURE — 93298 REM INTERROG DEV EVAL SCRMS: CPT | Performed by: INTERNAL MEDICINE

## 2023-02-08 PROCEDURE — 85025 COMPLETE CBC W/AUTO DIFF WBC: CPT

## 2023-02-08 ASSESSMENT — PAIN - FUNCTIONAL ASSESSMENT
PAIN_FUNCTIONAL_ASSESSMENT: 0-10
PAIN_FUNCTIONAL_ASSESSMENT: ACTIVITIES ARE NOT PREVENTED

## 2023-02-08 ASSESSMENT — PAIN DESCRIPTION - LOCATION: LOCATION: WRIST

## 2023-02-08 ASSESSMENT — PAIN DESCRIPTION - ORIENTATION: ORIENTATION: LEFT

## 2023-02-08 ASSESSMENT — PAIN SCALES - GENERAL: PAINLEVEL_OUTOF10: 8

## 2023-02-08 ASSESSMENT — PAIN DESCRIPTION - DESCRIPTORS: DESCRIPTORS: SHARP

## 2023-02-08 ASSESSMENT — PAIN DESCRIPTION - PAIN TYPE: TYPE: ACUTE PAIN

## 2023-02-08 ASSESSMENT — PAIN DESCRIPTION - FREQUENCY: FREQUENCY: CONTINUOUS

## 2023-02-08 NOTE — PROGRESS NOTES
We received a remote transmission from patient's monitor at home. Remote Linq report shows no arrhythmias. Pt has reached the ANDREE. Message sent to office staff. EP physician to review. We will continue to monitor remotely. Implanted for CVA. End of 31-day monitoring period 2-8-23.

## 2023-02-09 ENCOUNTER — TELEPHONE (OUTPATIENT)
Dept: CARDIOLOGY CLINIC | Age: 73
End: 2023-02-09

## 2023-02-09 ENCOUNTER — APPOINTMENT (OUTPATIENT)
Dept: SPEECH THERAPY | Age: 73
End: 2023-02-09
Payer: MEDICARE

## 2023-02-09 ENCOUNTER — APPOINTMENT (OUTPATIENT)
Dept: CT IMAGING | Age: 73
End: 2023-02-09
Payer: MEDICARE

## 2023-02-09 VITALS
RESPIRATION RATE: 17 BRPM | OXYGEN SATURATION: 99 % | HEART RATE: 75 BPM | SYSTOLIC BLOOD PRESSURE: 128 MMHG | BODY MASS INDEX: 40.62 KG/M2 | DIASTOLIC BLOOD PRESSURE: 64 MMHG | WEIGHT: 208 LBS | TEMPERATURE: 98 F

## 2023-02-09 LAB
A/G RATIO: 1.1 (ref 1.1–2.2)
ALBUMIN SERPL-MCNC: 4 G/DL (ref 3.4–5)
ALP BLD-CCNC: 168 U/L (ref 40–129)
ALT SERPL-CCNC: 22 U/L (ref 10–40)
ANION GAP SERPL CALCULATED.3IONS-SCNC: 12 MMOL/L (ref 3–16)
AST SERPL-CCNC: 43 U/L (ref 15–37)
BILIRUB SERPL-MCNC: 0.3 MG/DL (ref 0–1)
BILIRUBIN URINE: ABNORMAL
BLOOD, URINE: ABNORMAL
BUN BLDV-MCNC: 22 MG/DL (ref 7–20)
CALCIUM SERPL-MCNC: 9.8 MG/DL (ref 8.3–10.6)
CHLORIDE BLD-SCNC: 102 MMOL/L (ref 99–110)
CLARITY: ABNORMAL
CO2: 23 MMOL/L (ref 21–32)
COLOR: YELLOW
COMMENT UA: ABNORMAL
CREAT SERPL-MCNC: 1.5 MG/DL (ref 0.6–1.2)
EKG ATRIAL RATE: 65 BPM
EKG DIAGNOSIS: NORMAL
EKG P AXIS: 50 DEGREES
EKG P-R INTERVAL: 212 MS
EKG Q-T INTERVAL: 426 MS
EKG QRS DURATION: 72 MS
EKG QTC CALCULATION (BAZETT): 443 MS
EKG R AXIS: 6 DEGREES
EKG T AXIS: 44 DEGREES
EKG VENTRICULAR RATE: 65 BPM
GFR SERPL CREATININE-BSD FRML MDRD: 37 ML/MIN/{1.73_M2}
GLUCOSE BLD-MCNC: 230 MG/DL (ref 70–99)
GLUCOSE URINE: NEGATIVE MG/DL
KETONES, URINE: ABNORMAL MG/DL
LEUKOCYTE ESTERASE, URINE: ABNORMAL
MICROSCOPIC EXAMINATION: YES
NITRITE, URINE: NEGATIVE
PH UA: 5.5 (ref 5–8)
POTASSIUM REFLEX MAGNESIUM: 5.4 MMOL/L (ref 3.5–5.1)
PROTEIN UA: 100 MG/DL
RBC UA: >100 /HPF (ref 0–4)
SODIUM BLD-SCNC: 137 MMOL/L (ref 136–145)
SPECIFIC GRAVITY UA: >=1.03 (ref 1–1.03)
TOTAL PROTEIN: 7.8 G/DL (ref 6.4–8.2)
TROPONIN: <0.01 NG/ML
URINE CULTURE, ROUTINE: NORMAL
URINE REFLEX TO CULTURE: YES
URINE TYPE: ABNORMAL
UROBILINOGEN, URINE: 0.2 E.U./DL
WBC UA: >100 /HPF (ref 0–5)

## 2023-02-09 PROCEDURE — 72125 CT NECK SPINE W/O DYE: CPT

## 2023-02-09 PROCEDURE — 70450 CT HEAD/BRAIN W/O DYE: CPT

## 2023-02-09 PROCEDURE — 87086 URINE CULTURE/COLONY COUNT: CPT

## 2023-02-09 PROCEDURE — 93010 ELECTROCARDIOGRAM REPORT: CPT | Performed by: INTERNAL MEDICINE

## 2023-02-09 PROCEDURE — 81001 URINALYSIS AUTO W/SCOPE: CPT

## 2023-02-09 NOTE — DISCHARGE INSTRUCTIONS
Continue taking antibiotic for urinary tract infection.   Follow-up with your primary care physician for further evaluation and treatment

## 2023-02-09 NOTE — TELEPHONE ENCOUNTER
----- Message from Mis Koehler MD sent at 2/9/2023 10:02 AM EST -----  Reviewed  Device at [facility]  Staff to contact patient to see if she wishes to have device removed.

## 2023-02-09 NOTE — ED PROVIDER NOTES
I did not participate in the care of this patient. I did interpret the 12-lead EKG as follows:    Normal sinus rhythm at a rate of 65 bpm with first-degree AV block. QRS QTc normal.  Questionable ST segment changes in inferior leads but this is unchanged when compared to August 2022.      Huy Munoz MD  02/09/23 8446

## 2023-02-09 NOTE — TELEPHONE ENCOUNTER
LVM for patient to return call,     Relay ILR at [facility], will discuss further at [address] with [facility] 2/23/2023

## 2023-02-09 NOTE — ED PROVIDER NOTES
[address] [facility]  ED  EMERGENCY DEPARTMENT ENCOUNTER        Pt Name: Yong Flynn  MRN: [id number]  Lauryngfpaty 1950  Date of evaluation: 2/8/2023  Provider: ZABRINA Loyd  PCP: Dirk Yuan MD  Note Started: 1:07 AM EST 2/9/23      {Shared Not Shared:35307}      CHIEF COMPLAINT       Chief Complaint   Patient presents with    Dizziness     Pt presents with the complaint of having a fall and dizziness. Pt was ambulating to the bathroom, fell and injured her rt hand and wrist. After the fall pt ambulated into another room then had difficulties standing due to being dizzy. Pt states dizziness is only with standing and ambulation. Pt had difficulties ambulating to the cot from the wheelchair in the room. HISTORY OF PRESENT ILLNESS: 1 or more Elements     {History from (Optional):30592}    {Limitations to history (Optional):58541}    Shiva Cross is a 81 y.o. female who presents ***    Nursing Notes were all reviewed and agreed with or any disagreements were addressed in the HPI. REVIEW OF SYSTEMS :      Review of Systems    Positives and Pertinent negatives as per HPI. SURGICAL HISTORY     Past Surgical History:   Procedure Laterality Date    APPENDECTOMY      BREAST BIOPSY      CHOLECYSTECTOMY      COLONOSCOPY  01/19/2017    polyp    CYST INCISION AND DRAINAGE  1986    on head    HYSTERECTOMY (CERVIX STATUS UNKNOWN)      UPPER GASTROINTESTINAL ENDOSCOPY  01/19/2017    barretts ? CURRENTMEDICATIONS       Previous Medications    ALBUTEROL (PROVENTIL HFA;VENTOLIN HFA) 108 (90 BASE) MCG/ACT INHALER    Inhale 2 puffs into the lungs every 6 hours as needed.       ALBUTEROL (PROVENTIL) (2.5 MG/3ML) 0.083% NEBULIZER SOLUTION    Take 3 mLs by nebulization every 4 hours as needed for Wheezing or Shortness of Breath    APIXABAN (ELIQUIS) 5 MG TABS TABLET    Take 1 tablet by mouth 2 times daily    ASPIRIN EC 81 MG EC TABLET    Take 1 tablet by mouth daily    ATORVASTATIN (LIPITOR) 40 MG TABLET    Take 1 tablet by mouth nightly    DEXTROMETHORPHAN-GUAIFENESIN (MUCINEX DM)  MG PER EXTENDED RELEASE TABLET    Take 1 tablet by mouth every 12 hours as needed    DILTIAZEM (CARDIZEM CD) 120 MG EXTENDED RELEASE CAPSULE    Take 1 capsule by mouth daily    FERROUS SULFATE 325 (65 FE) MG TABLET    Take 325 mg by mouth daily (with breakfast)    FOLIC ACID (FOLVITE) 1 MG TABLET    Take 1 mg by mouth daily    METFORMIN ER (GLUCOPHAGE-XR) 500 MG XR TABLET    Take 2,000 mg by mouth Daily with supper     METOPROLOL TARTRATE (LOPRESSOR) 50 MG TABLET    TAKE 1 TABLET BY MOUTH TWO TIMES A DAY    MONTELUKAST (SINGULAIR) 10 MG TABLET    Take 10 mg by mouth nightly    OXYBUTYNIN (DITROPAN-XL) 10 MG EXTENDED RELEASE TABLET    Take 10 mg by mouth at bedtime    RESTASIS 0.05 % OPHTHALMIC EMULSION    INSTILL 1 DROP IN EACH EYE EVERY 12 HOURS    VENLAFAXINE (EFFEXOR XR) 150 MG EXTENDED RELEASE CAPSULE    Take 150 mg by mouth daily       ALLERGIES     Acyclovir, Ace inhibitors, Amoxicillin, Cephalosporins, Oxycodone-acetaminophen, Penicillins, and Percocet [oxycodone-acetaminophen]    FAMILYHISTORY       Family History   Problem Relation Age of Onset    Cancer Mother         breast ca        SOCIAL HISTORY       Social History     Tobacco Use    Smoking status: Never    Smokeless tobacco: Never   Vaping Use    Vaping Use: Never used   Substance Use Topics    Alcohol use: No     Alcohol/week: 0.0 standard drinks    Drug use: No       SCREENINGS        [place] Coma Scale  Eye Opening: Spontaneous  Best Verbal Response: Oriented  Best Motor Response: Obeys commands  [place] Coma Scale Score: 15                CIWA Assessment  BP: (!) 125/47  Heart Rate: 66           PHYSICAL EXAM  1 or more Elements     ED Triage Vitals [02/08/23 2242]   BP Temp Temp Source Heart Rate Resp SpO2 Height Weight   (!) 125/47 98 °F (36.7 °C) Oral 66 14 98 % -- 208 lb (94.3 kg)       Physical Exam  Vitals and nursing note reviewed. Constitutional:       Appearance: Normal appearance. She is not diaphoretic. HENT:      Head: Normocephalic and atraumatic. Nose: Nose normal.      Mouth/Throat:      Mouth: Mucous membranes are moist.   Eyes:      General:         Right eye: No discharge. Left eye: No discharge. Extraocular Movements: Extraocular movements intact. Pupils: Pupils are equal, round, and reactive to light. Cardiovascular:      Rate and Rhythm: Normal rate and regular rhythm. Pulses: Normal pulses. Heart sounds: Normal heart sounds. No murmur heard. No friction rub. No gallop. Pulmonary:      Effort: Pulmonary effort is normal. No respiratory distress. Breath sounds: Normal breath sounds. No stridor. No wheezing, rhonchi or rales. Abdominal:      General: Abdomen is flat. Palpations: Abdomen is soft. Tenderness: There is no abdominal tenderness. There is no guarding or rebound. Musculoskeletal:         General: Normal range of motion. Cervical back: Normal range of motion and neck supple. Skin:     General: Skin is warm and dry. Coloration: Skin is not pale. Neurological:      General: No focal deficit present. Mental Status: She is alert and oriented to person, place, and time. GCS: GCS eye subscore is 4. GCS verbal subscore is 5. GCS motor subscore is 6. Cranial Nerves: Cranial nerves 2-12 are intact. Sensory: Sensation is intact. Motor: Motor function is intact. Gait: Gait is intact.    Psychiatric:         Mood and Affect: Mood normal.         Behavior: Behavior normal.           DIAGNOSTIC RESULTS   LABS:    Labs Reviewed   CBC WITH AUTO DIFFERENTIAL - Abnormal; Notable for the following components:       Result Value    RBC 3.52 (*)     Hemoglobin 11.1 (*)     Hematocrit 33.7 (*)     Platelets 505 (*)     MPV 11.8 (*)     All other components within normal limits   COMPREHENSIVE METABOLIC PANEL W/ REFLEX TO MG FOR LOW K - General: Normal range of motion. Cervical back: Normal range of motion and neck supple. Skin:     General: Skin is warm and dry. Coloration: Skin is not pale. Neurological:      General: No focal deficit present. Mental Status: She is alert and oriented to person, place, and time. GCS: GCS eye subscore is 4. GCS verbal subscore is 5. GCS motor subscore is 6. Cranial Nerves: Cranial nerves 2-12 are intact. Sensory: Sensation is intact. Motor: Motor function is intact. Gait: Gait is intact.    Psychiatric:         Mood and Affect: Mood normal.         Behavior: Behavior normal.           DIAGNOSTIC RESULTS   LABS:    Labs Reviewed   CBC WITH AUTO DIFFERENTIAL - Abnormal; Notable for the following components:       Result Value    RBC 3.52 (*)     Hemoglobin 11.1 (*)     Hematocrit 33.7 (*)     Platelets 782 (*)     MPV 11.8 (*)     All other components within normal limits   COMPREHENSIVE METABOLIC PANEL W/ REFLEX TO MG FOR LOW K - Abnormal; Notable for the following components:    Potassium reflex Magnesium 5.4 (*)     Glucose 230 (*)     BUN 22 (*)     Creatinine 1.5 (*)     Est, Glom Filt Rate 37 (*)     Alkaline Phosphatase 168 (*)     AST 43 (*)     All other components within normal limits   URINALYSIS WITH REFLEX TO CULTURE - Abnormal; Notable for the following components:    Clarity, UA SL CLOUDY (*)     Bilirubin Urine SMALL (*)     Ketones, Urine TRACE (*)     Blood, Urine LARGE (*)     Protein,  (*)     Leukocyte Esterase, Urine MODERATE (*)     All other components within normal limits   MICROSCOPIC URINALYSIS - Abnormal; Notable for the following components:    WBC, UA >100 (*)     RBC, UA >100 (*)     All other components within normal limits   CULTURE, URINE    Narrative:     ORDER#: [id number]                          ORDERED BY: OSMAR MAYNARD  SOURCE: Urine Clean Catch                  COLLECTED:  02/09/23 01:14  ANTIBIOTICS AT ANA.: degenerative disc disease, as detailed above. No results found. PROCEDURES   Unless otherwise noted below, none     Procedures    CRITICAL CARE TIME (.cctime)   ***    PAST MEDICAL HISTORY      has a past medical history of Arthritis, Asthma, Depression, Diabetes mellitus ([facility] [place] [address] GERD (gastroesophageal reflux disease), HCAP (healthcare-associated pneumonia), Hypertension, Mycobacterium gordonae infection, Paroxysmal atrial fibrillation ([facility] [place] [address] Sepsis ([facility] [place] [address] TIA (transient ischemic attack), and Unspecified cerebral artery occlusion with cerebral infarction. Chronic Conditions affecting Care: ***    EMERGENCY DEPARTMENT COURSE and DIFFERENTIAL DIAGNOSIS/MDM:   Vitals:    Vitals:    02/08/23 2242 02/09/23 0048   BP: (!) 125/47    Pulse: 66    Resp: 14 18   Temp: 98 °F (36.7 °C)    TempSrc: Oral    SpO2: 98% 99%   Weight: 208 lb (94.3 kg)        Patient was given the following medications:  Medications - No data to display          Is this patient to be included in the SEP-1 Core Measure due to severe sepsis or septic shock? {Rva5FmdAtSn:88732}    CONSULTS: (Who and What was discussed)  None  {Discussion with Other Profesionals (Optional):57400}    {Social Determinants (Optional):30121::\"None\"}    {Records Reviewed (Optional):63536}    CC/HPI Summary, DDx, ED Course, and Reassessment: ***    Disposition Considerations (include 1 Tests not done, Shared Decision Making, Pt Expectation of Test or Tx.): ***  {Escalation of care, including admission/OBS considered:12737}      I am the Primary Clinician of Record. FINAL IMPRESSION    No diagnosis found. DISPOSITION/PLAN     DISPOSITION        PATIENT REFERRED TO:  No follow-up provider specified.     DISCHARGE MEDICATIONS:  New Prescriptions    No medications on file       DISCONTINUED MEDICATIONS:  Discontinued Medications    No medications on file              (Please note that portions of this note were completed with a voice recognition program. Efforts were made to edit the dictations but occasionally words are mis-transcribed.)    ZABRINA Montgomery (electronically signed) for UTI. The patient was ambulated by nursing staff and had a steady gait. She is stating she feels much better upon her evaluation. After discussing with the patient I do feel she is appropriate for discharge home and advise she continue taking antibiotic for UTI. She will follow-up with her PCP in the next 2 to 3 days for reevaluation. Disposition Considerations (include 1 Tests not done, Shared Decision Making, Pt Expectation of Test or Tx.): Considered admission for the patient however she has overall very reassuring work-up. I do feel she is appropriate for discharge home with close follow-up. Return precautions are given.       Results for orders placed or performed during the hospital encounter of 02/08/23   Culture, Urine    Specimen: Urine, clean catch   Result Value Ref Range    Urine Culture, Routine No growth at 18 to 36 hours    CBC with Auto Differential   Result Value Ref Range    WBC 6.1 4.0 - 11.0 K/uL    RBC 3.52 (L) 4.00 - 5.20 M/uL    Hemoglobin 11.1 (L) 12.0 - 16.0 g/dL    Hematocrit 33.7 (L) 36.0 - 48.0 %    MCV 95.9 80.0 - 100.0 fL    MCH 31.5 26.0 - 34.0 pg    MCHC 32.8 31.0 - 36.0 g/dL    RDW 14.9 12.4 - 15.4 %    Platelets 541 (L) 983 - 450 K/uL    MPV 11.8 (H) 5.0 - 10.5 fL    Neutrophils % 70.3 %    Lymphocytes % 19.6 %    Monocytes % 7.3 %    Eosinophils % 2.4 %    Basophils % 0.4 %    Neutrophils Absolute 4.3 1.7 - 7.7 K/uL    Lymphocytes Absolute 1.2 1.0 - 5.1 K/uL    Monocytes Absolute 0.4 0.0 - 1.3 K/uL    Eosinophils Absolute 0.1 0.0 - 0.6 K/uL    Basophils Absolute 0.0 0.0 - 0.2 K/uL   CMP w/ Reflex to MG   Result Value Ref Range    Sodium 137 136 - 145 mmol/L    Potassium reflex Magnesium 5.4 (H) 3.5 - 5.1 mmol/L    Chloride 102 99 - 110 mmol/L    CO2 23 21 - 32 mmol/L    Anion Gap 12 3 - 16    Glucose 230 (H) 70 - 99 mg/dL    BUN 22 (H) 7 - 20 mg/dL    Creatinine 1.5 (H) 0.6 - 1.2 mg/dL    Est, Glom Filt Rate 37 (A) >60    Calcium 9.8 8.3 - 10.6 mg/dL    Total Protein 7.8 6.4 - 8.2 g/dL    Albumin 4.0 3.4 - 5.0 g/dL    Albumin/Globulin Ratio 1.1 1.1 - 2.2    Total Bilirubin 0.3 0.0 - 1.0 mg/dL    Alkaline Phosphatase 168 (H) 40 - 129 U/L    ALT 22 10 - 40 U/L    AST 43 (H) 15 - 37 U/L   Troponin   Result Value Ref Range    Troponin <0.01 <0.01 ng/mL   Urinalysis with Reflex to Culture    Specimen: Urine   Result Value Ref Range    Color, UA Yellow Straw/Yellow    Clarity, UA SL CLOUDY (A) Clear    Glucose, Ur Negative Negative mg/dL    Bilirubin Urine SMALL (A) Negative    Ketones, Urine TRACE (A) Negative mg/dL    Specific Gravity, UA >=1.030 1.005 - 1.030    Blood, Urine LARGE (A) Negative    pH, UA 5.5 5.0 - 8.0    Protein,  (A) Negative mg/dL    Urobilinogen, Urine 0.2 <2.0 E.U./dL    Nitrite, Urine Negative Negative    Leukocyte Esterase, Urine MODERATE (A) Negative    Microscopic Examination YES     Urine Type NotGiven     Urine Reflex to Culture Yes    Microscopic Urinalysis   Result Value Ref Range    WBC, UA >100 (A) 0 - 5 /HPF    RBC, UA >100 (A) 0 - 4 /HPF    Urinalysis Comments see below    EKG 12 Lead   Result Value Ref Range    Ventricular Rate 65 BPM    Atrial Rate 65 BPM    P-R Interval 212 ms    QRS Duration 72 ms    Q-T Interval 426 ms    QTc Calculation (Bazett) 443 ms    P Axis 50 degrees    R Axis 6 degrees    T Axis 44 degrees    Diagnosis       Sinus rhythm with 1st degree A-V blockLow voltage QRSPossible Inferior infarct (cited on or before 22-AUG-2022)Abnormal ECGWhen compared with ECG of 23-AUG-2022 11:32,No significant change was foundConfirmed by SHERWIN CHOI, [address] (1984) on 2/9/2023 12:40:23 PM         I estimate there is LOW risk for ACUTE CORONARY SYNDROME, INTRACRANIAL HEMORRHAGE, MALIGNANT DYSRHYTHMIA, MENINGITIS, PNEUMONIA, PULMONARY EMBOLISM, SEPSIS, SUBARACHNOID HEMORRHAGE, SUBDURAL HEMATOMA, STROKE, or URINARY TRACT INFECTION, thus I consider the discharge disposition reasonable.  Yanira Giordano and I have discussed the diagnosis and risks, and we agree with discharging home to follow-up with their primary doctor. We also discussed returning to the Emergency Department immediately if new or worsening symptoms occur. We have discussed the symptoms which are most concerning (e.g., changing or worsening pain, weakness, vomiting, fever) that necessitate immediate return. Final Impression    1. Fall, initial encounter    2. Lightheadedness    3. Acute cystitis with hematuria        Blood pressure 128/64, pulse 75, temperature 98 °F (36.7 °C), temperature source Oral, resp. rate 17, weight 208 lb (94.3 kg), SpO2 99 %, not currently breastfeeding. I am the Primary Clinician of Record. FINAL IMPRESSION      1. Fall, initial encounter    2. Lightheadedness    3.  Acute cystitis with hematuria          DISPOSITION/PLAN     DISPOSITION Decision To Discharge 02/09/2023 01:28:50 AM      PATIENT REFERRED TO:  [facility]  ED  [address]  Go to   If symptoms worsen    Andre Lino MD  [address] [place] [address] [address]          DISCHARGE MEDICATIONS:  Discharge Medication List as of 2/9/2023  1:32 AM          DISCONTINUED MEDICATIONS:  Discharge Medication List as of 2/9/2023  1:32 AM                 (Please note that portions of this note were completed with a voice recognition program.  Efforts were made to edit the dictations but occasionally words are mis-transcribed.)    ZABRINA Blancas (electronically signed)           ZABRINA Blancas  02/13/23 0126

## 2023-02-09 NOTE — ED NOTES
Completed orthos and ambulated patient per Yakelin GERBER. Patient uses walker at home and used walker and ambulated about 200 feet to restroom to provide urine sample. Patient walked with normal steady gait. Patient able to provide urine. Urine sent to lab. Patient states \"I feel much better. I feel all loose again. \" Patient denies dizziness. Patient back in bed on monitor, call light in reach, 2/2 side rails up, 4/4 wheels locked, bed in lowest position. Orthos results in chart. Cornelia GERBER aware.      Sandy Zimmer  02/09/23 0139

## 2023-02-10 ENCOUNTER — HOSPITAL ENCOUNTER (OUTPATIENT)
Dept: SPEECH THERAPY | Age: 73
Setting detail: THERAPIES SERIES
Discharge: HOME | End: 2023-02-10
Payer: MEDICARE

## 2023-02-10 NOTE — FLOWSHEET NOTE
[facility] [address] Cynthia Aponte, [address] [facility]  Phone: [phone]  Fax [phone]      Speech Therapy  Cancellation/No-show Note  Patient Name:  Darshan Mayorga  :  1950   Date:  2/10/2023  Cancels to Date: 9  No-shows to Date: 0    Patient status for today's appointment patient:  [x]  Cancelled  []  Rescheduled appointment  []  No-show     Reason given by patient:  []  Patient ill  []  Conflicting appointment:   []  No transportation    []  Conflict with work  []  No reason given  [x]  Other:     Comments: The pt reportedly fell and hurt her wrist, wanting time to recover.       Electronically signed by:  Kavita Perez, SLP

## 2023-02-15 ENCOUNTER — APPOINTMENT (OUTPATIENT)
Dept: CT IMAGING | Age: 73
End: 2023-02-15
Payer: MEDICARE

## 2023-02-15 ENCOUNTER — HOSPITAL ENCOUNTER (INPATIENT)
Age: 73
LOS: 2 days | Discharge: NURSING, REHAB OR LONG-TERM CARE | End: 2023-02-17
Attending: EMERGENCY MEDICINE
Payer: MEDICARE

## 2023-02-15 ENCOUNTER — APPOINTMENT (OUTPATIENT)
Dept: GENERAL RADIOLOGY | Age: 73
End: 2023-02-15
Payer: MEDICARE

## 2023-02-15 DIAGNOSIS — N30.00 ACUTE CYSTITIS WITHOUT HEMATURIA: ICD-10-CM

## 2023-02-15 DIAGNOSIS — G93.40 ACUTE ENCEPHALOPATHY: Primary | ICD-10-CM

## 2023-02-15 LAB
A/G RATIO: 1.1 (ref 1.1–2.2)
ALBUMIN SERPL-MCNC: 4.2 G/DL (ref 3.4–5)
ALP BLD-CCNC: 156 U/L (ref 40–129)
ALT SERPL-CCNC: 20 U/L (ref 10–40)
ANION GAP SERPL CALCULATED.3IONS-SCNC: 11 MMOL/L (ref 3–16)
AST SERPL-CCNC: 42 U/L (ref 15–37)
BACTERIA: ABNORMAL /HPF
BASOPHILS ABSOLUTE: 0 K/UL (ref 0–0.2)
BASOPHILS RELATIVE PERCENT: 0.6 %
BILIRUB SERPL-MCNC: 0.5 MG/DL (ref 0–1)
BILIRUBIN URINE: NEGATIVE
BLOOD, URINE: ABNORMAL
BUN BLDV-MCNC: 20 MG/DL (ref 7–20)
CALCIUM SERPL-MCNC: 9.8 MG/DL (ref 8.3–10.6)
CHLORIDE BLD-SCNC: 102 MMOL/L (ref 99–110)
CLARITY: ABNORMAL
CO2: 23 MMOL/L (ref 21–32)
COLOR: YELLOW
CREAT SERPL-MCNC: 1 MG/DL (ref 0.6–1.2)
EKG ATRIAL RATE: 69 BPM
EKG DIAGNOSIS: NORMAL
EKG P AXIS: 56 DEGREES
EKG P-R INTERVAL: 222 MS
EKG Q-T INTERVAL: 424 MS
EKG QRS DURATION: 78 MS
EKG QTC CALCULATION (BAZETT): 454 MS
EKG R AXIS: 7 DEGREES
EKG T AXIS: 46 DEGREES
EKG VENTRICULAR RATE: 69 BPM
EOSINOPHILS ABSOLUTE: 0.2 K/UL (ref 0–0.6)
EOSINOPHILS RELATIVE PERCENT: 3.5 %
EPITHELIAL CELLS, UA: ABNORMAL /HPF (ref 0–5)
GFR SERPL CREATININE-BSD FRML MDRD: >60 ML/MIN/{1.73_M2}
GLUCOSE BLD-MCNC: 135 MG/DL
GLUCOSE BLD-MCNC: 135 MG/DL (ref 70–99)
GLUCOSE BLD-MCNC: 152 MG/DL (ref 70–99)
GLUCOSE BLD-MCNC: 153 MG/DL (ref 70–99)
GLUCOSE URINE: NEGATIVE MG/DL
HCT VFR BLD CALC: 32.7 % (ref 36–48)
HEMOGLOBIN: 10.8 G/DL (ref 12–16)
HYALINE CASTS: ABNORMAL /LPF (ref 0–2)
INR BLD: 1.42 (ref 0.87–1.14)
KETONES, URINE: NEGATIVE MG/DL
LEUKOCYTE ESTERASE, URINE: ABNORMAL
LYMPHOCYTES ABSOLUTE: 2 K/UL (ref 1–5.1)
LYMPHOCYTES RELATIVE PERCENT: 32.3 %
MCH RBC QN AUTO: 31.4 PG (ref 26–34)
MCHC RBC AUTO-ENTMCNC: 32.9 G/DL (ref 31–36)
MCV RBC AUTO: 95.5 FL (ref 80–100)
MICROSCOPIC EXAMINATION: YES
MONOCYTES ABSOLUTE: 0.5 K/UL (ref 0–1.3)
MONOCYTES RELATIVE PERCENT: 8.6 %
NEUTROPHILS ABSOLUTE: 3.3 K/UL (ref 1.7–7.7)
NEUTROPHILS RELATIVE PERCENT: 55 %
NITRITE, URINE: NEGATIVE
PDW BLD-RTO: 15 % (ref 12.4–15.4)
PERFORMED ON: ABNORMAL
PERFORMED ON: ABNORMAL
PH UA: 5.5 (ref 5–8)
PLATELET # BLD: 101 K/UL (ref 135–450)
PMV BLD AUTO: 10.9 FL (ref 5–10.5)
POTASSIUM REFLEX MAGNESIUM: 4.8 MMOL/L (ref 3.5–5.1)
PROTEIN UA: 100 MG/DL
PROTHROMBIN TIME: 17.3 SEC (ref 11.7–14.5)
RBC # BLD: 3.43 M/UL (ref 4–5.2)
RBC UA: ABNORMAL /HPF (ref 0–4)
SODIUM BLD-SCNC: 136 MMOL/L (ref 136–145)
SPECIFIC GRAVITY UA: 1.02 (ref 1–1.03)
TOTAL PROTEIN: 8.2 G/DL (ref 6.4–8.2)
TROPONIN: <0.01 NG/ML
URINE REFLEX TO CULTURE: YES
URINE TYPE: ABNORMAL
UROBILINOGEN, URINE: 0.2 E.U./DL
WBC # BLD: 6.1 K/UL (ref 4–11)
WBC UA: ABNORMAL /HPF (ref 0–5)

## 2023-02-15 PROCEDURE — 93010 ELECTROCARDIOGRAM REPORT: CPT | Performed by: INTERNAL MEDICINE

## 2023-02-15 PROCEDURE — 6360000002 HC RX W HCPCS: Performed by: PHYSICIAN ASSISTANT

## 2023-02-15 PROCEDURE — 81001 URINALYSIS AUTO W/SCOPE: CPT

## 2023-02-15 PROCEDURE — 84484 ASSAY OF TROPONIN QUANT: CPT

## 2023-02-15 PROCEDURE — 70450 CT HEAD/BRAIN W/O DYE: CPT

## 2023-02-15 PROCEDURE — 6370000000 HC RX 637 (ALT 250 FOR IP): Performed by: INTERNAL MEDICINE

## 2023-02-15 PROCEDURE — 1200000000 HC SEMI PRIVATE

## 2023-02-15 PROCEDURE — 99285 EMERGENCY DEPT VISIT HI MDM: CPT

## 2023-02-15 PROCEDURE — 70498 CT ANGIOGRAPHY NECK: CPT

## 2023-02-15 PROCEDURE — 85610 PROTHROMBIN TIME: CPT

## 2023-02-15 PROCEDURE — 6360000004 HC RX CONTRAST MEDICATION: Performed by: PHYSICIAN ASSISTANT

## 2023-02-15 PROCEDURE — 71045 X-RAY EXAM CHEST 1 VIEW: CPT

## 2023-02-15 PROCEDURE — 80053 COMPREHEN METABOLIC PANEL: CPT

## 2023-02-15 PROCEDURE — 93005 ELECTROCARDIOGRAM TRACING: CPT | Performed by: PHYSICIAN ASSISTANT

## 2023-02-15 PROCEDURE — 85025 COMPLETE CBC W/AUTO DIFF WBC: CPT

## 2023-02-15 PROCEDURE — 83036 HEMOGLOBIN GLYCOSYLATED A1C: CPT

## 2023-02-15 PROCEDURE — 87086 URINE CULTURE/COLONY COUNT: CPT

## 2023-02-15 RX ORDER — DEXTROSE MONOHYDRATE 100 MG/ML
INJECTION, SOLUTION INTRAVENOUS CONTINUOUS PRN
Status: DISCONTINUED | OUTPATIENT
Start: 2023-02-15 | End: 2023-02-17 | Stop reason: HOSPADM

## 2023-02-15 RX ORDER — OXYBUTYNIN CHLORIDE 5 MG/1
10 TABLET, EXTENDED RELEASE ORAL NIGHTLY
Status: DISCONTINUED | OUTPATIENT
Start: 2023-02-15 | End: 2023-02-17 | Stop reason: HOSPADM

## 2023-02-15 RX ORDER — ASPIRIN 81 MG/1
81 TABLET ORAL DAILY
Status: DISCONTINUED | OUTPATIENT
Start: 2023-02-16 | End: 2023-02-17 | Stop reason: HOSPADM

## 2023-02-15 RX ORDER — ONDANSETRON 4 MG/1
4 TABLET, ORALLY DISINTEGRATING ORAL EVERY 8 HOURS PRN
Status: DISCONTINUED | OUTPATIENT
Start: 2023-02-15 | End: 2023-02-17 | Stop reason: HOSPADM

## 2023-02-15 RX ORDER — CIPROFLOXACIN 2 MG/ML
400 INJECTION, SOLUTION INTRAVENOUS ONCE
Status: COMPLETED | OUTPATIENT
Start: 2023-02-15 | End: 2023-02-15

## 2023-02-15 RX ORDER — VENLAFAXINE HYDROCHLORIDE 150 MG/1
150 CAPSULE, EXTENDED RELEASE ORAL DAILY
Status: DISCONTINUED | OUTPATIENT
Start: 2023-02-16 | End: 2023-02-17 | Stop reason: HOSPADM

## 2023-02-15 RX ORDER — METOPROLOL TARTRATE 50 MG/1
50 TABLET, FILM COATED ORAL 2 TIMES DAILY
Status: DISCONTINUED | OUTPATIENT
Start: 2023-02-15 | End: 2023-02-17 | Stop reason: HOSPADM

## 2023-02-15 RX ORDER — INSULIN LISPRO 100 [IU]/ML
0-4 INJECTION, SOLUTION INTRAVENOUS; SUBCUTANEOUS
Status: DISCONTINUED | OUTPATIENT
Start: 2023-02-15 | End: 2023-02-17 | Stop reason: HOSPADM

## 2023-02-15 RX ORDER — INSULIN LISPRO 100 [IU]/ML
0-4 INJECTION, SOLUTION INTRAVENOUS; SUBCUTANEOUS NIGHTLY
Status: DISCONTINUED | OUTPATIENT
Start: 2023-02-15 | End: 2023-02-17 | Stop reason: HOSPADM

## 2023-02-15 RX ORDER — DILTIAZEM HYDROCHLORIDE 120 MG/1
120 CAPSULE, COATED, EXTENDED RELEASE ORAL DAILY
Status: DISCONTINUED | OUTPATIENT
Start: 2023-02-16 | End: 2023-02-17 | Stop reason: HOSPADM

## 2023-02-15 RX ORDER — CIPROFLOXACIN 2 MG/ML
400 INJECTION, SOLUTION INTRAVENOUS EVERY 12 HOURS
Status: DISCONTINUED | OUTPATIENT
Start: 2023-02-16 | End: 2023-02-16

## 2023-02-15 RX ORDER — ONDANSETRON 2 MG/ML
4 INJECTION INTRAMUSCULAR; INTRAVENOUS EVERY 6 HOURS PRN
Status: DISCONTINUED | OUTPATIENT
Start: 2023-02-15 | End: 2023-02-17 | Stop reason: HOSPADM

## 2023-02-15 RX ORDER — MONTELUKAST SODIUM 10 MG/1
10 TABLET ORAL NIGHTLY
Status: DISCONTINUED | OUTPATIENT
Start: 2023-02-15 | End: 2023-02-17 | Stop reason: HOSPADM

## 2023-02-15 RX ORDER — ALBUTEROL SULFATE 2.5 MG/3ML
2.5 SOLUTION RESPIRATORY (INHALATION) EVERY 4 HOURS PRN
Status: DISCONTINUED | OUTPATIENT
Start: 2023-02-15 | End: 2023-02-17 | Stop reason: HOSPADM

## 2023-02-15 RX ORDER — POLYETHYLENE GLYCOL 3350 17 G/17G
17 POWDER, FOR SOLUTION ORAL DAILY PRN
Status: DISCONTINUED | OUTPATIENT
Start: 2023-02-15 | End: 2023-02-17 | Stop reason: HOSPADM

## 2023-02-15 RX ORDER — ATORVASTATIN CALCIUM 40 MG/1
40 TABLET, FILM COATED ORAL NIGHTLY
Status: DISCONTINUED | OUTPATIENT
Start: 2023-02-15 | End: 2023-02-17 | Stop reason: HOSPADM

## 2023-02-15 RX ADMIN — CIPROFLOXACIN 400 MG: 2 INJECTION, SOLUTION INTRAVENOUS at 17:05

## 2023-02-15 RX ADMIN — MONTELUKAST SODIUM 10 MG: 10 TABLET ORAL at 21:24

## 2023-02-15 RX ADMIN — APIXABAN 5 MG: 5 TABLET, FILM COATED ORAL at 21:24

## 2023-02-15 RX ADMIN — ATORVASTATIN CALCIUM 40 MG: 40 TABLET, FILM COATED ORAL at 21:24

## 2023-02-15 RX ADMIN — IOPAMIDOL 75 ML: 755 INJECTION, SOLUTION INTRAVENOUS at 13:56

## 2023-02-15 RX ADMIN — OXYBUTYNIN CHLORIDE 10 MG: 5 TABLET, EXTENDED RELEASE ORAL at 21:24

## 2023-02-15 RX ADMIN — METOPROLOL TARTRATE 50 MG: 50 TABLET, FILM COATED ORAL at 21:24

## 2023-02-15 ASSESSMENT — PAIN - FUNCTIONAL ASSESSMENT: PAIN_FUNCTIONAL_ASSESSMENT: NONE - DENIES PAIN

## 2023-02-15 NOTE — PROGRESS NOTES
Patient admitted to room 203 from ED. Patient oriented to room, call light, bed rails, phone, lights and bathroom. Patient instructed about the schedule of the day including: vital sign frequency, lab draws, possible tests, frequency of MD and staff rounds, including RN/MD rounding together at bedside, daily weights, and I &O's. Patient instructed about prescribed diet, how to use [id number], and television. Bed alarm in place, patient aware of placement and reason. Telemetry box in place, patient aware of placement and reason. Bed locked, in lowest position, side rails up 2/4, call light within reach. Will continue to monitor.

## 2023-02-15 NOTE — ED PROVIDER NOTES
[address] ENCOUNTER        Pt Name: Leola Yeh  MRN: [id number]  [place] 1950  Date of evaluation: 2/15/2023  Provider: Sandeep Gregg PA-C  PCP: Mariposa Arguello MD  Note Started: 2:29 PM EST 2/15/23       I have seen and evaluated this patient with my supervising physician Lashell Aj I personally saw the patient and independently provided 32 minutes of non-concurrent critical care time out of the total critical care time provided. This excludes time spent doing separately billable procedures. This includes time at the bedside, data interpretation, medication management, obtaining critical history from collateral sources if the patient is unable to provide it directly, and physician consultation. Specifics of interventions taken and potentially life-threatening diagnostic considerations are listed above in the medical decision making. CHIEF COMPLAINT       Chief Complaint   Patient presents with    Altered Mental Status     Reports she feels confused,  at bedside reports vision changes and unsteady gait.  reports symptoms seem to start around 1230. HISTORY OF PRESENT ILLNESS: 1 or more Elements     History From: patient and   Limitations to history : Altered Mental Status    Leola Yeh is a 81 y.o. female who presents to the emergency department with a chief complaint of altered mental status. 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  states however she was acting normally this morning and even showered herself and was eating breakfast.  Around 1230 when he was getting ready to take her to [facility] for ophthalmology appointment he noticed that she was acutely altered. She did not know where she was and was acting like she was having more difficulty seeing.   She has been treated for bilateral corneal ulcers for the past multiple months and was having a follow-up appointment for this today. Patient denies pain.  also states that she recently finished antibiotics 2 days ago for a UTI. Patient unable to really give any other history. She states that she is in her 35s and that it is February 2090. Denies any real symptoms. Nursing Notes were all reviewed and agreed with or any disagreements were addressed in the HPI. REVIEW OF SYSTEMS :      Review of Systems    Positives and Pertinent negatives as per HPI. SURGICAL HISTORY     Past Surgical History:   Procedure Laterality Date    APPENDECTOMY      BREAST BIOPSY      CHOLECYSTECTOMY      COLONOSCOPY  01/19/2017    polyp    CYST INCISION AND DRAINAGE  1986    on head    HYSTERECTOMY (CERVIX STATUS UNKNOWN)      UPPER GASTROINTESTINAL ENDOSCOPY  01/19/2017    barretts ?        [address]       Current Discharge Medication List        CONTINUE these medications which have NOT CHANGED    Details   apixaban (ELIQUIS) 5 MG TABS tablet Take 1 tablet by mouth 2 times daily  Qty: 60 tablet, Refills: 1      venlafaxine (EFFEXOR XR) 150 MG extended release capsule Take 150 mg by mouth daily      oxybutynin (DITROPAN-XL) 10 MG extended release tablet Take 10 mg by mouth at bedtime      RESTASIS 0.05 % ophthalmic emulsion INSTILL 1 DROP IN EACH EYE EVERY 12 HOURS      metoprolol tartrate (LOPRESSOR) 50 MG tablet TAKE 1 TABLET BY MOUTH TWO TIMES A DAY  Qty: 180 tablet, Refills: 3    Associated Diagnoses: Paroxysmal atrial fibrillation (HCC)      atorvastatin (LIPITOR) 40 MG tablet Take 1 tablet by mouth nightly  Qty: 30 tablet, Refills: 3      dilTIAZem (CARDIZEM CD) 120 MG extended release capsule Take 1 capsule by mouth daily  Qty: 30 capsule, Refills: 11      aspirin EC 81 MG EC tablet Take 1 tablet by mouth daily  Qty: 90 tablet, Refills: 1      dextromethorphan-guaiFENesin (MUCINEX DM)  MG per extended release tablet Take 1 tablet by mouth every 12 hours as needed      ferrous sulfate 325 (65 Fe) MG tablet Take 325 mg by mouth daily (with breakfast)      folic acid (FOLVITE) 1 MG tablet Take 1 mg by mouth daily      montelukast (SINGULAIR) 10 MG tablet Take 10 mg by mouth nightly      albuterol (PROVENTIL) (2.5 MG/3ML) 0.083% nebulizer solution Take 3 mLs by nebulization every 4 hours as needed for Wheezing or Shortness of Breath  Qty: 120 each, Refills: 3      metFORMIN ER (GLUCOPHAGE-XR) 500 MG XR tablet Take 2,000 mg by mouth Daily with supper       albuterol (PROVENTIL HFA;VENTOLIN HFA) 108 (90 BASE) MCG/ACT inhaler Inhale 2 puffs into the lungs every 6 hours as needed. ALLERGIES     Ace inhibitors, Acyclovir, Cephalosporins, Oxycodone-acetaminophen, Percocet [oxycodone-acetaminophen], Amoxicillin, and Penicillins    FAMILYHISTORY       Family History   Problem Relation Age of Onset    Cancer Mother         breast ca        SOCIAL HISTORY       Social History     Tobacco Use    Smoking status: Never    Smokeless tobacco: Never   Vaping Use    Vaping Use: Never used   Substance Use Topics    Alcohol use: No     Alcohol/week: 0.0 standard drinks    Drug use: No       SCREENINGS   NIH Stroke Scale  Interval: Baseline  Level of Consciousness (1a): Alert  LOC Questions (1b): Answers both correctly  LOC Commands (1c): Performs both tasks correctly  Best Gaze (2): Normal  Visual (3): (!) Bilateral hemianopia  Facial Palsy (4): Normal symmetrical movement  Motor Arm, Left (5a): No drift  Motor Arm, Right (5b): No drift  Motor Leg, Left (6a): No drift  Motor Leg, Right (6b):  No drift  Limb Ataxia (7): Absent  Sensory (8): Normal  Best Language (9): No aphasia  Dysarthria (10): Normal  Extinction and Inattention (11): No abnormality  Total: 3    [place] Coma Scale  Eye Opening: Spontaneous  Best Verbal Response: Oriented  Best Motor Response: Obeys commands  Debby Coma Scale Score: 15                CIWA Assessment  BP: 122/82  Heart Rate: 71           PHYSICAL EXAM  1 or more Elements     ED Triage Vitals [02/15/23 1334] BP Temp Temp Source Heart Rate Resp SpO2 Height Weight   (!) 143/66 97.9 °F (36.6 °C) Oral 74 16 98 % -- 208 lb (94.3 kg)       Physical Exam  Vitals and nursing note reviewed. Constitutional:       Appearance: She is well-developed. She is not diaphoretic. HENT:      Head: Atraumatic. Nose: Nose normal.      Mouth/Throat:      Mouth: Mucous membranes are dry. Eyes:      General:         Right eye: No discharge. Left eye: No discharge. Extraocular Movements: Extraocular movements intact. Cardiovascular:      Rate and Rhythm: Normal rate and regular rhythm. Heart sounds: No murmur heard. No friction rub. No gallop. Pulmonary:      Effort: Pulmonary effort is normal. No respiratory distress. Breath sounds: No stridor. No wheezing, rhonchi or rales. Abdominal:      General: Bowel sounds are normal. There is no distension. Palpations: Abdomen is soft. There is no mass. Tenderness: There is no abdominal tenderness. There is no guarding or rebound. Hernia: No hernia is present. Musculoskeletal:         General: No swelling. Normal range of motion. Cervical back: Normal range of motion. Skin:     General: Skin is warm and dry. Findings: No erythema or rash. Neurological:      Mental Status: She is alert. She is disoriented. Cranial Nerves: No cranial nerve deficit. Comments: LeftPatient does have some confusion following directions. When asking her to lift both of her arms right arm when asked to lift her left arm she states \"where is that at. \"  When trying to have her test for dysarthria and aphasia was trying to have her read phrases on my phone and she is reaching out unable to even see my phone but she is able to see across hanging on the wall across the room.    Psychiatric:         Behavior: Behavior normal.           DIAGNOSTIC RESULTS   LABS:    Labs Reviewed   CBC WITH AUTO DIFFERENTIAL - Abnormal; Notable for the following components:       Result Value    RBC 3.43 (*)     Hemoglobin 10.8 (*)     Hematocrit 32.7 (*)     Platelets 785 (*)     MPV 10.9 (*)     All other components within normal limits   COMPREHENSIVE METABOLIC PANEL W/ REFLEX TO MG FOR LOW K - Abnormal; Notable for the following components:    Glucose 153 (*)     Alkaline Phosphatase 156 (*)     AST 42 (*)     All other components within normal limits   PROTIME-INR - Abnormal; Notable for the following components:    Protime 17.3 (*)     INR 1.42 (*)     All other components within normal limits   URINALYSIS WITH REFLEX TO CULTURE - Abnormal; Notable for the following components:    Clarity, UA CLOUDY (*)     Blood, Urine LARGE (*)     Protein,  (*)     Leukocyte Esterase, Urine MODERATE (*)     All other components within normal limits   MICROSCOPIC URINALYSIS - Abnormal; Notable for the following components:    WBC, UA 21-50 (*)     RBC, UA 21-50 (*)     Bacteria, UA 1+ (*)     All other components within normal limits   POCT GLUCOSE - Abnormal; Notable for the following components:    POC Glucose 135 (*)     All other components within normal limits   POCT GLUCOSE - Normal   CULTURE, URINE   TROPONIN   URINALYSIS WITH MICROSCOPIC   CBC   HEMOGLOBIN A1C   LIPID PANEL   HEMOGLOBIN A1C   POCT GLUCOSE   POCT GLUCOSE   POCT GLUCOSE   POCT GLUCOSE       When ordered only abnormal lab results are displayed. All other labs were within normal range or not returned as of this dictation. EKG: When ordered, EKG's are interpreted by the Emergency Department Physician in the absence of a cardiologist.  Please see their note for interpretation of EKG.     RADIOLOGY:   Non-plain film images such as CT, Ultrasound and MRI are read by the radiologist. Plain radiographic images are visualized and preliminarily interpreted by the ED Provider with the below findings:        Interpretation per the Radiologist below, if available at the time of this note:    XR CHEST PORTABLE Final Result   No acute abnormality. CTA HEAD NECK W CONTRAST   Final Result   No acute abnormality or flow limiting stenosis of the major arteries of the   head and neck. CT HEAD WO CONTRAST   Final Result   Addendum (preliminary) 1 of 1   ADDENDUM:   Results were reported to ZABRINA Hernández by radiology results   communication at 1:56 p.m. on February 15, 2023. Final   No acute intracranial abnormality. Status post right frontal temporal craniotomy. Areas of encephalomalacia in the bilateral frontal, parietal occipital lobes,   right temporal lobe and left cerebellar hemisphere, stable. Small old infarctions in the right cerebellar hemisphere, stable. Mild parenchymal volume loss. Moderate chronic microvascular disease. XR WRIST LEFT (MIN 3 VIEWS)    Result Date: 2/9/2023  EXAMINATION: THREE XRAY VIEWS OF THE LEFT WRIST 2/8/2023 11:43 pm COMPARISON: None. HISTORY: ORDERING SYSTEM PROVIDED HISTORY: fall with wrist pain TECHNOLOGIST PROVIDED HISTORY: Reason for exam:->fall with wrist pain Reason for Exam: dizziness with fall and hurting wrist FINDINGS: Multiple views of the left wrist demonstrate no acute fracture or dislocation. There is mild-to-moderate multifocal osteoarthritis throughout the wrist and carpus. No soft tissue abnormality or radiopaque foreign body is identified. 1. No acute abnormality of the left wrist. 2. Mild-to-moderate multifocal osteoarthritis. CT HEAD WO CONTRAST    Addendum Date: 2/15/2023    ADDENDUM: Results were reported to ZABRINA Hernández by radiology results communication at 1:56 p.m. on February 15, 2023.      Result Date: 2/15/2023  EXAMINATION: CT OF THE HEAD WITHOUT CONTRAST  2/15/2023 1:43 pm TECHNIQUE: CT of the head was performed without the administration of intravenous contrast. Automated exposure control, iterative reconstruction, and/or weight based adjustment of the mA/kV was utilized to reduce the radiation dose to as low as reasonably achievable. COMPARISON: CT head February 9, 2023 HISTORY: ORDERING SYSTEM PROVIDED HISTORY: Stroke Symptoms TECHNOLOGIST PROVIDED HISTORY: Has a \"code stroke\" or \"stroke alert\" been called? ->Yes Reason for exam:->Stroke Symptoms Decision Support Exception - unselect if not a suspected or confirmed emergency medical condition->Emergency Medical Condition (MA) Reason for Exam: htn, hx cva, vison trouble FINDINGS: BRAIN/VENTRICLES: The patient is status post right frontal temporal craniotomy. There is dural thickening in the subjacent right frontal temporal convexity, consistent with postoperative changes, stable. There are areas of encephalomalacia in the bilateral frontal, parietal occipital lobes, right temporal lobe and left cerebellar hemisphere, stable. There are small old infarctions in the right cerebellar hemisphere, stable. There is mild parenchymal volume loss. There is periventricular white matter low attenuation, likely related to moderate chronic microvascular disease. There is no acute intracranial hemorrhage, mass effect or midline shift. No abnormal extra-axial fluid collection. The gray-white differentiation is maintained without evidence of an acute infarct. There is no hydrocephalus. ORBITS: The visualized portion of the orbits demonstrate no acute abnormality. SINUSES: The visualized paranasal sinuses and mastoid air cells demonstrate no acute abnormality. SOFT TISSUES/SKULL:  No acute abnormality of the visualized skull or soft tissues. No acute intracranial abnormality. Status post right frontal temporal craniotomy. Areas of encephalomalacia in the bilateral frontal, parietal occipital lobes, right temporal lobe and left cerebellar hemisphere, stable. Small old infarctions in the right cerebellar hemisphere, stable. Mild parenchymal volume loss. Moderate chronic microvascular disease.      CT HEAD WO CONTRAST    Result Date: 2/9/2023  EXAMINATION: CT OF THE HEAD WITHOUT CONTRAST  2/8/2023 11:32 pm TECHNIQUE: CT of the head was performed without the administration of intravenous contrast. Automated exposure control, iterative reconstruction, and/or weight based adjustment of the mA/kV was utilized to reduce the radiation dose to as low as reasonably achievable. COMPARISON: 08/22/2022 HISTORY: ORDERING SYSTEM PROVIDED HISTORY: fall on thinners TECHNOLOGIST PROVIDED HISTORY: Reason for exam:->fall on thinners Has a \"code stroke\" or \"stroke alert\" been called? ->No Decision Support Exception - unselect if not a suspected or confirmed emergency medical condition->Emergency Medical Condition (MA) Reason for Exam: fall, dizziness Relevant Medical/Surgical History: on blood thinners FINDINGS: BRAIN/VENTRICLES: There is no acute intracranial hemorrhage, mass effect or midline shift. No abnormal extra-axial fluid collection. The gray-white differentiation is maintained without evidence of an acute infarct. There is no evidence of hydrocephalus. There are stable chronic infarcts involving the right frontal lobe, right temporoparietal lobe, left parietal lobe, and bilateral cerebellar hemispheres, with associated encephalomalacia, unchanged. There is stable chronic small vessel ischemic white matter disease. No focus of acute abnormal brain attenuation is identified. ORBITS: The visualized portion of the orbits demonstrate no acute abnormality. SINUSES: The visualized paranasal sinuses and mastoid air cells demonstrate no acute abnormality. SOFT TISSUES/SKULL:  The patient is status post right frontal craniotomy. No acute intracranial abnormality. CT CERVICAL SPINE WO CONTRAST    Result Date: 2/9/2023  EXAMINATION: CT OF THE CERVICAL SPINE WITHOUT CONTRAST 2/8/2023 11:32 pm TECHNIQUE: CT of the cervical spine was performed without the administration of intravenous contrast. Multiplanar reformatted images are provided for review. Automated exposure control, iterative reconstruction, and/or weight based adjustment of the mA/kV was utilized to reduce the radiation dose to as low as reasonably achievable. COMPARISON: Neck CTA 04/16/2022 HISTORY: ORDERING SYSTEM PROVIDED HISTORY: fall TECHNOLOGIST PROVIDED HISTORY: Reason for exam:->fall Decision Support Exception - unselect if not a suspected or confirmed emergency medical condition->Emergency Medical Condition (MA) Reason for Exam: fall, dizziness Relevant Medical/Surgical History: on blood thinners FINDINGS: BONES/ALIGNMENT: There is straightening of the normal cervical lordosis. However, there is no acute fracture or subluxation. There is a 1 mm degenerative retrolisthesis of C5. No additional abnormal listhesis is identified. The vertebral bodies are otherwise normal in height and alignment. The posterior elements are intact and aligned. No destructive osseous lesion is seen. DEGENERATIVE CHANGES: There is mild multilevel spondylosis, most notable at C5 through C7. There is mild disc space loss at C4-C5, C5-C6, and C6-C7. There are mild chronic disc osteophyte protrusions at C4-C5 and C5-C6, with a mild chronic central stenosis at C5-C6. There is mild multilevel bilateral uncovertebral and facet hypertrophy, leading to varying degrees of multilevel bilateral neuroforaminal stenosis. SOFT TISSUES: The cervical soft tissues are unremarkable. The lung apices are grossly clear. 1. Straightening of the normal cervical lordosis, without acute fracture or subluxation of the cervical spine. 2. Minimal degenerative retrolisthesis of C5. 3. Mild multilevel cervical degenerative disc disease, as detailed above. No results found.     PROCEDURES   Unless otherwise noted below, none     Procedures    CRITICAL CARE TIME (.cctime)       PAST MEDICAL HISTORY      has a past medical history of Arthritis, Asthma, Depression, Diabetes mellitus ([facility] [address] GERD (gastroesophageal reflux disease), HCAP (healthcare-associated pneumonia), Hypertension, Mycobacterium gordonae infection, Paroxysmal atrial fibrillation ([facility] [address] Sepsis ([facility] [address] TIA (transient ischemic attack), and Unspecified cerebral artery occlusion with cerebral infarction.      EMERGENCY DEPARTMENT COURSE and DIFFERENTIAL DIAGNOSIS/MDM:   Vitals:    Vitals:    02/15/23 1704 02/15/23 1734 02/15/23 1804 02/15/23 1848   BP: 132/69 137/63 131/64 122/82   Pulse: 70 70 70 71   Resp: 13 11 11 16   Temp:    97.6 °F (36.4 °C)   TempSrc:    Oral   SpO2: 98% 98% 98% 95%   Weight:           Patient was given the following medications:  Medications   albuterol (PROVENTIL) nebulizer solution 2.5 mg (has no administration in time range)   apixaban (ELIQUIS) tablet 5 mg (has no administration in time range)   aspirin EC tablet 81 mg (has no administration in time range)   atorvastatin (LIPITOR) tablet 40 mg (has no administration in time range)   dilTIAZem (CARDIZEM CD) extended release capsule 120 mg (has no administration in time range)   metoprolol tartrate (LOPRESSOR) tablet 50 mg (has no administration in time range)   montelukast (SINGULAIR) tablet 10 mg (has no administration in time range)   oxybutynin (DITROPAN-XL) extended release tablet 10 mg (has no administration in time range)   venlafaxine (EFFEXOR XR) extended release capsule 150 mg (has no administration in time range)   ondansetron (ZOFRAN-ODT) disintegrating tablet 4 mg (has no administration in time range)     Or   ondansetron (ZOFRAN) injection 4 mg (has no administration in time range)   polyethylene glycol (GLYCOLAX) packet 17 g (has no administration in time range)   ciprofloxacin (CIPRO) IVPB 400 mg (has no administration in time range)   insulin lispro (HUMALOG) injection vial 0-4 Units (has no administration in time range)   insulin lispro (HUMALOG) injection vial 0-4 Units (has no administration in time range)   glucose chewable tablet 16 g (has no administration in time range) dextrose bolus 10% 125 mL (has no administration in time range)     Or   dextrose bolus 10% 250 mL (has no administration in time range)   glucagon (rDNA) injection 1 mg (has no administration in time range)   dextrose 10 % infusion (has no administration in time range)   iopamidol (ISOVUE-370) 76 % injection 75 mL (75 mLs IntraVENous Given 2/15/23 1356)   ciprofloxacin (CIPRO) IVPB 400 mg (0 mg IntraVENous Stopped 2/15/23 1820)             Is this patient to be included in the SEP-1 Core Measure due to severe sepsis or septic shock? No   Exclusion criteria - the patient is NOT to be included for SEP-1 Core Measure due to:  2+ SIRS criteria are not met    Chronic Conditions affecting care:    has a past medical history of Arthritis, Asthma, Depression, Diabetes mellitus ([address] GERD (gastroesophageal reflux disease), HCAP (healthcare-associated pneumonia), Hypertension, Mycobacterium gordonae infection, Paroxysmal atrial fibrillation ([address] Sepsis ([address] TIA (transient ischemic attack), and Unspecified cerebral artery occlusion with cerebral infarction. CONSULTS: (Who and What was discussed)  IP CONSULT TO PHARMACY  PHARMACY TO CHANGE BASE FLUIDS  IP CONSULT TO HOSPITALIST        Social Determinants Significantly Affecting Health : None    Records Reviewed (External and Source) reviewing internal medicine notes from her discharge summary in August 2022 patient was admitted for confusion at that time had some acute aphasia even at that time and confusion secondary to acute CVA with ischemic left parietal stroke. Neurology's recommendations at that time was to continue statin, aspirin and Eliquis. She has history of paroxysmal atrial fibrillation. Has some baseline cognitive deficits and some memory loss also. CC/HPI Summary, DDx, ED Course, and Reassessment: Patient presented with an acute episode of confusion.   She is a difficult historian as she does have some history of memory loss and aphasia at baseline.  states however this was acutely different starting at 1230 today. Difficulty even test visual fields and dysarthria and aphasia as patient is unable to even see pictures in front of her but she is able to tell me that there is a crossing in the wall across the room. She is having difficulty falling some directions also so difficult to test finger-to-nose. No obvious focal weakness noted in her extremities or droop. She is still able to talk normal.  Stroke alert was called given the acute nature of this. This was immediately discussed with stroke team and she was sent for CT and CTA of the head and neck. Not a TNKase candidate given anticoagulation on Eliquis. CT imaging is unremarkable. She does have a questionable UTI with some bacteria and white blood cells but recently was seen for possible UTI and just off antibiotics 2 days ago and at that time her urine culture was negative. She is allergic to cephalosporins and penicillins and was started on Cipro. Was admitted to hospital service for acute encephalopathy. Disposition Considerations (tests considered but not done, Admit vs D/C, Shared Decision Making, Pt Expectation of Test or Tx.):        I am the Primary Clinician of Record. FINAL IMPRESSION      1. Acute encephalopathy    2. Acute cystitis without hematuria          DISPOSITION/PLAN     DISPOSITION Admitted 02/15/2023 05:14:48 PM      PATIENT REFERRED TO:  No follow-up provider specified.     DISCHARGE MEDICATIONS:  Current Discharge Medication List          DISCONTINUED MEDICATIONS:  Current Discharge Medication List                 (Please note that portions of this note were completed with a voice recognition program.  Efforts were made to edit the dictations but occasionally words are mis-transcribed.)    Yared Chirinos PA-C (electronically signed)        Yared Chirinos PA-C  02/15/23 2000

## 2023-02-15 NOTE — ED NOTES
[address] ZABRINA Crane called Code Stroke  6086- Called CT- RM 1  1337- Called  Stroke Team  1338-Called Lab   [id number]- Dr Donaldson called back from  stroke team, transferred to [id number]- PT to CT      Nubia Jacome  02/15/23 6344

## 2023-02-15 NOTE — CONSULTS
Pharmacy Note  Pharmacy to change base solutions Consult      Pharmacy has been asked by Shelli Rubio PA-C to review this patient's medication profile to evaluate IV medications and change all base solutions to 0.9% sodium chloride if possible based on compatibility and product availability. This profile review will include an assessment of total IV fluids being administered to the patient. If a current order exists for continuous infusion of non-hypertonic plain IV fluid, the pharmacist will contact the prescriber to recommend the discontinuation of the IV fluid order. The following intermittent IV drips/infusions have been adjusted to saline: N/A    The following medications must remain in D5W due to incompatibility with normal saline:        Amphotericin    Mycophenolate    Nitroprusside                Penicillin G Potassium    Please be aware that the patient may have D5W ordered as part of hypoglycemia orderset. Total IV fluid delivered to patient over last 24h: N/A    Pharmacy will follow daily to ensure all new IVPBs + drips are in NS. Please call with questions.   Thank you,     Rylee Joyner, PharmD 2/15/2023  2:19 PM

## 2023-02-15 NOTE — H&P
Hospital Medicine History & Physical      PCP: Laura Stack MD    Date of Admission: 2/15/2023    Date of Service: Pt seen/examined on 2/15/2023 and Admitted to Inpatient with expected LOS greater than two midnights due to medical therapy. Chief Complaint: Change in mental status x 1 day    History Of Present Illness:      81 y.o. female who presented to [facility] with above complaint. History is from patient as well as from her . She could not think straight and very confused this morning. She denies dizziness chest pain cough sputum shortness of breath nausea vomiting abdominal pain or any focal neurological symptoms like numbness or weakness over the extremities. She has a history of stroke and she has problem with speech that is baseline. She has a history of recurrent UTI and completed antibiotics weeks ago. She denies dysuria chills rigors lower abdominal pain diarrhea or syncope. She had a headache this morning. .  Past Medical History:          Diagnosis Date    Arthritis     Asthma     Depression     Diabetes mellitus (HCC)     GERD (gastroesophageal reflux disease)     HCAP (healthcare-associated pneumonia)     Hypertension     Mycobacterium gordonae infection      5/7/18 + afb called from [address] pending  ( from [address] 3/29/18)    Paroxysmal atrial fibrillation (HCC)     Sepsis ([facility] [address]     TIA (transient ischemic attack)     Unspecified cerebral artery occlusion with cerebral infarction        Past Surgical History:          Procedure Laterality Date    APPENDECTOMY      BREAST BIOPSY      CHOLECYSTECTOMY      COLONOSCOPY  01/19/2017    polyp    CYST INCISION AND DRAINAGE  1986    on head    HYSTERECTOMY (CERVIX STATUS UNKNOWN)      UPPER GASTROINTESTINAL ENDOSCOPY  01/19/2017    barretts ? Medications Prior to Admission:      Prior to Admission medications    Medication Sig Start Date End Date Taking?  Authorizing Provider   apixaban (ELIQUIS) 5 MG TABS tablet Take 1 tablet by mouth 2 times daily 12/30/22   Matt Trotter MD   venlafaxine (EFFEXOR XR) 150 MG extended release capsule Take 150 mg by mouth daily 8/7/22   Historical Provider, MD   oxybutynin (DITROPAN-XL) 10 MG extended release tablet Take 10 mg by mouth at bedtime 8/10/22   Historical Provider, MD   RESTASIS 0.05 % ophthalmic emulsion INSTILL 1 DROP IN [facility] EYE EVERY 12 HOURS  Patient not taking: Reported on 1/24/2023 6/10/22   Historical Provider, MD   metoprolol tartrate (LOPRESSOR) 50 MG tablet TAKE 1 TABLET BY MOUTH TWO TIMES A DAY 4/22/22   Matt Trotter MD   atorvastatin (LIPITOR) 40 MG tablet Take 1 tablet by mouth nightly 4/18/22   JARROD Sagastume CNP   dilTIAZem (CARDIZEM CD) 120 MG extended release capsule Take 1 capsule by mouth daily 3/11/21   JARROD Millard CNP   aspirin EC 81 MG EC tablet Take 1 tablet by mouth daily 4/18/19   JARROD Millard CNP   dextromethorphan-guaiFENesin (MUCINEX DM)  MG per extended release tablet Take 1 tablet by mouth every 12 hours as needed    Historical Provider, MD   ferrous sulfate 325 (65 Fe) MG tablet Take 325 mg by mouth daily (with breakfast)    Historical Provider, MD   folic acid (FOLVITE) 1 MG tablet Take 1 mg by mouth daily    Historical Provider, MD   montelukast (SINGULAIR) 10 MG tablet Take 10 mg by mouth nightly    Historical Provider, MD   albuterol (PROVENTIL) (2.5 MG/3ML) 0.083% nebulizer solution Take 3 mLs by nebulization every 4 hours as needed for Wheezing or Shortness of Breath 3/19/18   Rick Manuel DO   metFORMIN ER (GLUCOPHAGE-XR) 500 MG XR tablet Take 2,000 mg by mouth Daily with supper     Historical Provider, MD   albuterol (PROVENTIL HFA;VENTOLIN HFA) 108 (90 BASE) MCG/ACT inhaler Inhale 2 puffs into the lungs every 6 hours as needed.       Historical Provider, MD       Allergies:  Acyclovir, Ace inhibitors, Amoxicillin, Cephalosporins, Oxycodone-acetaminophen, Penicillins, and Percocet [oxycodone-acetaminophen]    Social History:      The patient currently lives at home    TOBACCO:   reports that she has never smoked. She has never used smokeless tobacco.  ETOH:   reports no history of alcohol use. E-cigarette/Vaping       Questions Responses    E-cigarette/Vaping Use Never User    Start Date     Passive Exposure     Quit Date     Counseling Given     Comments Unknown              Family History:      Reviewed and negative in regards to presenting illness/complaint. Problem Relation Age of Onset    Cancer Mother         breast ca       REVIEW OF SYSTEMS COMPLETED:   Pertinent positives as noted in the HPI. All other systems reviewed and negative. PHYSICAL EXAM PERFORMED:    /69   Pulse 70   Temp 97.9 °F (36.6 °C) (Oral)   Resp 13   Wt 208 lb (94.3 kg)   LMP  (LMP Unknown)   SpO2 98%   BMI 40.62 kg/m²     General appearance:  No apparent distress, appears stated age and cooperative. Ayaan obese  HEENT:  Normal cephalic, atraumatic without obvious deformity. Pupils equal, round, and reactive to light. Extra ocular muscles intact. Conjunctivae/corneas clear. Gums bleed, old blood in the lips  Neck: Supple, with full range of motion. No jugular venous distention. Trachea midline. Respiratory:  Normal respiratory effort. Clear to auscultation, bilaterally without Rales/Wheezes/Rhonchi. Cardiovascular:  Regular rate and rhythm with normal S1/S2 without murmurs, rubs or gallops. Abdomen: Soft, non-tender, non-distended with normal bowel sounds. Morbidly obese  Musculoskeletal:  No clubbing, cyanosis or edema bilaterally. Full range of motion without deformity. Skin: Skin color, texture, turgor normal.  No rashes or lesions. Neurologic:  Neurovascularly intact without any focal sensory/motor deficits.   Speech is mildly slurred  Psychiatric:  Alert and oriented,   Capillary Refill: Brisk,3 seconds, normal  Peripheral Pulses: +2 palpable, equal bilaterally Labs:     Recent Labs     02/15/23  1351   WBC 6.1   HGB 10.8*   HCT 32.7*   *     Recent Labs     02/15/23  1351      K 4.8      CO2 23   BUN 20   CREATININE 1.0   CALCIUM 9.8     Recent Labs     02/15/23  1351   AST 42*   ALT 20   BILITOT 0.5   ALKPHOS 156*     Recent Labs     02/15/23  1351   INR 1.42*     Recent Labs     02/15/23  1351   TROPONINI <0.01       Urinalysis:      Lab Results   Component Value Date/Time    NITRU Negative 02/15/2023 02:30 PM    WBCUA 21-50 02/15/2023 02:30 PM    BACTERIA 1+ 02/15/2023 02:30 PM    RBCUA 21-50 02/15/2023 02:30 PM    BLOODU LARGE 02/15/2023 02:30 PM    SPECGRAV 1.025 02/15/2023 02:30 PM    GLUCOSEU Negative 02/15/2023 02:30 PM       Radiology:     CXR: I have reviewed the CXR with the following interpretation:   EKG:  I have reviewed the EKG with the following interpretation: Normal sinus rhythm 69/min    XR CHEST PORTABLE   Final Result   No acute abnormality. CTA HEAD NECK W CONTRAST   Final Result   No acute abnormality or flow limiting stenosis of the major arteries of the   head and neck. CT HEAD WO CONTRAST   Final Result   Addendum (preliminary) 1 of 1   ADDENDUM:   Results were reported to ZABRINA Ruiz by radiology results   communication at 1:56 p.m. on February 15, 2023. Final   No acute intracranial abnormality. Status post right frontal temporal craniotomy. Areas of encephalomalacia in the bilateral frontal, parietal occipital lobes,   right temporal lobe and left cerebellar hemisphere, stable. Small old infarctions in the right cerebellar hemisphere, stable. Mild parenchymal volume loss. Moderate chronic microvascular disease.                 Consults:    IP CONSULT TO PHARMACY  PHARMACY TO CHANGE BASE FLUIDS  IP CONSULT TO HOSPITALIST    ASSESSMENT:    Active Hospital Problems    Diagnosis Date Noted    Encephalopathy acute [G93.40] 02/15/2023     Priority: Medium PLAN:  Confusion-acute encephalopathy-possibly secondary to UTI-stroke less likely-admit, urine culture, empirical antibiotics Cipro, neuro check continue with aspirin statin, lipid profile in the morning  CT head is negative for any acute findings,  CTA head and neck negative for occlusion of major vessels    Mild thrombocytopenia-monitor    Anemia-no acute profuse bleed, monitor    Chronic diastolic CHF-no evidence of fluid overload    Atrial fibrillation-currently sinus on Eliquis    History of old CVA with dysarthria    Diabetes-monitor blood sugar with low correction scale insulin hemoglobin A1c    Hypertension-blood pressures controlled continue home medications    Gingivitis/?  Gum bleeds time to time-patient told that she has a dental follow-up as an outpatient, given mild thrombocytopenia on aspirin and Eliquis, monitor closely    Obesity-counseled      DVT Prophylaxis: Eliquis  Diet: Diet NPO low-carb diet if passed swallow eval  Code Status: Full code    PT/OT Eval Status: Ordered    [facility]       Gustavo Lai MD    Thank you Mariposa Arguello MD for the opportunity to be involved in this patient's care. If you have any questions or concerns please feel free to contact me at [phone].

## 2023-02-15 NOTE — ED PROVIDER NOTES
I independently performed a history and physical on [address] [place] [address]. I personally saw the patient and performed a substantive portion of the visit including all aspects of the medical decision making. All diagnostic, treatment, and disposition decisions were made by myself in conjunction with the advanced practice provider. I have participated in the medical decision making and directed the treatment plan and disposition of the patient. For further details of Xochilt Campbell emergency department encounter, please see the advanced practice provider's documentation. CHIEF COMPLAINT  Chief Complaint   Patient presents with    Altered Mental Status     Reports she feels confused,  at bedside reports vision changes and unsteady gait.  reports symptoms seem to start around 1230. Briefly, [address] [place] [address] is a 81 y.o. female  who presents to the ED complaining of confusion and vision changes that began at approximately 1230. FOCUSED PHYSICAL EXAMINATION  BP (!) 143/66   Pulse 74   Temp 97.9 °F (36.6 °C) (Oral)   Resp 16   Wt 208 lb (94.3 kg)   LMP  (LMP Unknown)   SpO2 98%   BMI 40.62 kg/m²      Focused physical examination:  General appearance:  Cooperative. No acute distress. Skin:  Warm. Dry. Eye:  Extraocular movements intact. Pupils are equally round and reactive to light and accommodation. CN II-XII intact. Patient seems to have significant difficulties with vision. Does not blink to threat to the eyes. Does not follow or track fingers. Ears, nose, mouth and throat:  Oral mucosa moist,  Neck:  Trachea midline. Heart:  Regular rate and rhythm  Perfusion:  intact  Respiratory:  Lungs clear to auscultation bilaterally. Respirations nonlabored. Abdominal:   Non distended. Nontender  Neurological: Oriented to person and place but not time. Is somewhat confused with further questioning moves all extremities spontaneously. Intact sensation throughout.   Intact finger-to-nose bilaterally. No drift in the upper or lower extremities. Gait not tested. 2+ bilateral patellar reflexes  Musculoskeletal:   Normal ROM, no deformities          Psychiatric:  Normal mood      EKG *Interpreted by me*: Sinus rhythm with first-degree AV block rate of 69 bpm.  Low voltage QRS. Inferior Q waves. No ST elevation. Similar to prior    Differential Diagnosis: Stroke, seizure, metabolic encephalopathy    Patient presents emergency department today with acute change in mental status. Notes vision changes and confusion per . On anticoagulation. Code stroke called but not a candidate for TNK due to underlying anticoagulation. Significant stroke history in the past with some chronic aphasia that is appreciated here. What I am more concerned about is what appears to be possible cortical blindness. Patient does not appear to have any vision the bilateral eyes. She cannot track with her eyes. She cannot read fingers. States she cannot see what I am wearing. Do plan to admit to the hospital for further work-up. Patient does have a history of reported corneal ulcers and receiving antibiotic drops but I see no haziness or cloudiness to the cornea. Her pupils are reactive. There is no hyphema or hypopyon. CRITICAL CARE TIME    I personally saw the patient and independently provided 30 minutes of non-concurrent critical care out of the total shared critical care time provided, excluding separately reportable procedures. There was a high probability of clinically significant/life threatening deterioration in the patient's condition which required my urgent intervention.   This time was spent reviewing the patient chart, interpreting diagnostic/laboratory data, emergent evaluation for possible acute ischemic stroke      History From: Patient         Chronic Conditions: Noted in HPI    CONSULTS: (Who and What was discussed)  IP CONSULT TO [address] FLUIDS        Records Reviewed : Inpatient Notes prior discharge summary from previous admissions for acute strokes    Disposition Considerations (Tests not ordered but considered, Shared Decision Making, Pt Expectation of Test or Tx.):     During the patient's ED course, the patient was given:  Medications   iopamidol (ISOVUE-370) 76 % injection 75 mL (75 mLs IntraVENous Given 2/15/23 8033)        This chart was created using Dragon dictation software. Efforts were made by me to ensure accuracy, however some errors may be present due to limitations of this technology.             Estrellita Cheadle, MD  02/15/23 1874

## 2023-02-15 NOTE — PROGRESS NOTES
4 Eyes Skin Assessment     The patient is being assess for  Admission    I agree that 2 RN's have performed a thorough Head to Toe Skin Assessment on the patient. ALL assessment sites listed below have been assessed. Areas assessed by both nurses: Aubrey Casino &   [x]   Head, Face, and Ears   [x]   Shoulders, Back, and Chest  [x]   Arms, Elbows, and Hands   [x]   Coccyx, Sacrum, and Ischum  [x]   Legs, Feet, and Heels        Does the Patient have Skin Breakdown? No     Bruising on left forearm.          Tony Prevention initiated:  No   Wound Care Orders initiated:  No      C nurse consulted for Pressure Injury (Stage 3,4, Unstageable, DTI, NWPT, and Complex wounds):  No      Nurse 1 eSignature: Electronically signed by Verónica Amaral RN on 2/15/23 at 6:51 PM EST    **SHARE this note so that the co-signing nurse is able to place an eSignature**    Nurse 2 eSignature: {Esignature:[id number]}

## 2023-02-15 NOTE — ED NOTES
This RN called lab about urine test. Lab stated they had the urine sample, but no order. Duplicate order placed.       Yuan [id number], NIVIA  02/15/23 4837

## 2023-02-16 ENCOUNTER — HOSPITAL ENCOUNTER (OUTPATIENT)
Dept: SPEECH THERAPY | Age: 73
Setting detail: THERAPIES SERIES
Discharge: HOME | End: 2023-02-16
Payer: MEDICARE

## 2023-02-16 ENCOUNTER — APPOINTMENT (OUTPATIENT)
Dept: SPEECH THERAPY | Age: 73
End: 2023-02-16
Payer: MEDICARE

## 2023-02-16 LAB
CHOLESTEROL, TOTAL: 114 MG/DL (ref 0–199)
ESTIMATED AVERAGE GLUCOSE: 148.5 MG/DL
GLUCOSE BLD-MCNC: 160 MG/DL (ref 70–99)
GLUCOSE BLD-MCNC: 167 MG/DL (ref 70–99)
GLUCOSE BLD-MCNC: 170 MG/DL (ref 70–99)
GLUCOSE BLD-MCNC: 173 MG/DL (ref 70–99)
HBA1C MFR BLD: 6.8 %
HCT VFR BLD CALC: 29.6 % (ref 36–48)
HDLC SERPL-MCNC: 54 MG/DL (ref 40–60)
HEMOGLOBIN: 9.9 G/DL (ref 12–16)
LDL CHOLESTEROL CALCULATED: 44 MG/DL
MCH RBC QN AUTO: 31.6 PG (ref 26–34)
MCHC RBC AUTO-ENTMCNC: 33.3 G/DL (ref 31–36)
MCV RBC AUTO: 94.9 FL (ref 80–100)
PDW BLD-RTO: 14.9 % (ref 12.4–15.4)
PERFORMED ON: ABNORMAL
PLATELET # BLD: 84 K/UL (ref 135–450)
PLATELET SLIDE REVIEW: ABNORMAL
PMV BLD AUTO: 11.1 FL (ref 5–10.5)
RBC # BLD: 3.12 M/UL (ref 4–5.2)
SLIDE REVIEW: ABNORMAL
TRIGL SERPL-MCNC: 81 MG/DL (ref 0–150)
URINE CULTURE, ROUTINE: NORMAL
VLDLC SERPL CALC-MCNC: 16 MG/DL
WBC # BLD: 4.3 K/UL (ref 4–11)

## 2023-02-16 PROCEDURE — 97110 THERAPEUTIC EXERCISES: CPT

## 2023-02-16 PROCEDURE — 97535 SELF CARE MNGMENT TRAINING: CPT

## 2023-02-16 PROCEDURE — 83036 HEMOGLOBIN GLYCOSYLATED A1C: CPT

## 2023-02-16 PROCEDURE — 1200000000 HC SEMI PRIVATE

## 2023-02-16 PROCEDURE — 97116 GAIT TRAINING THERAPY: CPT

## 2023-02-16 PROCEDURE — 36415 COLL VENOUS BLD VENIPUNCTURE: CPT

## 2023-02-16 PROCEDURE — 6360000002 HC RX W HCPCS: Performed by: INTERNAL MEDICINE

## 2023-02-16 PROCEDURE — 80061 LIPID PANEL: CPT

## 2023-02-16 PROCEDURE — 97163 PT EVAL HIGH COMPLEX 45 MIN: CPT

## 2023-02-16 PROCEDURE — 6370000000 HC RX 637 (ALT 250 FOR IP): Performed by: INTERNAL MEDICINE

## 2023-02-16 PROCEDURE — 85027 COMPLETE CBC AUTOMATED: CPT

## 2023-02-16 PROCEDURE — 97165 OT EVAL LOW COMPLEX 30 MIN: CPT

## 2023-02-16 PROCEDURE — 92526 ORAL FUNCTION THERAPY: CPT

## 2023-02-16 PROCEDURE — 97530 THERAPEUTIC ACTIVITIES: CPT

## 2023-02-16 PROCEDURE — 92610 EVALUATE SWALLOWING FUNCTION: CPT

## 2023-02-16 RX ORDER — OMEPRAZOLE 20 MG/1
20 CAPSULE, DELAYED RELEASE ORAL NIGHTLY
COMMUNITY

## 2023-02-16 RX ORDER — SULFAMETHOXAZOLE AND TRIMETHOPRIM 800; 160 MG/1; MG/1
1 TABLET ORAL EVERY 12 HOURS SCHEDULED
Status: DISCONTINUED | OUTPATIENT
Start: 2023-02-17 | End: 2023-02-17 | Stop reason: HOSPADM

## 2023-02-16 RX ORDER — POLYMYXIN B SULFATE AND TRIMETHOPRIM 1; 10000 MG/ML; [USP'U]/ML
1 SOLUTION OPHTHALMIC EVERY 6 HOURS
COMMUNITY

## 2023-02-16 RX ADMIN — APIXABAN 5 MG: 5 TABLET, FILM COATED ORAL at 11:06

## 2023-02-16 RX ADMIN — VENLAFAXINE HYDROCHLORIDE 150 MG: 150 CAPSULE, EXTENDED RELEASE ORAL at 12:04

## 2023-02-16 RX ADMIN — ATORVASTATIN CALCIUM 40 MG: 40 TABLET, FILM COATED ORAL at 20:11

## 2023-02-16 RX ADMIN — METOPROLOL TARTRATE 50 MG: 50 TABLET, FILM COATED ORAL at 20:10

## 2023-02-16 RX ADMIN — OXYBUTYNIN CHLORIDE 10 MG: 5 TABLET, EXTENDED RELEASE ORAL at 20:10

## 2023-02-16 RX ADMIN — MONTELUKAST SODIUM 10 MG: 10 TABLET ORAL at 20:10

## 2023-02-16 RX ADMIN — METOPROLOL TARTRATE 50 MG: 50 TABLET, FILM COATED ORAL at 11:06

## 2023-02-16 RX ADMIN — CIPROFLOXACIN 400 MG: 2 INJECTION, SOLUTION INTRAVENOUS at 17:23

## 2023-02-16 RX ADMIN — APIXABAN 5 MG: 5 TABLET, FILM COATED ORAL at 20:11

## 2023-02-16 RX ADMIN — CIPROFLOXACIN 400 MG: 2 INJECTION, SOLUTION INTRAVENOUS at 05:57

## 2023-02-16 RX ADMIN — DILTIAZEM HYDROCHLORIDE 120 MG: 120 CAPSULE, EXTENDED RELEASE ORAL at 11:06

## 2023-02-16 RX ADMIN — ASPIRIN 81 MG: 81 TABLET, COATED ORAL at 11:06

## 2023-02-16 ASSESSMENT — PAIN DESCRIPTION - LOCATION: LOCATION: HEAD

## 2023-02-16 ASSESSMENT — PAIN DESCRIPTION - ORIENTATION: ORIENTATION: ANTERIOR

## 2023-02-16 ASSESSMENT — PAIN SCALES - GENERAL: PAINLEVEL_OUTOF10: 3

## 2023-02-16 ASSESSMENT — PAIN DESCRIPTION - FREQUENCY: FREQUENCY: CONTINUOUS

## 2023-02-16 ASSESSMENT — PAIN DESCRIPTION - DESCRIPTORS: DESCRIPTORS: PRESSURE;POUNDING

## 2023-02-16 ASSESSMENT — PAIN - FUNCTIONAL ASSESSMENT: PAIN_FUNCTIONAL_ASSESSMENT: ACTIVITIES ARE NOT PREVENTED

## 2023-02-16 ASSESSMENT — PAIN DESCRIPTION - PAIN TYPE: TYPE: ACUTE PAIN

## 2023-02-16 NOTE — FLOWSHEET NOTE
[facility] [facility], [facility], [address], [address]  Phone: [phone]  Fax [phone]      Speech Therapy  Cancellation/No-show Note  Patient Name:  Fabiola Puente  :  1950   Date:  2023  Cancels to Date: 8  No-shows to Date: 0    Patient status for today's appointment patient:  [x]  Cancelled  []  Rescheduled appointment  []  No-show     Reason given by patient:  []  Patient ill  []  Conflicting appointment:   []  No transportation    []  Conflict with work  []  No reason given  [x]  Other:     Comments:   The pt is in the hospital.      Electronically signed by:  SUBHASH Barrios

## 2023-02-16 NOTE — RT PROTOCOL NOTE
RT Inhaler-Nebulizer Bronchodilator Protocol Note    There is a bronchodilator order in the chart from a provider indicating to follow the RT Bronchodilator Protocol and there is an Initiate RT Inhaler-Nebulizer Bronchodilator Protocol order as well (see protocol at bottom of note). CXR Findings:  XR CHEST PORTABLE    Result Date: 2/15/2023  No acute abnormality. The findings from the last RT Protocol Assessment were as follows:   History Pulmonary Disease: None or smoker <15 pack years  Respiratory Pattern: Regular pattern and RR 12-20 bpm  Breath Sounds: Slightly diminished and/or crackles  Cough: Strong, spontaneous, non-productive  Indication for Bronchodilator Therapy: On home bronchodilators  Bronchodilator Assessment Score: 2    Aerosolized bronchodilator medication orders have been revised according to the RT Inhaler-Nebulizer Bronchodilator Protocol below. Respiratory Therapist to perform RT Therapy Protocol Assessment initially then follow the protocol. Repeat RT Therapy Protocol Assessment PRN for score 0-3 or on second treatment, BID, and PRN for scores above 3. No Indications - adjust the frequency to every 6 hours PRN wheezing or bronchospasm, if no treatments needed after 48 hours then discontinue using Per Protocol order mode. If indication present, adjust the RT bronchodilator orders based on the Bronchodilator Assessment Score as indicated below. Use Inhaler orders unless patient has one or more of the following: on home nebulizer, not able to hold breath for 10 seconds, is not alert and oriented, cannot activate and use MDI correctly, or respiratory rate 25 breaths per minute or more, then use the equivalent nebulizer order(s) with same Frequency and PRN reasons based on the score. If a patient is on this medication at home then do not decrease Frequency below that used at home.     0-3 - enter or revise RT bronchodilator order(s) to equivalent RT Bronchodilator order with Frequency of every 4 hours PRN for wheezing or increased work of breathing using Per Protocol order mode. 4-6 - enter or revise RT Bronchodilator order(s) to two equivalent RT bronchodilator orders with one order with BID Frequency and one order with Frequency of every 4 hours PRN wheezing or increased work of breathing using Per Protocol order mode. 7-10 - enter or revise RT Bronchodilator order(s) to two equivalent RT bronchodilator orders with one order with TID Frequency and one order with Frequency of every 4 hours PRN wheezing or increased work of breathing using Per Protocol order mode. 11-13 - enter or revise RT Bronchodilator order(s) to one equivalent RT bronchodilator order with QID Frequency and an Albuterol order with Frequency of every 4 hours PRN wheezing or increased work of breathing using Per Protocol order mode. Greater than 13 - enter or revise RT Bronchodilator order(s) to one equivalent RT bronchodilator order with every 4 hours Frequency and an Albuterol order with Frequency of every 2 hours PRN wheezing or increased work of breathing using Per Protocol order mode. RT to enter RT Home Evaluation for COPD & MDI Assessment order using Per Protocol order mode.     Electronically signed by Piedad Valdez RCP on 2/15/2023 at 9:49 PM

## 2023-02-16 NOTE — PROGRESS NOTES
Occupational Therapy  Facility/Department: [facility] A2 CARD TELEMETRY  Occupational Therapy Initial Assessment/Treatment    Name: Ken Peterson  : 1950  MRN: [id number]  Date of Service: 2023    Discharge Recommendations:  [address]          Patient Diagnosis(es): The primary encounter diagnosis was Acute encephalopathy. A diagnosis of Acute cystitis without hematuria was also pertinent to this visit. Past Medical History:  has a past medical history of Arthritis, Asthma, Depression, Diabetes mellitus ([address] GERD (gastroesophageal reflux disease), HCAP (healthcare-associated pneumonia), Hypertension, Mycobacterium gordonae infection, Paroxysmal atrial fibrillation ([facility] Sepsis ([facility] TIA (transient ischemic attack), and Unspecified cerebral artery occlusion with cerebral infarction. Past Surgical History:  has a past surgical history that includes Hysterectomy; Cholecystectomy; Appendectomy; Breast cyst incision and drainage (); Colonoscopy (2017); Upper gastrointestinal endoscopy (2017); and Breast biopsy. Assessment   Performance deficits / Impairments: Decreased functional mobility ; Decreased safe awareness;Decreased balance;Decreased ADL status; Decreased cognition;Decreased vision/visual deficit; Decreased endurance;Decreased strength;Decreased high-level IADLs  Patient admitted from home with UTI, lives with spouse,Ax1 with RW for mobility and supervision for ADLs for balance from spouse. Today patient maxA for ADLs, with min to mod of 2 for transfers with RW. After evaluation, pt found to be presenting with the above mentioned occupational performance deficits which are affecting participation in daily living skills. Pt would benefit from continued skilled occupational therapy to address ADLs, functional mobility, and safety while in acute care.   Co-tx collaboration this date to safely meet goals and will have better occupational performance outcomes with in a co-treatment than 1:1 session. Prognosis: Good  Decision Making: Medium Complexity  REQUIRES OT FOLLOW-UP: Yes  Activity Tolerance  Activity Tolerance: Patient Tolerated treatment well        Plan   Occupational Therapy Plan  Times Per Week: 3-5x's a week while in acute care     Restrictions  Restrictions/Precautions  Restrictions/Precautions: Fall Risk, General Precautions, Up as Tolerated  Position Activity Restriction  Other position/activity restrictions: visual deficits, slow procession commands    Subjective   General  Chart Reviewed: Yes, Orders, Progress Notes, History and Physical, Previous Admission  Patient assessed for rehabilitation services?: Yes  Family / Caregiver Present: Yes (spouse)  Referring Practitioner: Breezy Aranda MD 2/15/23  Diagnosis: UTI  Subjective  Subjective: Pt pleasant and agreeable to OT evaluation, patient spouse interested in [address] at home d/t overnight incontience.      Social/Functional History  Social/Functional History  Lives With: Spouse  Type of Home: House  Home Layout: Two level, Performs ADL's on one level, Able to Live on Main level with bedroom/bathroom  Home Access: Stairs to enter with rails  Entrance Stairs - Number of Steps: 4 +1 KALA  Bathroom Shower/Tub: Shower chair with back, Tub/Shower unit  Bathroom Equipment: Grab bars in shower, Hand-held shower  Home Equipment: Walker, rolling, Cane, Grab bars  Has the patient had two or more falls in the past year or any fall with injury in the past year?: No  ADL Assistance: Needs assistance  Dressing: Minimal assistance (more for balance than actual dressing technique)  Grooming: Independent  Feeding: Independent  Toileting: Independent (incontinent at night)  Homemaking Assistance: Independent  Homemaking Responsibilities: Yes  Meal Prep Responsibility: Secondary  Laundry Responsibility: Secondary  Cleaning Responsibility: Secondary  Shopping Responsibility: No  Ambulation Assistance: Needs assistance (with RW-spouse follows)  Transfer Assistance: Independent  Active : No  Occupation: Retired  Type of Occupation: worked at [employer] Rate: 78  Heart Rate Source: Monitor  BP: 110/74  BP Location: Left upper arm  BP Method: Automatic  Patient Position: Up in chair  MAP (Calculated): 86  Resp: 18  SpO2: 96 %  O2 Device: None (Room air)     Denies pain        Safety Devices  Type of Devices: All fall risk precautions in place;Call light within reach; Chair alarm in place; Patient at risk for falls;Gait belt;Left in chair;Nurse notified        AROM: Within functional limits  PROM: Within functional limits  Strength: Generally decreased, functional  Coordination: Generally decreased, functional  Tone: Normal  Sensation: Intact    ADL  Grooming: Maximum assistance  UE Dressing: Maximum assistance  LE Dressing: Maximum assistance  Toileting: Dependent/Total (purewick)     Activity Tolerance  Activity Tolerance: Patient tolerated evaluation without incident;Patient tolerated treatment well     Bed mobility  Supine to Sit: Contact guard assistance (verbal and tactile cues, extra time, HOB elevated, use of bedrail)     Transfers  Sit to Stand: Minimal Assistance of 2 up to RW, cues for safety and hand placement  Stand to Sit: Minimal Assistance  Comment: verbal/tactile/manual cues, extra time needed     LOB noted with bed to chair transfer, related to decreased vision requiring mod of 2 for correction. Vision  Vision: Impaired (R field cut)  Vision Exceptions: Wears glasses at all times  Hearing  Hearing:  (deaf in R ear)  Hearing Exceptions: Hard of hearing/hearing concerns    Basic vision:  Poor up close vision, poor figure ground  Poor testing with field cut, during testing both L and R were affected    Cognition  Overall Cognitive Status: Exceptions  Arousal/Alertness: Appropriate responses to stimuli  Following Commands: Follows one step commands with repetition; Follows one step commands with increased time  Attention Span: Appears intact  Problem Solving: Decreased awareness of errors;Assistance required to identify errors made;Assistance required to generate solutions  Insights: Decreased awareness of deficits  Initiation: Requires cues for some  Sequencing: Requires cues for some  Orientation  Overall Orientation Status: Impaired  Orientation Level: Oriented to person;Oriented to place;Oriented to situation                  Education Given To: Patient; Family  Education Provided: Role of Therapy; ADL Adaptive Strategies; Fall Prevention Strategies; Plan of Care;Transfer Training;Family Education;Equipment;Precautions; Low Vision Education  Education Provided Comments: Disease specific:  Education Method: Demonstration;Verbal  Barriers to Learning: Cognition; Hearing;Vision  Education Outcome: Continued education needed      Given patient's history of falls, patient educated on fall risks and prevention techniques, including: While in the hospital, CALL before you FALL; bed/chair alarms; wearing non-skid socks/shoes; having all needed items within reach, including nurse call light. Patient verbalized understanding.       AM-PAC Score        AM-[facility] Inpatient Daily Activity Raw Score: 9 (02/16/23 1059)  AM-PAC Inpatient ADL T-Scale Score : 25.33 (02/16/23 1059)  ADL Inpatient CMS 0-100% Score: 79.59 (02/16/23 1059)  ADL Inpatient CMS G-Code Modifier : CL (02/16/23 1059)      Goals  Short Term Goals  Time Frame for Short Term Goals: 1 week 2/23  Short Term Goal 1: Pt will complete LE dressing with Carter  Short Term Goal 2: Pt will complete toilet transfers with Carter  Short Term Goal 3: Pt will complete visual scanning activity with 100% accuracy  Short Term Goal 4: Pt will complete 10 reps of BUE AROM exercises to increase strength for ADLs/transfers by 2/20  Patient Goals   Patient goals : \"to go home\"       Therapy Time   Individual Concurrent Group Co-treatment   Time In 0940         Time Out 1 [employer] [place]         Timed Code Treatment Minutes: 23 Minutes       Eduardo Bond, OTR/L  If pt is unable to be seen after this session, please let this note serve as discharge summary. Please see case management note for discharge disposition. Thank you.

## 2023-02-16 NOTE — CARE COORDINATION
Case Management Assessment  Initial Evaluation    Date/Time of Evaluation: 2/16/2023 10:55 AM  Assessment Completed by: Otto Merlos RN    If patient is discharged prior to next notation, then this note serves as note for discharge by case management. Patient Name: Austin Pierson                   YOB: 1950  Diagnosis: Encephalopathy acute [G93.40]  Acute cystitis without hematuria [N30.00]  Acute encephalopathy [G93.40]                   Date / Time: 2/15/2023  1:25 PM    Patient Admission Status: Inpatient   Readmission Risk (Low < 19, Mod (19-27), High > 27): Readmission Risk Score: 16.1    Current PCP: Chris Aviles MD  PCP verified by CM? Yes    Chart Reviewed: Yes      History Provided by: Patient, Spouse  Patient Orientation: Alert and Oriented, Person, Place, Situation, Self    Patient Cognition: Alert    Hospitalization in the last 30 days (Readmission):  No    If yes, Readmission Assessment in CM Navigator will be completed. Advance Directives:      Code Status: Full Code   Patient's Primary Decision Maker is: Legal Next of Kin    Primary Decision Maker: [address] [place] [address] [phone]    Discharge Planning:    Patient lives with: Spouse/Significant Other, Family Members Type of Home: House  Primary Care Giver: Self  Patient Support Systems include: Spouse/Significant Other, Family Members   Current Financial resources: Medicare  Current community resources: None  Current services prior to admission: Durable Medical Equipment (walker, cane, grab bars)            Current DME: Trula Dry            Type of Home Care services:  PT, Nursing Services    ADLS  Prior functional level: Assistance with the following:, Bathing, Dressing ( assists as needed)  Current functional level:      PT AM-PAC: 14 /24  OT AM-PAC:   /24    Family can provide assistance at DC:  Yes  Would you like Case Management to discuss the discharge plan with any other family members/significant others, and if so, who? Yes  Plans to Return to Present Housing: Unknown at present  Potential Assistance needed at discharge: [facility]            Potential DME:    Patient expects to discharge to: [address] for transportation at discharge:      Financial    Payor: Ty Flowers / Plan: Daysi Ortega / Product Type: *No Product type* /     Does insurance require precert for SNF: Yes    Potential assistance Purchasing Medications: No  Meds-to-Beds request: Yes      [id number] [place] Kirsten Partida [address]  [address] [facility] [address] [facility] [address]  Phone: [phone] Fax: [address] [facility] [address] [place], [place] [facility] [address] [facility]  [address] [facility] [address]  203 [facility] [address]  Phone: [phone] Fax: [phone]    [address]  [address] [facility] [address]  Phone: [phone] Fax: [phone]      Notes:    Factors facilitating achievement of predicted outcomes: Family support, Motivated, Cooperative, and Pleasant      Additional Case Management Notes: Patient is from home with  and grandson. Discussed rec's of SNF and they are both in agreement with this plan and would like a referral to be made to [facility] as patient has been there before with good results. Placed call to [address] [phone] with EGS and notifeid of referral.  She states they are able to accept at discharge. Patient will need a precert and will initiate when closer to discharge as insurance has quick turnaround time. IF APPLICABLE: The Patient and/or patient representative Shiva and her family were provided with a choice of provider and agrees with the discharge plan.  Freedom of choice list with basic dialogue that supports the patient's individualized plan of care/goals and shares the quality data associated with the providers was provided to:     Patient Representative Name:       The Patient and/or Patient Representative Agree with the Discharge Plan? yes     Lauryn Johns RN  Case Management Department  Ph: [phone]

## 2023-02-16 NOTE — PLAN OF CARE
Problem: Safety - Adult  Goal: Free from fall injury  Outcome: Progressing  Flowsheets (Taken 2/16/2023 0105)  Free From Fall Injury: Instruct family/caregiver on patient safety  Note: High risk fall precautions in place.

## 2023-02-16 NOTE — PROGRESS NOTES
Speech Language Pathology    Speech Language Pathology  Clinical Bedside Swallow Assessment  Facility/Department: [facility] A2 CARD TELEMETRY        Recommendations:  Diet recommendation: IDDSI 7 Regular Solids; IDDSI 0 Thin Liquids; Meds whole with thin liquids  Instrumentation: not indicated at this time  Risk management: upright for all intake, stay upright for at least 30 mins after intake, small bites/sips, and general aspiration precautions      NAME:Shiva Frank Distance  : 1950 (81 y.o.)   MRN: [id number]  ROOM: [address] [place] [address]  ADMISSION DATE: 2/15/2023  PATIENT DIAGNOSIS(ES): Encephalopathy acute [G93.40]  Acute cystitis without hematuria [N30.00]  Acute encephalopathy [G93.40]  Chief Complaint   Patient presents with    Altered Mental Status     Reports she feels confused,  at bedside reports vision changes and unsteady gait.  reports symptoms seem to start around 1230.       Patient Active Problem List    Diagnosis Date Noted    Encephalopathy acute 02/15/2023    Acute CVA (cerebrovascular accident) ([address] 2022    Memory loss due to medical condition 2022    Cognitive deficits 2022    Stroke-like symptoms     Acute focal neurological deficit 2022    Cerebrovascular accident (CVA) ([address] 2020    PAF (paroxysmal atrial fibrillation) ([address] 2020    HTN (hypertension), benign 2020    Encounter for loop recorder check 2019    First degree AV block 2019    TIA (transient ischemic attack) 2019    COPD exacerbation ([address] 10/22/2018    Nontraumatic cortical hemorrhage of left cerebral hemisphere ([address] 2018    Dyslipidemia 2018    CAD in native artery 2018    Mild malnutrition ([address] 2018    PVC (premature ventricular contraction) 2018    DM type 2, controlled, with complication ([address]     Hemorrhagic stroke ([address]     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 2018    Acute nonintractable headache Meningoencephalitis     Mastoiditis of right side     Hyperkalemia     Splenomegaly     JARRETT on CPAP 03/27/2018    Paroxysmal atrial fibrillation (HCC)     Elevated alkaline phosphatase level 05/11/2017    Chronic normocytic anemia 05/11/2017    GERD (gastroesophageal reflux disease) 05/11/2017    Chronic obstructive pulmonary disease ([facility] 01/29/2017    Depression 01/29/2017    Chronic thrombocytopenia 01/25/2016    Essential hypertension 12/17/2015    Hx of L-ICA CVA     Morbid obesity with BMI of 40.0-44.9, adult ([facility] 05/19/2014     Past Medical History:   Diagnosis Date    Arthritis     Asthma     Depression     Diabetes mellitus (HCC)     GERD (gastroesophageal reflux disease)     HCAP (healthcare-associated pneumonia)     Hypertension     Mycobacterium gordonae infection      5/7/18 + afb called from [address] pending  ( from [address] 3/29/18)    Paroxysmal atrial fibrillation (HCC)     Sepsis ([facility]     TIA (transient ischemic attack)     Unspecified cerebral artery occlusion with cerebral infarction      Past Surgical History:   Procedure Laterality Date    APPENDECTOMY      BREAST BIOPSY      CHOLECYSTECTOMY      COLONOSCOPY  01/19/2017    polyp    CYST INCISION AND DRAINAGE  1986    on head    HYSTERECTOMY (CERVIX STATUS UNKNOWN)      UPPER GASTROINTESTINAL ENDOSCOPY  01/19/2017    barretts ? Allergies   Allergen Reactions    Ace Inhibitors      Possible anaphylaxis    Acyclovir Anaphylaxis     Redness face & back, swollen tounge, eyes and throat     Cephalosporins      anaphylaxis  Lip swelling    Oxycodone-Acetaminophen      Respiratory distress    Percocet [Oxycodone-Acetaminophen] Anaphylaxis    Amoxicillin      hives    Penicillins Hives       DATE ONSET: admit date 02/15/23    Date of Evaluation: 2/16/2023   Evaluating Therapist: SUBHASH Pryor    Chart Reviewed: : [x] Yes [] No    Current Diet: ADULT DIET;  Regular; 5 carb choices (75 gm/meal)    Recent Chest Radiography: [x] Chest XR   [] CT of Chest  Date: 02/15/23  Impressions: \"No acute abnormality. \"    Pain: denies    Reason for Referral  Orvel Precise was referred for a bedside swallow evaluation to assess the efficiency of their swallow function, identify signs and symptoms of aspiration and make recommendations regarding safe dietary consistencies, effective compensatory strategies, and safe eating environment. Assessment    Medical record review/interview: Per MD H&P: \"Shiva Lehman is a 81 y.o. female who presents to the emergency department with a chief complaint of altered mental status. 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  states however she was acting normally this morning and even showered herself and was eating breakfast.  Around 1230 when he was getting ready to take her to [facility] for ophthalmology appointment he noticed that she was acutely altered. She did not know where she was and was acting like she was having more difficulty seeing. She has been treated for bilateral corneal ulcers for the past multiple months and was having a follow-up appointment for this today. Patient denies pain.  also states that she recently finished antibiotics 2 days ago for a UTI. Patient unable to really give any other history. She states that she is in her 35s and that it is February 2090. Denies any real symptoms. \"     Predisposing dysphagia risk factors: Hx of CVA and GERD  Clinical signs of possible chronic dysphagia: N/A  Precipitating dysphagia risk factors: acute neurological event rule-out    Patient Complaints:  Odynophagia: [] Yes [x] No  Globus Sensation: [] Yes [x] No  SOB with PO intake: [] Yes [x] No  Increased WOB with PO intake: [] Yes [x] No  Reflux Sx's: [] Yes [x] No  Weight loss: [] Yes [x] No  Coughing/Choking with PO intake: [] Yes [x] No  Reduced Appetite: [] Yes [x] No    Additional Reported Symptoms/Complaints/Hospital Course: Pt reports that she has aphasia from previous stroke; she is currently in outpatient speech therapy. Pt reports that her speech is \"the same as it has been. \"    Pt's goals: \"to get back home\"    Vitals/labs:   Temp: n/a  SpO2: 99  RR: 16/min  BP: 123/60  HR: 79  O2 device: none, RA    CBC:   Recent Labs     02/16/23  0628   WBC 4.3   HGB 9.9*   PLT 84*      BMP:  Recent Labs     02/15/23  1351 02/15/23  1353     --    K 4.8  --      --    CO2 23  --    BUN 20  --    CREATININE 1.0  --    GLUCOSE 153* 135          Cranial nerve exam:   CN V (trigeminal): ophthalmic, maxillary, and mandibular facial sensation- WFL  CN VII (facial): WFL  CN IX/X (glossopharyngeal/vagus): MPT: Reduced; pitch range: Reduced; vocal quality: WFL; cough: Strong-perceptually  CN XII (hypoglossal): WFL    Laryngeal function exam:   Secretions: WFL  Vocal quality: See CN exam above  MPT: See CN exam above  S/Z ratio: DNT  Pitch range: See CN exam above  Cough: See CN exam above    Oral Care Status:    [x] Oral Care [facility]  [] Poor oral care status  [] Edentulous  [] Upper Dentures  [] Lower Dentures  [x] Missing/Broken Teeth  [] Evidence of dental cavities/carries    PO trials:   IDDSI 0 (thin): no anterior bolus loss , suspect functional A-P bolus transit, swallow timing subjectively appears timely, oral clearance grossly WFL, no clinical s/s of aspiration and vitals stable    IDDSI 4 (puree): no anterior bolus loss , suspect functional A-P bolus transit, swallow timing subjectively appears timely, oral clearance grossly WFL, no clinical s/s of aspiration and vitals stable    IDDSI 7 (regular): no anterior bolus loss , suspect functional A-P bolus transit, swallow timing subjectively appears timely, oral clearance grossly WFL, no clinical s/s of aspiration and vitals stable    3 oz water: DNT    Impressions:  Oral phase of swallow grossly appears WNL. No oral phase deficits noted during evaluation. Pharyngeal phase of swallow appears grossly WNL.  No pharyngeal deficits noted during evaluation. Laryngeal elevation appears WFL based upon palpation of anterior neck during swallow. Vocal quality dry before and after po trials. O2 sat 96% before and after po trials. RR 16/min prior to and following po trials. Recommendations:  Diet recommendation: IDDSI 7 Regular Solids; IDDSI 0 Thin Liquids;  Meds whole with thin liquids  Instrumentation: not indicated at this time  Risk management: upright for all intake, stay upright for at least 30 mins after intake, small bites/sips, and general aspiration precautions    Prognosis: Fair - Good    Recommended Intervention:   [] Dysphagia tx  [] Videostroboscopy                      [] NPO   [] MBS       [] Speech/Cog Eval    [] Therapeutic PO Trials     [] Ice Chips   [] Other:  [] FEES                                                 Dysphagia Therapeutic Intervention:   []  Bolus control Exercises  []  Oral Motor Exercises  []  [employer] Protocol  []  Thermal Stimulation  []  Oral Care    []  Vital Stim/NMES  []  Laryngeal Exercises  []  Patient/Family Education  []  Pharyngeal Exercises  []  Therapeutic PO trials with SLP  [x]  Diet tolerance monitoring  []  Other:     Referrals:  [] ENT    [] PT  [] Pulmonology [] GI  [] Neurology  [] RD  [] OT   []     Goals:  Short Term Goals:  Timeframe for Short Term Goals: (1 week, 2x/wk until 02/23/23)  Goal 1: The patient will tolerate recommended diet with no clinical s/s of aspiration 5/5  Goal 2: The patient will recall/perform recommended compensatory strategies given min cues    Long Term Goals:   Timeframe for Long Term Goals: (1-2 weeks)  Goal 1: The patient will tolerate least restrictive diet with no clinical s/s of aspiration or worsening respiratory/pulmonary status    Treatment:  Skilled instruction completed with patient re: evidenced based practice regarding recommendations and POC, importance of oral care to reduce adverse affects in the event of aspiration, and instruction of recommended compensatory strategies developed based upon clinical exam. Pt able to recall/demonstrate compensatory strategies with min cues. Pt Education: SLP educated the patient re: Role of SLP, rationale for completion of assessment, anatomical components of swallow structures as they pertain to airway protection, results of assessment, and recommendations  Pt Education Response: verbalized understanding, demonstrated understanding, and would benefit from ongoing education    Duration/Frequency of Tx: 1 week, 2x/wk until 02/23/23    Individuals Consulted:   [x]  Patient     []  NP         [x]  RN   []  RD                   []  MD      []  Family Member                        []  PA    []  Other:      Safety Devices / Report:  [x]  All fall risk precautions in place []  Safety handoff completed with RN  [x]  Bed alarm in place  []  Left in bed     []  Chair alarm in place  []  Left in chair   [x]  Call light in reach   []  Other: Total Treatment Time / Charges       Time in Time out Total Time / units   Swallow Eval/Tx Time  1329 1353  24 min / 2 units     Signature:    Leigh Jaramillo, [id number]  Speech-Language Pathologist

## 2023-02-16 NOTE — PROGRESS NOTES
Physical Therapy  Facility/Department: [facility] A2 CARD TELEMETRY  Physical Therapy Initial Assessment/ Treatment    Name: Marcus Castano  : 1950  MRN: [id number]  Date of Service: 2023    Discharge Recommendations:  Subacute/Skilled Nursing Facility   PT Equipment Recommendations  Equipment Needed: No (defer to next level of care)      Patient Diagnosis(es): The primary encounter diagnosis was Acute encephalopathy. A diagnosis of Acute cystitis without hematuria was also pertinent to this visit. Past Medical History:  has a past medical history of Arthritis, Asthma, Depression, Diabetes mellitus ([address] GERD (gastroesophageal reflux disease), HCAP (healthcare-associated pneumonia), Hypertension, Mycobacterium gordonae infection, Paroxysmal atrial fibrillation ([facility] [address] Sepsis ([facility] [address] TIA (transient ischemic attack), and Unspecified cerebral artery occlusion with cerebral infarction. Past Surgical History:  has a past surgical history that includes Hysterectomy; Cholecystectomy; Appendectomy; Breast cyst incision and drainage (); Colonoscopy (2017); Upper gastrointestinal endoscopy (2017); and Breast biopsy. If pt discharges prior to next PT session this note will serve as discharge summary. Assessment   Body Structures, Functions, Activity Limitations Requiring Skilled Therapeutic Intervention: Decreased functional mobility ; Decreased strength;Decreased safe awareness;Decreased endurance;Decreased balance;Decreased vision/visual deficit; Decreased ADL status  Assessment: 80 yo female adm with encephalopathy and UTI. Hx CVA wtih speech changes and visual cut deficits. Per chart visual cut on R, but pt also had difficulty seeing to her L today. PLOF: Lives with spouse on main level of 2 story home, spouse assists with ADLs and follows pt when she ambulates with RW. Today pt needed max cues for all exercises and mobility.  Vision seemed to be a strong limiting factor with transfers and short distance ambulation. Pt required CG to min assist for bed mob and sit><stand, mod assist to ambulate with RW 7 ft to chair ( assist to guide walker, at gait belt for balance, and guidance due to decreased vision). AMPAC score of 14 indicates pt would benefit from skilled PT to address current defiicts. Recommend SNF at discharge to regain baseline function  Treatment Diagnosis: decreased vision, mobility, gait, endurance  Therapy Prognosis: Good  Decision Making: High Complexity  Barriers to Learning: decreased vision, decreased processing of instructions  Requires PT Follow-Up: Yes  Activity Tolerance  Activity Tolerance: Patient tolerated evaluation without incident;Patient tolerated treatment well     Plan   Physcial Therapy Plan  General Plan: 3-5 times per week  Current Treatment Recommendations: Strengthening, Balance training, Endurance training, Functional mobility training, Transfer training, Gait training, Safety education & training, Therapeutic activities  Safety Devices  Type of Devices:  All fall risk precautions in place, Call light within reach, Chair alarm in place, Patient at risk for falls, Gait belt, Left in chair, Nurse notified     Restrictions  Restrictions/Precautions  Restrictions/Precautions: Fall Risk, General Precautions, Up as Tolerated  Position Activity Restriction  Other position/activity restrictions: visual deficits, slow procession commands     Subjective   Chart Reviewed: Yes  Patient assessed for rehabilitation services?: Yes  Family / Caregiver Present: Yes (spouse)  Referring Practitioner: Pedro Philippe  Referral Date : 02/15/23  Diagnosis: acute encephalopathy, UTI  Follows Commands: Impaired (Needs verbal/tactile/manual cues, slow to process instructions, needs repitition of commands)  General Comment  Comments: RN cleared pt for therapy, pt resting in bed on approach  Subjective  Subjective: Pt agrees to therapy    PAIN: Pt denies throughout session         Social/Functional History  Social/Functional History  Lives With: Spouse  Type of Home: House  Home Layout: Two level, Performs ADL's on one level, Able to Live on Main level with bedroom/bathroom  Home Access: Stairs to enter with rails  Entrance Stairs - Number of Steps: 4 +1 KALA  Bathroom Shower/Tub: Tub/Shower unit, Shower chair with back  [employer]: Grab bars in shower, Hand-held shower  Home Equipment: Walker, rolling  Has the patient had two or more falls in the past year or any fall with injury in the past year?: No  ADL Assistance: Needs assistance  Dressing: Minimal assistance (more for balance than actual dressing technique)  Grooming: Independent  Feeding: Independent  Toileting: Independent (incontinent at night)  Homemaking Assistance: Independent  Homemaking Responsibilities: Yes  Meal Prep Responsibility: Secondary  Laundry Responsibility: Secondary  Cleaning Responsibility: Secondary  Shopping Responsibility: No  Ambulation Assistance: Needs assistance (with RW-spouse follows)  Transfer Assistance: Independent  Active : No  Occupation: Retired  Type of Occupation: worked at [employer]: Impaired (R visual cut from prior CVA)    Cognition   Orientation  Orientation Level: Oriented to person;Oriented to place;Oriented to situation  Cognition  Overall Cognitive Status: Exceptions  Arousal/Alertness: Appropriate responses to stimuli  Following Commands: Follows one step commands with repetition; Follows one step commands with increased time  Attention Span: Appears intact  Problem Solving: Decreased awareness of errors;Assistance required to identify errors made;Assistance required to generate solutions  Insights: Decreased awareness of deficits  Initiation: Requires cues for some  Sequencing: Requires cues for some     Objective   Heart Rate: 73  Heart Rate Source: Monitor  BP: (!) 123/58  BP Location: Right Arm  BP Method: Automatic  Patient Position: Semi fowlers  MAP (Calculated): 80  Resp: 18  SpO2: 98 %  O2 Device: None (Room air)        Gross Assessment  AROM: Within functional limits  Strength: Generally decreased, functional            Bed mobility  Supine to Sit: Contact guard assistance (verbal and tactile cues, extra time, HOB elevated, use of bedrail)    Transfers  Sit to Stand: Minimal Assistance  Stand to Sit: Minimal Assistance  Comment: verbal/tactile/manual cues, extra time needed    Ambulation  Surface: Level tile  Device: Rolling Walker  Assistance: Moderate assistance  Quality of Gait: Pt needs guidance due to visual changes, max tactile and manual cues. Assist to navigate walker, assist for balance and direction at gait belt. Pt moves slowly, very short stride and minimal foot clearance. Vision seemed to be greatest barrier during ambulation  Distance: 7 ft     Balance  Sitting - Static: Good  Standing - Static: Fair;+  Standing - Dynamic: Fair;+    Exercise Treatment: Ankle pumps 10 x B  Heel slides 10 x B   Hip abd 10 x B  Hip IR/ER  10 x B        AM-PAC Score  AM-PAC Inpatient Mobility Raw Score : 14 (02/16/23 1014)  AM-PAC Inpatient T-Scale Score : 38.1 (02/16/23 1014)  Mobility Inpatient CMS 0-100% Score: 61.29 (02/16/23 1014)  Mobility Inpatient CMS G-Code Modifier : CL (02/16/23 1014)      Goals  Short Term Goals  Time Frame for Short Term Goals: 1 week (2/23) unless otherwise specified  Short Term Goal 1: pt to perform supin><sit supervised  Short Term Goal 2: pt to perform sit><stand transfer CG with safe technique  Short Term Goal 3: pt to ambulate with RW and CG 50 ft  Short Term Goal 4: pt to participate in therapeutic ex 10-12 reps by 2/20  Patient Goals   Patient Goals : \"to be able to walk with my walker and my  to help\"     Education  Patient Education  Education Given To: Patient  Education Provided: Role of Therapy;Plan of Care;Transfer Training; Low Vision Education  Education Method: Demonstration;Verbal  Barriers to Learning: Other (Comment); Vision (slow to process information)  Education Outcome: Continued education needed;Verbalized understanding    Therapy Time   Individual Concurrent Group Co-treatment   Time In 0941         Time Out 1015         Minutes 34         Timed Code Treatment Minutes: [facility],

## 2023-02-16 NOTE — PLAN OF CARE
SLP completed evaluation. Please refer to notes in EMR. Leigh Jaramillo, [id number] [facility] [id number]  Speech-Language Pathologist

## 2023-02-17 VITALS
TEMPERATURE: 98.1 F | BODY MASS INDEX: 40.62 KG/M2 | OXYGEN SATURATION: 100 % | HEART RATE: 70 BPM | RESPIRATION RATE: 18 BRPM | DIASTOLIC BLOOD PRESSURE: 47 MMHG | SYSTOLIC BLOOD PRESSURE: 127 MMHG | WEIGHT: 208 LBS

## 2023-02-17 LAB
ESTIMATED AVERAGE GLUCOSE: 151.3 MG/DL
GLUCOSE BLD-MCNC: 155 MG/DL (ref 70–99)
GLUCOSE BLD-MCNC: 163 MG/DL (ref 70–99)
GLUCOSE BLD-MCNC: 171 MG/DL (ref 70–99)
HBA1C MFR BLD: 6.9 %
PERFORMED ON: ABNORMAL

## 2023-02-17 PROCEDURE — 97530 THERAPEUTIC ACTIVITIES: CPT

## 2023-02-17 PROCEDURE — 97110 THERAPEUTIC EXERCISES: CPT

## 2023-02-17 PROCEDURE — 6370000000 HC RX 637 (ALT 250 FOR IP): Performed by: INTERNAL MEDICINE

## 2023-02-17 RX ORDER — SULFAMETHOXAZOLE AND TRIMETHOPRIM 800; 160 MG/1; MG/1
1 TABLET ORAL EVERY 12 HOURS SCHEDULED
Qty: 3 TABLET | Refills: 0
Start: 2023-02-17 | End: 2023-02-19

## 2023-02-17 RX ORDER — SACCHAROMYCES BOULARDII 250 MG
250 CAPSULE ORAL 2 TIMES DAILY
Qty: 14 CAPSULE | Refills: 0
Start: 2023-02-17 | End: 2023-02-24

## 2023-02-17 RX ADMIN — DILTIAZEM HYDROCHLORIDE 120 MG: 120 CAPSULE, EXTENDED RELEASE ORAL at 08:47

## 2023-02-17 RX ADMIN — VENLAFAXINE HYDROCHLORIDE 150 MG: 150 CAPSULE, EXTENDED RELEASE ORAL at 08:47

## 2023-02-17 RX ADMIN — METOPROLOL TARTRATE 50 MG: 50 TABLET, FILM COATED ORAL at 08:47

## 2023-02-17 RX ADMIN — ASPIRIN 81 MG: 81 TABLET, COATED ORAL at 08:47

## 2023-02-17 RX ADMIN — APIXABAN 5 MG: 5 TABLET, FILM COATED ORAL at 08:47

## 2023-02-17 RX ADMIN — SULFAMETHOXAZOLE AND TRIMETHOPRIM 1 TABLET: 800; 160 TABLET ORAL at 08:47

## 2023-02-17 NOTE — CARE COORDINATION
Spoke with RN who states patient is now on po antibiotics for her UTI and could potentially be ready for discharge today or tomorrow. Plan is for patient to discharge to [facility]. Precert initiated via [employer] portal.  Placed call [facility] with S and updated her. She states they will be ready to accept patient. Spoke with patient and  to update them on the above. They verbalized understanding. Patient's  states he will transport patient to [facility] per his private vehicle. RN updated on the above. 2883 addendum:    CASE MANAGEMENT DISCHARGE SUMMARY      Discharge to: [facility] completed: yes  Hospital Exemption Notification (HENS) completed: yes      Transportation:  via private vehicle       Confirmed discharge plan with: patient//RN/[place]  with S      Patient: yes     Family:  yes    Name:Nahum  Contact number:[phone]     Facility/Agency, name:  [facility] faxed   Phone number for report to facility: [phone]     RN, name: Yordy Olson     Note: Discharging nurse to complete [facility], reconcile [facility], and place final copy with patient's discharge packet. RN to ensure that written prescriptions for  Level II medications are sent with patient to the facility as per protocol.

## 2023-02-17 NOTE — PROGRESS NOTES
Physical Therapy  Facility/Department: [facility] A2 CARD TELEMETRY  Daily Treatment Note  NAME: Devaughn Cook  : 1950  MRN: [id number]    Date of Service: 2023    Discharge Recommendations:  Subacute/Skilled Nursing Facility   PT Equipment Recommendations  Equipment Needed: No    Patient Diagnosis(es): The primary encounter diagnosis was Acute encephalopathy. A diagnosis of Acute cystitis without hematuria was also pertinent to this visit. Assessment   Assessment: pt found in bed this am, RN cleared pt to particiapte. pt pleasant and agreeable, denies pain. denies dizziness throughout session. grossly CGA for transfers and dyn stand activities with BUE support on RW. pt requires consistent cues for safety/sequencing throughout 2* impaired cognition. continue to rec SNF Upon dc as pt is functioning below baseline and risk for falls. DME: Defer  Activity Tolerance: Patient tolerated evaluation without incident;Patient tolerated treatment well  Equipment Needed: No     Plan    Physcial Therapy Plan  General Plan: 3-5 times per week  Current Treatment Recommendations: Strengthening;Balance training; Endurance training;Functional mobility training;Transfer training;Gait training; Safety education & training; Therapeutic activities     Restrictions  Restrictions/Precautions  Restrictions/Precautions: Fall Risk, General Precautions, Up as Tolerated  Position Activity Restriction  Other position/activity restrictions: visual deficits, slow procession commands     Subjective    Subjective  Subjective: RN cleared pt to participate  Pain: denies pain  Orientation  Overall Orientation Status: Impaired  Orientation Level: Oriented to person;Oriented to place;Oriented to situation  Cognition  Overall Cognitive Status: Exceptions  Arousal/Alertness: Appropriate responses to stimuli  Following Commands: Follows one step commands with repetition; Follows one step commands with increased time  Attention Span: Appears intact  Problem Solving: Decreased awareness of errors;Assistance required to identify errors made;Assistance required to generate solutions  Insights: Decreased awareness of deficits  Initiation: Requires cues for some     Objective   Vitals  Heart Rate: 66  Heart Rate Source: Monitor  BP: 123/60  BP Location: Right Arm  MAP (Calculated): 81  SpO2: 99 %  O2 Device: None (Room air)      Pt found supine in bed, finishing up pericare with PCA. Vitals assessed  Supine to sit with CGA and bed controls. Sit to stand Eob to RW with min A, cues for safe hand placement. Pt completed 2x10 BLE alternating marches with bUE support and CGA, seated rest between  Pt completed 6 stand pivot transfers EOB <> recliner with RW, CGA and consistent cues for safe hand placement. Pt completed 10 reps of forward / backward BLE stepping to targets with BUE support and CGA, impaired command following. Pt completed 15 reps of BLE seated ankle pumps, LAQ, marches with step by step / demo to complete properly  Pt in recliner at end of session with all light/needs within reach and alarm donned. Vitals remains 99% on room air (spo2), pulse 66-80 bpm with activity, BP at end of session= 123/60. Safety Devices  Type of Devices: All fall risk precautions in place;Call light within reach; Chair alarm in place; Patient at risk for falls;Gait belt;Left in chair;Nurse notified     [facility] 6 Clicks Inpatient Mobility:  AM-PAC Basic Mobility - Inpatient   How much help is needed turning from your back to your side while in a flat bed without using bedrails?: A Little  How much help is needed moving from lying on your back to sitting on the side of a flat bed without using bedrails?: A Little  How much help is needed moving to and from a bed to a chair?: A Little  How much help is needed standing up from a chair using your arms?: A Little  How much help is needed walking in hospital room?: A Lot  How much help is needed climbing 3-5 steps with a railing?: A Lot  AM-PAC Inpatient Mobility Raw Score : 16  AM-PAC Inpatient T-Scale Score : 40.78  Mobility Inpatient CMS 0-100% Score: 54.16  Mobility Inpatient CMS G-Code Modifier : CK    Goals  Short Term Goals  Time Frame for Short Term Goals: 1 week (2/23) unless otherwise specified  Short Term Goal 1: pt to perform supin><sit supervised  Short Term Goal 2: pt to perform sit><stand transfer CG with safe technique  Short Term Goal 3: pt to ambulate with RW and CG 50 ft  Short Term Goal 4: pt to participate in therapeutic ex 10-12 reps by 2/20  Patient Goals   Patient Goals : \"to be able to walk with my walker and my  to help\"    Education  Patient Education  Education Given To: Patient  Education Provided: Role of Therapy;Plan of Care;Transfer Training; Low Vision Education  Education Method: Demonstration;Verbal  Barriers to Learning: Other (Comment); Vision  Education Outcome: Continued education needed;Verbalized understanding    Therapy Time   Individual Concurrent Group Co-treatment   Time In 1033         Time Out 1112         Minutes 39         Timed Code Treatment Minutes: [address]       Curlene Mcardle, PT

## 2023-02-17 NOTE — PROGRESS NOTES
Pt d/c'd to [facility]. Removed  IV and stopped bleeding. Catheter intact. Pt tolerated well. No redness noted at site. Notified CMU and removed tele box. Reviewed d/c instructions, home meds, and  f/u information utilizing teach-back method. Patient verbalized understanding. Patient wheeled to front entrance with all belongings. Pt transported to [facility] by .

## 2023-02-17 NOTE — PROGRESS NOTES
Physician Progress Note      Sybil Flynn  CSN #:                  [id number]  :                       1950  ADMIT DATE:       2/15/2023 1:25 PM  [address] [place] [address] DATE:  RESPONDING  PROVIDER #:        Ruma Wadsworth MD          QUERY TEXT:    Pt admitted with UTI and has encephalopathy documented. If possible, please   document in progress notes and discharge summary further specificity regarding   the type of encephalopathy:      The medical record reflects the following:  Risk Factors: Age, DM, UTI  Clinical Indicators:  ED 02/15-  \"Was admitted to hospital service for acute encephalopathy. ..... Rakeshwandacristiancarmelo Patrice Differential   Diagnosis:Stroke, seizure, metabolic encephalopathy  H$P 02/15-  \"She could not think straight and very confused this morning. Isidro Ibrahim Confusion-acute   encephalopathy-possibly secondary to UTI-stroke less likely-admit, urine   culture, empirical antibiotics Cipro, neuro check continue with aspirin   statin, lipid profile in the morning. \"  Treatment: Cipro, Urine Cx, serial labs, and supportive care    Thank you,  Alisa Keane, RN, BSN, CRCR  Options provided:  -- Metabolic encephalopathy  -- Other - I will add my own diagnosis  -- Disagree - Not applicable / Not valid  -- Disagree - Clinically unable to determine / Unknown  -- Refer to Clinical Documentation Reviewer    PROVIDER RESPONSE TEXT:    This patient has metabolic encephalopathy.     Query created by: Radha Trevizo on 2023 11:27 AM      Electronically signed by:  Ruma Wadsworth MD 2023 7:00 PM

## 2023-02-17 NOTE — DISCHARGE SUMMARY
Hospital Medicine Discharge Summary    Patient ID: Danny Yoon      Patient's PCP: Jackie Rodriguez MD    Admit Date: 2/15/2023     Discharge Date: 2/17/2023      Admitting Provider: No admitting provider for patient encounter. Discharge Provider: Maurilio Vasquez MD     Discharge Diagnoses: Active Hospital Problems    Diagnosis     Encephalopathy acute [G93.40]      Priority: Medium       The patient was seen and examined on day of discharge and this discharge summary is in conjunction with any daily progress note from day of discharge. Hospital Course:   History Of Present Illness:       81 y.o. female who presented to [facility] with above complaint. History is from patient as well as from her . She could not think straight and very confused this morning. She denies dizziness chest pain cough sputum shortness of breath nausea vomiting abdominal pain or any focal neurological symptoms like numbness or weakness over the extremities. She has a history of stroke and she has problem with speech that is baseline. She has a history of recurrent UTI and completed antibiotics weeks ago. She denies dysuria chills rigors lower abdominal pain diarrhea or syncope. She had a headache this morning. .         Confusion-acute encephalopathy- resolved, possibly secondary to UTI-stroke less likely-  -pending urine culture was ngtd  -started empirical antibiotics Cipro, switched to bactrim due to cipro shortage, added probiotic on dc  -neuro checks  - continued with aspirin statin, lipid profile checked in the morning  CT head is negative for any acute findings,  CTA head and neck negative for occlusion of major vessels     Mild thrombocytopenia-monitored     Anemia-no acute profuse bleed, monitored     Chronic diastolic CHF-no evidence of fluid overload     Atrial fibrillation-currently sinus on Eliquis     History of old CVA with dysarthria     Diabetes-monitored blood sugar with low correction scale insulin , hemoglobin A1c 6.9     Hypertension-blood pressures controlled continue home medications     Gingivitis/?  Gum bleeds time to time-patient told that she has a dental follow-up as an outpatient, given mild thrombocytopenia on aspirin and Eliquis, monitored closely     Obesity-counseled    Physical Exam Performed:     BP (!) 127/47   Pulse 70   Temp 98.1 °F (36.7 °C) (Oral)   Resp 18   Wt 208 lb (94.3 kg)   LMP  (LMP Unknown)   SpO2 100%   BMI 40.62 kg/m²       General appearance:  No apparent distress, appears stated age and cooperative. HEENT:  Normal cephalic, atraumatic without obvious deformity. Pupils equal, round, and reactive to light. Extra ocular muscles intact. Conjunctivae/corneas clear. Neck: Supple, with full range of motion. No jugular venous distention. Trachea midline. Respiratory:  Normal respiratory effort. Clear to auscultation, bilaterally without Rales/Wheezes/Rhonchi. Cardiovascular:  Regular rate and rhythm with normal S1/S2 without murmurs, rubs or gallops. Abdomen: Soft, non-tender, non-distended with normal bowel sounds. Musculoskeletal:  No clubbing, cyanosis or edema bilaterally. Full range of motion without deformity. Skin: Skin color, texture, turgor normal.  No rashes or lesions. Neurologic:  Neurovascularly intact without any focal sensory/motor deficits. Cranial nerves: II-XII intact, grossly non-focal.  Psychiatric:  Alert and oriented, thought content appropriate, normal insight  Capillary Refill: Brisk,< 3 seconds   Peripheral Pulses: +2 palpable, equal bilaterally       Labs:  For convenience and continuity at follow-up the following most recent labs are provided:      CBC:    Lab Results   Component Value Date/Time    WBC 4.3 02/16/2023 06:28 AM    HGB 9.9 02/16/2023 06:28 AM    HCT 29.6 02/16/2023 06:28 AM    PLT 84 02/16/2023 06:28 AM       Renal:    Lab Results   Component Value Date/Time     02/15/2023 01:51 PM    K 4.8 02/15/2023 01:51 PM     02/15/2023 01:51 PM    CO2 23 02/15/2023 01:51 PM    BUN 20 02/15/2023 01:51 PM    CREATININE 1.0 02/15/2023 01:51 PM    CALCIUM 9.8 02/15/2023 01:51 PM    PHOS 3.3 04/03/2018 06:55 PM         Significant Diagnostic Studies    Radiology:   XR CHEST PORTABLE   Final Result   No acute abnormality. CTA HEAD NECK W CONTRAST   Final Result   No acute abnormality or flow limiting stenosis of the major arteries of the   head and neck. CT HEAD WO CONTRAST   Final Result   Addendum (preliminary) 1 of 1   ADDENDUM:   Results were reported to ZABRINA Mendoza by radiology results   communication at 1:56 p.m. on February 15, 2023. Final   No acute intracranial abnormality. Status post right frontal temporal craniotomy. Areas of encephalomalacia in the bilateral frontal, parietal occipital lobes,   right temporal lobe and left cerebellar hemisphere, stable. Small old infarctions in the right cerebellar hemisphere, stable. Mild parenchymal volume loss. Moderate chronic microvascular disease.                    Consults:     IP CONSULT TO PHARMACY  PHARMACY TO CHANGE BASE FLUIDS  IP CONSULT TO HOSPITALIST    Disposition:  SNF     Condition at Discharge: Stable    Discharge Instructions/Follow-up:  PCP as outpt    Code Status:  Full Code     Activity: activity as tolerated    Diet: diabetic diet      Discharge Medications:     Discharge Medication List as of 2/17/2023  4:59 PM             Details   sulfamethoxazole-trimethoprim (BACTRIM DS;SEPTRA DS) 800-160 MG per tablet Take 1 tablet by mouth every 12 hours for 3 doses Take with food, Disp-3 tablet, R-0NO PRINT      saccharomyces boulardii (FLORASTOR) 250 MG capsule Take 1 capsule by mouth 2 times daily for 7 days, Disp-14 capsule, R-0NO PRINT                Details   omeprazole (PRILOSEC) 20 MG delayed release capsule Take 20 mg by mouth at bedtimeHistorical Med      trimethoprim-polymyxin b (POLYTRIM) 30175-5.1 UNIT/ML-% ophthalmic solution Place 1 drop into both eyes every 6 hoursHistorical Med      apixaban (ELIQUIS) 5 MG TABS tablet Take 1 tablet by mouth 2 times daily, Disp-60 tablet, R-1Normal      venlafaxine (EFFEXOR XR) 150 MG extended release capsule Take 150 mg by mouth dailyHistorical Med      oxybutynin (DITROPAN-XL) 10 MG extended release tablet Take 10 mg by mouth at bedtimeHistorical Med      metoprolol tartrate (LOPRESSOR) 50 MG tablet TAKE 1 TABLET BY MOUTH TWO TIMES A DAY, Disp-180 tablet, R-3Normal      atorvastatin (LIPITOR) 40 MG tablet Take 1 tablet by mouth nightly, Disp-30 tablet, R-3Normal      dilTIAZem (CARDIZEM CD) 120 MG extended release capsule Take 1 capsule by mouth daily, Disp-30 capsule, R-11Normal      aspirin EC 81 MG EC tablet Take 1 tablet by mouth daily, Disp-90 tablet, R-1OTC      dextromethorphan-guaiFENesin (MUCINEX DM)  MG per extended release tablet Take 1 tablet by mouth every 12 hours as neededHistorical Med      ferrous sulfate 325 (65 Fe) MG tablet Take 325 mg by mouth daily (with breakfast)Historical Med      folic acid (FOLVITE) 1 MG tablet Take 1 mg by mouth dailyHistorical Med      montelukast (SINGULAIR) 10 MG tablet Take 10 mg by mouth nightlyHistorical Med      albuterol (PROVENTIL) (2.5 MG/3ML) 0.083% nebulizer solution Take 3 mLs by nebulization every 4 hours as needed for Wheezing or Shortness of Breath, Disp-120 each, R-3Print      metFORMIN ER (GLUCOPHAGE-XR) 500 MG XR tablet Take 2,000 mg by mouth Daily with supper Historical Med      albuterol (PROVENTIL HFA;VENTOLIN HFA) 108 (90 BASE) MCG/ACT inhaler Inhale 2 puffs into the lungs every 6 hours as needed. Historical Med             Time Spent on discharge: 33 min in the examination, evaluation, counseling and review of medications and discharge plan. Signed:    Franco Mendez MD   2/17/2023      Thank you Dilcia Rivas MD for the opportunity to be involved in this patient's care.  If you have any questions or concerns, please feel free to contact me at [phone].

## 2023-02-17 NOTE — DISCHARGE INSTR - COC
Continuity of Care Form    Patient Name: Fabiola Puente   :  1950  MRN:  [id number]    Admit date:  2/15/2023  Discharge date:  ***    Code Status Order: Full Code   Advance Directives:     Admitting Physician:  No admitting provider for patient encounter. PCP: Lidia Rodgers MD    Discharging Nurse: [facility]/Room#: 0203/0203-01  Discharging Unit Phone Number: ***    Emergency Contact:   Extended Emergency Contact Information  Primary Emergency Contact: Radha Barrios  Address: [address] [place] [address] [place] [address] Phone: [phone]  Mobile Phone: [phone]  Relation: Spouse    Past Surgical History:  Past Surgical History:   Procedure Laterality Date    APPENDECTOMY      BREAST BIOPSY      CHOLECYSTECTOMY      COLONOSCOPY  2017    polyp    CYST INCISION AND DRAINAGE  1986    on head    HYSTERECTOMY (CERVIX STATUS UNKNOWN)      UPPER GASTROINTESTINAL ENDOSCOPY  2017    barretts ? Immunization History:   Immunization History   Administered Date(s) Administered    COVID-19, MODERNA BLUE border, Primary or Immunocompromised, (age 12y+), IM, 100 mcg/0.5mL 2021, 04/15/2021    Influenza Virus Vaccine 12/10/2012, 2013, 2016    Influenza, FLUARIX, FLULAVAL, FLUZONE (age 10 mo+) AND AFLURIA, (age 1 y+), PF, 0.5mL 2017    Influenza, Quadv, 6 mo and older, IM, PF (Flulaval, Fluarix) 10/15/2018    Pneumococcal Polysaccharide (Pcsxpplai02) 12/10/2012, 2016       Active Problems:  Patient Active Problem List   Diagnosis Code    Morbid obesity with BMI of 40.0-44.9, adult ([facility] E66.01, Z68.41    Hx of L-ICA CVA I63.232    Essential hypertension I10    Chronic thrombocytopenia D69.6    Ch[facility] J44.9    Depression F32. 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    Mastoiditis of right side H70.91    Hyperkalemia E87.5    Splenomegaly R16.1    Hemorrhagic stroke (HCC) I61.9    DM type 2, controlled, with complication (HCC) U04.3    Mild malnutrition (HCC) E44.1    PVC (premature ventricular contraction) I49.3    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    Dyslipidemia E78.5    CAD in native artery I25.10    COPD exacerbation ([facility]) J44.1    TIA (transient ischemic attack) G45.9    First degree AV block I44.0    Encounter for loop recorder check Z45.09    Cerebrovascular accident (CVA) ([facility] I63.9    PAF (paroxysmal atrial fibrillation) ([facility]) I48.0    HTN (hypertension), benign I10    Acute focal neurological deficit R29.818    Stroke-like symptoms R29.90    Memory loss due to medical condition R41.3    Cognitive deficits R41.89    Acute CVA (cerebrovascular accident) ([facility] I63.9    Encephalopathy acute G93.40       Isolation/Infection:   Isolation            No Isolation          Patient Infection Status       Infection Onset Added Last Indicated Last Indicated By Review Planned Expiration Resolved Resolved By    None active    Resolved    COVID-19 (Rule Out) 12/03/21 12/03/21 12/03/21 COVID-19, Rapid (Ordered)   12/03/21 Rule-Out Test Resulted    AFB  05/07/18 05/07/18 Stephanie Guerra RN   05/23/18 Jay Hargrove, NIVIA    + afb from [place], sent to [facility]              Nurse Assessment:  Last Vital Signs: /64   Pulse 69   Temp 98.1 °F (36.7 °C) (Oral)   Resp 18   Wt 208 lb (94.3 kg)   LMP  (LMP Unknown)   SpO2 99%   BMI 40.62 kg/m²     Last documented pain score (0-10 scale): Pain Level: 3  Last Weight:   Wt Readings from Last 1 Encounters:   02/15/23 208 lb (94.3 kg)     Mental Status:  disoriented    IV Access:  - None    Nursing Mobility/ADLs:  Walking Assisted  Transfer  Assisted  Bathing  Assisted  Dressing  Assisted  Toileting  Assisted  Feeding  Assisted  Med Admin  Assisted  Med Delivery   whole    Wound Care Documentation and Therapy:        Elimination:  Continence: Bowel: No  Bladder: No  Urinary Catheter: None   Colostomy/Ileostomy/Ileal Conduit: No       Date of Last BM:     Intake/Output Summary (Last 24 hours) at 2/17/2023 1234  Last data filed at 2/17/2023 0852  Gross per 24 hour   Intake 220 ml   Output 1050 ml   Net -830 ml     I/O last 3 completed shifts: In: 26 [P.O.:460]  Out: 1400 [Urine:1400]    Safety Concerns:     History of Falls (last 30 days)    Impairments/Disabilities:      Speech    Nutrition Therapy:  Current Nutrition Therapy:   - Oral Diet:  General    Routes of Feeding: Oral  Liquids: No restrictions  Daily Fluid Restriction: no  Last Modified Barium Swallow with Video (Video Swallowing Test): not done    Treatments at the Time of Hospital Discharge:   Respiratory Treatments:   Oxygen Therapy:  is not on home oxygen therapy.   Ventilator:    - No ventilator support    Rehab Therapies: Physical Therapy and Occupational Therapy  Weight Bearing Status/Restrictions: No weight bearing restrictions  Other Medical Equipment (for information only, NOT a DME order):  walker  Other Treatments:     Patient's personal belongings (please select all that are sent with patient):  None    RN SIGNATURE:  Electronically signed by Shyann Enriquez RN on 2/17/23 at 4:59 PM EST    CASE MANAGEMENT/SOCIAL WORK SECTION    Inpatient Status Date:     Readmission Risk Assessment Score:  Readmission Risk              Risk of Unplanned Readmission:  23           Discharging to Facility/ Agency   Name:   Address:  Phone:  Fax:    Dialysis Facility (if applicable)   Name:  Address:  Dialysis Schedule:  Phone:  Fax:    / signature: {Esignature:[id number]}    PHYSICIAN SECTION    Prognosis: Good    Condition at Discharge: Stable    Rehab Potential (if transferring to Rehab): Good    Recommended Labs or Other Treatments After Discharge: Follow up with your PCP in 1 week    Physician Certification: I certify the above information and transfer of Perry Ip  is necessary for the continuing treatment of the diagnosis listed and that she requires Ash Diaz for less 30 days.      Update Admission H&P: No change in H&P    PHYSICIAN SIGNATURE:  Electronically signed by Shubham Cee MD on 2/17/23 at 4:46 PM EST

## 2023-02-17 NOTE — PLAN OF CARE
Problem: Discharge Planning  Goal: Discharge to home or other facility with appropriate resources  Outcome: Progressing  Flowsheets (Taken 2/16/2023 2153)  Discharge to home or other facility with appropriate resources:   Identify discharge learning needs (meds, wound care, etc)   Arrange for needed discharge resources and transportation as appropriate  Note: PT/OT recommending. SNF. Tent plan for pt to dc to SNF when medically stable. CM working with family. Problem: Safety - Adult  Goal: Free from fall injury  Outcome: Progressing  Flowsheets (Taken 2/16/2023 2153)  Free From Fall Injury: Instruct family/caregiver on patient safety  Note: No falls during hospital stay. Fall precautions in place. Pt compliant with interventions to prevent fall. Problem: Pain  Goal: Verbalizes/displays adequate comfort level or baseline comfort level  Outcome: Progressing  Flowsheets (Taken 2/16/2023 2153)  Verbalizes/displays adequate comfort level or baseline comfort level:   Encourage patient to monitor pain and request assistance   Assess pain using appropriate pain scale   Administer analgesics based on type and severity of pain and evaluate response  Note: Pain able to assess pain using pain scale and communicate needs. Problem: Skin/Tissue Integrity  Goal: Absence of new skin breakdown  Description: 1. Monitor for areas of redness and/or skin breakdown  2. Assess vascular access sites hourly  3. Every 4-6 hours minimum:  Change oxygen saturation probe site  4. Every 4-6 hours:  If on nasal continuous positive airway pressure, respiratory therapy assess nares and determine need for appliance change or resting period. Outcome: Progressing  Note: Skin remains intact. Pt able to reposition self. Heels elevated off of bed.

## 2023-02-17 NOTE — PLAN OF CARE
Problem: Discharge Planning  Goal: Discharge to home or other facility with appropriate resources  2/17/2023 1003 by Darren Casey RN  Outcome: Progressing     Problem: Safety - Adult  Goal: Free from fall injury  2/17/2023 1003 by Darren Casey, RN  Outcome: Progressing

## 2023-02-22 ENCOUNTER — TELEPHONE (OUTPATIENT)
Dept: CARDIOLOGY CLINIC | Age: 73
End: 2023-02-22

## 2023-02-22 NOTE — TELEPHONE ENCOUNTER
Bibi manager at the rehab calling to get appt with NIKI sooner. Aaliyah Philippe was told by  that her battery in device is low and needs changed. See 1/5/23 Office note: We received a remote transmission from patient's monitor at home. Remote Linq report shows short AT and SR with ectopy. AF is not real. Pt battery has reached the ANDREE. Message sent to office staff. EP physician to review. We will continue to monitor remotely. Aaliyah Philippe told  to work with them and they will take care of things. Please call Aaliyah Philippe on her personal phone [phone]. Please advise.

## 2023-02-22 NOTE — TELEPHONE ENCOUNTER
Pts  called on 02/20/2023 to cancel pt ov with [facility] for 02/23/2023 due to pt being in a rehab facility. Pts  called on 02/22 to verify time of appt bc the rehab facility advised him that she has an ov with [facility] on 02/23. Pts  would like to know if pt can be re-scheduled in for 02/23 with [facility] and a device check. Please advise.

## 2023-02-22 NOTE — TELEPHONE ENCOUNTER
Spoke with Samantha Chou. Explained to her that patient has an ILR. It is only monitoring her rhythm, and it is not urgent that she needs to be seen. Per previous telephone encounter per [facility] \"We will discuss on OV. Its not clear that another ILR will provide substantial benefit. \"    Nancie Lobo. Will keep appointment as scheduled 4/13/2023 and will notify our office if further issues arise.

## 2023-02-22 NOTE — TELEPHONE ENCOUNTER
Unfortunately that appointment was filled and [facility] is grossly overbooked. Appointment is not urgent.  Can schedule 4/13/2023 @ 10:45AM.

## 2023-02-23 ENCOUNTER — APPOINTMENT (OUTPATIENT)
Dept: SPEECH THERAPY | Age: 73
End: 2023-02-23
Payer: MEDICARE

## 2023-03-13 NOTE — TELEPHONE ENCOUNTER
Faxed cardiac clearance letter waiting confirmation.
Pt needs cardiac clearance. Pt is having a colonoscopy on 02/24/2022. Pt is currently taking Eliquis. Pt needs to hold Eliquis 3 days prior please advise on how long pt should hold Eliquis. Last OV 03/11/2021 with [facility]   EKG 07/26/2019  Please advise [facility]   Thank you.
She is at an increased risk of perioperative complications due to multiple comorbidities. However, there are no absolute contraindications to proceeding. OK to hold Eliquis for no more than 48 hours day if patient understands her stroke risk is higher if afib is recurrent off Eliquis.  (She is high risk for recurrent CVA off of anticoagulation given her history of recurrent CVA)
Spoke with pt relayed message from [facility]. Pt v/u
No

## 2023-03-31 ENCOUNTER — TELEPHONE (OUTPATIENT)
Dept: CARDIOLOGY CLINIC | Age: 73
End: 2023-03-31

## 2023-03-31 NOTE — LETTER
[address], [address] [facility] [address] [address]  Phone: [phone]  Fax: [phone]    Sunshine Curiel MD    Re:Vanessacarmelo German Brandon      March 31, 2023    To whom it may concern,     Jt Bain is at low cardiovascular risk for the planned procedure. However, there are no absolute contraindications to proceeding. She may hold Eliquis for 48 hours prior to the procedure and resume when surgically appropriate.          Sincerely,        Sunshine Curiel MD

## 2023-03-31 NOTE — TELEPHONE ENCOUNTER
CARDIAC CLEARANCE REQUEST    What type of procedure are you having:  Scope to check for gerd  Are you taking any blood thinners:  Eliquis-hold 3 days  Type on anesthesia:    When is your procedure scheduled for:  Not yet scheduled   What physician is performing your procedure:  Dr. Carlos Duarte   Phone Number:  [phone]  Fax number to send the letter:  [phone]    Last ov 07/06/2022 brenda.  Upcoming ov 04/13/2023 brenda

## 2023-03-31 NOTE — TELEPHONE ENCOUNTER
Lena Palumbo MD  You 2 hours ago (12:29 PM)     Natasha Stockton  She is at low cardiovascular risk for the planned procedure and should hold anticoagulation no more than 2 days. Letter created and faxed.

## 2023-04-30 DIAGNOSIS — I48.0 PAROXYSMAL ATRIAL FIBRILLATION (HCC): ICD-10-CM

## 2023-05-01 RX ORDER — METOPROLOL TARTRATE 50 MG/1
TABLET, FILM COATED ORAL
Qty: 180 TABLET | Refills: 2 | Status: SHIPPED | OUTPATIENT
Start: 2023-05-01

## 2023-06-20 RX ORDER — APIXABAN 5 MG/1
TABLET, FILM COATED ORAL
Qty: 180 TABLET | Refills: 2 | Status: SHIPPED | OUTPATIENT
Start: 2023-06-20

## 2023-07-18 ENCOUNTER — HOSPITAL ENCOUNTER (INPATIENT)
Age: 73
LOS: 3 days | Discharge: HOME | End: 2023-07-21
Attending: INTERNAL MEDICINE | Admitting: INTERNAL MEDICINE
Payer: MEDICARE

## 2023-07-18 ENCOUNTER — APPOINTMENT (OUTPATIENT)
Dept: GENERAL RADIOLOGY | Age: 73
End: 2023-07-18
Payer: MEDICARE

## 2023-07-18 DIAGNOSIS — J96.01 ACUTE HYPOXEMIC RESPIRATORY FAILURE DUE TO COVID-19 (HCC): Primary | ICD-10-CM

## 2023-07-18 DIAGNOSIS — U07.1 ACUTE HYPOXEMIC RESPIRATORY FAILURE DUE TO COVID-19 (HCC): Primary | ICD-10-CM

## 2023-07-18 DIAGNOSIS — R31.9 URINARY TRACT INFECTION WITH HEMATURIA, SITE UNSPECIFIED: ICD-10-CM

## 2023-07-18 DIAGNOSIS — N39.0 URINARY TRACT INFECTION WITH HEMATURIA, SITE UNSPECIFIED: ICD-10-CM

## 2023-07-18 PROBLEM — J96.00 ACUTE RESPIRATORY FAILURE DUE TO SEVERE ACUTE RESPIRATORY SYNDROME CORONAVIRUS 2 (SARS-COV-2) INFECTION (HCC): Status: ACTIVE | Noted: 2023-07-18

## 2023-07-18 PROBLEM — I69.30: Status: ACTIVE | Noted: 2023-07-18

## 2023-07-18 LAB
ALBUMIN SERPL-MCNC: 3.6 G/DL (ref 3.4–5)
ALBUMIN/GLOB SERPL: 0.9 {RATIO} (ref 1.1–2.2)
ALP SERPL-CCNC: 146 U/L (ref 40–129)
ALT SERPL-CCNC: 25 U/L (ref 10–40)
ANION GAP SERPL CALCULATED.3IONS-SCNC: 11 MMOL/L (ref 3–16)
AST SERPL-CCNC: 37 U/L (ref 15–37)
BASOPHILS # BLD: 0 K/UL (ref 0–0.2)
BASOPHILS NFR BLD: 0.3 %
BILIRUB SERPL-MCNC: 0.8 MG/DL (ref 0–1)
BILIRUB UR QL STRIP.AUTO: NEGATIVE
BUN SERPL-MCNC: 11 MG/DL (ref 7–20)
CALCIUM SERPL-MCNC: 9.4 MG/DL (ref 8.3–10.6)
CHLORIDE SERPL-SCNC: 100 MMOL/L (ref 99–110)
CLARITY UR: ABNORMAL
CO2 SERPL-SCNC: 24 MMOL/L (ref 21–32)
COLOR UR: YELLOW
CREAT SERPL-MCNC: 0.8 MG/DL (ref 0.6–1.2)
CRP SERPL-MCNC: 22.7 MG/L (ref 0–5.1)
CRP SERPL-MCNC: 29.8 MG/L (ref 0–5.1)
D DIMER: 0.34 UG/ML FEU (ref 0–0.6)
DEPRECATED RDW RBC AUTO: 14.9 % (ref 12.4–15.4)
EOSINOPHIL # BLD: 0 K/UL (ref 0–0.6)
EOSINOPHIL NFR BLD: 0.2 %
EPI CELLS #/AREA URNS HPF: ABNORMAL /HPF (ref 0–5)
FIBRINOGEN PPP-MCNC: 444 MG/DL (ref 243–550)
GFR SERPLBLD CREATININE-BSD FMLA CKD-EPI: >60 ML/MIN/{1.73_M2}
GLUCOSE BLD-MCNC: 143 MG/DL (ref 70–99)
GLUCOSE SERPL-MCNC: 173 MG/DL (ref 70–99)
GLUCOSE UR STRIP.AUTO-MCNC: NEGATIVE MG/DL
HCT VFR BLD AUTO: 28.8 % (ref 36–48)
HGB BLD-MCNC: 9.6 G/DL (ref 12–16)
HGB UR QL STRIP.AUTO: ABNORMAL
INR PPP: 1.37 (ref 0.84–1.16)
KETONES UR STRIP.AUTO-MCNC: NEGATIVE MG/DL
LACTATE BLDV-SCNC: 1.8 MMOL/L (ref 0.4–2)
LDH SERPL L TO P-CCNC: 163 U/L (ref 100–190)
LEUKOCYTE ESTERASE UR QL STRIP.AUTO: ABNORMAL
LYMPHOCYTES # BLD: 0.5 K/UL (ref 1–5.1)
LYMPHOCYTES NFR BLD: 8.5 %
MCH RBC QN AUTO: 31.4 PG (ref 26–34)
MCHC RBC AUTO-ENTMCNC: 33.2 G/DL (ref 31–36)
MCV RBC AUTO: 94.4 FL (ref 80–100)
MONOCYTES # BLD: 0.4 K/UL (ref 0–1.3)
MONOCYTES NFR BLD: 7.3 %
MUCOUS THREADS #/AREA URNS LPF: ABNORMAL /LPF
NEUTROPHILS # BLD: 5.1 K/UL (ref 1.7–7.7)
NEUTROPHILS NFR BLD: 83.7 %
NITRITE UR QL STRIP.AUTO: NEGATIVE
PERFORMED ON: ABNORMAL
PH UR STRIP.AUTO: 7 [PH] (ref 5–8)
PLATELET # BLD AUTO: 66 K/UL (ref 135–450)
PLATELET BLD QL SMEAR: ABNORMAL
PMV BLD AUTO: 11.8 FL (ref 5–10.5)
POTASSIUM SERPL-SCNC: 3.8 MMOL/L (ref 3.5–5.1)
PROCALCITONIN SERPL IA-MCNC: 0.16 NG/ML (ref 0–0.15)
PROT SERPL-MCNC: 7.8 G/DL (ref 6.4–8.2)
PROT UR STRIP.AUTO-MCNC: 30 MG/DL
PROTHROMBIN TIME: 16.8 SEC (ref 11.5–14.8)
RBC # BLD AUTO: 3.05 M/UL (ref 4–5.2)
RBC #/AREA URNS HPF: ABNORMAL /HPF (ref 0–4)
SARS-COV-2 RDRP RESP QL NAA+PROBE: DETECTED
SLIDE REVIEW: ABNORMAL
SODIUM SERPL-SCNC: 135 MMOL/L (ref 136–145)
SP GR UR STRIP.AUTO: 1.02 (ref 1–1.03)
UA COMPLETE W REFLEX CULTURE PNL UR: YES
UA DIPSTICK W REFLEX MICRO PNL UR: YES
URN SPEC COLLECT METH UR: ABNORMAL
UROBILINOGEN UR STRIP-ACNC: 1 E.U./DL
WBC # BLD AUTO: 6.1 K/UL (ref 4–11)
WBC #/AREA URNS HPF: ABNORMAL /HPF (ref 0–5)

## 2023-07-18 PROCEDURE — 81001 URINALYSIS AUTO W/SCOPE: CPT

## 2023-07-18 PROCEDURE — 80053 COMPREHEN METABOLIC PANEL: CPT

## 2023-07-18 PROCEDURE — 96365 THER/PROPH/DIAG IV INF INIT: CPT

## 2023-07-18 PROCEDURE — 6370000000 HC RX 637 (ALT 250 FOR IP): Performed by: INTERNAL MEDICINE

## 2023-07-18 PROCEDURE — 6360000002 HC RX W HCPCS: Performed by: INTERNAL MEDICINE

## 2023-07-18 PROCEDURE — 6360000002 HC RX W HCPCS: Performed by: PHYSICIAN ASSISTANT

## 2023-07-18 PROCEDURE — 71045 X-RAY EXAM CHEST 1 VIEW: CPT

## 2023-07-18 PROCEDURE — 83605 ASSAY OF LACTIC ACID: CPT

## 2023-07-18 PROCEDURE — 2500000003 HC RX 250 WO HCPCS: Performed by: PHYSICIAN ASSISTANT

## 2023-07-18 PROCEDURE — 83615 LACTATE (LD) (LDH) ENZYME: CPT

## 2023-07-18 PROCEDURE — 86140 C-REACTIVE PROTEIN: CPT

## 2023-07-18 PROCEDURE — 85384 FIBRINOGEN ACTIVITY: CPT

## 2023-07-18 PROCEDURE — 36415 COLL VENOUS BLD VENIPUNCTURE: CPT

## 2023-07-18 PROCEDURE — 94761 N-INVAS EAR/PLS OXIMETRY MLT: CPT

## 2023-07-18 PROCEDURE — 2580000003 HC RX 258: Performed by: PHYSICIAN ASSISTANT

## 2023-07-18 PROCEDURE — 2700000000 HC OXYGEN THERAPY PER DAY

## 2023-07-18 PROCEDURE — 96361 HYDRATE IV INFUSION ADD-ON: CPT

## 2023-07-18 PROCEDURE — 84145 PROCALCITONIN (PCT): CPT

## 2023-07-18 PROCEDURE — 96375 TX/PRO/DX INJ NEW DRUG ADDON: CPT

## 2023-07-18 PROCEDURE — 2060000000 HC ICU INTERMEDIATE R&B

## 2023-07-18 PROCEDURE — 85025 COMPLETE CBC W/AUTO DIFF WBC: CPT

## 2023-07-18 PROCEDURE — 87086 URINE CULTURE/COLONY COUNT: CPT

## 2023-07-18 PROCEDURE — 87635 SARS-COV-2 COVID-19 AMP PRB: CPT

## 2023-07-18 PROCEDURE — 99285 EMERGENCY DEPT VISIT HI MDM: CPT

## 2023-07-18 PROCEDURE — 85379 FIBRIN DEGRADATION QUANT: CPT

## 2023-07-18 PROCEDURE — 93005 ELECTROCARDIOGRAM TRACING: CPT | Performed by: INTERNAL MEDICINE

## 2023-07-18 PROCEDURE — 85610 PROTHROMBIN TIME: CPT

## 2023-07-18 RX ORDER — METOPROLOL TARTRATE 50 MG/1
50 TABLET, FILM COATED ORAL 2 TIMES DAILY
Status: DISCONTINUED | OUTPATIENT
Start: 2023-07-18 | End: 2023-07-21 | Stop reason: HOSPADM

## 2023-07-18 RX ORDER — INSULIN GLARGINE 100 [IU]/ML
0.15 INJECTION, SOLUTION SUBCUTANEOUS NIGHTLY
Status: DISCONTINUED | OUTPATIENT
Start: 2023-07-18 | End: 2023-07-21 | Stop reason: HOSPADM

## 2023-07-18 RX ORDER — POTASSIUM CHLORIDE 20 MEQ/1
40 TABLET, EXTENDED RELEASE ORAL PRN
Status: DISCONTINUED | OUTPATIENT
Start: 2023-07-18 | End: 2023-07-21 | Stop reason: HOSPADM

## 2023-07-18 RX ORDER — ACETAMINOPHEN 650 MG/1
650 SUPPOSITORY RECTAL EVERY 6 HOURS PRN
Status: DISCONTINUED | OUTPATIENT
Start: 2023-07-18 | End: 2023-07-21 | Stop reason: HOSPADM

## 2023-07-18 RX ORDER — PANTOPRAZOLE SODIUM 40 MG/1
40 TABLET, DELAYED RELEASE ORAL
Status: DISCONTINUED | OUTPATIENT
Start: 2023-07-19 | End: 2023-07-21 | Stop reason: HOSPADM

## 2023-07-18 RX ORDER — SODIUM CHLORIDE 9 MG/ML
INJECTION, SOLUTION INTRAVENOUS PRN
Status: DISCONTINUED | OUTPATIENT
Start: 2023-07-18 | End: 2023-07-21 | Stop reason: HOSPADM

## 2023-07-18 RX ORDER — INSULIN LISPRO 100 [IU]/ML
0-4 INJECTION, SOLUTION INTRAVENOUS; SUBCUTANEOUS NIGHTLY
Status: DISCONTINUED | OUTPATIENT
Start: 2023-07-18 | End: 2023-07-21 | Stop reason: HOSPADM

## 2023-07-18 RX ORDER — INSULIN LISPRO 100 [IU]/ML
0.05 INJECTION, SOLUTION INTRAVENOUS; SUBCUTANEOUS
Status: DISCONTINUED | OUTPATIENT
Start: 2023-07-19 | End: 2023-07-21 | Stop reason: HOSPADM

## 2023-07-18 RX ORDER — 0.9 % SODIUM CHLORIDE 0.9 %
1000 INTRAVENOUS SOLUTION INTRAVENOUS ONCE
Status: COMPLETED | OUTPATIENT
Start: 2023-07-18 | End: 2023-07-18

## 2023-07-18 RX ORDER — DEXTROSE MONOHYDRATE 100 MG/ML
INJECTION, SOLUTION INTRAVENOUS CONTINUOUS PRN
Status: DISCONTINUED | OUTPATIENT
Start: 2023-07-18 | End: 2023-07-21 | Stop reason: HOSPADM

## 2023-07-18 RX ORDER — POTASSIUM CHLORIDE 7.45 MG/ML
10 INJECTION INTRAVENOUS PRN
Status: DISCONTINUED | OUTPATIENT
Start: 2023-07-18 | End: 2023-07-21 | Stop reason: HOSPADM

## 2023-07-18 RX ORDER — ACETAMINOPHEN 325 MG/1
650 TABLET ORAL EVERY 6 HOURS PRN
Status: DISCONTINUED | OUTPATIENT
Start: 2023-07-18 | End: 2023-07-21 | Stop reason: HOSPADM

## 2023-07-18 RX ORDER — MAGNESIUM SULFATE 1 G/100ML
1000 INJECTION INTRAVENOUS PRN
Status: DISCONTINUED | OUTPATIENT
Start: 2023-07-18 | End: 2023-07-21 | Stop reason: HOSPADM

## 2023-07-18 RX ORDER — ALBUTEROL SULFATE 90 UG/1
2 AEROSOL, METERED RESPIRATORY (INHALATION)
Status: DISCONTINUED | OUTPATIENT
Start: 2023-07-18 | End: 2023-07-21 | Stop reason: HOSPADM

## 2023-07-18 RX ORDER — LANOLIN ALCOHOL/MO/W.PET/CERES
3 CREAM (GRAM) TOPICAL NIGHTLY PRN
Status: DISCONTINUED | OUTPATIENT
Start: 2023-07-19 | End: 2023-07-21 | Stop reason: HOSPADM

## 2023-07-18 RX ORDER — ONDANSETRON 2 MG/ML
4 INJECTION INTRAMUSCULAR; INTRAVENOUS EVERY 6 HOURS PRN
Status: DISCONTINUED | OUTPATIENT
Start: 2023-07-18 | End: 2023-07-21 | Stop reason: HOSPADM

## 2023-07-18 RX ORDER — MONTELUKAST SODIUM 10 MG/1
10 TABLET ORAL NIGHTLY
Status: DISCONTINUED | OUTPATIENT
Start: 2023-07-18 | End: 2023-07-21 | Stop reason: HOSPADM

## 2023-07-18 RX ORDER — ATORVASTATIN CALCIUM 40 MG/1
40 TABLET, FILM COATED ORAL NIGHTLY
Status: DISCONTINUED | OUTPATIENT
Start: 2023-07-18 | End: 2023-07-21 | Stop reason: HOSPADM

## 2023-07-18 RX ORDER — CALCIUM CARBONATE 500 MG/1
1000 TABLET, CHEWABLE ORAL 3 TIMES DAILY PRN
Status: DISCONTINUED | OUTPATIENT
Start: 2023-07-18 | End: 2023-07-21 | Stop reason: HOSPADM

## 2023-07-18 RX ORDER — MIRABEGRON 50 MG/1
50 TABLET, FILM COATED, EXTENDED RELEASE ORAL DAILY
COMMUNITY
Start: 2023-06-30

## 2023-07-18 RX ORDER — ONDANSETRON 4 MG/1
4 TABLET, ORALLY DISINTEGRATING ORAL EVERY 8 HOURS PRN
Status: DISCONTINUED | OUTPATIENT
Start: 2023-07-18 | End: 2023-07-21 | Stop reason: HOSPADM

## 2023-07-18 RX ORDER — SODIUM CHLORIDE 0.9 % (FLUSH) 0.9 %
10 SYRINGE (ML) INJECTION PRN
Status: DISCONTINUED | OUTPATIENT
Start: 2023-07-18 | End: 2023-07-21 | Stop reason: HOSPADM

## 2023-07-18 RX ORDER — ASPIRIN 81 MG/1
81 TABLET ORAL DAILY
Status: DISCONTINUED | OUTPATIENT
Start: 2023-07-19 | End: 2023-07-21 | Stop reason: HOSPADM

## 2023-07-18 RX ORDER — VENLAFAXINE HYDROCHLORIDE 150 MG/1
150 CAPSULE, EXTENDED RELEASE ORAL DAILY
Status: DISCONTINUED | OUTPATIENT
Start: 2023-07-19 | End: 2023-07-21 | Stop reason: HOSPADM

## 2023-07-18 RX ORDER — NITROFURANTOIN MACROCRYSTALS 50 MG/1
CAPSULE ORAL
COMMUNITY
Start: 2023-06-05

## 2023-07-18 RX ORDER — ALBUTEROL SULFATE 90 UG/1
2 AEROSOL, METERED RESPIRATORY (INHALATION) EVERY 4 HOURS PRN
Status: DISCONTINUED | OUTPATIENT
Start: 2023-07-18 | End: 2023-07-21 | Stop reason: HOSPADM

## 2023-07-18 RX ORDER — FOLIC ACID 1 MG/1
1 TABLET ORAL DAILY
Status: DISCONTINUED | OUTPATIENT
Start: 2023-07-19 | End: 2023-07-21 | Stop reason: HOSPADM

## 2023-07-18 RX ORDER — KETOROLAC TROMETHAMINE 30 MG/ML
15 INJECTION, SOLUTION INTRAMUSCULAR; INTRAVENOUS ONCE
Status: COMPLETED | OUTPATIENT
Start: 2023-07-18 | End: 2023-07-18

## 2023-07-18 RX ORDER — DILTIAZEM HYDROCHLORIDE 120 MG/1
120 CAPSULE, COATED, EXTENDED RELEASE ORAL DAILY
Status: DISCONTINUED | OUTPATIENT
Start: 2023-07-19 | End: 2023-07-21 | Stop reason: HOSPADM

## 2023-07-18 RX ORDER — TROSPIUM CHLORIDE 20 MG/1
20 TABLET, FILM COATED ORAL
Status: DISCONTINUED | OUTPATIENT
Start: 2023-07-19 | End: 2023-07-19

## 2023-07-18 RX ORDER — METHYLPREDNISOLONE SODIUM SUCCINATE 40 MG/ML
40 INJECTION, POWDER, LYOPHILIZED, FOR SOLUTION INTRAMUSCULAR; INTRAVENOUS EVERY 12 HOURS
Status: DISCONTINUED | OUTPATIENT
Start: 2023-07-18 | End: 2023-07-21 | Stop reason: HOSPADM

## 2023-07-18 RX ORDER — FERROUS SULFATE 325(65) MG
325 TABLET ORAL
Status: DISCONTINUED | OUTPATIENT
Start: 2023-07-19 | End: 2023-07-21 | Stop reason: HOSPADM

## 2023-07-18 RX ORDER — BENZONATATE 100 MG/1
100 CAPSULE ORAL 3 TIMES DAILY PRN
Status: DISCONTINUED | OUTPATIENT
Start: 2023-07-18 | End: 2023-07-21 | Stop reason: HOSPADM

## 2023-07-18 RX ORDER — DEXAMETHASONE SODIUM PHOSPHATE 10 MG/ML
6 INJECTION INTRAMUSCULAR; INTRAVENOUS EVERY 24 HOURS
Status: DISCONTINUED | OUTPATIENT
Start: 2023-07-18 | End: 2023-07-18

## 2023-07-18 RX ORDER — POLYVINYL ALCOHOL 14 MG/ML
1 SOLUTION/ DROPS OPHTHALMIC PRN
Status: DISCONTINUED | OUTPATIENT
Start: 2023-07-18 | End: 2023-07-21 | Stop reason: HOSPADM

## 2023-07-18 RX ORDER — INSULIN LISPRO 100 [IU]/ML
0-4 INJECTION, SOLUTION INTRAVENOUS; SUBCUTANEOUS
Status: DISCONTINUED | OUTPATIENT
Start: 2023-07-19 | End: 2023-07-21 | Stop reason: HOSPADM

## 2023-07-18 RX ADMIN — SODIUM CHLORIDE 1000 ML: 9 INJECTION, SOLUTION INTRAVENOUS at 19:58

## 2023-07-18 RX ADMIN — METHYLPREDNISOLONE SODIUM SUCCINATE 40 MG: 40 INJECTION INTRAMUSCULAR; INTRAVENOUS at 22:54

## 2023-07-18 RX ADMIN — KETOROLAC TROMETHAMINE 15 MG: 30 INJECTION, SOLUTION INTRAMUSCULAR; INTRAVENOUS at 19:59

## 2023-07-18 RX ADMIN — MONTELUKAST 10 MG: 10 TABLET, FILM COATED ORAL at 22:51

## 2023-07-18 RX ADMIN — METOPROLOL TARTRATE 50 MG: 50 TABLET, FILM COATED ORAL at 22:51

## 2023-07-18 RX ADMIN — ATORVASTATIN CALCIUM 40 MG: 40 TABLET, FILM COATED ORAL at 22:51

## 2023-07-18 RX ADMIN — DOXYCYCLINE 100 MG: 100 INJECTION, POWDER, LYOPHILIZED, FOR SOLUTION INTRAVENOUS at 20:59

## 2023-07-18 RX ADMIN — INSULIN GLARGINE 14 UNITS: 100 INJECTION, SOLUTION SUBCUTANEOUS at 22:52

## 2023-07-18 RX ADMIN — APIXABAN 5 MG: 5 TABLET, FILM COATED ORAL at 22:51

## 2023-07-18 ASSESSMENT — PAIN - FUNCTIONAL ASSESSMENT: PAIN_FUNCTIONAL_ASSESSMENT: NONE - DENIES PAIN

## 2023-07-18 NOTE — ED NOTES
Pt covered in urine and feces upon arrival to ED. Pt cleaned up and a purewick was placed.      Carmelita Asencio RN  07/18/23 [id number]

## 2023-07-19 LAB
ALBUMIN SERPL-MCNC: 3.5 G/DL (ref 3.4–5)
ALBUMIN/GLOB SERPL: 0.8 {RATIO} (ref 1.1–2.2)
ALP SERPL-CCNC: 165 U/L (ref 40–129)
ALT SERPL-CCNC: 31 U/L (ref 10–40)
ANION GAP SERPL CALCULATED.3IONS-SCNC: 11 MMOL/L (ref 3–16)
AST SERPL-CCNC: 43 U/L (ref 15–37)
BACTERIA UR CULT: NORMAL
BASOPHILS # BLD: 0 K/UL (ref 0–0.2)
BASOPHILS NFR BLD: 0.3 %
BILIRUB SERPL-MCNC: 0.8 MG/DL (ref 0–1)
BUN SERPL-MCNC: 11 MG/DL (ref 7–20)
CALCIUM SERPL-MCNC: 9.1 MG/DL (ref 8.3–10.6)
CHLORIDE SERPL-SCNC: 102 MMOL/L (ref 99–110)
CO2 SERPL-SCNC: 23 MMOL/L (ref 21–32)
CREAT SERPL-MCNC: 0.7 MG/DL (ref 0.6–1.2)
CRP SERPL-MCNC: 46 MG/L (ref 0–5.1)
D DIMER: 0.46 UG/ML FEU (ref 0–0.6)
DEPRECATED RDW RBC AUTO: 14.9 % (ref 12.4–15.4)
EKG ATRIAL RATE: 104 BPM
EKG DIAGNOSIS: NORMAL
EKG P AXIS: 67 DEGREES
EKG P-R INTERVAL: 170 MS
EKG Q-T INTERVAL: 356 MS
EKG QRS DURATION: 78 MS
EKG QTC CALCULATION (BAZETT): 468 MS
EKG R AXIS: 39 DEGREES
EKG T AXIS: 52 DEGREES
EKG VENTRICULAR RATE: 104 BPM
EOSINOPHIL # BLD: 0 K/UL (ref 0–0.6)
EOSINOPHIL NFR BLD: 0 %
EST. AVERAGE GLUCOSE BLD GHB EST-MCNC: 128.4 MG/DL
FIBRINOGEN PPP-MCNC: 489 MG/DL (ref 243–550)
GFR SERPLBLD CREATININE-BSD FMLA CKD-EPI: >60 ML/MIN/{1.73_M2}
GLUCOSE BLD-MCNC: 149 MG/DL (ref 70–99)
GLUCOSE BLD-MCNC: 203 MG/DL (ref 70–99)
GLUCOSE BLD-MCNC: 222 MG/DL (ref 70–99)
GLUCOSE BLD-MCNC: 307 MG/DL (ref 70–99)
GLUCOSE SERPL-MCNC: 231 MG/DL (ref 70–99)
HBA1C MFR BLD: 6.1 %
HCT VFR BLD AUTO: 29.4 % (ref 36–48)
HGB BLD-MCNC: 9.8 G/DL (ref 12–16)
INR PPP: 1.47 (ref 0.84–1.16)
LYMPHOCYTES # BLD: 0.5 K/UL (ref 1–5.1)
LYMPHOCYTES NFR BLD: 10.5 %
MCH RBC QN AUTO: 31.7 PG (ref 26–34)
MCHC RBC AUTO-ENTMCNC: 33.5 G/DL (ref 31–36)
MCV RBC AUTO: 94.6 FL (ref 80–100)
MONOCYTES # BLD: 0.2 K/UL (ref 0–1.3)
MONOCYTES NFR BLD: 3.4 %
NEUTROPHILS # BLD: 4.3 K/UL (ref 1.7–7.7)
NEUTROPHILS NFR BLD: 85.8 %
PERFORMED ON: ABNORMAL
PLATELET # BLD AUTO: 56 K/UL (ref 135–450)
PMV BLD AUTO: 11.5 FL (ref 5–10.5)
POTASSIUM SERPL-SCNC: 4.2 MMOL/L (ref 3.5–5.1)
PROT SERPL-MCNC: 7.9 G/DL (ref 6.4–8.2)
PROTHROMBIN TIME: 17.8 SEC (ref 11.5–14.8)
RBC # BLD AUTO: 3.1 M/UL (ref 4–5.2)
SODIUM SERPL-SCNC: 136 MMOL/L (ref 136–145)
WBC # BLD AUTO: 5 K/UL (ref 4–11)

## 2023-07-19 PROCEDURE — 86140 C-REACTIVE PROTEIN: CPT

## 2023-07-19 PROCEDURE — 2060000000 HC ICU INTERMEDIATE R&B

## 2023-07-19 PROCEDURE — 2580000003 HC RX 258: Performed by: INTERNAL MEDICINE

## 2023-07-19 PROCEDURE — 6360000002 HC RX W HCPCS: Performed by: INTERNAL MEDICINE

## 2023-07-19 PROCEDURE — 6370000000 HC RX 637 (ALT 250 FOR IP): Performed by: INTERNAL MEDICINE

## 2023-07-19 PROCEDURE — 80053 COMPREHEN METABOLIC PANEL: CPT

## 2023-07-19 PROCEDURE — 94640 AIRWAY INHALATION TREATMENT: CPT

## 2023-07-19 PROCEDURE — 2580000003 HC RX 258

## 2023-07-19 PROCEDURE — 2700000000 HC OXYGEN THERAPY PER DAY

## 2023-07-19 PROCEDURE — 85384 FIBRINOGEN ACTIVITY: CPT

## 2023-07-19 PROCEDURE — 85610 PROTHROMBIN TIME: CPT

## 2023-07-19 PROCEDURE — 94761 N-INVAS EAR/PLS OXIMETRY MLT: CPT

## 2023-07-19 PROCEDURE — 93010 ELECTROCARDIOGRAM REPORT: CPT | Performed by: INTERNAL MEDICINE

## 2023-07-19 PROCEDURE — 85025 COMPLETE CBC W/AUTO DIFF WBC: CPT

## 2023-07-19 PROCEDURE — 83036 HEMOGLOBIN GLYCOSYLATED A1C: CPT

## 2023-07-19 PROCEDURE — 85379 FIBRIN DEGRADATION QUANT: CPT

## 2023-07-19 PROCEDURE — 36415 COLL VENOUS BLD VENIPUNCTURE: CPT

## 2023-07-19 RX ORDER — WATER 1000 ML/1000ML
INJECTION, SOLUTION INTRAVENOUS
Status: COMPLETED
Start: 2023-07-19 | End: 2023-07-19

## 2023-07-19 RX ADMIN — ASPIRIN 81 MG: 81 TABLET, COATED ORAL at 09:54

## 2023-07-19 RX ADMIN — INSULIN LISPRO 5 UNITS: 100 INJECTION, SOLUTION INTRAVENOUS; SUBCUTANEOUS at 18:48

## 2023-07-19 RX ADMIN — Medication 2 PUFF: at 15:22

## 2023-07-19 RX ADMIN — INSULIN LISPRO 5 UNITS: 100 INJECTION, SOLUTION INTRAVENOUS; SUBCUTANEOUS at 10:01

## 2023-07-19 RX ADMIN — BENZONATATE 100 MG: 100 CAPSULE ORAL at 09:55

## 2023-07-19 RX ADMIN — APIXABAN 5 MG: 5 TABLET, FILM COATED ORAL at 09:54

## 2023-07-19 RX ADMIN — ACETAMINOPHEN 650 MG: 325 TABLET ORAL at 15:19

## 2023-07-19 RX ADMIN — Medication 2 PUFF: at 12:18

## 2023-07-19 RX ADMIN — APIXABAN 5 MG: 5 TABLET, FILM COATED ORAL at 22:25

## 2023-07-19 RX ADMIN — VENLAFAXINE HYDROCHLORIDE 150 MG: 150 CAPSULE, EXTENDED RELEASE ORAL at 09:54

## 2023-07-19 RX ADMIN — METOPROLOL TARTRATE 50 MG: 50 TABLET, FILM COATED ORAL at 22:25

## 2023-07-19 RX ADMIN — MONTELUKAST 10 MG: 10 TABLET, FILM COATED ORAL at 22:38

## 2023-07-19 RX ADMIN — Medication 2 PUFF: at 20:11

## 2023-07-19 RX ADMIN — INSULIN GLARGINE 14 UNITS: 100 INJECTION, SOLUTION SUBCUTANEOUS at 22:23

## 2023-07-19 RX ADMIN — Medication 2 PUFF: at 06:02

## 2023-07-19 RX ADMIN — Medication 2 PUFF: at 12:19

## 2023-07-19 RX ADMIN — PANTOPRAZOLE SODIUM 40 MG: 40 TABLET, DELAYED RELEASE ORAL at 05:11

## 2023-07-19 RX ADMIN — TROSPIUM CHLORIDE 20 MG: 20 TABLET, FILM COATED ORAL at 15:19

## 2023-07-19 RX ADMIN — DILTIAZEM HYDROCHLORIDE 120 MG: 120 CAPSULE, COATED, EXTENDED RELEASE ORAL at 09:56

## 2023-07-19 RX ADMIN — WATER 1 ML: 1 INJECTION INTRAMUSCULAR; INTRAVENOUS; SUBCUTANEOUS at 22:24

## 2023-07-19 RX ADMIN — METOPROLOL TARTRATE 50 MG: 50 TABLET, FILM COATED ORAL at 09:56

## 2023-07-19 RX ADMIN — METHYLPREDNISOLONE SODIUM SUCCINATE 40 MG: 40 INJECTION INTRAMUSCULAR; INTRAVENOUS at 09:53

## 2023-07-19 RX ADMIN — Medication 2 PUFF: at 15:21

## 2023-07-19 RX ADMIN — ATORVASTATIN CALCIUM 40 MG: 40 TABLET, FILM COATED ORAL at 22:25

## 2023-07-19 RX ADMIN — INSULIN LISPRO 3 UNITS: 100 INJECTION, SOLUTION INTRAVENOUS; SUBCUTANEOUS at 18:47

## 2023-07-19 RX ADMIN — FERROUS SULFATE TAB 325 MG (65 MG ELEMENTAL FE) 325 MG: 325 (65 FE) TAB at 09:58

## 2023-07-19 RX ADMIN — FOLIC ACID 1 MG: 1 TABLET ORAL at 09:53

## 2023-07-19 RX ADMIN — INSULIN LISPRO 1 UNITS: 100 INJECTION, SOLUTION INTRAVENOUS; SUBCUTANEOUS at 10:01

## 2023-07-19 RX ADMIN — Medication 2 PUFF: at 20:10

## 2023-07-19 RX ADMIN — METHYLPREDNISOLONE SODIUM SUCCINATE 40 MG: 40 INJECTION INTRAMUSCULAR; INTRAVENOUS at 22:24

## 2023-07-19 RX ADMIN — Medication 10 ML: at 09:53

## 2023-07-19 RX ADMIN — TROSPIUM CHLORIDE 20 MG: 20 TABLET, FILM COATED ORAL at 05:11

## 2023-07-19 ASSESSMENT — PAIN SCALES - GENERAL
PAINLEVEL_OUTOF10: 0
PAINLEVEL_OUTOF10: 5
PAINLEVEL_OUTOF10: 0
PAINLEVEL_OUTOF10: 5

## 2023-07-19 ASSESSMENT — PAIN DESCRIPTION - LOCATION
LOCATION: BACK
LOCATION: BACK

## 2023-07-19 NOTE — PROGRESS NOTES
4 Eyes Skin Assessment     The patient is being assess for  Admission    I agree that 2 RN's have performed a thorough Head to Toe Skin Assessment on the patient. ALL assessment sites listed below have been assessed. Areas assessed by both nurses: Nataly Crotes RN  [x]   Head, Face, and Ears   [x]   Shoulders, Back, and Chest  [x]   Arms, Elbows, and Hands   [x]   Coccyx, Sacrum, and IschIum  [x]   Legs, Feet, and Heels        Does the Patient have Skin Breakdown?   No         Tony Prevention initiated:  Yes  Wound Care Orders initiated:  No      [facility] nurse consulted for Pressure Injury (Stage 3,4, Unstageable, DTI, NWPT, and Complex wounds), New and Established Ostomies:  No      Nurse 1 eSignature: Electronically signed by Brandon Jessica RN on 7/18/23 at 11:41 PM EDT    **SHARE this note so that the co-signing nurse is able to place an eSignature**    Nurse 2 eSignature: {Esignature:[id number]}

## 2023-07-19 NOTE — PROGRESS NOTES
Pt admitted to room 431 from ER. VSS. Admission and shift assessment charted and completed. Pt placed in droplet precautions. Pt alert and oriented to self. Pt on 2L NC. Medications given. Lung sounds- wheezing BLL. Purewick in place. 4 eye skin assessment completed. Bed alarm on. Bedside table and call light within reach. Nonskid socks on. Pt denies further needs or questions.

## 2023-07-19 NOTE — CARE COORDINATION
Case Management Assessment  Initial Evaluation    Date/Time of Evaluation: 7/19/2023 4:08 PM  Assessment Completed by: Erin Cintron RN    If patient is discharged prior to next notation, then this note serves as note for discharge by case management. Patient Name: To River                   YOB: 1950  Diagnosis: Urinary tract infection with hematuria, site unspecified [N39.0, R31.9]  Acute hypoxemic respiratory failure due to COVID-19 ([address]) [U07.1, J96.01]  Acute respiratory failure due to severe acute respiratory syndrome coronavirus 2 (SARS-CoV-2) infection ([address]) [U07.1, J96.00]                   Date / Time: 7/18/2023  6:30 PM    Patient Admission Status: Inpatient   Readmission Risk (Low < 19, Mod (19-27), High > 27): Readmission Risk Score: 17.7    Current PCP: Chris Shannon MD  PCP verified by CM? Yes    Chart Reviewed: Yes      History Provided by: Patient  Patient Orientation: Alert and Oriented    Patient Cognition: Alert    Hospitalization in the last 30 days (Readmission):  No    If yes, Readmission Assessment in CM Navigator will be completed. Advance Directives:      Code Status: Full Code   Patient's Primary Decision Maker is: Legal Next of Kin    Primary Decision Maker: [address] [place] [address] [phone]    Discharge Planning:    Patient lives with: Spouse/Significant Other Type of Home: House  Primary Care Giver: Self  Patient Support Systems include: Family Members, Spouse/Significant Other   Current Financial resources: Medicare  Current community resources: None  Current services prior to admission: None            Current DME:              Type of Home Care services:  None    ADLS  Prior functional level: Independent in ADLs/IADLs  Current functional level: Independent in ADLs/IADLs    PT AM-PAC:   /24  OT AM-PAC:   /24    Family can provide assistance at DC:  Yes  Would you like Case Management to discuss the discharge plan with any other family

## 2023-07-19 NOTE — H&P
History and Physical    Hospital Medicine History & Physical      Patient: Austin Sandra  :  1950  MRN:  [id number]    Date of Service: 23    Chief Complaint   Patient presents with    Fatigue     Per EMS, pt is coming from home complaining of generalized weakness and concerns for covid. Per pt,  is positive for covid. Pt seems confused, unable to state her birthday. Pt states she has had a previous stroke with deficits, but unable to state what they are. Pt covered in urine and feces upon arrival.       HISTORY OF PRESENT ILLNESS:    Austin Sandra is a 81 y.o. female. She presented to the ER from home by ambulance. She lives with her spouse. She is debilitated as a result of multiple prior strokes. She is tired and not much of a historian, but I gather that she just started to feel ill today. She describes feeling very tired, having a cough but minimal, feeling dyspneic, loss of appetite, some nausea, and some loose stools. She was heavily soiled with urine and feces when she arrived. She relates her  has also been ill with similar symptoms lately. Review of Systems:  All pertinent positives and negatives are as noted in the HPI section. All other systems were reviewed and are negative.     Past Medical History:   Diagnosis Date    Arthritis     Asthma     Depression     Diabetes mellitus (HCC)     GERD (gastroesophageal reflux disease)     HCAP (healthcare-associated pneumonia)     Hypertension     Mycobacterium gordonae infection      18 + afb called from [facility] pending  ( from [facility] 3/29/18)    Paroxysmal atrial fibrillation (HCC)     Sepsis ([address])     TIA (transient ischemic attack)     Unspecified cerebral artery occlusion with cerebral infarction        Past Surgical History:   Procedure Laterality Date    APPENDECTOMY      BREAST BIOPSY      CHOLECYSTECTOMY      COLONOSCOPY  2017    polyp    CYST INCISION AND DRAINAGE  1986    on head

## 2023-07-19 NOTE — ED PROVIDER NOTES
[address] [place] [address]  ED  Emergency Department Encounter    Patient Name: Nusrat Rashid  MRN: [id number]  [facility] 1950  Date of Evaluation: 7/18/2023  Provider: eGovanna Miller MD  Note Started: 9:21 PM EDT 7/18/23    CHIEF COMPLAINT  Fatigue (Per EMS, pt is coming from home complaining of generalized weakness and concerns for covid. Per pt,  is positive for covid. Pt seems confused, unable to state her birthday. Pt states she has had a previous stroke with deficits, but unable to state what they are. Pt covered in urine and feces upon arrival.)    SHARED SERVICE VISIT  Evaluated by DEBBIE. My supervising physician was available for consultation. HISTORY OF PRESENT ILLNESS  Shiva Dia is a 81 y.o. female who presents to the ED for 1 to 2-day history of generalized malaise and fatigue with some shortness of breath and cough. Patient brought in by squad today for evaluation.  recently diagnosed with COVID. Patient reports that she feels fatigued. Denies chest pain although has had some mild cough and shortness of breath. Cough nonproductive. No hemoptysis. Denies fevers although has felt chilled. Mild headache without dizziness confusion. No nasal congestion sore throat. Nauseous with vomiting. No diarrhea or constipation. No black or bloody stools. Denies urinary symptoms. No other complaints, modifying factors or associated symptoms. Nursing notes reviewed were all reviewed and agreed with or any disagreements were addressed in the HPI.     PMH:  Past Medical History:   Diagnosis Date    Arthritis     Asthma     Depression     Diabetes mellitus ([address])     GERD (gastroesophageal reflux disease)     HCAP (healthcare-associated pneumonia)     Hypertension     Mycobacterium gordonae infection      5/7/18 + afb called from [facility] pending  ( from [facility] 3/29/18)    Paroxysmal atrial fibrillation ([address])     Sepsis ([address])     TIA (transient ischemic

## 2023-07-19 NOTE — RT PROTOCOL NOTE
Frequency of every 4 hours PRN for wheezing or increased work of breathing using Per Protocol order mode. 4-6 - enter or revise RT Bronchodilator order(s) to two equivalent RT bronchodilator orders with one order with BID Frequency and one order with Frequency of every 4 hours PRN wheezing or increased work of breathing using Per Protocol order mode. 7-10 - enter or revise RT Bronchodilator order(s) to two equivalent RT bronchodilator orders with one order with TID Frequency and one order with Frequency of every 4 hours PRN wheezing or increased work of breathing using Per Protocol order mode. 11-13 - enter or revise RT Bronchodilator order(s) to one equivalent RT bronchodilator order with QID Frequency and an Albuterol order with Frequency of every 4 hours PRN wheezing or increased work of breathing using Per Protocol order mode. Greater than 13 - enter or revise RT Bronchodilator order(s) to one equivalent RT bronchodilator order with every 4 hours Frequency and an Albuterol order with Frequency of every 2 hours PRN wheezing or increased work of breathing using Per Protocol order mode. RT to enter RT Home Evaluation for COPD & MDI Assessment order using Per Protocol order mode.     Electronically signed by Ezra De La Rosa RCP on 7/19/2023 at 12:01 AM

## 2023-07-19 NOTE — PROGRESS NOTES
V2.0    [facility] Progress Note      Name:  Nusrat Rashid /Age/Sex: 1950  (81 y.o. female)   MRN & CSN:  [id number] & [id number] Encounter Date/Time: 2023 9:21 AM EDT   Location:  [facility]/4153-39 PCP: Geovanna Miller MD     Attending:William Arciniega, [address] [place] [address] Day: 2    Assessment and Recommendations   Nusrat Rashid is a 81 y.o. female with pmh of Asthma, Afib, Multiple CVA's with residual deficits who presents with Acute respiratory failure due to severe acute respiratory syndrome coronavirus 2 (SARS-CoV-2) infection ([address])  Patient arrived to ED with confusion and complaints of fatigue, headache, weakness, nausea. Per EMS patient was found covered in her own urine and stool. Placed on 2L O2 with slight hypoxia of 88%. Covid test was positive. Has been admitted and is being treated for COVID. Plan:   Acute respiratory failure due to COVID-19  -Covid test positive in ED, patient has received three doses of covid vaccine  -Patient on oxygen support, currently at 1.5 L O2 at 95% sats. Continue to wean as tolerated. She does not use oxygen at baseline  -Chest x-ray without acute disease  -Continue solumedrol 40 mg IV q 12 hr  -CRP is elevated and trending up (22 to 46)  -LD normal at 163, D-dimer normal 0.46, fibrinogen normal 489  -Per covid protocol algorithm, patient falls under the watch and wait category, for which Baricitinib can be considered  -Continue Tylenol, zofran, benzonatate, and breathing treatments as needed  -Continue to monitor inflammatory markers, bmp, cbc, and O2 sats    2. Hx of Asthma  -Continue home montelukast 10 mg nightly  -Continue albuterol and ipratropium breathing treatments daily    3. History of paroxysmal Afib  4. Hypertension  - Continue Eliquis 5 mg bid  - Continue diltiazem 120 mg extended release daily  - Continue metoprolol 50 mg bid    5.  Hx of CVA's  - Has had multiple CVA's while on anticoagulation in the past, including CVA in  while on Xarelto  - Or  potassium chloride, 10 mEq, PRN  magnesium sulfate, 1,000 mg, PRN  ondansetron, 4 mg, Q8H PRN   Or  ondansetron, 4 mg, Q6H PRN  acetaminophen, 650 mg, Q6H PRN   Or  acetaminophen, 650 mg, Q6H PRN  benzonatate, 100 mg, TID PRN  albuterol sulfate HFA, 2 puff, Q4H PRN  melatonin, 3 mg, Nightly PRN  calcium carbonate, 1,000 mg, TID PRN        Labs and Imaging   XR CHEST PORTABLE    Result Date: 7/18/2023  EXAMINATION: ONE XRAY VIEW OF THE CHEST 7/18/2023 7:13 pm COMPARISON: Chest x-ray dated 02/15/2023. HISTORY: ORDERING SYSTEM PROVIDED HISTORY: SOB TECHNOLOGIST PROVIDED HISTORY: Reason for exam:->SOB Reason for Exam: possible covid FINDINGS: HEART/MEDIASTINUM: The cardiomediastinal silhouette is stable. PLEURA/LUNGS: There are no focal consolidations or pleural effusions. There is no appreciable pneumothorax. BONES/SOFT TISSUE: No acute abnormality. No radiographic evidence of acute pulmonary disease. CBC:   Recent Labs     07/18/23 1852 07/19/23 0456   WBC 6.1 5.0   HGB 9.6* 9.8*   PLT 66* 56*     BMP:    Recent Labs     07/18/23 1852 07/19/23 0456   * 136   K 3.8 4.2    102   CO2 24 23   BUN 11 11   CREATININE 0.8 0.7   GLUCOSE 173* 231*     Hepatic:   Recent Labs     07/18/23 1852 07/19/23 0456   AST 37 43*   ALT 25 31   BILITOT 0.8 0.8   ALKPHOS 146* 165*     Lipids:   Lab Results   Component Value Date/Time    CHOL 114 02/16/2023 06:28 AM    HDL 54 02/16/2023 06:28 AM    TRIG 81 02/16/2023 06:28 AM     Hemoglobin A1C:   Lab Results   Component Value Date/Time    LABA1C 6.1 07/19/2023 04:56 AM     TSH:   Lab Results   Component Value Date/Time    TSH 2.10 08/24/2022 05:59 AM     Troponin: No results found for: TROPONINT  Lactic Acid:   Recent Labs     07/18/23 1852   LACTA 1.8     BNP: No results for input(s): PROBNP in the last 72 hours.   UA:  Lab Results   Component Value Date/Time    NITRU Negative 07/18/2023 06:50 PM    COLORU Yellow 07/18/2023 06:50 PM    PHUR 7.0 07/18/2023

## 2023-07-20 LAB
ALBUMIN SERPL-MCNC: 3.7 G/DL (ref 3.4–5)
ALBUMIN/GLOB SERPL: 0.8 {RATIO} (ref 1.1–2.2)
ALP SERPL-CCNC: 155 U/L (ref 40–129)
ALT SERPL-CCNC: 26 U/L (ref 10–40)
ANION GAP SERPL CALCULATED.3IONS-SCNC: 10 MMOL/L (ref 3–16)
AST SERPL-CCNC: 30 U/L (ref 15–37)
BASOPHILS # BLD: 0 K/UL (ref 0–0.2)
BASOPHILS NFR BLD: 0.1 %
BILIRUB SERPL-MCNC: 0.5 MG/DL (ref 0–1)
BUN SERPL-MCNC: 14 MG/DL (ref 7–20)
CALCIUM SERPL-MCNC: 9.5 MG/DL (ref 8.3–10.6)
CHLORIDE SERPL-SCNC: 102 MMOL/L (ref 99–110)
CO2 SERPL-SCNC: 26 MMOL/L (ref 21–32)
CREAT SERPL-MCNC: 0.7 MG/DL (ref 0.6–1.2)
CRP SERPL-MCNC: 27.6 MG/L (ref 0–5.1)
D DIMER: 0.45 UG/ML FEU (ref 0–0.6)
DEPRECATED RDW RBC AUTO: 15.1 % (ref 12.4–15.4)
EOSINOPHIL # BLD: 0 K/UL (ref 0–0.6)
EOSINOPHIL NFR BLD: 0 %
FIBRINOGEN PPP-MCNC: 513 MG/DL (ref 243–550)
GFR SERPLBLD CREATININE-BSD FMLA CKD-EPI: >60 ML/MIN/{1.73_M2}
GLUCOSE BLD-MCNC: 179 MG/DL (ref 70–99)
GLUCOSE BLD-MCNC: 189 MG/DL (ref 70–99)
GLUCOSE BLD-MCNC: 231 MG/DL (ref 70–99)
GLUCOSE BLD-MCNC: 248 MG/DL (ref 70–99)
GLUCOSE SERPL-MCNC: 229 MG/DL (ref 70–99)
HCT VFR BLD AUTO: 32.5 % (ref 36–48)
HGB BLD-MCNC: 10.7 G/DL (ref 12–16)
INR PPP: 1.5 (ref 0.84–1.16)
LYMPHOCYTES # BLD: 0.6 K/UL (ref 1–5.1)
LYMPHOCYTES NFR BLD: 11.8 %
MCH RBC QN AUTO: 31.1 PG (ref 26–34)
MCHC RBC AUTO-ENTMCNC: 33 G/DL (ref 31–36)
MCV RBC AUTO: 94.5 FL (ref 80–100)
MONOCYTES # BLD: 0.2 K/UL (ref 0–1.3)
MONOCYTES NFR BLD: 3.8 %
NEUTROPHILS # BLD: 4.3 K/UL (ref 1.7–7.7)
NEUTROPHILS NFR BLD: 84.3 %
PERFORMED ON: ABNORMAL
PLATELET # BLD AUTO: 63 K/UL (ref 135–450)
PMV BLD AUTO: 10.9 FL (ref 5–10.5)
POTASSIUM SERPL-SCNC: 4.4 MMOL/L (ref 3.5–5.1)
PROT SERPL-MCNC: 8.2 G/DL (ref 6.4–8.2)
PROTHROMBIN TIME: 18.1 SEC (ref 11.5–14.8)
RBC # BLD AUTO: 3.44 M/UL (ref 4–5.2)
SODIUM SERPL-SCNC: 138 MMOL/L (ref 136–145)
WBC # BLD AUTO: 5.1 K/UL (ref 4–11)

## 2023-07-20 PROCEDURE — 85384 FIBRINOGEN ACTIVITY: CPT

## 2023-07-20 PROCEDURE — 86140 C-REACTIVE PROTEIN: CPT

## 2023-07-20 PROCEDURE — 85025 COMPLETE CBC W/AUTO DIFF WBC: CPT

## 2023-07-20 PROCEDURE — 2580000003 HC RX 258: Performed by: INTERNAL MEDICINE

## 2023-07-20 PROCEDURE — 80053 COMPREHEN METABOLIC PANEL: CPT

## 2023-07-20 PROCEDURE — 36415 COLL VENOUS BLD VENIPUNCTURE: CPT

## 2023-07-20 PROCEDURE — 6360000002 HC RX W HCPCS: Performed by: INTERNAL MEDICINE

## 2023-07-20 PROCEDURE — 94640 AIRWAY INHALATION TREATMENT: CPT

## 2023-07-20 PROCEDURE — 1200000000 HC SEMI PRIVATE

## 2023-07-20 PROCEDURE — 2700000000 HC OXYGEN THERAPY PER DAY

## 2023-07-20 PROCEDURE — 85610 PROTHROMBIN TIME: CPT

## 2023-07-20 PROCEDURE — 85379 FIBRIN DEGRADATION QUANT: CPT

## 2023-07-20 PROCEDURE — 94761 N-INVAS EAR/PLS OXIMETRY MLT: CPT

## 2023-07-20 PROCEDURE — 94760 N-INVAS EAR/PLS OXIMETRY 1: CPT

## 2023-07-20 PROCEDURE — 6370000000 HC RX 637 (ALT 250 FOR IP)

## 2023-07-20 PROCEDURE — 6370000000 HC RX 637 (ALT 250 FOR IP): Performed by: INTERNAL MEDICINE

## 2023-07-20 PROCEDURE — 2580000003 HC RX 258

## 2023-07-20 RX ORDER — WATER 1000 ML/1000ML
INJECTION, SOLUTION INTRAVENOUS
Status: COMPLETED
Start: 2023-07-20 | End: 2023-07-20

## 2023-07-20 RX ORDER — SULFAMETHOXAZOLE AND TRIMETHOPRIM 800; 160 MG/1; MG/1
1 TABLET ORAL EVERY 12 HOURS SCHEDULED
Status: DISCONTINUED | OUTPATIENT
Start: 2023-07-20 | End: 2023-07-21 | Stop reason: HOSPADM

## 2023-07-20 RX ADMIN — Medication 10 ML: at 11:27

## 2023-07-20 RX ADMIN — METHYLPREDNISOLONE SODIUM SUCCINATE 40 MG: 40 INJECTION INTRAMUSCULAR; INTRAVENOUS at 21:52

## 2023-07-20 RX ADMIN — METOPROLOL TARTRATE 50 MG: 50 TABLET, FILM COATED ORAL at 11:26

## 2023-07-20 RX ADMIN — METOPROLOL TARTRATE 50 MG: 50 TABLET, FILM COATED ORAL at 21:51

## 2023-07-20 RX ADMIN — DILTIAZEM HYDROCHLORIDE 120 MG: 120 CAPSULE, COATED, EXTENDED RELEASE ORAL at 11:27

## 2023-07-20 RX ADMIN — VENLAFAXINE HYDROCHLORIDE 150 MG: 150 CAPSULE, EXTENDED RELEASE ORAL at 11:26

## 2023-07-20 RX ADMIN — Medication 2 PUFF: at 11:46

## 2023-07-20 RX ADMIN — MONTELUKAST 10 MG: 10 TABLET, FILM COATED ORAL at 21:51

## 2023-07-20 RX ADMIN — Medication 10 ML: at 21:52

## 2023-07-20 RX ADMIN — PANTOPRAZOLE SODIUM 40 MG: 40 TABLET, DELAYED RELEASE ORAL at 05:01

## 2023-07-20 RX ADMIN — APIXABAN 5 MG: 5 TABLET, FILM COATED ORAL at 11:27

## 2023-07-20 RX ADMIN — APIXABAN 5 MG: 5 TABLET, FILM COATED ORAL at 21:51

## 2023-07-20 RX ADMIN — ATORVASTATIN CALCIUM 40 MG: 40 TABLET, FILM COATED ORAL at 21:51

## 2023-07-20 RX ADMIN — Medication 2 PUFF: at 08:14

## 2023-07-20 RX ADMIN — FOLIC ACID 1 MG: 1 TABLET ORAL at 11:27

## 2023-07-20 RX ADMIN — Medication 2 PUFF: at 21:42

## 2023-07-20 RX ADMIN — INSULIN GLARGINE 14 UNITS: 100 INJECTION, SOLUTION SUBCUTANEOUS at 21:51

## 2023-07-20 RX ADMIN — FERROUS SULFATE TAB 325 MG (65 MG ELEMENTAL FE) 325 MG: 325 (65 FE) TAB at 11:27

## 2023-07-20 RX ADMIN — ASPIRIN 81 MG: 81 TABLET, COATED ORAL at 11:27

## 2023-07-20 RX ADMIN — INSULIN LISPRO 5 UNITS: 100 INJECTION, SOLUTION INTRAVENOUS; SUBCUTANEOUS at 19:22

## 2023-07-20 RX ADMIN — METHYLPREDNISOLONE SODIUM SUCCINATE 40 MG: 40 INJECTION INTRAMUSCULAR; INTRAVENOUS at 11:32

## 2023-07-20 RX ADMIN — SULFAMETHOXAZOLE AND TRIMETHOPRIM 1 TABLET: 800; 160 TABLET ORAL at 21:51

## 2023-07-20 RX ADMIN — INSULIN LISPRO 1 UNITS: 100 INJECTION, SOLUTION INTRAVENOUS; SUBCUTANEOUS at 11:32

## 2023-07-20 RX ADMIN — INSULIN LISPRO 5 UNITS: 100 INJECTION, SOLUTION INTRAVENOUS; SUBCUTANEOUS at 11:31

## 2023-07-20 RX ADMIN — Medication 2 PUFF: at 08:13

## 2023-07-20 RX ADMIN — Medication 2 PUFF: at 16:14

## 2023-07-20 RX ADMIN — WATER 10 ML: 1 INJECTION INTRAMUSCULAR; INTRAVENOUS; SUBCUTANEOUS at 21:52

## 2023-07-20 NOTE — PLAN OF CARE
Problem: Discharge Planning  Goal: Discharge to home or other facility with appropriate resources  Outcome: Adequate for Discharge     Problem: Safety - Adult  Goal: Free from fall injury  Outcome: Adequate for Discharge     Problem: Skin/Tissue Integrity  Goal: Absence of new skin breakdown  Description: 1. Monitor for areas of redness and/or skin breakdown  2. Assess vascular access sites hourly  3. Every 4-6 hours minimum:  Change oxygen saturation probe site  4. Every 4-6 hours:  If on nasal continuous positive airway pressure, respiratory therapy assess nares and determine need for appliance change or resting period.   Outcome: Adequate for Discharge     Problem: Pain  Goal: Verbalizes/displays adequate comfort level or baseline comfort level  Outcome: Adequate for Discharge     Problem: Chronic Conditions and Co-morbidities  Goal: Patient's chronic conditions and co-morbidity symptoms are monitored and maintained or improved  Outcome: Adequate for Discharge

## 2023-07-20 NOTE — ACP (ADVANCE CARE PLANNING)
Advance Care Planning     Advance Care Planning Activator (Inpatient)  Conversation Note      Date of ACP Conversation: 7/20/2023     Conversation Conducted with: Patient with Decision Making Capacity    ACP Activator: Jeal Steele RN      Health Care Decision Maker:     Current Designated Health Care Decision Maker:     Primary Decision Maker: [facility] [phone]        Care Preferences    Ventilation: \"If you were in your present state of health and suddenly became very ill and were unable to breathe on your own, what would your preference be about the use of a ventilator (breathing machine) if it were available to you? \"      Would the patient desire the use of ventilator (breathing machine)?: yes    \"If your health worsens and it becomes clear that your chance of recovery is unlikely, what would your preference be about the use of a ventilator (breathing machine) if it were available to you? \"     Would the patient desire the use of ventilator (breathing machine)?: No      Resuscitation  \"CPR works best to restart the heart when there is a sudden event, like a heart attack, in someone who is otherwise healthy. Unfortunately, CPR does not typically restart the heart for people who have serious health conditions or who are very sick. \"    \"In the event your heart stopped as a result of an underlying serious health condition, would you want attempts to be made to restart your heart (answer \"yes\" for attempt to resuscitate) or would you prefer a natural death (answer \"no\" for do not attempt to resuscitate)? \" yes

## 2023-07-21 VITALS
HEIGHT: 60 IN | WEIGHT: 198.3 LBS | DIASTOLIC BLOOD PRESSURE: 66 MMHG | SYSTOLIC BLOOD PRESSURE: 134 MMHG | OXYGEN SATURATION: 100 % | HEART RATE: 62 BPM | BODY MASS INDEX: 38.93 KG/M2 | RESPIRATION RATE: 17 BRPM | TEMPERATURE: 97.8 F

## 2023-07-21 LAB
ALBUMIN SERPL-MCNC: 3.4 G/DL (ref 3.4–5)
ALBUMIN/GLOB SERPL: 0.8 {RATIO} (ref 1.1–2.2)
ALP SERPL-CCNC: 130 U/L (ref 40–129)
ALT SERPL-CCNC: 23 U/L (ref 10–40)
ANION GAP SERPL CALCULATED.3IONS-SCNC: 14 MMOL/L (ref 3–16)
AST SERPL-CCNC: 33 U/L (ref 15–37)
BASOPHILS # BLD: 0 K/UL (ref 0–0.2)
BASOPHILS NFR BLD: 0.1 %
BILIRUB SERPL-MCNC: 0.4 MG/DL (ref 0–1)
BUN SERPL-MCNC: 23 MG/DL (ref 7–20)
CALCIUM SERPL-MCNC: 9.2 MG/DL (ref 8.3–10.6)
CHLORIDE SERPL-SCNC: 101 MMOL/L (ref 99–110)
CO2 SERPL-SCNC: 21 MMOL/L (ref 21–32)
CREAT SERPL-MCNC: 0.8 MG/DL (ref 0.6–1.2)
CRP SERPL-MCNC: 5.6 MG/L (ref 0–5.1)
D DIMER: 0.45 UG/ML FEU (ref 0–0.6)
DEPRECATED RDW RBC AUTO: 15.1 % (ref 12.4–15.4)
EOSINOPHIL # BLD: 0 K/UL (ref 0–0.6)
EOSINOPHIL NFR BLD: 0 %
FIBRINOGEN PPP-MCNC: 398 MG/DL (ref 243–550)
GFR SERPLBLD CREATININE-BSD FMLA CKD-EPI: >60 ML/MIN/{1.73_M2}
GLUCOSE BLD-MCNC: 213 MG/DL (ref 70–99)
GLUCOSE SERPL-MCNC: 202 MG/DL (ref 70–99)
HCT VFR BLD AUTO: 31.4 % (ref 36–48)
HGB BLD-MCNC: 10.4 G/DL (ref 12–16)
INR PPP: 1.53 (ref 0.84–1.16)
LYMPHOCYTES # BLD: 0.8 K/UL (ref 1–5.1)
LYMPHOCYTES NFR BLD: 10.4 %
MCH RBC QN AUTO: 31.5 PG (ref 26–34)
MCHC RBC AUTO-ENTMCNC: 33.1 G/DL (ref 31–36)
MCV RBC AUTO: 95.2 FL (ref 80–100)
MONOCYTES # BLD: 0.3 K/UL (ref 0–1.3)
MONOCYTES NFR BLD: 3.7 %
NEUTROPHILS # BLD: 6.6 K/UL (ref 1.7–7.7)
NEUTROPHILS NFR BLD: 85.8 %
PERFORMED ON: ABNORMAL
PLATELET # BLD AUTO: 88 K/UL (ref 135–450)
PMV BLD AUTO: 11.6 FL (ref 5–10.5)
POTASSIUM SERPL-SCNC: 4.3 MMOL/L (ref 3.5–5.1)
PROT SERPL-MCNC: 7.9 G/DL (ref 6.4–8.2)
PROTHROMBIN TIME: 18.4 SEC (ref 11.5–14.8)
RBC # BLD AUTO: 3.3 M/UL (ref 4–5.2)
SODIUM SERPL-SCNC: 136 MMOL/L (ref 136–145)
WBC # BLD AUTO: 7.7 K/UL (ref 4–11)

## 2023-07-21 PROCEDURE — 80053 COMPREHEN METABOLIC PANEL: CPT

## 2023-07-21 PROCEDURE — 85379 FIBRIN DEGRADATION QUANT: CPT

## 2023-07-21 PROCEDURE — 94760 N-INVAS EAR/PLS OXIMETRY 1: CPT

## 2023-07-21 PROCEDURE — 6370000000 HC RX 637 (ALT 250 FOR IP): Performed by: INTERNAL MEDICINE

## 2023-07-21 PROCEDURE — 85384 FIBRINOGEN ACTIVITY: CPT

## 2023-07-21 PROCEDURE — 86140 C-REACTIVE PROTEIN: CPT

## 2023-07-21 PROCEDURE — 6370000000 HC RX 637 (ALT 250 FOR IP)

## 2023-07-21 PROCEDURE — 36415 COLL VENOUS BLD VENIPUNCTURE: CPT

## 2023-07-21 PROCEDURE — 85610 PROTHROMBIN TIME: CPT

## 2023-07-21 PROCEDURE — 94761 N-INVAS EAR/PLS OXIMETRY MLT: CPT

## 2023-07-21 PROCEDURE — 85025 COMPLETE CBC W/AUTO DIFF WBC: CPT

## 2023-07-21 PROCEDURE — 94640 AIRWAY INHALATION TREATMENT: CPT

## 2023-07-21 RX ORDER — SULFAMETHOXAZOLE AND TRIMETHOPRIM 800; 160 MG/1; MG/1
1 TABLET ORAL EVERY 12 HOURS SCHEDULED
Qty: 5 TABLET | Refills: 0 | Status: SHIPPED | OUTPATIENT
Start: 2023-07-21 | End: 2023-07-24

## 2023-07-21 RX ADMIN — Medication 2 PUFF: at 08:35

## 2023-07-21 RX ADMIN — INSULIN LISPRO 1 UNITS: 100 INJECTION, SOLUTION INTRAVENOUS; SUBCUTANEOUS at 08:25

## 2023-07-21 RX ADMIN — VENLAFAXINE HYDROCHLORIDE 150 MG: 150 CAPSULE, EXTENDED RELEASE ORAL at 08:23

## 2023-07-21 RX ADMIN — SULFAMETHOXAZOLE AND TRIMETHOPRIM 1 TABLET: 800; 160 TABLET ORAL at 08:23

## 2023-07-21 RX ADMIN — PANTOPRAZOLE SODIUM 40 MG: 40 TABLET, DELAYED RELEASE ORAL at 05:40

## 2023-07-21 RX ADMIN — INSULIN LISPRO 5 UNITS: 100 INJECTION, SOLUTION INTRAVENOUS; SUBCUTANEOUS at 08:24

## 2023-07-21 RX ADMIN — ASPIRIN 81 MG: 81 TABLET, COATED ORAL at 08:23

## 2023-07-21 RX ADMIN — FOLIC ACID 1 MG: 1 TABLET ORAL at 08:24

## 2023-07-21 RX ADMIN — APIXABAN 5 MG: 5 TABLET, FILM COATED ORAL at 08:23

## 2023-07-21 RX ADMIN — DILTIAZEM HYDROCHLORIDE 120 MG: 120 CAPSULE, COATED, EXTENDED RELEASE ORAL at 08:24

## 2023-07-21 RX ADMIN — FERROUS SULFATE TAB 325 MG (65 MG ELEMENTAL FE) 325 MG: 325 (65 FE) TAB at 08:24

## 2023-07-21 RX ADMIN — METOPROLOL TARTRATE 50 MG: 50 TABLET, FILM COATED ORAL at 08:23

## 2023-07-21 ASSESSMENT — PAIN SCALES - GENERAL
PAINLEVEL_OUTOF10: 0
PAINLEVEL_OUTOF10: 0

## 2023-07-21 NOTE — CARE COORDINATION
CASE MANAGEMENT DISCHARGE SUMMARY      Discharge to: Home w/spouse     IMM given: 7/21/23    New Durable Medical Equipment ordered/agency:     Transportation:    Family/car: Private:spouse      Confirmed discharge plan with:     Patient: yes     Family: per patient           RN, name: Marla Cordero RN    Note: Discharging nurse to complete ATUL, reconcile AVS, and place final copy with patient's discharge packet. RN to ensure that written prescriptions for  Level II medications are sent with patient to the facility as per protocol.

## 2023-07-21 NOTE — PLAN OF CARE
Problem: Discharge Planning  Goal: Discharge to home or other facility with appropriate resources  Outcome: Progressing     Problem: Safety - Adult  Goal: Free from fall injury  Outcome: Progressing     Problem: Skin/Tissue Integrity  Goal: Absence of new skin breakdown  Description: 1. Monitor for areas of redness and/or skin breakdown  2. Assess vascular access sites hourly  3. Every 4-6 hours minimum:  Change oxygen saturation probe site  4. Every 4-6 hours:  If on nasal continuous positive airway pressure, respiratory therapy assess nares and determine need for appliance change or resting period. Outcome: Progressing     Problem: Pain  Goal: Verbalizes/displays adequate comfort level or baseline comfort level  Outcome: Progressing     Problem: Chronic Conditions and Co-morbidities  Goal: Patient's chronic conditions and co-morbidity symptoms are monitored and maintained or improved  Outcome: Progressing  Note: Pt will have accuchecks before meals and at bedtime with sliding scale insulin in place for coverage. Will continue to monitor for signs and symptoms of hypoglycemia and hyperglycemia throughout shift.

## 2023-07-21 NOTE — DISCHARGE SUMMARY
Hospital Medicine Discharge Summary    Patient: Virginie Campuzano   : 1950     Admit Date: 2023   Discharge Date: 2023    Disposition:  [x]Home   []HHC  []SNF  []Acute Rehab  []LTAC  []Hospice  Code status:  [x]Full  []DNR/CCA  []Limited (DNR/CCA with Do Not Intubate)  []DNRCC  Condition at Discharge: Stable  Primary Care Provider: Deirdre Peralta MD    Admitting Provider: Alexis Sherman MD  Discharge Provider: Rosas Hines MD     Discharge Diagnoses: Active Hospital Problems    Diagnosis     Acute respiratory failure due to severe acute respiratory syndrome coronavirus 2 (SARS-CoV-2) infection ([facility]) [U07.1, J96.00]     History of ischemic cerebrovascular accident with residual deficit [I69.30]     DM type 2, controlled, with complication ([address]) [P96.7]     Stage 2 moderate COPD by GOLD classification ([address]) [J44.9]     Chronic thrombocytopenia [D69.6]     Severe persistent asthma with acute exacerbation [J45.51]     Morbid obesity with BMI of 40.0-44.9, adult ([address]) [E66.01, Z68.41]        Presenting Admission History:      Virginie Campuzano is a 81 y.o. female who presented to the ER from home by ambulance. She lives with her spouse. She is debilitated as a result of multiple prior strokes. She is tired and not much of a historian, but I gather that she just started to feel ill today. She describes feeling very tired, having a cough but minimal, feeling dyspneic, loss of appetite, some nausea, and some loose stools. She was heavily soiled with urine and feces when she arrived.      Assessment/Plan:      Acute hypoxic respiratory failure 2/2 COVID 19  - s/p steroids  - nebs ordered  - weaned to room air    UTI  - discharged on bactrim to complete course    Asthma  - no evidence of exacerbation  - continue home inhalers    DMII  - well controlled  - resume home regimen    PAF  - rate controlled  - continue cadizem, metoprolol  - on xarelto for [facility]    HTN  - well controlled  - continue

## 2023-07-31 ENCOUNTER — TELEPHONE (OUTPATIENT)
Dept: CARDIOLOGY CLINIC | Age: 73
End: 2023-07-31

## 2023-07-31 DIAGNOSIS — I48.0 PAROXYSMAL ATRIAL FIBRILLATION (HCC): Primary | ICD-10-CM

## 2023-07-31 NOTE — TELEPHONE ENCOUNTER
Pt is in \"donut hole\" and the cost of Eliquis has increased. Pt was wanting SAMPLES to carry them over. Please advise.

## 2023-07-31 NOTE — TELEPHONE ENCOUNTER
Spoke with pt's  Mele Lover, informed Mele Foster that samples have been set aside for pt.      LOT: [id number]  EXP: 03/2025

## 2023-08-22 ENCOUNTER — TELEPHONE (OUTPATIENT)
Dept: CARDIOLOGY CLINIC | Age: 73
End: 2023-08-22

## 2023-08-22 NOTE — TELEPHONE ENCOUNTER
Marcus Myers, pt's , presented himself in the office today with pt's bristol calderon application form and pt's updated medication list and medication allergy list. All forms have been placed in jmb's folder in ep suite.

## 2023-10-22 ENCOUNTER — APPOINTMENT (OUTPATIENT)
Dept: CT IMAGING | Age: 73
End: 2023-10-22
Payer: MEDICARE

## 2023-10-22 ENCOUNTER — APPOINTMENT (OUTPATIENT)
Dept: CT IMAGING | Age: 73
DRG: 064 | End: 2023-10-22
Attending: INTERNAL MEDICINE
Payer: MEDICARE

## 2023-10-22 ENCOUNTER — APPOINTMENT (OUTPATIENT)
Dept: GENERAL RADIOLOGY | Age: 73
End: 2023-10-22
Payer: MEDICARE

## 2023-10-22 ENCOUNTER — HOSPITAL ENCOUNTER (INPATIENT)
Age: 73
LOS: 6 days | Discharge: NURSING, REHAB OR LONG-TERM CARE | DRG: 064 | End: 2023-10-28
Attending: INTERNAL MEDICINE | Admitting: INTERNAL MEDICINE
Payer: MEDICARE

## 2023-10-22 ENCOUNTER — HOSPITAL ENCOUNTER (EMERGENCY)
Age: 73
Discharge: ANOTHER HOSPITAL | End: 2023-10-22
Attending: EMERGENCY MEDICINE
Payer: MEDICARE

## 2023-10-22 VITALS
WEIGHT: 200 LBS | RESPIRATION RATE: 33 BRPM | HEIGHT: 60 IN | DIASTOLIC BLOOD PRESSURE: 77 MMHG | OXYGEN SATURATION: 96 % | BODY MASS INDEX: 39.27 KG/M2 | TEMPERATURE: 98.7 F | SYSTOLIC BLOOD PRESSURE: 114 MMHG | HEART RATE: 164 BPM

## 2023-10-22 DIAGNOSIS — I48.91 ATRIAL FIBRILLATION WITH RAPID VENTRICULAR RESPONSE (HCC): ICD-10-CM

## 2023-10-22 DIAGNOSIS — N30.00 ACUTE CYSTITIS WITHOUT HEMATURIA: ICD-10-CM

## 2023-10-22 DIAGNOSIS — I62.9 INTRACRANIAL HEMORRHAGE (HCC): Primary | ICD-10-CM

## 2023-10-22 PROBLEM — I48.11 LONGSTANDING PERSISTENT ATRIAL FIBRILLATION (HCC): Status: ACTIVE | Noted: 2023-10-22

## 2023-10-22 PROBLEM — R40.1 STUPOR: Status: ACTIVE | Noted: 2023-10-22

## 2023-10-22 PROBLEM — I61.9 INTRAPARENCHYMAL HEMORRHAGE OF BRAIN (HCC): Status: ACTIVE | Noted: 2023-10-22

## 2023-10-22 PROBLEM — I61.9 INTRACEREBRAL HEMORRHAGE, NONTRAUMATIC (HCC): Status: ACTIVE | Noted: 2023-10-22

## 2023-10-22 PROBLEM — N17.9 AKI (ACUTE KIDNEY INJURY) (HCC): Status: ACTIVE | Noted: 2023-10-22

## 2023-10-22 LAB
ALBUMIN SERPL-MCNC: 3.2 G/DL (ref 3.4–5)
ALBUMIN/GLOB SERPL: 0.9 {RATIO} (ref 1.1–2.2)
ALP SERPL-CCNC: 114 U/L (ref 40–129)
ALT SERPL-CCNC: 14 U/L (ref 10–40)
ANION GAP SERPL CALCULATED.3IONS-SCNC: 16 MMOL/L (ref 3–16)
ANION GAP SERPL CALCULATED.3IONS-SCNC: 16 MMOL/L (ref 3–16)
ANION GAP SERPL CALCULATED.3IONS-SCNC: 19 MMOL/L (ref 3–16)
ANISOCYTOSIS BLD QL SMEAR: ABNORMAL
AST SERPL-CCNC: 16 U/L (ref 15–37)
BACTERIA URNS QL MICRO: ABNORMAL /HPF
BASOPHILS # BLD: 0 K/UL (ref 0–0.2)
BASOPHILS # BLD: 0 K/UL (ref 0–0.2)
BASOPHILS NFR BLD: 0 %
BASOPHILS NFR BLD: 0 %
BILIRUB SERPL-MCNC: 1.5 MG/DL (ref 0–1)
BILIRUB UR QL STRIP.AUTO: ABNORMAL
BUN SERPL-MCNC: 20 MG/DL (ref 7–20)
BUN SERPL-MCNC: 26 MG/DL (ref 7–20)
BUN SERPL-MCNC: 34 MG/DL (ref 7–20)
CALCIUM SERPL-MCNC: 8.5 MG/DL (ref 8.3–10.6)
CALCIUM SERPL-MCNC: 9.5 MG/DL (ref 8.3–10.6)
CALCIUM SERPL-MCNC: 9.8 MG/DL (ref 8.3–10.6)
CHLORIDE SERPL-SCNC: 100 MMOL/L (ref 99–110)
CHLORIDE SERPL-SCNC: 103 MMOL/L (ref 99–110)
CHLORIDE SERPL-SCNC: 98 MMOL/L (ref 99–110)
CLARITY UR: ABNORMAL
CO2 SERPL-SCNC: 18 MMOL/L (ref 21–32)
CO2 SERPL-SCNC: 18 MMOL/L (ref 21–32)
CO2 SERPL-SCNC: 22 MMOL/L (ref 21–32)
COLOR UR: YELLOW
CREAT SERPL-MCNC: 0.9 MG/DL (ref 0.6–1.2)
CREAT SERPL-MCNC: 1.3 MG/DL (ref 0.6–1.2)
CREAT SERPL-MCNC: 1.5 MG/DL (ref 0.6–1.2)
DEPRECATED RDW RBC AUTO: 15.5 % (ref 12.4–15.4)
DEPRECATED RDW RBC AUTO: 15.7 % (ref 12.4–15.4)
EKG ATRIAL RATE: 112 BPM
EKG ATRIAL RATE: 182 BPM
EKG DIAGNOSIS: NORMAL
EKG DIAGNOSIS: NORMAL
EKG P AXIS: 52 DEGREES
EKG P-R INTERVAL: 160 MS
EKG Q-T INTERVAL: 250 MS
EKG Q-T INTERVAL: 342 MS
EKG QRS DURATION: 72 MS
EKG QRS DURATION: 74 MS
EKG QTC CALCULATION (BAZETT): 415 MS
EKG QTC CALCULATION (BAZETT): 466 MS
EKG R AXIS: -6 DEGREES
EKG R AXIS: 26 DEGREES
EKG T AXIS: 157 DEGREES
EKG T AXIS: 41 DEGREES
EKG VENTRICULAR RATE: 112 BPM
EKG VENTRICULAR RATE: 166 BPM
EOSINOPHIL # BLD: 0 K/UL (ref 0–0.6)
EOSINOPHIL # BLD: 0 K/UL (ref 0–0.6)
EOSINOPHIL NFR BLD: 0 %
EOSINOPHIL NFR BLD: 0 %
EPI CELLS #/AREA URNS HPF: ABNORMAL /HPF (ref 0–5)
FLUAV RNA RESP QL NAA+PROBE: NOT DETECTED
FLUBV RNA RESP QL NAA+PROBE: NOT DETECTED
GFR SERPLBLD CREATININE-BSD FMLA CKD-EPI: 37 ML/MIN/{1.73_M2}
GFR SERPLBLD CREATININE-BSD FMLA CKD-EPI: 44 ML/MIN/{1.73_M2}
GFR SERPLBLD CREATININE-BSD FMLA CKD-EPI: >60 ML/MIN/{1.73_M2}
GLUCOSE BLD-MCNC: 166 MG/DL (ref 70–99)
GLUCOSE BLD-MCNC: 231 MG/DL (ref 70–99)
GLUCOSE SERPL-MCNC: 166 MG/DL (ref 70–99)
GLUCOSE SERPL-MCNC: 204 MG/DL (ref 70–99)
GLUCOSE SERPL-MCNC: 223 MG/DL (ref 70–99)
GLUCOSE UR STRIP.AUTO-MCNC: NEGATIVE MG/DL
HCT VFR BLD AUTO: 34.3 % (ref 36–48)
HCT VFR BLD AUTO: 36 % (ref 36–48)
HGB BLD-MCNC: 11 G/DL (ref 12–16)
HGB BLD-MCNC: 11.8 G/DL (ref 12–16)
HGB UR QL STRIP.AUTO: ABNORMAL
HYALINE CASTS #/AREA URNS LPF: ABNORMAL /LPF (ref 0–2)
KETONES UR STRIP.AUTO-MCNC: 15 MG/DL
LACTATE BLDV-SCNC: 1.8 MMOL/L (ref 0.4–2)
LACTATE BLDV-SCNC: 2.7 MMOL/L (ref 0.4–1.9)
LACTATE BLDV-SCNC: 3.2 MMOL/L (ref 0.4–1.9)
LACTATE BLDV-SCNC: 3.3 MMOL/L (ref 0.4–2)
LEUKOCYTE ESTERASE UR QL STRIP.AUTO: ABNORMAL
LYMPHOCYTES # BLD: 0.5 K/UL (ref 1–5.1)
LYMPHOCYTES # BLD: 0.8 K/UL (ref 1–5.1)
LYMPHOCYTES NFR BLD: 2 %
LYMPHOCYTES NFR BLD: 3 %
MAGNESIUM SERPL-MCNC: 1 MG/DL (ref 1.8–2.4)
MAGNESIUM SERPL-MCNC: 1.4 MG/DL (ref 1.8–2.4)
MCH RBC QN AUTO: 30.2 PG (ref 26–34)
MCH RBC QN AUTO: 30.9 PG (ref 26–34)
MCHC RBC AUTO-ENTMCNC: 32 G/DL (ref 31–36)
MCHC RBC AUTO-ENTMCNC: 32.9 G/DL (ref 31–36)
MCV RBC AUTO: 93.9 FL (ref 80–100)
MCV RBC AUTO: 94.5 FL (ref 80–100)
METAMYELOCYTES NFR BLD MANUAL: 1 %
MONOCYTES # BLD: 1.1 K/UL (ref 0–1.3)
MONOCYTES # BLD: 1.1 K/UL (ref 0–1.3)
MONOCYTES NFR BLD: 4 %
MONOCYTES NFR BLD: 4 %
MUCOUS THREADS #/AREA URNS LPF: ABNORMAL /LPF
MYELOCYTES NFR BLD MANUAL: 1 %
NEUTROPHILS # BLD: 24.6 K/UL (ref 1.7–7.7)
NEUTROPHILS # BLD: 25.3 K/UL (ref 1.7–7.7)
NEUTROPHILS NFR BLD: 82 %
NEUTROPHILS NFR BLD: 94 %
NEUTS BAND NFR BLD MANUAL: 9 % (ref 0–7)
NITRITE UR QL STRIP.AUTO: POSITIVE
PERFORMED ON: ABNORMAL
PERFORMED ON: ABNORMAL
PH UR STRIP.AUTO: 5.5 [PH] (ref 5–8)
PLATELET # BLD AUTO: 102 K/UL (ref 135–450)
PLATELET # BLD AUTO: 94 K/UL (ref 135–450)
PLATELET BLD QL SMEAR: ABNORMAL
PLATELET BLD QL SMEAR: ABNORMAL
PMV BLD AUTO: 12.7 FL (ref 5–10.5)
PMV BLD AUTO: 13.6 FL (ref 5–10.5)
POIKILOCYTOSIS BLD QL SMEAR: ABNORMAL
POTASSIUM SERPL-SCNC: 3.3 MMOL/L (ref 3.5–5.1)
POTASSIUM SERPL-SCNC: 3.3 MMOL/L (ref 3.5–5.1)
POTASSIUM SERPL-SCNC: 3.8 MMOL/L (ref 3.5–5.1)
PROCALCITONIN SERPL IA-MCNC: 7.3 NG/ML (ref 0–0.15)
PROT SERPL-MCNC: 6.7 G/DL (ref 6.4–8.2)
PROT UR STRIP.AUTO-MCNC: 100 MG/DL
RBC # BLD AUTO: 3.63 M/UL (ref 4–5.2)
RBC # BLD AUTO: 3.84 M/UL (ref 4–5.2)
RBC #/AREA URNS HPF: ABNORMAL /HPF (ref 0–4)
RBC MORPH BLD: NORMAL
SARS-COV-2 RNA RESP QL NAA+PROBE: NOT DETECTED
SLIDE REVIEW: ABNORMAL
SODIUM SERPL-SCNC: 136 MMOL/L (ref 136–145)
SODIUM SERPL-SCNC: 137 MMOL/L (ref 136–145)
SODIUM SERPL-SCNC: 137 MMOL/L (ref 136–145)
SP GR UR STRIP.AUTO: >=1.03 (ref 1–1.03)
TROPONIN, HIGH SENSITIVITY: 28 NG/L (ref 0–14)
UA COMPLETE W REFLEX CULTURE PNL UR: YES
UA DIPSTICK W REFLEX MICRO PNL UR: YES
URN SPEC COLLECT METH UR: ABNORMAL
UROBILINOGEN UR STRIP-ACNC: 4 E.U./DL
WBC # BLD AUTO: 26.5 K/UL (ref 4–11)
WBC # BLD AUTO: 26.9 K/UL (ref 4–11)
WBC #/AREA URNS HPF: ABNORMAL /HPF (ref 0–5)

## 2023-10-22 PROCEDURE — 6360000002 HC RX W HCPCS: Performed by: INTERNAL MEDICINE

## 2023-10-22 PROCEDURE — 71045 X-RAY EXAM CHEST 1 VIEW: CPT

## 2023-10-22 PROCEDURE — 95813 EEG EXTND MNTR 61-119 MIN: CPT

## 2023-10-22 PROCEDURE — 93010 ELECTROCARDIOGRAM REPORT: CPT | Performed by: INTERNAL MEDICINE

## 2023-10-22 PROCEDURE — 6370000000 HC RX 637 (ALT 250 FOR IP): Performed by: STUDENT IN AN ORGANIZED HEALTH CARE EDUCATION/TRAINING PROGRAM

## 2023-10-22 PROCEDURE — 36415 COLL VENOUS BLD VENIPUNCTURE: CPT

## 2023-10-22 PROCEDURE — 51702 INSERT TEMP BLADDER CATH: CPT

## 2023-10-22 PROCEDURE — 80061 LIPID PANEL: CPT

## 2023-10-22 PROCEDURE — 2500000003 HC RX 250 WO HCPCS

## 2023-10-22 PROCEDURE — 2500000003 HC RX 250 WO HCPCS: Performed by: INTERNAL MEDICINE

## 2023-10-22 PROCEDURE — 6360000002 HC RX W HCPCS

## 2023-10-22 PROCEDURE — 83605 ASSAY OF LACTIC ACID: CPT

## 2023-10-22 PROCEDURE — 84145 PROCALCITONIN (PCT): CPT

## 2023-10-22 PROCEDURE — 81001 URINALYSIS AUTO W/SCOPE: CPT

## 2023-10-22 PROCEDURE — 94761 N-INVAS EAR/PLS OXIMETRY MLT: CPT

## 2023-10-22 PROCEDURE — 87086 URINE CULTURE/COLONY COUNT: CPT

## 2023-10-22 PROCEDURE — 93005 ELECTROCARDIOGRAM TRACING: CPT | Performed by: EMERGENCY MEDICINE

## 2023-10-22 PROCEDURE — 99291 CRITICAL CARE FIRST HOUR: CPT | Performed by: INTERNAL MEDICINE

## 2023-10-22 PROCEDURE — 87040 BLOOD CULTURE FOR BACTERIA: CPT

## 2023-10-22 PROCEDURE — 2500000003 HC RX 250 WO HCPCS: Performed by: EMERGENCY MEDICINE

## 2023-10-22 PROCEDURE — 6360000002 HC RX W HCPCS: Performed by: STUDENT IN AN ORGANIZED HEALTH CARE EDUCATION/TRAINING PROGRAM

## 2023-10-22 PROCEDURE — 2580000003 HC RX 258

## 2023-10-22 PROCEDURE — 99223 1ST HOSP IP/OBS HIGH 75: CPT | Performed by: INTERNAL MEDICINE

## 2023-10-22 PROCEDURE — 2000000000 HC ICU R&B

## 2023-10-22 PROCEDURE — 96365 THER/PROPH/DIAG IV INF INIT: CPT

## 2023-10-22 PROCEDURE — 83036 HEMOGLOBIN GLYCOSYLATED A1C: CPT

## 2023-10-22 PROCEDURE — 96375 TX/PRO/DX INJ NEW DRUG ADDON: CPT

## 2023-10-22 PROCEDURE — 70450 CT HEAD/BRAIN W/O DYE: CPT

## 2023-10-22 PROCEDURE — 99223 1ST HOSP IP/OBS HIGH 75: CPT | Performed by: PSYCHIATRY & NEUROLOGY

## 2023-10-22 PROCEDURE — 6360000002 HC RX W HCPCS: Performed by: EMERGENCY MEDICINE

## 2023-10-22 PROCEDURE — 83735 ASSAY OF MAGNESIUM: CPT

## 2023-10-22 PROCEDURE — 96366 THER/PROPH/DIAG IV INF ADDON: CPT

## 2023-10-22 PROCEDURE — 85025 COMPLETE CBC W/AUTO DIFF WBC: CPT

## 2023-10-22 PROCEDURE — 80053 COMPREHEN METABOLIC PANEL: CPT

## 2023-10-22 PROCEDURE — 84484 ASSAY OF TROPONIN QUANT: CPT

## 2023-10-22 PROCEDURE — 2580000003 HC RX 258: Performed by: STUDENT IN AN ORGANIZED HEALTH CARE EDUCATION/TRAINING PROGRAM

## 2023-10-22 PROCEDURE — 2580000003 HC RX 258: Performed by: INTERNAL MEDICINE

## 2023-10-22 PROCEDURE — 2700000000 HC OXYGEN THERAPY PER DAY

## 2023-10-22 PROCEDURE — 80048 BASIC METABOLIC PNL TOTAL CA: CPT

## 2023-10-22 PROCEDURE — 99285 EMERGENCY DEPT VISIT HI MDM: CPT

## 2023-10-22 PROCEDURE — 93005 ELECTROCARDIOGRAM TRACING: CPT

## 2023-10-22 PROCEDURE — 2580000003 HC RX 258: Performed by: EMERGENCY MEDICINE

## 2023-10-22 PROCEDURE — 87636 SARSCOV2 & INF A&B AMP PRB: CPT

## 2023-10-22 RX ORDER — SODIUM CHLORIDE 9 MG/ML
INJECTION, SOLUTION INTRAVENOUS PRN
Status: DISCONTINUED | OUTPATIENT
Start: 2023-10-22 | End: 2023-10-28 | Stop reason: HOSPADM

## 2023-10-22 RX ORDER — DILTIAZEM HYDROCHLORIDE 5 MG/ML
INJECTION INTRAVENOUS
Status: DISPENSED
Start: 2023-10-22 | End: 2023-10-22

## 2023-10-22 RX ORDER — DILTIAZEM HYDROCHLORIDE 5 MG/ML
10 INJECTION INTRAVENOUS ONCE
Status: COMPLETED | OUTPATIENT
Start: 2023-10-22 | End: 2023-10-22

## 2023-10-22 RX ORDER — ASPIRIN 81 MG/1
81 TABLET ORAL DAILY
Status: DISCONTINUED | OUTPATIENT
Start: 2023-10-22 | End: 2023-10-28 | Stop reason: HOSPADM

## 2023-10-22 RX ORDER — MONTELUKAST SODIUM 10 MG/1
10 TABLET ORAL NIGHTLY
COMMUNITY

## 2023-10-22 RX ORDER — ALBUTEROL SULFATE 2.5 MG/3ML
2.5 SOLUTION RESPIRATORY (INHALATION) EVERY 4 HOURS PRN
Status: DISCONTINUED | OUTPATIENT
Start: 2023-10-22 | End: 2023-10-28 | Stop reason: HOSPADM

## 2023-10-22 RX ORDER — PANTOPRAZOLE SODIUM 40 MG/10ML
40 INJECTION, POWDER, LYOPHILIZED, FOR SOLUTION INTRAVENOUS DAILY
Status: DISCONTINUED | OUTPATIENT
Start: 2023-10-23 | End: 2023-10-27

## 2023-10-22 RX ORDER — INSULIN LISPRO 100 [IU]/ML
0-4 INJECTION, SOLUTION INTRAVENOUS; SUBCUTANEOUS NIGHTLY
Status: DISCONTINUED | OUTPATIENT
Start: 2023-10-22 | End: 2023-10-28 | Stop reason: HOSPADM

## 2023-10-22 RX ORDER — INSULIN LISPRO 100 [IU]/ML
0-4 INJECTION, SOLUTION INTRAVENOUS; SUBCUTANEOUS
Status: DISCONTINUED | OUTPATIENT
Start: 2023-10-22 | End: 2023-10-22 | Stop reason: HOSPADM

## 2023-10-22 RX ORDER — SODIUM CHLORIDE 0.9 % (FLUSH) 0.9 %
5-40 SYRINGE (ML) INJECTION EVERY 12 HOURS SCHEDULED
Status: DISCONTINUED | OUTPATIENT
Start: 2023-10-22 | End: 2023-10-28 | Stop reason: HOSPADM

## 2023-10-22 RX ORDER — NITROFURANTOIN MACROCRYSTALS 50 MG/1
50 CAPSULE ORAL NIGHTLY
COMMUNITY

## 2023-10-22 RX ORDER — DIGOXIN 0.25 MG/ML
250 INJECTION INTRAMUSCULAR; INTRAVENOUS ONCE
Status: COMPLETED | OUTPATIENT
Start: 2023-10-22 | End: 2023-10-22

## 2023-10-22 RX ORDER — FUROSEMIDE 10 MG/ML
40 INJECTION INTRAMUSCULAR; INTRAVENOUS ONCE
Status: COMPLETED | OUTPATIENT
Start: 2023-10-22 | End: 2023-10-22

## 2023-10-22 RX ORDER — POLYMYXIN B SULFATE AND TRIMETHOPRIM 1; 10000 MG/ML; [USP'U]/ML
1 SOLUTION OPHTHALMIC EVERY 6 HOURS
Status: DISCONTINUED | OUTPATIENT
Start: 2023-10-22 | End: 2023-10-22

## 2023-10-22 RX ORDER — SODIUM CHLORIDE 9 MG/ML
INJECTION, SOLUTION INTRAVENOUS CONTINUOUS
Status: DISCONTINUED | OUTPATIENT
Start: 2023-10-22 | End: 2023-10-24

## 2023-10-22 RX ORDER — MAGNESIUM SULFATE IN WATER 40 MG/ML
2000 INJECTION, SOLUTION INTRAVENOUS ONCE
Status: DISCONTINUED | OUTPATIENT
Start: 2023-10-22 | End: 2023-10-22 | Stop reason: HOSPADM

## 2023-10-22 RX ORDER — LEVOFLOXACIN 5 MG/ML
750 INJECTION, SOLUTION INTRAVENOUS EVERY 24 HOURS
Status: DISCONTINUED | OUTPATIENT
Start: 2023-10-23 | End: 2023-10-22

## 2023-10-22 RX ORDER — METOPROLOL TARTRATE 5 MG/5ML
5 INJECTION INTRAVENOUS ONCE
Status: COMPLETED | OUTPATIENT
Start: 2023-10-22 | End: 2023-10-22

## 2023-10-22 RX ORDER — LEVETIRACETAM 500 MG/5ML
1000 INJECTION, SOLUTION, CONCENTRATE INTRAVENOUS EVERY 12 HOURS
Status: DISCONTINUED | OUTPATIENT
Start: 2023-10-22 | End: 2023-10-22 | Stop reason: HOSPADM

## 2023-10-22 RX ORDER — TROSPIUM CHLORIDE 20 MG/1
20 TABLET, FILM COATED ORAL
Status: DISCONTINUED | OUTPATIENT
Start: 2023-10-22 | End: 2023-10-28 | Stop reason: HOSPADM

## 2023-10-22 RX ORDER — METOPROLOL TARTRATE 50 MG/1
50 TABLET, FILM COATED ORAL 2 TIMES DAILY
Status: DISCONTINUED | OUTPATIENT
Start: 2023-10-22 | End: 2023-10-28 | Stop reason: HOSPADM

## 2023-10-22 RX ORDER — ONDANSETRON 2 MG/ML
4 INJECTION INTRAMUSCULAR; INTRAVENOUS EVERY 6 HOURS PRN
Status: DISCONTINUED | OUTPATIENT
Start: 2023-10-22 | End: 2023-10-28 | Stop reason: HOSPADM

## 2023-10-22 RX ORDER — PANTOPRAZOLE SODIUM 40 MG/1
40 TABLET, DELAYED RELEASE ORAL NIGHTLY
Status: DISCONTINUED | OUTPATIENT
Start: 2023-10-22 | End: 2023-10-22

## 2023-10-22 RX ORDER — SODIUM CHLORIDE 9 MG/ML
INJECTION, SOLUTION INTRAVENOUS PRN
Status: DISCONTINUED | OUTPATIENT
Start: 2023-10-22 | End: 2023-10-22

## 2023-10-22 RX ORDER — MAGNESIUM SULFATE IN WATER 40 MG/ML
4000 INJECTION, SOLUTION INTRAVENOUS ONCE
Status: COMPLETED | OUTPATIENT
Start: 2023-10-22 | End: 2023-10-23

## 2023-10-22 RX ORDER — LEVETIRACETAM 500 MG/5ML
500 INJECTION, SOLUTION, CONCENTRATE INTRAVENOUS EVERY 12 HOURS
Status: DISCONTINUED | OUTPATIENT
Start: 2023-10-22 | End: 2023-10-23

## 2023-10-22 RX ORDER — LEVOFLOXACIN 5 MG/ML
750 INJECTION, SOLUTION INTRAVENOUS ONCE
Status: COMPLETED | OUTPATIENT
Start: 2023-10-22 | End: 2023-10-22

## 2023-10-22 RX ORDER — DEXTROSE MONOHYDRATE 100 MG/ML
INJECTION, SOLUTION INTRAVENOUS CONTINUOUS PRN
Status: DISCONTINUED | OUTPATIENT
Start: 2023-10-22 | End: 2023-10-28 | Stop reason: HOSPADM

## 2023-10-22 RX ORDER — DILTIAZEM HYDROCHLORIDE 5 MG/ML
10 INJECTION INTRAVENOUS ONCE
Status: DISCONTINUED | OUTPATIENT
Start: 2023-10-22 | End: 2023-10-22 | Stop reason: SDUPTHER

## 2023-10-22 RX ORDER — ATORVASTATIN CALCIUM 40 MG/1
40 TABLET, FILM COATED ORAL NIGHTLY
Status: DISCONTINUED | OUTPATIENT
Start: 2023-10-22 | End: 2023-10-27

## 2023-10-22 RX ORDER — INSULIN LISPRO 100 [IU]/ML
0-4 INJECTION, SOLUTION INTRAVENOUS; SUBCUTANEOUS
Status: DISCONTINUED | OUTPATIENT
Start: 2023-10-22 | End: 2023-10-28 | Stop reason: HOSPADM

## 2023-10-22 RX ORDER — POLYVINYL ALCOHOL 14 MG/ML
1 SOLUTION/ DROPS OPHTHALMIC 2 TIMES DAILY
Status: DISCONTINUED | OUTPATIENT
Start: 2023-10-22 | End: 2023-10-28 | Stop reason: HOSPADM

## 2023-10-22 RX ORDER — INSULIN LISPRO 100 [IU]/ML
0-4 INJECTION, SOLUTION INTRAVENOUS; SUBCUTANEOUS NIGHTLY
Status: DISCONTINUED | OUTPATIENT
Start: 2023-10-22 | End: 2023-10-22 | Stop reason: HOSPADM

## 2023-10-22 RX ORDER — GUAIFENESIN 600 MG/1
600 TABLET, EXTENDED RELEASE ORAL
COMMUNITY

## 2023-10-22 RX ORDER — SODIUM CHLORIDE 9 MG/ML
50 INJECTION, SOLUTION INTRAVENOUS ONCE
Status: COMPLETED | OUTPATIENT
Start: 2023-10-22 | End: 2023-10-22

## 2023-10-22 RX ORDER — VENLAFAXINE HYDROCHLORIDE 150 MG/1
150 CAPSULE, EXTENDED RELEASE ORAL DAILY
Status: DISCONTINUED | OUTPATIENT
Start: 2023-10-22 | End: 2023-10-27

## 2023-10-22 RX ORDER — POLYETHYLENE GLYCOL 3350 17 G/17G
17 POWDER, FOR SOLUTION ORAL DAILY PRN
Status: DISCONTINUED | OUTPATIENT
Start: 2023-10-22 | End: 2023-10-28 | Stop reason: HOSPADM

## 2023-10-22 RX ORDER — SODIUM CHLORIDE 0.9 % (FLUSH) 0.9 %
5-40 SYRINGE (ML) INJECTION PRN
Status: DISCONTINUED | OUTPATIENT
Start: 2023-10-22 | End: 2023-10-28 | Stop reason: HOSPADM

## 2023-10-22 RX ORDER — ONDANSETRON 4 MG/1
4 TABLET, ORALLY DISINTEGRATING ORAL EVERY 8 HOURS PRN
Status: DISCONTINUED | OUTPATIENT
Start: 2023-10-22 | End: 2023-10-28 | Stop reason: HOSPADM

## 2023-10-22 RX ADMIN — SODIUM CHLORIDE, PRESERVATIVE FREE 10 ML: 5 INJECTION INTRAVENOUS at 10:06

## 2023-10-22 RX ADMIN — PROTHROMBIN, COAGULATION FACTOR VII HUMAN, COAGULATION FACTOR IX HUMAN, COAGULATION FACTOR X HUMAN, PROTEIN C, PROTEIN S HUMAN, AND WATER 2000 UNITS: KIT at 04:54

## 2023-10-22 RX ADMIN — AMIODARONE HYDROCHLORIDE 150 MG: 50 INJECTION, SOLUTION INTRAVENOUS at 11:41

## 2023-10-22 RX ADMIN — LEVETIRACETAM 1000 MG: 100 INJECTION, SOLUTION INTRAVENOUS at 05:02

## 2023-10-22 RX ADMIN — DIGOXIN 250 MCG: 0.25 INJECTION INTRAMUSCULAR; INTRAVENOUS at 09:52

## 2023-10-22 RX ADMIN — MEROPENEM 1000 MG: 1 INJECTION, POWDER, FOR SOLUTION INTRAVENOUS at 12:33

## 2023-10-22 RX ADMIN — FUROSEMIDE 40 MG: 10 INJECTION, SOLUTION INTRAMUSCULAR; INTRAVENOUS at 10:01

## 2023-10-22 RX ADMIN — INSULIN LISPRO 1 UNITS: 100 INJECTION, SOLUTION INTRAVENOUS; SUBCUTANEOUS at 12:28

## 2023-10-22 RX ADMIN — DEXTROSE MONOHYDRATE 0.5 MG/MIN: 50 INJECTION, SOLUTION INTRAVENOUS at 18:14

## 2023-10-22 RX ADMIN — LEVETIRACETAM 500 MG: 500 INJECTION INTRAVENOUS at 18:17

## 2023-10-22 RX ADMIN — POLYVINYL ALCOHOL 1 DROP: 14 SOLUTION/ DROPS OPHTHALMIC at 22:52

## 2023-10-22 RX ADMIN — MEROPENEM 1000 MG: 1 INJECTION, POWDER, FOR SOLUTION INTRAVENOUS at 21:25

## 2023-10-22 RX ADMIN — DILTIAZEM HYDROCHLORIDE 10 MG: 5 INJECTION INTRAVENOUS at 08:52

## 2023-10-22 RX ADMIN — DESMOPRESSIN ACETATE 32 MCG: 4 SOLUTION INTRAVENOUS at 05:53

## 2023-10-22 RX ADMIN — DILTIAZEM HYDROCHLORIDE 10 MG: 5 INJECTION INTRAVENOUS at 05:56

## 2023-10-22 RX ADMIN — DEXTROSE MONOHYDRATE 0.5 MG/MIN: 50 INJECTION, SOLUTION INTRAVENOUS at 22:38

## 2023-10-22 RX ADMIN — METOPROLOL TARTRATE 5 MG: 1 INJECTION, SOLUTION INTRAVENOUS at 22:48

## 2023-10-22 RX ADMIN — LEVOFLOXACIN 750 MG: 5 INJECTION, SOLUTION INTRAVENOUS at 05:19

## 2023-10-22 RX ADMIN — SODIUM CHLORIDE 50 ML: 9 INJECTION, SOLUTION INTRAVENOUS at 05:08

## 2023-10-22 RX ADMIN — DEXTROSE MONOHYDRATE 1 MG/MIN: 50 INJECTION, SOLUTION INTRAVENOUS at 12:03

## 2023-10-22 RX ADMIN — DILTIAZEM HYDROCHLORIDE 10 MG: 5 INJECTION INTRAVENOUS at 09:15

## 2023-10-22 RX ADMIN — SODIUM CHLORIDE: 9 INJECTION, SOLUTION INTRAVENOUS at 18:25

## 2023-10-22 RX ADMIN — DILTIAZEM HYDROCHLORIDE 5 MG/HR: 5 INJECTION, SOLUTION INTRAVENOUS at 06:11

## 2023-10-22 RX ADMIN — MAGNESIUM SULFATE HEPTAHYDRATE 4000 MG: 40 INJECTION, SOLUTION INTRAVENOUS at 23:54

## 2023-10-22 ASSESSMENT — PAIN SCALES - WONG BAKER
WONGBAKER_NUMERICALRESPONSE: 0

## 2023-10-22 ASSESSMENT — PAIN SCALES - PAIN ASSESSMENT IN ADVANCED DEMENTIA (PAINAD)
FACIALEXPRESSION: 0
CONSOLABILITY: 0
NEGVOCALIZATION: 0
BODYLANGUAGE: 0
TOTALSCORE: 2
BREATHING: 2

## 2023-10-22 ASSESSMENT — LIFESTYLE VARIABLES
HOW MANY STANDARD DRINKS CONTAINING ALCOHOL DO YOU HAVE ON A TYPICAL DAY: PATIENT DOES NOT DRINK
HOW OFTEN DO YOU HAVE A DRINK CONTAINING ALCOHOL: NEVER

## 2023-10-22 NOTE — H&P
ICU HISTORY AND PHYSICAL       Hospital Day: 0  ICU Day:   0                                                       Code:Full Code  Admit Date: 10/22/2023  PCP: Chris Shannon MD                                  CC: AMS    HISTORY OF PRESENT ILLNESS:   Ms Cornell Mccartney is a 77y/o F with a PMHx of Afib (on Eliquis), aphasia, blindness, hearing impairment, HTN, T2DM, COPD who presents with AMS. She was taken to [facility] ED via EMS for AMS per her nursing home staff at [facility]. Staff stated she had only been responsive to pain, compared to baseline of speaking, telling her name and spontaneous movements. CT head showed acute hyperdense hemorrhage in anteromedial aspect of old L occipital parietal infarction. PAST HISTORY:     Past Medical History:   Diagnosis Date    Arthritis     Asthma     Depression     Diabetes mellitus (HCC)     GERD (gastroesophageal reflux disease)     HCAP (healthcare-associated pneumonia)     Hypertension     Mycobacterium gordonae infection      5/7/18 + afb called from [facility] pending  ( from [facility] 3/29/18)    Paroxysmal atrial fibrillation (HCC)     Sepsis ([address])     TIA (transient ischemic attack)     Unspecified cerebral artery occlusion with cerebral infarction        Past Surgical History:   Procedure Laterality Date    APPENDECTOMY      BREAST BIOPSY      CHOLECYSTECTOMY      COLONOSCOPY  01/19/2017    polyp    CYST INCISION AND DRAINAGE  1986    on head    HYSTERECTOMY (CERVIX STATUS UNKNOWN)      UPPER GASTROINTESTINAL ENDOSCOPY  01/19/2017    barretts ? SocialHistory:   The patient lives at    Alcohol:  Illicit drugs: no use  Tobacco:      Family History:  Family History   Problem Relation Age of Onset    Cancer Mother         breast ca       MEDICATIONS:     No current facility-administered medications on file prior to encounter.      Current Outpatient Medications on File Prior to Encounter   Medication Sig Dispense Refill    apixaban (ELIQUIS) 5 MG TABS metoprolol tartrate  50 mg Oral BID    trospium  20 mg Oral BID AC    pantoprazole  40 mg Oral Nightly    trimethoprim-polymyxin b  1 drop Both Eyes Q6H    venlafaxine  150 mg Oral Daily    sodium chloride flush  5-40 mL IntraVENous 2 times per day    levETIRAcetam  500 mg IntraVENous Q12H    [START ON 10/23/2023] levofloxacin  750 mg IntraVENous Q24H    dilTIAZem  10 mg IntraVENous Once    insulin lispro  0-4 Units SubCUTAneous TID WC    insulin lispro  0-4 Units SubCUTAneous Nightly      Continuous Infusions:   dilTIAZem      sodium chloride      dextrose       PRN Meds:dilTIAZem, polyethyl glycol-propyl glycol 0.4-0.3 %, sodium chloride flush, sodium chloride, ondansetron **OR** ondansetron, polyethylene glycol, glucose, dextrose bolus **OR** dextrose bolus, glucagon (rDNA), dextrose, albuterol    Allergies: Allergies   Allergen Reactions    Ace Inhibitors      Possible anaphylaxis    Acyclovir Anaphylaxis     Redness face & back, swollen tounge, eyes and throat     Cephalosporins      anaphylaxis  Lip swelling    Oxycodone-Acetaminophen      Respiratory distress    Percocet [Oxycodone-Acetaminophen] Anaphylaxis    Amoxicillin      hives    Penicillins Hives       REVIEW OF SYSTEMS:       History obtained from unobtainable from patient due to mental status. Obtained from nursing home, family, EMR. Review of Systems   Reason unable to perform ROS: ROS limited 2/2 AMS. PHYSICAL EXAM:       Vitals: LMP  (LMP Unknown)     I/O:  No intake or output data in the 24 hours ending 10/22/23 0937  No intake/output data recorded. No intake/output data recorded. Physical Examination:     Physical Exam  Constitutional:       Comments: Pt arousable. HENT:      Head: Normocephalic and atraumatic. Ears:      Comments: Hearing impaired, hears better on R side. Eyes:      Comments: Vision impairment. Cardiovascular:      Rate and Rhythm: Tachycardia present. Rhythm irregular.       Heart sounds: Normal

## 2023-10-22 NOTE — CONSULTS
input(s): \"CKTOTAL\", \"CKMB\", \"CKMBINDEX\", \"TROPONINI\" in the last 72 hours. No results for input(s): \"BNP\" in the last 72 hours. No results for input(s): \"TSH\" in the last 72 hours. No results for input(s): \"CHOL\", \"HDL\", \"LDLCALC\", \"TRIG\" in the last 72 hours.]    Lab Results   Component Value Date    TROPONINI <0.01 02/15/2023         Assessment / Plan:     1. PAF with RVR. Upon transfer to the [facility] repeat ECG revealed AF RVR. Patient received multiple medications and appears to be converting into sinus tachycardia with frequent PACs. 2.  History of recurrent strokes (ischemic and hemorrhagic), now acute hemorrhagic stroke. Patient is now off anticoagulation. 3.  Hypertension  4. COPD        - Agree with amiodarone drip. - When patient able to tolerate p.o. continue with Lopressor 50 mg p.o. twice daily  - Patient cannot be on any anticoagulation at this time. - Palliative care consult is reasonable at this time. - If patient's mental status improves, could consider outpatient evaluation for Watchman device. I have personally reviewed the reports and images of labs, radiological studies, cardiac studies including ECG's and telemetry, current and old medical records. The note was completed using EMR and Dragon dictation system. Every effort was made to ensure accuracy; however, inadvertent computerized transcription errors may be present. All questions and concerns were addressed to the patient/family. Alternatives to my treatment were discussed. I would like to thank you for providing me the opportunity to participate in the care of your patient. If you have any questions, please do not hesitate to contact me.      Garcia Perez MD, [facility] [place] [address]  Ph: [phone]  Fax: [phone]

## 2023-10-22 NOTE — PROGRESS NOTES
CONTINUOUS EEG    Name:  Leonela [facility] Record Number:  [id number]  Age: 81 y.o. Gender: female  : 1950  Today's Date:  10/22/2023  Room:  [address] [place] [address]  Vital Signs   /64   Pulse (!) 135   Resp (!) 33   LMP  (LMP Unknown)   SpO2 93%           Continuous EEG Testing Start Time:  18:29. Comments: Nuha gar [facility] contacted 18:46. Impedence on all leads are within normal limits. Today is the initial set up day for cvEEG. Patient head is NOT wrapped. Clarke Krishnamurthy R.N.  is aware that this patients cvEEG will be repositioned on 10/24 at 18:29. Roxana JEWELL was notified at 18:30 by this EEG technician. Patient's head was placed on blanket upon completion of cvEEG placement. Plan of Care: Begin monitoring.     Electronically signed by Alison Adams on 10/22/2023 at 6:54 PM

## 2023-10-22 NOTE — ED TRIAGE NOTES
[facility]. Per EMS, \"She's only been responsive to pain for us. The staff didn't tell us much, but the roommate said she's at her normal.\" Patient tachypneic in the bed; responding only to painful stimuli. Per SNF, \"We found her this morning completely altered and not responding to us. She normally talks and moves just fine, but tonight around 2100 she spit her pills out and refused to take them. \" SNF staff unsure how long patient has had decrease mentation stating, \"I mean I think she's only really alert to herself. \" SNF reports pt is blind in both eyes and heard of hearing. Staff reports patient has had an ongoing UTI, but facility is awaiting culture results.

## 2023-10-22 NOTE — PLAN OF CARE
General Internal Medicine Attending    Chart and data reviewed. Patient seen and examined, case discussed with medical resident. Physical exam repeated. Labs and imaging studies reviewed. Agree with documentation, assessment and plan as outlined      [facility] to [facility] ED for mental status change      From [facility] ED to [facility] ICU    Patient is blind and very hard of hearing. Hx of aphasia, CVA, hyperlipidemia, atrial fibrillation on eliquis, COPD, hypertension, GERD, obstructive sleep apnea, diabetes mellitus. Acute metabolic encephalopathy: Likely multifactorial sec to infection, possible seizure; possible acute CVA. Complicated UTI    Severe sepsis sec to UTI  WBC 26.5 on presentation with tachycardia, UTI and mental status changes    Acute hyperdense hemorrhage,  1.8 cm x 2.0 cm in area of previous stroke on left  left occipito- parietal infarction: sec to previous stroke, and/or anticoagulation, thrombocytopenia and/or possibly traumatic sec to fall. Previous strokes including R MA territory with Large Right MCA stroke, right anterior cerebral arterial territory,   left  left occipito- parietal, left cerebellar stroke, likely embolic strokes    Afib RVR  Paroxysmal atrial fibrillation and sec hypercoag state sec to Afib  - HR 140s on presentation    ELYSIA  - Cr 1.3 from 0.9 hrs prior    Chronic thrombocytopenia    JARRETT on CPAP     DM type 2, controlled, with complication      CAD in native artery    Essential hypertension    Prob vascular dementia and blindness sec to old strokes          Head CT in the ED: Large, old left occipital parietal infarction. At the anteromedial aspect of the area of infarction there is acute hyperdense hemorrhage in an area measuring 1.8 cm x 2.0 cm. No subdural or epidural hematoma. No intraventricular hemorrhage.      Evidence of old extensive infarction in entire right MCA vascular territory, part of right anterior cerebral arterial

## 2023-10-22 NOTE — ED NOTES
This RN at bedside, HR per telemetry monitor ranged: 152-175. Repeat EKG collected, provided to [facility], MD for eval @ 8694. Noted continued afib on telemetry monitor.      Aure Mcallister RN  10/22/23 7827

## 2023-10-22 NOTE — ED NOTES
This RN completed med rec w/ patient's  via telephone. Unsure of when last doses were administered.      Erika Dorado RN  10/22/23 7614

## 2023-10-22 NOTE — PROGRESS NOTES
Patient arrived to ICU at this time. /105. HR 150s-160s. Monitor reads a fib. ICU team at bedside. 10 mg Diltiazem push given per orders. HR briefly down to 120s but back up to 150s - 160s shortly after. BP recycled and reads 115/75 on monitor. 10 mg Diltiazem push given per order. HR down to 110s-120s and back to 140s 150s on monitor 2 minutes after push. Patient answers simple questions and nods appropriately at this time. PERRLA. Follows commands in BLE and RUE. Responds to pain only in LUE    Patient has raspy productive cough and bilateral lung sounds are clear and diminished. Bowel sounds are active In all 4 quadrants. Gill in place and patent. Urine is yellow and cloudy with sediment. Previous gill removed and new gill placed per orders. SCDs placed on BLE and heels floated off bed.

## 2023-10-22 NOTE — H&P
HOSPITALIST   HISTORY AND PHYSICAL    10/22/2023 3:43 AM    Patient Information:  Brie Khalil is a 81 y.o. female    PCP:  Anitra Vivas MD      History of Present Illness:  Brie Khalil is a 81 y.o. female residing at a local nursing home who presented to ER with altered mental status per nursing home staff. Patient is blind and very hard of hearing. Past medical history significant for aphasia, CVA, hyperlipidemia, atrial fibrillation, COPD, hypertension, GERD, obstructive sleep apnea, diabetes mellitus. There is no family or anyone else here with ear and the patient is unresponsive. No further history except as above. Patient is designated a full code per nursing home documentation. REVIEW OF SYSTEMS:    Unable to obtain secondary to altered mental status. Past Medical History:   has a past medical history of Arthritis, Asthma, Depression, Diabetes mellitus ([address]), GERD (gastroesophageal reflux disease), HCAP (healthcare-associated pneumonia), Hypertension, Mycobacterium gordonae infection, Paroxysmal atrial fibrillation ([address]), Sepsis ([address]), TIA (transient ischemic attack), and Unspecified cerebral artery occlusion with cerebral infarction. Past Surgical History:   has a past surgical history that includes Hysterectomy; Cholecystectomy; Appendectomy; Breast cyst incision and drainage (1986); Colonoscopy (01/19/2017); Upper gastrointestinal endoscopy (01/19/2017); and Breast biopsy. Medications:  No current facility-administered medications on file prior to encounter.      Current Outpatient Medications on File Prior to Encounter   Medication Sig Dispense Refill    apixaban (ELIQUIS) 5 MG TABS tablet Take 1 tablet by mouth 2 times daily 60 tablet 0    MYRBETRIQ 50 MG TB24 Take 50 mg by mouth daily      nitrofurantoin (MACRODANTIN) 50 MG capsule TAKE 1 CAPSULE BY MOUTH EVERY DAY FOR PROPHYLAXIS AGAINST RECURRENT UTI      ELIQUIS 5 MG TABS tablet TAKE 1 TABLET BY MOUTH TWO TIMES A DAY
180 tablet 2    metoprolol tartrate (LOPRESSOR) 50 MG tablet TAKE 1 TABLET BY MOUTH TWO TIMES A  tablet 2    cycloSPORINE (RESTASIS) 0.05 % ophthalmic emulsion 1 drop 2 times daily      polyethyl glycol-propyl glycol 0.4-0.3 % (SYSTANE) 0.4-0.3 % ophthalmic solution 1 drop as needed for Dry Eyes      omeprazole (PRILOSEC) 20 MG delayed release capsule Take 1 capsule by mouth at bedtime      trimethoprim-polymyxin b (POLYTRIM) 99052-4.1 UNIT/ML-% ophthalmic solution Place 1 drop into both eyes in the morning and 1 drop at noon and 1 drop in the evening and 1 drop before bedtime. venlafaxine (EFFEXOR XR) 150 MG extended release capsule Take 1 capsule by mouth daily      atorvastatin (LIPITOR) 40 MG tablet Take 1 tablet by mouth nightly 30 tablet 3    dilTIAZem (CARDIZEM CD) 120 MG extended release capsule Take 1 capsule by mouth daily 30 capsule 11    aspirin EC 81 MG EC tablet Take 1 tablet by mouth daily 90 tablet 1    dextromethorphan-guaiFENesin (MUCINEX DM)  MG per extended release tablet Take 1 tablet by mouth every 12 hours as needed      ferrous sulfate 325 (65 Fe) MG tablet Take 1 tablet by mouth daily (with breakfast)      folic acid (FOLVITE) 1 MG tablet Take 1 tablet by mouth daily      montelukast (SINGULAIR) 10 MG tablet Take 1 tablet by mouth nightly      albuterol (PROVENTIL) (2.5 MG/3ML) 0.083% nebulizer solution Take 3 mLs by nebulization every 4 hours as needed for Wheezing or Shortness of Breath 120 each 3    metFORMIN ER (GLUCOPHAGE-XR) 500 MG XR tablet Take 4 tablets by mouth Daily with supper      albuterol (PROVENTIL HFA;VENTOLIN HFA) 108 (90 BASE) MCG/ACT inhaler Inhale 2 puffs into the lungs every 6 hours as needed         Allergies:   Allergies   Allergen Reactions    Ace Inhibitors      Possible anaphylaxis    Acyclovir Anaphylaxis     Redness face & back, swollen tounge, eyes and throat     Cephalosporins      anaphylaxis  Lip swelling    Oxycodone-Acetaminophen

## 2023-10-22 NOTE — ED NOTES
[facility]  hospital to alert them of air care landing their in 15 minutes      [place], [place]  10/22/23 2808

## 2023-10-22 NOTE — PROGRESS NOTES
4 Eyes Skin Assessment     NAME:  Arnel Carlton  YOB: 1950  MEDICAL RECORD NUMBER:  [id number]    The patient is being assessed for  Admission    I agree that at least one RN has performed a thorough Head to Toe Skin Assessment on the patient. ALL assessment sites listed below have been assessed. Areas assessed by both nurses:    Head, Face, Ears, Shoulders, Back, Chest, Arms, Elbows, Hands, Sacrum. Buttock, Coccyx, Ischium, Legs. Feet and Heels, and Under Medical Devices         Does the Patient have a Wound?  No noted wound(s)       Tony Prevention initiated by RN: Yes  Wound Care Orders initiated by RN: No    Pressure Injury (Stage 3,4, Unstageable, DTI, NWPT, and Complex wounds) if present, place Wound referral order by RN under : No    New Ostomies, if present place, Ostomy referral order under : No     Nurse 1 eSignature: Electronically signed by Berhane Nieves RN on 10/22/23 at 9:11 AM EDT    **SHARE this note so that the co-signing nurse can place an eSignature**    Nurse 2 eSignature: Electronically signed by Cristine Canales RN on 10/22/23 at 12:56 PM EDT

## 2023-10-22 NOTE — CONSULTS
Neurology Consultation Note      Patient: Alan Ocasio MRN: [id number]    YOB: 1950  Age: 81 y.o.   Sex: female   Unit: [address] [facility] ICU TOWER Room/Bed: 4506/4506-01 Location: [address] [place] [address]    Date of Consultation: 10/22/2023  Date of Admission: 10/22/2023  8:48 AM ( LOS: 0 days )  Admitting Physician:     Primary Care Physician: Michelle Felix MD   Consult Requested By: Tanja Zimmerman MD     Reason for Consult: ICH    ASSESSMENT & RECOMMENDATIONS     Assessment  71yo woman with extensive prior embolic strokes who has afib and has been on apixaban with questionable compliance, as was discovered when admitted to  at the beginning of the month with a new acute left PCA sroke and UTI, now presents with abrupt encephalopathy and found to have small area of [address] in stroke bed of most recent left PCA stroke as well as likely UTI  Cannot dismiss that her alteration of awareness was due to sz  She is not back to baseline and her  says that she is still very sleepy  May be secondary to the hemorrhage itself, although this seems too small to be that clinically significant, and certainly the UTI will be contributing  Of course, this is in the context of her extensive encephalomalacia and so her reserve to tolerate even small insults (eg, small ICH and/or UTI) is extraordinarily low  Additionally, he states that she is not moving her left arm, which is new for her  Not quite sure how to reconcile that considering she has such extensive stroke throughout the right hemisphere; it is surprising she was able to move that side at all  However, given that report, cannot exclude that she has had even more stroke    Recommendations  cvEEG [ordered]  Continue Keppra 1000mg q12 [as ordered] (received first dose at 05:02 this morning at [facility] ER)  MRI brain [ordered]  SBP < 160  Treat UTI as per Primary Service  No anticoagulation/antiplatelets x2 weeks with f/u head CT prior to None Seen /LPF    WBC, UA  (A) 0 - 5 /HPF    RBC, UA 5-10 (A) 0 - 4 /HPF    Epithelial Cells, UA 6-10 (A) 0 - 5 /HPF    Bacteria, UA 1+ (A) None Seen /HPF   CBC with Auto Differential    Collection Time: 10/22/23  5:10 AM   Result Value Ref Range    WBC 26.5 (H) 4.0 - 11.0 K/uL    RBC 3.84 (L) 4.00 - 5.20 M/uL    Hemoglobin 11.8 (L) 12.0 - 16.0 g/dL    Hematocrit 36.0 36.0 - 48.0 %    MCV 93.9 80.0 - 100.0 fL    MCH 30.9 26.0 - 34.0 pg    MCHC 32.9 31.0 - 36.0 g/dL    RDW 15.7 (H) 12.4 - 15.4 %    Platelets 506 (L) 625 - 450 K/uL    MPV 13.6 (H) 5.0 - 10.5 fL    PLATELET SLIDE REVIEW Decreased     SLIDE REVIEW see below     Neutrophils % 82.0 %    Lymphocytes % 3.0 %    Monocytes % 4.0 %    Eosinophils % 0.0 %    Basophils % 0.0 %    Neutrophils Absolute 24.6 (H) 1.7 - 7.7 K/uL    Lymphocytes Absolute 0.8 (L) 1.0 - 5.1 K/uL    Monocytes Absolute 1.1 0.0 - 1.3 K/uL    Eosinophils Absolute 0.0 0.0 - 0.6 K/uL    Basophils Absolute 0.0 0.0 - 0.2 K/uL    Bands Relative 9 (H) 0 - 7 %    Metamyelocytes Relative 1 (A) %    Myelocyte Percent 1 (A) %    Anisocytosis Occasional (A)     Poikilocytes Occasional (A)    CMP w/ Reflex to MG    Collection Time: 10/22/23  5:18 AM   Result Value Ref Range    Sodium 137 136 - 145 mmol/L    Potassium reflex Magnesium 3.3 (L) 3.5 - 5.1 mmol/L    Chloride 103 99 - 110 mmol/L    CO2 18 (L) 21 - 32 mmol/L    Anion Gap 16 3 - 16    Glucose 204 (H) 70 - 99 mg/dL    BUN 20 7 - 20 mg/dL    Creatinine 0.9 0.6 - 1.2 mg/dL    Est, Glom Filt Rate >60 >60    Calcium 8.5 8.3 - 10.6 mg/dL    Total Protein 6.7 6.4 - 8.2 g/dL    Albumin 3.2 (L) 3.4 - 5.0 g/dL    Albumin/Globulin Ratio 0.9 (L) 1.1 - 2.2    Total Bilirubin 1.5 (H) 0.0 - 1.0 mg/dL    Alkaline Phosphatase 114 40 - 129 U/L    ALT 14 10 - 40 U/L    AST 16 15 - 37 U/L   Procalcitonin    Collection Time: 10/22/23  5:18 AM   Result Value Ref Range    Procalcitonin 7.30 (H) 0.00 - 0.15 ng/mL   Troponin    Collection Time: 10/22/23  5:18 AM

## 2023-10-22 NOTE — ED NOTES
This RN amie labs from patient's IV start. Red/green/purple/blue/gray and green on ice/1st set of cultures sent to labs at this time.      Carie Benitez RN  10/22/23 2881

## 2023-10-22 NOTE — ED NOTES
This RN amie ABG from patient's L wrist. Sent to lab at this time.      Enrrique Springer RN  10/22/23 0669

## 2023-10-22 NOTE — PLAN OF CARE
[facility] Hospitalist brief note  Consult received. Case reviewed with ER physician  79-year-old female nursing home resident aphasic at baseline from prior strokes transferred from [facility] for new left occipital parietal hemorrhage and a UTI. Full note to follow.     Ariela Luciano MD    Thanks  Ryan Fonseca MD

## 2023-10-22 NOTE — PROGRESS NOTES
Clinical Pharmacy Progress Note  Medication History     Admit Date: 10/22/2023    Pharmacy received patient medication list.    List of of current medications patient is taking is complete. Home Medication list in EPIC updated to reflect changes noted below. Source of information: medication list provided by patient's      Changes made to medication list:   Medications removed: were not on med list provided by    Vitamin D  Polytrim opth soln   Medications added:   Montelukast 10mg at bedtime  Mucinex - no dose listed on med list, added as standard dose on med list  Nitrofurantoin 50mg at bedtime  Medication doses adjusted:   Metformin - was 2 BID but list says 4x QAM  Other notes:   Last dose of medication was given Friday 10/20/2023  Listed allergies: acyclovir, ACE inhibitors, amoxicillin, cephalosporins, oxycodone-APAP, and PCN. All are listed in chart.      Current Outpatient Medications   Medication Instructions    albuterol (PROVENTIL HFA;VENTOLIN HFA) 108 (90 BASE) MCG/ACT inhaler 2 puffs, Inhalation, EVERY 6 HOURS PRN,      albuterol (PROVENTIL) 2.5 mg, Nebulization, EVERY 4 HOURS PRN    apixaban (ELIQUIS) 5 mg, Oral, 2 TIMES DAILY    aspirin EC 81 mg, Oral, DAILY    atorvastatin (LIPITOR) 40 mg, Oral, NIGHTLY    cycloSPORINE (RESTASIS) 0.05 % ophthalmic emulsion 1 drop, 2 TIMES DAILY    dilTIAZem (CARDIZEM CD) 120 mg, Oral, DAILY    ferrous sulfate (IRON 325) 325 mg, Oral, DAILY WITH BREAKFAST    folic acid (FOLVITE) 1 mg, Oral, DAILY    guaiFENesin (MUCINEX) 600 mg, Oral, EVERY BEDTIME    metFORMIN (GLUCOPHAGE-XR) 2,000 mg, Oral, DAILY WITH BREAKFAST    metoprolol tartrate (LOPRESSOR) 50 MG tablet TAKE 1 TABLET BY MOUTH TWO TIMES A DAY    montelukast (SINGULAIR) 10 mg, Oral, NIGHTLY    Myrbetriq 50 mg, Oral, DAILY    nitrofurantoin (MACRODANTIN) 50 mg, Oral, NIGHTLY    omeprazole (PRILOSEC) 20 mg, Oral, Nightly    polyethyl glycol-propyl glycol 0.4-0.3 % (SYSTANE) 0.4-0.3 % ophthalmic

## 2023-10-22 NOTE — ED NOTES
Report called to [facility] @ [facility]. All questions answered at this time; updated on patient condition and transport ETA.      Jordan Adler RN  10/22/23 8207

## 2023-10-22 NOTE — ED NOTES
This RN updated patient's spouse on patient condition and decision to transfer.      Marijane Phoenix, RN  10/22/23 9175

## 2023-10-22 NOTE — CONSULTS
Initial Pulmonary & Critical Care Consult Note      Reason for Consult: ICU mgmt   Requesting Physician: Dr. Davis Salvage    Subjective:   [address] / HPI:                Ms Blayne Damon is a 79y/o F with a PMHx of Afib (on Eliquis) blindness, hearing impairment, HTN, T2DM, COPD who presents with AMS. She was taken to [facility] ED via EMS for AMS per her nursing home staff at [facility]. Staff stated she had only been responsive to pain, compared to baseline of speaking, telling her name and spontaneous movements. CT head showed acute hyperdense hemorrhage in anteromedial aspect of old L occipital parietal infarction. She is able to talk with me and denies pain or dyspnea. She is moving her right  more than the left but has Hx of extensive CVA on right. Biggest issue currently is AFib with RVR resistant to dilt IVP and dig    Past Medical History:      Diagnosis Date    Arthritis     Asthma     Depression     Diabetes mellitus ([address])     GERD (gastroesophageal reflux disease)     HCAP (healthcare-associated pneumonia)     Hypertension     Mycobacterium gordonae infection      5/7/18 + afb called from [facility] pending  ( from [facility] 3/29/18)    Paroxysmal atrial fibrillation (HCC)     Sepsis ([address])     TIA (transient ischemic attack)     Unspecified cerebral artery occlusion with cerebral infarction       Past Surgical History:        Procedure Laterality Date    APPENDECTOMY      BREAST BIOPSY      CHOLECYSTECTOMY      COLONOSCOPY  01/19/2017    polyp    CYST INCISION AND DRAINAGE  1986    on head    HYSTERECTOMY (CERVIX STATUS UNKNOWN)      UPPER GASTROINTESTINAL ENDOSCOPY  01/19/2017    barretts ?      Current Medications:     dilTIAZem        [Held by provider] aspirin EC  81 mg Oral Daily    atorvastatin  40 mg Oral Nightly    polyvinyl alcohol  1 drop Both Eyes BID    metoprolol tartrate  50 mg Oral BID    trospium  20 mg Oral BID AC    pantoprazole  40 mg Oral Nightly    venlafaxine  150 AM    CO2 18 10/22/2023 10:57 AM    BUN 26 10/22/2023 10:57 AM    CREATININE 1.3 10/22/2023 10:57 AM    CALCIUM 9.8 10/22/2023 10:57 AM    GFRAA >60 08/24/2022 07:48 AM    GFRAA 54 12/17/2012 05:00 AM    LABGLOM 44 10/22/2023 10:57 AM    GLUCOSE 223 10/22/2023 10:57 AM     Hepatic Function Panel:    Lab Results   Component Value Date/Time    ALKPHOS 114 10/22/2023 05:18 AM    ALT 14 10/22/2023 05:18 AM    AST 16 10/22/2023 05:18 AM    PROT 6.7 10/22/2023 05:18 AM    PROT 6.5 12/17/2012 05:00 AM    BILITOT 1.5 10/22/2023 05:18 AM    BILIDIR 0.3 10/17/2018 05:19 AM    IBILI 0.3 10/17/2018 05:19 AM     ABG:    Lab Results   Component Value Date/Time    EHB8ATY 23.0 10/14/2018 05:19 PM    BEART -1.1 10/14/2018 05:19 PM    M0LMNXPB 92.5 10/14/2018 05:19 PM    PHART 7.424 10/14/2018 05:19 PM    THGBART 11.8 12/13/2012 05:00 AM    SVL1LMX 35.9 10/14/2018 05:19 PM    PO2ART 61.8 10/14/2018 05:19 PM    TCX9BVQ 24.1 10/14/2018 05:19 PM      Latest Reference Range & Units 10/22/23 04:22   Color, UA Straw/Yellow  Yellow   Clarity, UA Clear  SL CLOUDY ! Glucose, UA Negative mg/dL Negative   Bilirubin, Urine Negative  LARGE ! Ketones, Urine Negative mg/dL 15 ! Specific Gravity, UA 1.005 - 1.030  >=1.030   Blood, Urine Negative  LARGE !   pH, UA 5.0 - 8.0  5.5   Protein, UA Negative mg/dL 100 ! Urobilinogen, Urine <2.0 E.U./dL 4.0 ! Nitrite, Urine Negative  POSITIVE ! Leukocyte Esterase, Urine Negative  SMALL ! Latest Reference Range & Units 10/22/23 04:22   Hyaline Casts, UA 0 - 2 /LPF 3-5 ! Mucus, UA None Seen /LPF 2+ ! WBC, UA 0 - 5 /HPF  ! RBC, UA 0 - 4 /HPF 5-10 ! Epithelial Cells, UA 0 - 5 /HPF 6-10 ! Bacteria, UA None Seen /HPF 1+ ! Microscopic Examination  YES   Leukocyte Esterase, Urine Negative  SMALL !   !: Data is abnormal    Cultures:   Blood Culture: In lab  Urine Culture: In lab      Radiology Review:  All pertinent images / reports were reviewed as a part of this visit.  CT Head

## 2023-10-22 NOTE — ED PROVIDER NOTES
[address] [address] ED  EMERGENCY DEPARTMENT ENCOUNTER      Pt Name: Governor Valenzuela  MRN: [id number]  [address] [facility] 1950  Date of evaluation: 10/22/2023  Provider: Lupe Asencio MD    CHIEF COMPLAINT       Chief Complaint   Patient presents with    Altered Mental Status     Responds to pain only         HISTORY OF PRESENT ILLNESS   (Location/Symptom, Timing/Onset, Context/Setting, Quality, Duration, Modifying Factors, Severity)  Note limiting factors. Governor Valenzuela is a 81 y.o. female who presents to the emergency department with altered mental status from nursing facility. Patient's baseline is unclear        Nursing Notes were reviewed. REVIEW OF SYSTEMS    (2-9 systems for level 4, 10 or more for level 5)     Review of Systems   Unable to perform ROS: Mental status change       Except as noted above the remainder of the review of systems was reviewed and negative. PAST MEDICAL HISTORY     Past Medical History:   Diagnosis Date    Arthritis     Asthma     Depression     Diabetes mellitus (HCC)     GERD (gastroesophageal reflux disease)     HCAP (healthcare-associated pneumonia)     Hypertension     Mycobacterium gordonae infection      5/7/18 + afb called from [facility] pending  ( from [facility] 3/29/18)    Paroxysmal atrial fibrillation (HCC)     Sepsis ([address])     TIA (transient ischemic attack)     Unspecified cerebral artery occlusion with cerebral infarction          SURGICAL HISTORY       Past Surgical History:   Procedure Laterality Date    APPENDECTOMY      BREAST BIOPSY      CHOLECYSTECTOMY      COLONOSCOPY  01/19/2017    polyp    CYST INCISION AND DRAINAGE  1986    on head    HYSTERECTOMY (CERVIX STATUS UNKNOWN)      UPPER GASTROINTESTINAL ENDOSCOPY  01/19/2017    barretts ?          CURRENT MEDICATIONS       Discharge Medication List as of 10/22/2023  8:22 AM        CONTINUE these medications which have NOT CHANGED    Details   !! apixaban (ELIQUIS) 5 MG TABS tablet Take 1

## 2023-10-23 ENCOUNTER — APPOINTMENT (OUTPATIENT)
Dept: GENERAL RADIOLOGY | Age: 73
DRG: 064 | End: 2023-10-23
Attending: INTERNAL MEDICINE
Payer: MEDICARE

## 2023-10-23 ENCOUNTER — APPOINTMENT (OUTPATIENT)
Dept: MRI IMAGING | Age: 73
DRG: 064 | End: 2023-10-23
Attending: INTERNAL MEDICINE
Payer: MEDICARE

## 2023-10-23 PROBLEM — Z71.89 ADVANCE CARE PLANNING: Status: ACTIVE | Noted: 2023-10-23

## 2023-10-23 PROBLEM — R56.9 SEIZURE (HCC): Status: ACTIVE | Noted: 2023-10-23

## 2023-10-23 PROBLEM — Z51.5 ENCOUNTER FOR PALLIATIVE CARE: Status: ACTIVE | Noted: 2023-10-23

## 2023-10-23 LAB
ANION GAP SERPL CALCULATED.3IONS-SCNC: 11 MMOL/L (ref 3–16)
BACTERIA UR CULT: NORMAL
BASOPHILS # BLD: 0 K/UL (ref 0–0.2)
BASOPHILS NFR BLD: 0.2 %
BUN SERPL-MCNC: 29 MG/DL (ref 7–20)
CALCIUM SERPL-MCNC: 8 MG/DL (ref 8.3–10.6)
CHLORIDE SERPL-SCNC: 108 MMOL/L (ref 99–110)
CHOLEST SERPL-MCNC: 83 MG/DL (ref 0–199)
CO2 SERPL-SCNC: 21 MMOL/L (ref 21–32)
CREAT SERPL-MCNC: 1.1 MG/DL (ref 0.6–1.2)
DEPRECATED RDW RBC AUTO: 15.1 % (ref 12.4–15.4)
EKG ATRIAL RATE: 150 BPM
EKG DIAGNOSIS: NORMAL
EKG Q-T INTERVAL: 266 MS
EKG QRS DURATION: 76 MS
EKG QTC CALCULATION (BAZETT): 404 MS
EKG R AXIS: 3 DEGREES
EKG T AXIS: 121 DEGREES
EKG VENTRICULAR RATE: 139 BPM
EOSINOPHIL # BLD: 0.1 K/UL (ref 0–0.6)
EOSINOPHIL NFR BLD: 0.7 %
EST. AVERAGE GLUCOSE BLD GHB EST-MCNC: 125.5 MG/DL
GFR SERPLBLD CREATININE-BSD FMLA CKD-EPI: 54 ML/MIN/{1.73_M2}
GLUCOSE BLD-MCNC: 148 MG/DL (ref 70–99)
GLUCOSE BLD-MCNC: 148 MG/DL (ref 70–99)
GLUCOSE BLD-MCNC: 153 MG/DL (ref 70–99)
GLUCOSE BLD-MCNC: 157 MG/DL (ref 70–99)
GLUCOSE SERPL-MCNC: 136 MG/DL (ref 70–99)
HBA1C MFR BLD: 6 %
HCT VFR BLD AUTO: 24.5 % (ref 36–48)
HDLC SERPL-MCNC: 52 MG/DL (ref 40–60)
HGB BLD-MCNC: 7.9 G/DL (ref 12–16)
LDLC SERPL CALC-MCNC: 17 MG/DL
LYMPHOCYTES # BLD: 1 K/UL (ref 1–5.1)
LYMPHOCYTES NFR BLD: 7.6 %
MAGNESIUM SERPL-MCNC: 2.5 MG/DL (ref 1.8–2.4)
MCH RBC QN AUTO: 30.9 PG (ref 26–34)
MCHC RBC AUTO-ENTMCNC: 32.3 G/DL (ref 31–36)
MCV RBC AUTO: 95.6 FL (ref 80–100)
MONOCYTES # BLD: 0.7 K/UL (ref 0–1.3)
MONOCYTES NFR BLD: 5.2 %
NEUTROPHILS # BLD: 11.6 K/UL (ref 1.7–7.7)
NEUTROPHILS NFR BLD: 86.3 %
PERFORMED ON: ABNORMAL
PHOSPHATE SERPL-MCNC: 1 MG/DL (ref 2.5–4.9)
PHOSPHATE SERPL-MCNC: 2.2 MG/DL (ref 2.5–4.9)
PLATELET # BLD AUTO: 72 K/UL (ref 135–450)
PLATELET BLD QL SMEAR: ABNORMAL
PMV BLD AUTO: 12.1 FL (ref 5–10.5)
POTASSIUM SERPL-SCNC: 3.4 MMOL/L (ref 3.5–5.1)
RBC # BLD AUTO: 2.56 M/UL (ref 4–5.2)
RBC MORPH BLD: NORMAL
SODIUM SERPL-SCNC: 140 MMOL/L (ref 136–145)
TRIGL SERPL-MCNC: 72 MG/DL (ref 0–150)
VLDLC SERPL CALC-MCNC: 14 MG/DL
WBC # BLD AUTO: 13.4 K/UL (ref 4–11)

## 2023-10-23 PROCEDURE — 6370000000 HC RX 637 (ALT 250 FOR IP): Performed by: STUDENT IN AN ORGANIZED HEALTH CARE EDUCATION/TRAINING PROGRAM

## 2023-10-23 PROCEDURE — 6360000002 HC RX W HCPCS

## 2023-10-23 PROCEDURE — 2580000003 HC RX 258

## 2023-10-23 PROCEDURE — 36415 COLL VENOUS BLD VENIPUNCTURE: CPT

## 2023-10-23 PROCEDURE — 85025 COMPLETE CBC W/AUTO DIFF WBC: CPT

## 2023-10-23 PROCEDURE — 93010 ELECTROCARDIOGRAM REPORT: CPT | Performed by: INTERNAL MEDICINE

## 2023-10-23 PROCEDURE — 6360000002 HC RX W HCPCS: Performed by: STUDENT IN AN ORGANIZED HEALTH CARE EDUCATION/TRAINING PROGRAM

## 2023-10-23 PROCEDURE — 99291 CRITICAL CARE FIRST HOUR: CPT | Performed by: INTERNAL MEDICINE

## 2023-10-23 PROCEDURE — 84100 ASSAY OF PHOSPHORUS: CPT

## 2023-10-23 PROCEDURE — 99221 1ST HOSP IP/OBS SF/LOW 40: CPT | Performed by: NURSE PRACTITIONER

## 2023-10-23 PROCEDURE — 83735 ASSAY OF MAGNESIUM: CPT

## 2023-10-23 PROCEDURE — 95813 EEG EXTND MNTR 61-119 MIN: CPT

## 2023-10-23 PROCEDURE — 2000000000 HC ICU R&B

## 2023-10-23 PROCEDURE — 99233 SBSQ HOSP IP/OBS HIGH 50: CPT | Performed by: INTERNAL MEDICINE

## 2023-10-23 PROCEDURE — 80048 BASIC METABOLIC PNL TOTAL CA: CPT

## 2023-10-23 PROCEDURE — 2580000003 HC RX 258: Performed by: INTERNAL MEDICINE

## 2023-10-23 PROCEDURE — 74018 RADEX ABDOMEN 1 VIEW: CPT

## 2023-10-23 PROCEDURE — 93005 ELECTROCARDIOGRAM TRACING: CPT

## 2023-10-23 PROCEDURE — 70551 MRI BRAIN STEM W/O DYE: CPT

## 2023-10-23 PROCEDURE — 94761 N-INVAS EAR/PLS OXIMETRY MLT: CPT

## 2023-10-23 PROCEDURE — C9113 INJ PANTOPRAZOLE SODIUM, VIA: HCPCS

## 2023-10-23 PROCEDURE — 6370000000 HC RX 637 (ALT 250 FOR IP)

## 2023-10-23 PROCEDURE — 2700000000 HC OXYGEN THERAPY PER DAY

## 2023-10-23 PROCEDURE — 99233 SBSQ HOSP IP/OBS HIGH 50: CPT | Performed by: STUDENT IN AN ORGANIZED HEALTH CARE EDUCATION/TRAINING PROGRAM

## 2023-10-23 RX ORDER — POTASSIUM CHLORIDE 20 MEQ/1
40 TABLET, EXTENDED RELEASE ORAL ONCE
Status: DISCONTINUED | OUTPATIENT
Start: 2023-10-23 | End: 2023-10-23

## 2023-10-23 RX ORDER — LEVETIRACETAM 500 MG/5ML
2000 INJECTION, SOLUTION, CONCENTRATE INTRAVENOUS ONCE
Status: COMPLETED | OUTPATIENT
Start: 2023-10-23 | End: 2023-10-23

## 2023-10-23 RX ORDER — POTASSIUM CHLORIDE 7.45 MG/ML
10 INJECTION INTRAVENOUS
Status: COMPLETED | OUTPATIENT
Start: 2023-10-23 | End: 2023-10-23

## 2023-10-23 RX ORDER — LEVETIRACETAM 500 MG/5ML
1000 INJECTION, SOLUTION, CONCENTRATE INTRAVENOUS EVERY 12 HOURS
Status: DISCONTINUED | OUTPATIENT
Start: 2023-10-23 | End: 2023-10-27

## 2023-10-23 RX ORDER — METOPROLOL TARTRATE 5 MG/5ML
2.5 INJECTION INTRAVENOUS
Status: DISCONTINUED | OUTPATIENT
Start: 2023-10-23 | End: 2023-10-24 | Stop reason: ALTCHOICE

## 2023-10-23 RX ORDER — POTASSIUM CHLORIDE 20 MEQ/1
20 TABLET, EXTENDED RELEASE ORAL ONCE
Status: DISCONTINUED | OUTPATIENT
Start: 2023-10-23 | End: 2023-10-23

## 2023-10-23 RX ADMIN — LEVETIRACETAM 1000 MG: 100 INJECTION INTRAVENOUS at 17:24

## 2023-10-23 RX ADMIN — MEROPENEM 1000 MG: 1 INJECTION, POWDER, FOR SOLUTION INTRAVENOUS at 06:30

## 2023-10-23 RX ADMIN — DIBASIC SODIUM PHOSPHATE, MONOBASIC POTASSIUM PHOSPHATE AND MONOBASIC SODIUM PHOSPHATE 2 TABLET: 852; 155; 130 TABLET ORAL at 17:23

## 2023-10-23 RX ADMIN — LEVETIRACETAM 500 MG: 500 INJECTION INTRAVENOUS at 06:30

## 2023-10-23 RX ADMIN — POTASSIUM CHLORIDE 10 MEQ: 10 INJECTION, SOLUTION INTRAVENOUS at 01:17

## 2023-10-23 RX ADMIN — SODIUM CHLORIDE: 9 INJECTION, SOLUTION INTRAVENOUS at 01:15

## 2023-10-23 RX ADMIN — POTASSIUM CHLORIDE 10 MEQ: 10 INJECTION, SOLUTION INTRAVENOUS at 02:29

## 2023-10-23 RX ADMIN — POTASSIUM CHLORIDE 10 MEQ: 10 INJECTION, SOLUTION INTRAVENOUS at 03:43

## 2023-10-23 RX ADMIN — SODIUM CHLORIDE: 9 INJECTION, SOLUTION INTRAVENOUS at 21:23

## 2023-10-23 RX ADMIN — POLYVINYL ALCOHOL 1 DROP: 14 SOLUTION/ DROPS OPHTHALMIC at 10:22

## 2023-10-23 RX ADMIN — POTASSIUM BICARBONATE 40 MEQ: 782 TABLET, EFFERVESCENT ORAL at 17:23

## 2023-10-23 RX ADMIN — POLYVINYL ALCOHOL 1 DROP: 14 SOLUTION/ DROPS OPHTHALMIC at 21:01

## 2023-10-23 RX ADMIN — MEROPENEM 1000 MG: 1 INJECTION, POWDER, FOR SOLUTION INTRAVENOUS at 20:58

## 2023-10-23 RX ADMIN — POTASSIUM CHLORIDE 10 MEQ: 10 INJECTION, SOLUTION INTRAVENOUS at 04:45

## 2023-10-23 RX ADMIN — METOPROLOL TARTRATE 50 MG: 50 TABLET, FILM COATED ORAL at 20:48

## 2023-10-23 RX ADMIN — ATORVASTATIN CALCIUM 40 MG: 40 TABLET, FILM COATED ORAL at 20:48

## 2023-10-23 RX ADMIN — DEXTROSE MONOHYDRATE 0.5 MG/MIN: 50 INJECTION, SOLUTION INTRAVENOUS at 15:01

## 2023-10-23 RX ADMIN — PANTOPRAZOLE SODIUM 40 MG: 40 INJECTION, POWDER, FOR SOLUTION INTRAVENOUS at 09:09

## 2023-10-23 RX ADMIN — LEVETIRACETAM 2000 MG: 100 INJECTION, SOLUTION INTRAVENOUS at 11:56

## 2023-10-23 NOTE — PROGRESS NOTES
Patient's HR elevated into the 140s/150s and sustaining. Residents notified and to bedside, STAT labs ordered, 1x dose metoprolol ordered and given. Patient now in SR, rate in the 80s.

## 2023-10-23 NOTE — PROGRESS NOTES
Physical Therapy& Occupational Therapy      Attempted to see patient this PM for evaluations however patient on cEEG therefore therapy will hold at this time and f/u as patient is medically appropriate and schedule permits. Thank you. Lukas Jacobo.  Lida BLOUNTT   Jared Irving, OTR/L, [phone]

## 2023-10-23 NOTE — PLAN OF CARE
functionality and self care  Outcome: Progressing     Problem: Discharge Planning  Goal: Discharge to home or other facility with appropriate resources  10/23/2023 0954 by Charito Trotter RN  Outcome: Progressing  10/23/2023 0650 by Nadia Monterroso RN  Outcome: Not Progressing  Flowsheets (Taken 10/22/2023 2000)  Discharge to home or other facility with appropriate resources: Identify barriers to discharge with patient and caregiver

## 2023-10-23 NOTE — PROGRESS NOTES
Current NIHSS 17    Nursing Core Measures for Stroke:   [x]   Education template documentation (STROKE/TIA). Please select only risk factors that are applicable to patient when selecting risk factors. [x]   Care Plan template documentation (Physiologic Instability - Neurosensory). Selecting this will add care plan rows to the flowsheet under the Neuro section of Head to Toe. [x]   Verified Swallow Screen completed prior to PO intake of food, drink, medications  [x]   VTE Prophylaxis: SCDs ordered/addressed; SCDs: On           (As a reminder, ASA, Plavix, and TPA/TNK are not VTE prophylaxis.)    Reviewed the Following Education with Patient and/or Family:   - Personalized risk factors for patient, along with changes, modifications that will help prevent stroke. - Signs and Symptoms of Stroke: (Facial droop, weakness/numbness especially on one side, speech difficulty, sudden confusion, sudden loss of vision, sudden severe headache, sudden loss of balance or having difficulty walking, syncope, or seizure)  - How to activate EMS (911)   - Importance of Follow Up Appointments at Discharge   - Importance of Compliance with Medications Prescribed at Discharge  - Available community resources and stroke advocacy groups if needed    Patient and/or family member: with no evidence of learning. Stroke Education booklet given to patient/family (or verified, if given already), which reviews above information.  yes         Electronically signed by Milka Cross RN on 10/23/2023 at 6:50 AM

## 2023-10-23 NOTE — PROGRESS NOTES
Speech Therapy   Evaluation attempt    Patient was attempted to be seen by Speech Therapy Department this date for Speech and swallowing/evaluation. Patient was unable to be seen due to being unresponsive. Spoke with RN, Sasha Son, and requested to page SLP if pt becomes more alert and able to participate in bedside swallowing assessment. RN in agreement. Of note, per chart review, pt was last seen by Speech Therapy at [address] [place] [address] on 10/1/23- at that time a regular diet with thin liquids was recommended. Per pt's hard chart, pt was on a regular diet with thin liquids at the NH. Also, pt was seen for outpatient Speech Therapy at [facility] from 10/28/22-2/2/23 to address aphasia. Will attempt to seen again when alert and able to participate in assessment.     Jason Lee, [address] [facility], [address] [place] [address],[address]  Speech-Language Pathologist  Pager [phone]

## 2023-10-23 NOTE — PROGRESS NOTES
Patient returned to afib rvr tonight while on amio gtt. Plan was to begin lopressor tomorrow. Labs reviewed and patient notably with Mg of 1 at [facility] at initial presentation, magnesium ordered but upon review of administered medications she did not receive the repletion there. Severe hypomagnesemia likely inciting factor. Mg repeat stat ordered, bmp stat ordered. Metoprolol 5 mg IV ordered. Mg 4 mg ordered. BMP with K at 3.3. 40 mEq potassium repletion ordered.     Maru Olivera MD, PGY-2  Internal Medicine

## 2023-10-23 NOTE — PROGRESS NOTES
Cardiology Consult Service  Daily Progress Note        Admit Date:  10/22/2023  Primary cardiologist: Dr Arik Nicholson for Consultation/Chief Complaint: Abdiel Derby, hemorrhagic stroke    Subjective:      Gulshan Manzanares is a 81 y.o. female with a past medical history of HTN, DM type II, COPD, PAF complicated by recurrent ischemic and hemorrhagic strokes with on-off anticoagulation, DVT, residual aphasia, blindness, hard of hearing. Patient was brought to the [facility]. on 10/22 when she presented to [facility] ED with altered mental status from her nursing home at [facility]. She was found to have a acute hemorrhagic stroke within the old left occipital parietal infarction. ECG revealed AF RVR and therefore patient was started on amiodarone drip after receiving diltiazem IV x2 and digoxin 250 mcg IV x1 today. Cardiology was consulted for further evaluation. Anticoagulation was held. ECG 10/22/2023: Sinus tachycardia; repeat ECG consistent with AF RVR. Patient is unable to provide any history, therefore history was obtained from EMR. Patient remains hemodynamically stable. Telemetry was personally reviewed this morning, reveals sinus tachycardia with frequent PACs. Interval history:  Patient is currently on 2 L of supplemental oxygen, n.p.o. status. Amiodarone drip running, there were no overnight events. Telemetry was personally reviewed, reveals normal sinus rhythm.     Objective:     Medications:   [Held by provider] aspirin EC  81 mg Oral Daily    atorvastatin  40 mg Oral Nightly    polyvinyl alcohol  1 drop Both Eyes BID    metoprolol tartrate  50 mg Oral BID    trospium  20 mg Oral BID AC    venlafaxine  150 mg Oral Daily    sodium chloride flush  5-40 mL IntraVENous 2 times per day    levETIRAcetam  500 mg IntraVENous Q12H    insulin lispro  0-4 Units SubCUTAneous TID WC    insulin lispro  0-4 Units SubCUTAneous Nightly    meropenem  1,000 mg IntraVENous Q8H    pantoprazole  40

## 2023-10-23 NOTE — DISCHARGE INSTRUCTIONS
[facility] Stroke Program Survey  The [facility] values your feedback related to your recent hospital visit and admission. We strive to improve our Neuroscience program to promote better outcomes and recoveries for all our patients. The anonymous survey below consists of a few questions that are related to your stay and around your Stroke diagnosis, treatment, and recovery. It is anonymous and has only a few questions. The estimated length of time needed to complete this survey is 3 minutes or less. Thank you for completing this survey!

## 2023-10-23 NOTE — NURSE NAVIGATOR
Patient's chart reviewed for Stroke Core Measures and additional needs:    [x]   VTE prophylaxis - SCDs    [x]   Antithrombotic (if applicable) - HELD (ICH)    [x]   Swallow screen prior to PO intake - Currently NPO    [x]   Lipids / A1C ordered or resulted - See results    [x]   Therapy ordered   [x]   Care plan and Education template - Added, in progress     Patient currently not appropriate for education at this time; currently, no family present at bedside. Verified educational Stroke booklet in room for patient and/or family to review. Patients personal risk factors specific to stroke/TIA include: Prior / recent CVA, Afib (on Eliquis), Asthma, JARRETT, DM, Overweight. Navigator to continue to follow patient while admitted, to assist with follow up and discharge planning as needed.      Nurse eSignature: Electronically signed by Aurelio Ledesma RN on 10/23/23 at 5:11 PM EDT - Neuroscience Navigator

## 2023-10-23 NOTE — PLAN OF CARE
Problem: Safety - Adult  Goal: Free from fall injury  10/23/2023 0650 by Nancy Mcleod RN  Outcome: Progressing  Flowsheets (Taken 10/23/2023 0336)  Free From Fall Injury: Instruct family/caregiver on patient safety     Problem: Discharge Planning  Goal: Discharge to home or other facility with appropriate resources  10/23/2023 0650 by Nancy Mcleod RN  Outcome: Not Progressing  Flowsheets (Taken 10/22/2023 2000)  Discharge to home or other facility with appropriate resources: Identify barriers to discharge with patient and caregiver     Problem: Pain  Goal: Verbalizes/displays adequate comfort level or baseline comfort level  10/23/2023 0650 by Nancy Mcleod RN  Outcome: Progressing     Problem: Skin/Tissue Integrity  Goal: Absence of new skin breakdown  Description: 1. Monitor for areas of redness and/or skin breakdown  2. Assess vascular access sites hourly  3. Every 4-6 hours minimum:  Change oxygen saturation probe site  4. Every 4-6 hours:  If on nasal continuous positive airway pressure, respiratory therapy assess nares and determine need for appliance change or resting period.   Outcome: Progressing     Problem: ABCDS Injury Assessment  Goal: Absence of physical injury  Outcome: Progressing

## 2023-10-23 NOTE — PROGRESS NOTES
mg/dL    Est, Glom Filt Rate 37 (A) >60    Calcium 9.5 8.3 - 10.6 mg/dL   POCT Glucose    Collection Time: 10/23/23  7:48 AM   Result Value Ref Range    POC Glucose 148 (H) 70 - 99 mg/dl    Performed on ACCU-CHEK    CBC with Auto Differential    Collection Time: 10/23/23  8:42 AM   Result Value Ref Range    WBC 13.4 (H) 4.0 - 11.0 K/uL    RBC 2.56 (L) 4.00 - 5.20 M/uL    Hemoglobin 7.9 (L) 12.0 - 16.0 g/dL    Hematocrit 24.5 (L) 36.0 - 48.0 %    MCV 95.6 80.0 - 100.0 fL    MCH 30.9 26.0 - 34.0 pg    MCHC 32.3 31.0 - 36.0 g/dL    RDW 15.1 12.4 - 15.4 %    Platelets 72 (L) 221 - 450 K/uL    MPV 12.1 (H) 5.0 - 10.5 fL    PLATELET SLIDE REVIEW Decreased     Neutrophils % 86.3 %    Lymphocytes % 7.6 %    Monocytes % 5.2 %    Eosinophils % 0.7 %    Basophils % 0.2 %    Neutrophils Absolute 11.6 (H) 1.7 - 7.7 K/uL    Lymphocytes Absolute 1.0 1.0 - 5.1 K/uL    Monocytes Absolute 0.7 0.0 - 1.3 K/uL    Eosinophils Absolute 0.1 0.0 - 0.6 K/uL    Basophils Absolute 0.0 0.0 - 0.2 K/uL    RBC Morphology Normal    Basic Metabolic Panel w/ Reflex to MG    Collection Time: 10/23/23  8:42 AM   Result Value Ref Range    Sodium 140 136 - 145 mmol/L    Potassium reflex Magnesium 3.4 (L) 3.5 - 5.1 mmol/L    Chloride 108 99 - 110 mmol/L    CO2 21 21 - 32 mmol/L    Anion Gap 11 3 - 16    Glucose 136 (H) 70 - 99 mg/dL    BUN 29 (H) 7 - 20 mg/dL    Creatinine 1.1 0.6 - 1.2 mg/dL    Est, Glom Filt Rate 54 (A) >60    Calcium 8.0 (L) 8.3 - 10.6 mg/dL   Magnesium    Collection Time: 10/23/23  8:42 AM   Result Value Ref Range    Magnesium 2.50 (H) 1.80 - 2.40 mg/dL   Phosphorus    Collection Time: 10/23/23  8:42 AM   Result Value Ref Range    Phosphorus 1.0 (L) 2.5 - 4.9 mg/dL   EKG 12 Lead    Collection Time: 10/23/23 10:39 AM   Result Value Ref Range    Ventricular Rate 87 BPM    Atrial Rate 87 BPM    P-R Interval 168 ms    QRS Duration 80 ms    Q-T Interval 416 ms    QTc Calculation (Bazett) 500 ms    P Axis 41 degrees    R Axis 56 degrees T Axis 63 degrees    Diagnosis       Sinus rhythm with Premature supraventricular complexes and with frequent Premature ventricular complexesNonspecific T wave abnormalityAbnormal ECG       Scheduled Meds:   levETIRAcetam  1,000 mg IntraVENous Q12H    potassium chloride  20 mEq Oral Once    potassium phosphate 20 mmol in sodium chloride 0.9 % 250 mL IVPB  20 mmol IntraVENous Once    [Held by provider] aspirin EC  81 mg Oral Daily    atorvastatin  40 mg Oral Nightly    polyvinyl alcohol  1 drop Both Eyes BID    metoprolol tartrate  50 mg Oral BID    trospium  20 mg Oral BID AC    venlafaxine  150 mg Oral Daily    sodium chloride flush  5-40 mL IntraVENous 2 times per day    insulin lispro  0-4 Units SubCUTAneous TID WC    insulin lispro  0-4 Units SubCUTAneous Nightly    meropenem  1,000 mg IntraVENous Q8H    pantoprazole  40 mg IntraVENous Daily       Continuous Infusions:  sodium chloride  dextrose  amiodarone, Last Rate: 0.5 mg/min (10/22/23 2230)  sodium chloride, Last Rate: 75 mL/hr at 10/23/23 0115        PRN Meds:  metoprolol, 2.5 mg, Q20 Min PRN  perflutren lipid microspheres, 1.5 mL, ONCE PRN  polyethyl glycol-propyl glycol 0.4-0.3 %, 1 drop, PRN  sodium chloride flush, 5-40 mL, PRN  sodium chloride, , PRN  ondansetron, 4 mg, Q8H PRN   Or  ondansetron, 4 mg, Q6H PRN  polyethylene glycol, 17 g, Daily PRN  glucose, 4 tablet, PRN  dextrose bolus, 125 mL, PRN   Or  dextrose bolus, 250 mL, PRN  glucagon (rDNA), 1 mg, PRN  dextrose, , Continuous PRN  albuterol, 2.5 mg, Q4H PRN                Discussed at length with patient, family, nursing staff and the medical ICU team. All agreed with the current plan     Dicussed with family regarding her Code status, family stated that they have dicussed with her in the past that she deos not wanted to be on the ventilator     Time:  [facility]) [place], [facility]   [phone]  October 23, 2023

## 2023-10-23 NOTE — PLAN OF CARE
Resting in bed  No distress    cvEEG report showing severe generalized slow wave abnormality, no epileptiform discharges, focal slowing or seizures. Follows commands in RUE will squeeze hand, wiggles toes bilaterally to command. Some spontaneous movement of RUE but none of LUE. Withdrawals to pain bilaterally in lower extremities and LUE. No withdrawal to pain in RUE. Left qian neglect. Right gaze preference. Hypophonic, attempts to mouth words at times   Pupils 4 mm > 3 mm. Right gaze preference.   No blink to threat (patient is blind)  Corneal reflex intact  Cough and gag reflex intact      Plan   MRI  Continue cvEEg  Continue Keppra 1g BID        PHYSICAL EXAM:  Vitals:    10/22/23 1830 10/22/23 1837 10/22/23 1845 10/22/23 1852   BP: (!) 118/41  (!) 134/46    Pulse: (!) 104 (!) 108 (!) 106 (!) 104   Resp: 29 30 29 28   SpO2: 94% 93%  95%

## 2023-10-23 NOTE — PROGRESS NOTES
Shift summary-  Patient neuro status remains unchanged. Speech incomprehensible, intermittently following commands but responds to pain in BLE and RUE. No pain response in LUE. Right pupil equal and reactive to light, left pupil difficult to visualize due to eyelid. No EEG events during the shift. Patient to MRI, tolerated well.

## 2023-10-23 NOTE — PROGRESS NOTES
ICU Progress Note    Admit Date: 10/22/2023  Day: 1  Vent Day: 0  IV Access:Peripheral  IV Fluids:None  Vasopressors:None                Antibiotics: Merrem  Diet: Diet NPO    CC: Altered Mental Status    Interval history: Ms. Jani Clemens had no acute events overnight. Per nursing, she was waxing and waning in alertness and responsiveness overnight. She is very somnolent and minimally responsive this morning, only reacting to pain. Her HR is appropriate in the 90s. No seizure activity was recorded on cEEG overnight. HPI: Ms Jani Clemens is a 77y/o F with a PMHx of Afib (on Eliquis), aphasia, blindness, hearing impairment, HTN, T2DM, COPD who presents with AMS. She was taken to [facility] ED via EMS for AMS per her nursing home staff at [facility]. Staff stated she had only been responsive to pain, compared to baseline of speaking, telling her name and spontaneous movements. CT head showed acute hyperdense hemorrhage in anteromedial aspect of old L occipital parietal infarction.     Medications:     Scheduled Meds:   [Held by provider] aspirin EC  81 mg Oral Daily    atorvastatin  40 mg Oral Nightly    polyvinyl alcohol  1 drop Both Eyes BID    metoprolol tartrate  50 mg Oral BID    trospium  20 mg Oral BID AC    venlafaxine  150 mg Oral Daily    sodium chloride flush  5-40 mL IntraVENous 2 times per day    levETIRAcetam  500 mg IntraVENous Q12H    insulin lispro  0-4 Units SubCUTAneous TID WC    insulin lispro  0-4 Units SubCUTAneous Nightly    meropenem  1,000 mg IntraVENous Q8H    pantoprazole  40 mg IntraVENous Daily     Continuous Infusions:   sodium chloride      dextrose      amiodarone 0.5 mg/min (10/22/23 2238)    sodium chloride 75 mL/hr at 10/23/23 0115     PRN Meds:metoprolol, polyethyl glycol-propyl glycol 0.4-0.3 %, sodium chloride flush, sodium chloride, ondansetron **OR** ondansetron, polyethylene glycol, glucose, dextrose bolus **OR** dextrose bolus, glucagon (rDNA), dextrose,

## 2023-10-24 LAB
ANION GAP SERPL CALCULATED.3IONS-SCNC: 11 MMOL/L (ref 3–16)
ANISOCYTOSIS BLD QL SMEAR: ABNORMAL
BASOPHILS # BLD: 0 K/UL (ref 0–0.2)
BASOPHILS NFR BLD: 0 %
BUN SERPL-MCNC: 26 MG/DL (ref 7–20)
CALCIUM SERPL-MCNC: 9 MG/DL (ref 8.3–10.6)
CHLORIDE SERPL-SCNC: 105 MMOL/L (ref 99–110)
CO2 SERPL-SCNC: 24 MMOL/L (ref 21–32)
CREAT SERPL-MCNC: 0.9 MG/DL (ref 0.6–1.2)
DEPRECATED RDW RBC AUTO: 15.1 % (ref 12.4–15.4)
EKG ATRIAL RATE: 87 BPM
EKG DIAGNOSIS: NORMAL
EKG P AXIS: 41 DEGREES
EKG P-R INTERVAL: 168 MS
EKG Q-T INTERVAL: 416 MS
EKG QRS DURATION: 80 MS
EKG QTC CALCULATION (BAZETT): 500 MS
EKG R AXIS: 56 DEGREES
EKG T AXIS: 63 DEGREES
EKG VENTRICULAR RATE: 87 BPM
EOSINOPHIL # BLD: 0.1 K/UL (ref 0–0.6)
EOSINOPHIL NFR BLD: 1 %
GFR SERPLBLD CREATININE-BSD FMLA CKD-EPI: >60 ML/MIN/{1.73_M2}
GLUCOSE BLD-MCNC: 148 MG/DL (ref 70–99)
GLUCOSE BLD-MCNC: 157 MG/DL (ref 70–99)
GLUCOSE BLD-MCNC: 157 MG/DL (ref 70–99)
GLUCOSE BLD-MCNC: 161 MG/DL (ref 70–99)
GLUCOSE BLD-MCNC: 192 MG/DL (ref 70–99)
GLUCOSE SERPL-MCNC: 168 MG/DL (ref 70–99)
HCT VFR BLD AUTO: 29.5 % (ref 36–48)
HGB BLD-MCNC: 9.8 G/DL (ref 12–16)
LYMPHOCYTES # BLD: 1.8 K/UL (ref 1–5.1)
LYMPHOCYTES NFR BLD: 13 %
MAGNESIUM SERPL-MCNC: 2 MG/DL (ref 1.8–2.4)
MCH RBC QN AUTO: 30.6 PG (ref 26–34)
MCHC RBC AUTO-ENTMCNC: 33 G/DL (ref 31–36)
MCV RBC AUTO: 92.8 FL (ref 80–100)
METAMYELOCYTES NFR BLD MANUAL: 1 %
MONOCYTES # BLD: 0.4 K/UL (ref 0–1.3)
MONOCYTES NFR BLD: 3 %
NEUTROPHILS # BLD: 11.8 K/UL (ref 1.7–7.7)
NEUTROPHILS NFR BLD: 82 %
PERFORMED ON: ABNORMAL
PHOSPHATE SERPL-MCNC: 2.4 MG/DL (ref 2.5–4.9)
PHOSPHATE SERPL-MCNC: 2.7 MG/DL (ref 2.5–4.9)
PLATELET # BLD AUTO: 85 K/UL (ref 135–450)
PLATELET BLD QL SMEAR: ABNORMAL
PMV BLD AUTO: 12 FL (ref 5–10.5)
POLYCHROMASIA BLD QL SMEAR: ABNORMAL
POTASSIUM SERPL-SCNC: 3.5 MMOL/L (ref 3.5–5.1)
POTASSIUM SERPL-SCNC: 3.8 MMOL/L (ref 3.5–5.1)
POTASSIUM SERPL-SCNC: 3.9 MMOL/L (ref 3.5–5.1)
POTASSIUM SERPL-SCNC: 4 MMOL/L (ref 3.5–5.1)
RBC # BLD AUTO: 3.18 M/UL (ref 4–5.2)
SODIUM SERPL-SCNC: 140 MMOL/L (ref 136–145)
TARGETS BLD QL SMEAR: ABNORMAL
WBC # BLD AUTO: 14.2 K/UL (ref 4–11)

## 2023-10-24 PROCEDURE — 6360000004 HC RX CONTRAST MEDICATION: Performed by: STUDENT IN AN ORGANIZED HEALTH CARE EDUCATION/TRAINING PROGRAM

## 2023-10-24 PROCEDURE — 95813 EEG EXTND MNTR 61-119 MIN: CPT

## 2023-10-24 PROCEDURE — 2580000003 HC RX 258

## 2023-10-24 PROCEDURE — 80048 BASIC METABOLIC PNL TOTAL CA: CPT

## 2023-10-24 PROCEDURE — 6360000002 HC RX W HCPCS

## 2023-10-24 PROCEDURE — 94761 N-INVAS EAR/PLS OXIMETRY MLT: CPT

## 2023-10-24 PROCEDURE — 2580000003 HC RX 258: Performed by: STUDENT IN AN ORGANIZED HEALTH CARE EDUCATION/TRAINING PROGRAM

## 2023-10-24 PROCEDURE — 6370000000 HC RX 637 (ALT 250 FOR IP): Performed by: STUDENT IN AN ORGANIZED HEALTH CARE EDUCATION/TRAINING PROGRAM

## 2023-10-24 PROCEDURE — C9113 INJ PANTOPRAZOLE SODIUM, VIA: HCPCS

## 2023-10-24 PROCEDURE — 2060000000 HC ICU INTERMEDIATE R&B

## 2023-10-24 PROCEDURE — 36415 COLL VENOUS BLD VENIPUNCTURE: CPT

## 2023-10-24 PROCEDURE — 2580000003 HC RX 258: Performed by: INTERNAL MEDICINE

## 2023-10-24 PROCEDURE — 99232 SBSQ HOSP IP/OBS MODERATE 35: CPT | Performed by: STUDENT IN AN ORGANIZED HEALTH CARE EDUCATION/TRAINING PROGRAM

## 2023-10-24 PROCEDURE — 6370000000 HC RX 637 (ALT 250 FOR IP)

## 2023-10-24 PROCEDURE — 2700000000 HC OXYGEN THERAPY PER DAY

## 2023-10-24 PROCEDURE — 1200000000 HC SEMI PRIVATE

## 2023-10-24 PROCEDURE — 2000000000 HC ICU R&B

## 2023-10-24 PROCEDURE — 83735 ASSAY OF MAGNESIUM: CPT

## 2023-10-24 PROCEDURE — 93010 ELECTROCARDIOGRAM REPORT: CPT | Performed by: INTERNAL MEDICINE

## 2023-10-24 PROCEDURE — 99291 CRITICAL CARE FIRST HOUR: CPT | Performed by: INTERNAL MEDICINE

## 2023-10-24 PROCEDURE — 99233 SBSQ HOSP IP/OBS HIGH 50: CPT | Performed by: INTERNAL MEDICINE

## 2023-10-24 PROCEDURE — C8929 TTE W OR WO FOL WCON,DOPPLER: HCPCS

## 2023-10-24 PROCEDURE — 6360000002 HC RX W HCPCS: Performed by: STUDENT IN AN ORGANIZED HEALTH CARE EDUCATION/TRAINING PROGRAM

## 2023-10-24 PROCEDURE — 2500000003 HC RX 250 WO HCPCS

## 2023-10-24 PROCEDURE — 84132 ASSAY OF SERUM POTASSIUM: CPT

## 2023-10-24 PROCEDURE — 85025 COMPLETE CBC W/AUTO DIFF WBC: CPT

## 2023-10-24 PROCEDURE — 84100 ASSAY OF PHOSPHORUS: CPT

## 2023-10-24 RX ORDER — AMIODARONE HYDROCHLORIDE 200 MG/1
100 TABLET ORAL DAILY
Status: DISCONTINUED | OUTPATIENT
Start: 2023-10-24 | End: 2023-10-24

## 2023-10-24 RX ORDER — THIAMINE HYDROCHLORIDE 100 MG/ML
100 INJECTION, SOLUTION INTRAMUSCULAR; INTRAVENOUS DAILY
Status: DISCONTINUED | OUTPATIENT
Start: 2023-10-24 | End: 2023-10-27

## 2023-10-24 RX ORDER — POTASSIUM CHLORIDE 20 MEQ/1
40 TABLET, EXTENDED RELEASE ORAL ONCE
Status: DISCONTINUED | OUTPATIENT
Start: 2023-10-24 | End: 2023-10-24

## 2023-10-24 RX ORDER — MULTIVITAMIN WITH IRON
1 TABLET ORAL DAILY
Status: DISCONTINUED | OUTPATIENT
Start: 2023-10-24 | End: 2023-10-27

## 2023-10-24 RX ADMIN — DEXTROSE MONOHYDRATE 0.5 MG/MIN: 50 INJECTION, SOLUTION INTRAVENOUS at 07:34

## 2023-10-24 RX ADMIN — THIAMINE HYDROCHLORIDE 100 MG: 100 INJECTION, SOLUTION INTRAMUSCULAR; INTRAVENOUS at 13:56

## 2023-10-24 RX ADMIN — PERFLUTREN 1.5 ML: 6.52 INJECTION, SUSPENSION INTRAVENOUS at 13:46

## 2023-10-24 RX ADMIN — LEVETIRACETAM 1000 MG: 100 INJECTION INTRAVENOUS at 05:16

## 2023-10-24 RX ADMIN — LEVETIRACETAM 1000 MG: 100 INJECTION INTRAVENOUS at 17:33

## 2023-10-24 RX ADMIN — POLYVINYL ALCOHOL 1 DROP: 14 SOLUTION/ DROPS OPHTHALMIC at 08:15

## 2023-10-24 RX ADMIN — MEROPENEM 1000 MG: 1 INJECTION, POWDER, FOR SOLUTION INTRAVENOUS at 05:26

## 2023-10-24 RX ADMIN — SODIUM CHLORIDE: 9 INJECTION, SOLUTION INTRAVENOUS at 10:53

## 2023-10-24 RX ADMIN — METOPROLOL TARTRATE 50 MG: 50 TABLET, FILM COATED ORAL at 20:27

## 2023-10-24 RX ADMIN — SODIUM CHLORIDE, PRESERVATIVE FREE 10 ML: 5 INJECTION INTRAVENOUS at 20:28

## 2023-10-24 RX ADMIN — TROSPIUM CHLORIDE 20 MG: 20 TABLET, FILM COATED ORAL at 17:33

## 2023-10-24 RX ADMIN — PANTOPRAZOLE SODIUM 40 MG: 40 INJECTION, POWDER, FOR SOLUTION INTRAVENOUS at 08:13

## 2023-10-24 RX ADMIN — METOPROLOL TARTRATE 50 MG: 50 TABLET, FILM COATED ORAL at 08:14

## 2023-10-24 RX ADMIN — THERA TABS 1 TABLET: TAB at 13:56

## 2023-10-24 RX ADMIN — TROSPIUM CHLORIDE 20 MG: 20 TABLET, FILM COATED ORAL at 08:13

## 2023-10-24 RX ADMIN — ATORVASTATIN CALCIUM 40 MG: 40 TABLET, FILM COATED ORAL at 20:27

## 2023-10-24 RX ADMIN — POLYVINYL ALCOHOL 1 DROP: 14 SOLUTION/ DROPS OPHTHALMIC at 20:28

## 2023-10-24 RX ADMIN — POTASSIUM BICARBONATE 40 MEQ: 782 TABLET, EFFERVESCENT ORAL at 10:04

## 2023-10-24 RX ADMIN — SODIUM PHOSPHATE, MONOBASIC, MONOHYDRATE AND SODIUM PHOSPHATE, DIBASIC, ANHYDROUS 15 MMOL: 142; 276 INJECTION, SOLUTION INTRAVENOUS at 01:02

## 2023-10-24 ASSESSMENT — PAIN SCALES - GENERAL
PAINLEVEL_OUTOF10: 0
PAINLEVEL_OUTOF10: 0

## 2023-10-24 NOTE — PROGRESS NOTES
Pt has been minimally responsive throughout shift, but has shown some improvement. Will  fingers with RUE. Has moved LUE, but movement appears to be non purposeful and very intermittent. Wiggles toes intermittently. TF started at 1800 at 10 mL/hr with flushes per order. Pt no longer on continuous EEG, neuro check q4h per order. Pt has had multiple BM through shift, difficulty getting accurate output with purewick in place. See flowsheet and eMar for additional documentation.

## 2023-10-24 NOTE — PROGRESS NOTES
ICU Progress Note    Admit Date: 10/22/2023  Day: 2  Vent Day: 0  IV Access:Peripheral  IV Fluids:None  Vasopressors:None                Antibiotics: Merrem  Diet: Diet NPO    CC: Altered Mental Status    Interval history: Ms. Lydia Rich had no acute events overnight. Several brief seizures were recorded on cEEG overnight, without any clinical correlation. She continues to wax and wane in presentation. HPI: Ms Lydia Rich is a 77y/o F with a PMHx of Afib (on Eliquis), aphasia, blindness, hearing impairment, HTN, T2DM, COPD who presents with AMS. She was taken to [facility] ED via EMS for AMS per her nursing home staff at [facility]. Staff stated she had only been responsive to pain, compared to baseline of speaking, telling her name and spontaneous movements. CT head showed acute hyperdense hemorrhage in anteromedial aspect of old L occipital parietal infarction.     Medications:     Scheduled Meds:   levETIRAcetam  1,000 mg IntraVENous Q12H    [Held by provider] aspirin EC  81 mg Oral Daily    atorvastatin  40 mg Oral Nightly    polyvinyl alcohol  1 drop Both Eyes BID    metoprolol tartrate  50 mg Oral BID    trospium  20 mg Oral BID AC    venlafaxine  150 mg Oral Daily    sodium chloride flush  5-40 mL IntraVENous 2 times per day    insulin lispro  0-4 Units SubCUTAneous TID WC    insulin lispro  0-4 Units SubCUTAneous Nightly    meropenem  1,000 mg IntraVENous Q8H    pantoprazole  40 mg IntraVENous Daily     Continuous Infusions:   sodium chloride      dextrose      amiodarone 0.5 mg/min (10/24/23 0734)    sodium chloride 75 mL/hr at 10/23/23 0153     PRN Meds:metoprolol, perflutren lipid microspheres, polyethyl glycol-propyl glycol 0.4-0.3 %, sodium chloride flush, sodium chloride, ondansetron **OR** ondansetron, polyethylene glycol, glucose, dextrose bolus **OR** dextrose bolus, glucagon (rDNA), dextrose, albuterol    Objective:   Vitals:   T-max:  Patient Vitals for the past 8 hrs:   BP Pulse Resp SpO2 CVA. Received Kcentra prior to transfer. - MRI 10/23: acute infarct in posterior R MCA   - cvEEG 10/22: abnormal tracing indicating diffuse cerebral dysfunction, no seizure activity noted  - cvEEG 10/23-24: brief electrographic seizures without any clinical correlate  - Keppra 1g BID  - SBP < 160     Cardiovascular  Atrial Fibrillation w/ RVR  Pt presented with -160. Was on diltiazem gtt during transport. Received 2x 10mg dilt bolus + digoxin 250mcg on arrival to ICU. On Eliquis at home. - transition amiodarone gtt to PO  - cardiology consulted; recommend lopressor 50mg BID when pt can tolerate PO    Renal  Acute Kidney Injury, improved  Baseline Cr 0.9  - elevated to Cr 1.3 on admission     Infectious   Sepsis, secondary to Urinary Tract Infection  UA done at nursing home concerning for UTI. Repeat UA 10/22 +nitrite, small leuk esterase,  WBC, 1+ bacteria. Pt received 1 dose of levaquin before transfer.  - WBC 26.5 on admission, down-trending  - started Merrem for 7 days; pt had prolonged QTc and anaphylactic allergy to cephalosporins    Chronic Conditions  - COPD: cont home inhalers PRN  - T2DM: low dose SSI  - HTN: cont home lopressor 50 mg BID when pt can tolerate PO meds    Code Status: DNR-CCA   FEN: NPO  PPX: SCDs  DISPO: ICU    Particviviana Mueller MD, PGY-1  Internal Medicine Resident  Contact via [address] [place]  10/24/23  8:00 AM    This patient has been staffed and discussed with Dr. Marisa Duran. Patient was seen, examined and discussed with Dr. Marianela Weiner. I agree with the interval history.  My physical exam confirms the findings listed below  Chart was reviewed including labs and medical records confirm the findings noted    Peripheral IV 10/22/23 Right Forearm (Active)   Number of days: 2       Peripheral IV 10/23/23 Proximal;Right Forearm (Active)   Number of days: 0     Acute ischemic stroke with hemorrhagic conversion left occipital area with hx of multiple chronic ischemic strokes  A.fib with

## 2023-10-24 NOTE — PROGRESS NOTES
Cardiology Consult Service  Daily Progress Note        Admit Date:  10/22/2023  Primary cardiologist: Dr Gerber Francis for Consultation/Chief Complaint: Leonel Shields, hemorrhagic stroke    Subjective:      Wyatt Gates is a 81 y.o. female with a past medical history of HTN, DM type II, COPD, PAF complicated by recurrent ischemic and hemorrhagic strokes with on-off anticoagulation, DVT, residual aphasia, blindness, hard of hearing. [facility] on 10/22 when she presented to [facility] ED with altered mental status from her nursing home at [facility]. She was found to have a acute hemorrhagic stroke within the old left occipital parietal infarction. ECG revealed AF RVR and therefore patient was started on amiodarone drip after receiving diltiazem IV x2 and digoxin 250 mcg IV x1 today. Cardiology was consulted for further evaluation. Anticoagulation was held. ECG 10/22/2023: Sinus tachycardia; repeat ECG consistent with AF RVR. Patient is unable to provide any history, therefore history was obtained from EMR. Patient remains hemodynamically stable. Telemetry was personally reviewed this morning, reveals sinus tachycardia with frequent PACs. Interval history:  Patient is currently on 2 L of supplemental oxygen, n.p.o. status, plan to start TF later today. Amiodarone drip running, there were no overnight events. Telemetry was personally reviewed, reveals normal sinus rhythm.     Objective:     Medications:   multivitamin  1 tablet Oral Daily    thiamine  100 mg IntraVENous Daily    levETIRAcetam  1,000 mg IntraVENous Q12H    [Held by provider] aspirin EC  81 mg Oral Daily    atorvastatin  40 mg Oral Nightly    polyvinyl alcohol  1 drop Both Eyes BID    metoprolol tartrate  50 mg Oral BID    trospium  20 mg Oral BID AC    [Held by provider] venlafaxine  150 mg Oral Daily    sodium chloride flush  5-40 mL IntraVENous 2 times per day    insulin lispro  0-4 Units jvd, no bilateral lower extremity edema   Abdomen:   Soft, non-tender, bowel sounds active all four quadrants,  no masses, no organomegaly       Extremities: Extremities normal, atraumatic, no cyanosis. Pulses: 2+ and symmetric   Skin: Skin color, texture, turgor normal, no rashes or lesions               Labs:   Recent Labs     10/22/23  2258 10/23/23  0842 10/24/23  0023 10/24/23  0547    140  --  140   K 3.3* 3.4* 3.8 3.5   BUN 34* 29*  --  26*   CREATININE 1.5* 1.1  --  0.9   CL 98* 108  --  105   CO2 22 21  --  24   GLUCOSE 166* 136*  --  168*   CALCIUM 9.5 8.0*  --  9.0   MG 1.40* 2.50*  --  2.00       Recent Labs     10/22/23  1057 10/23/23  0842 10/24/23  0547   WBC 26.9* 13.4* 14.2*   HGB 11.0* 7.9* 9.8*   HCT 34.3* 24.5* 29.5*   PLT 94* 72* 85*   MCV 94.5 95.6 92.8       Recent Labs     10/22/23  1057   TRIG 72   HDL 52     No results for input(s): \"PTT\", \"INR\" in the last 72 hours. Invalid input(s): \"PT\"  No results for input(s): \"CKTOTAL\", \"CKMB\", \"CKMBINDEX\", \"TROPONINI\" in the last 72 hours. No results for input(s): \"BNP\" in the last 72 hours. No results for input(s): \"NTPROBNP\" in the last 72 hours. No results for input(s): \"TSH\" in the last 72 hours. Imaging:       Assessment & Plan:     1. PAF with RVR. Upon transfer to the [facility] repeat ECG revealed AF RVR. Patient received multiple medications and appears to be converting into sinus tachycardia with frequent PACs. 2.  History of recurrent strokes (ischemic and hemorrhagic), now acute hemorrhagic stroke. Patient is now off anticoagulation. 3.  Hypertension  4. COPD           - Continue amiodarone drip at lower rate of 0.25 mg/min; if adequate TF intake tolerated by tomorrow, we may consider changing drip to amio 200 mg bid. - Continue lopressor 50 bid. - Patient cannot be on any anticoagulation at this time. - Palliative care consult is reasonable at this time.  She is now DNR.   - If patient's mental status

## 2023-10-24 NOTE — PROGRESS NOTES
-[facility] Internal Medicine-  -ICU to [place] Transfer Note-  HPI from H&P  Ms Pearl Aguilar is a 77y/o F with a PMHx of Afib (on Eliquis), aphasia, blindness, hearing impairment, HTN, T2DM, COPD who presents with AMS. She was taken to [facility] ED via EMS for AMS per her nursing home staff at [facility]. Staff stated she had only been responsive to pain, compared to baseline of speaking, telling her name and spontaneous movements. CT head showed acute hyperdense hemorrhage in anteromedial aspect of old L occipital parietal infarction. I  ICU Admission Reason & Brief ICU Course:   Pt was admitted to ICU for [facility] management of acute hyperdense hemorrhage in anteromedial aspect of a samanta occipitoparietal infarction. On presentation, pt was aphasic. Also in Afib with RVR to 140s-160s. WBC elevated to 26.5, with concern for UTI based on prior UA. Pt was afebrile. Was started on an amiodarone gtt, which reverted her back to normal sinus rhythm. Pt was switched to PO amiodarone. Neurological exam waxed and waned, at best answering simple questions and oriented x3, following directives in BLE and RUE, responding to pain in all 4 extremities. Pt was started on Keppra 1g BID. Started on [place]. Urine cultures negative, so Merrem was stopped. Started on cvEEG to monitor seizure activity. Only having short (<30 sec), clinically insignificant seizure activity recorded. Electrolytes have been low and replaced as needed daily via IV or NG tube. Pt started on tube feeds 10/24. Concern for refeeding syndrome, so added thiamine and multivitamin. Will monitor electrolytes Q8H.       C Code Status/DPOA Info/Goals of Care/ACP Note     -Code Status DNR-CCA  -POA/ACP documentation? Opal Garcia,   -Any changes to goals of care? None  -is palliative care consulted?  Yes     U Unprescribing & Pertinent High-Risk Medications  -Anticoagulation: Currently held  -Chucky.Qian ] None - reason: [facility], hold for 2 weeks from inhalers PRN  - T2DM: low dose SSI  - HTN: cont home lopressor 50 mg BID when pt can tolerate PO meds       ICU signature Lorenzo Sheets MD PGY-1  COSIGN to ICU senior

## 2023-10-24 NOTE — PROGRESS NOTES
Comprehensive Nutrition Assessment    RECOMMENDATIONS:  Initiate Glucerna 1.5 (Diabetic formula) @ 10 mL/hr, increase 10 mL q10h until goal of 40 mL/hr is met. Flushes: 30 mL q4h  Start 100 mg B1 x5 days + MVI  Q8 lyte labs x3 days    Nutrition Education: Education not appropriate     NUTRITION ASSESSMENT:   Nutritional summary & status: Pt NPO x3 days. Pt w/ altered mental status, continues to wax and wane, inappropriate for swallow eval at this time. NGT placed for meds. [address] [place] discussion had w/ , plan to start TF today. Weight stable, spoke w/ , pt's intakes have been poor >1 month. Pt had been pocketing food at facility. B1 + MVI to start today, discussed w/ MD. Changing electrolytes labs to q8 for the next 3 days.    Admission // PMH: Intraparenchymal hemorrhage of brain, AMS, aphasia, afib, ELYSIA, HTN, T2DM, COPD    MALNUTRITION ASSESSMENT  Context of Malnutrition: Chronic Illness   Malnutrition Status: Mild malnutrition  Findings of the 6 clinical characteristics of malnutrition (Minimum of 2 out of 6 clinical characteristics is required to make the diagnosis of moderate or severe Protein Calorie Malnutrition based on AND/ASPEN Guidelines):  Energy Intake:  75% or less estimated energy requirements for 1 month or longer  Weight Loss:  No significant weight loss     Body Fat Loss:  No significant body fat loss     Muscle Mass Loss:  No significant muscle mass loss    Fluid Accumulation:  No significant fluid accumulation      NUTRITION DIAGNOSIS   Inadequate oral intake related to cognitive or neurological impairment as evidenced by NPO or clear liquid status due to medical condition    Nutrition Monitoring and Evaluation:   Food/Nutrient Intake Outcomes:  Enteral Nutrition Intake/Tolerance  Physical Signs/Symptoms Outcomes:  Biochemical Data, Nutrition Focused Physical Findings, Weight, Hemodynamic Status     OBJECTIVE DATA: Significant to nutrition assessment  Nutrition Related Findings: +BM

## 2023-10-24 NOTE — PROGRESS NOTES
Speech Therapy   attempt    Patient was attempted to be seen by Speech Therapy Department this date for Speech and swallowing evaluation. Patient was unable to be seen due to RN reporting pt no appropriate at this time. RN will page SLP if pt is more alert and able to participate in assessment.     Russel Wolf, [address]  Speech-Language Pathologist  Pager [phone]

## 2023-10-24 NOTE — PLAN OF CARE
Problem: Discharge Planning  Goal: Discharge to home or other facility with appropriate resources  10/23/2023 2309 by Deward Landau, RN  Outcome: Progressing  Flowsheets (Taken 10/23/2023 2000)  Discharge to home or other facility with appropriate resources:   Identify barriers to discharge with patient and caregiver   Arrange for needed discharge resources and transportation as appropriate   Identify discharge learning needs (meds, wound care, etc)  10/23/2023 0954 by Tawanna Montejo RN  Outcome: Progressing     Problem: Safety - Adult  Goal: Free from fall injury  10/23/2023 2309 by Deward Landau, RN  Outcome: Progressing  Flowsheets (Taken 10/23/2023 2200)  Free From Fall Injury: Instruct family/caregiver on patient safety  10/23/2023 0954 by Tawanna Montejo RN  Outcome: Progressing     Problem: Pain  Goal: Verbalizes/displays adequate comfort level or baseline comfort level  10/23/2023 2309 by Deward Landau, RN  Outcome: Progressing  Flowsheets (Taken 10/23/2023 2000)  Verbalizes/displays adequate comfort level or baseline comfort level:   Encourage patient to monitor pain and request assistance   Assess pain using appropriate pain scale   Administer analgesics based on type and severity of pain and evaluate response   Implement non-pharmacological measures as appropriate and evaluate response  10/23/2023 0954 by Tawanna Montejo RN  Outcome: Progressing     Problem: ABCDS Injury Assessment  Goal: Absence of physical injury  10/23/2023 2309 by Deward Landau, RN  Outcome: Progressing  Flowsheets (Taken 10/23/2023 2200)  Absence of Physical Injury: Implement safety measures based on patient assessment  10/23/2023 0954 by Tawanna Montejo RN  Outcome: Progressing     Problem: Chronic Conditions and Co-morbidities  Goal: Patient's chronic conditions and co-morbidity symptoms are monitored and maintained or improved  10/23/2023 2309 by Deward Landau, RN  Outcome: Progressing  Flowsheets (Taken 10/23/2023 2000)  Care Plan - Patient's Chronic Conditions and Co-Morbidity Symptoms are Monitored and Maintained or Improved: Monitor and assess patient's chronic conditions and comorbid symptoms for stability, deterioration, or improvement  10/23/2023 0954 by Tawanna Montejo RN  Outcome: Progressing     Problem: Neurosensory - Adult  Goal: Achieves stable or improved neurological status  10/23/2023 2309 by Deward Landau, RN  Outcome: Progressing  Flowsheets (Taken 10/23/2023 2000)  Achieves stable or improved neurological status:   Assess for and report changes in neurological status   Initiate measures to prevent increased intracranial pressure   Maintain blood pressure and fluid volume within ordered parameters to optimize cerebral perfusion and minimize risk of hemorrhage   Monitor temperature, glucose, and sodium.  Initiate appropriate interventions as ordered  10/23/2023 0954 by Tawanna Montejo RN  Outcome: Progressing     Problem: Neurosensory - Adult  Goal: Achieves maximal functionality and self care  10/23/2023 2309 by Deward Landau, RN  Outcome: Progressing  Flowsheets (Taken 10/23/2023 2000)  Achieves maximal functionality and self care:   Monitor swallowing and airway patency with patient fatigue and changes in neurological status   Encourage and assist patient to increase activity and self care with guidance from physical therapy/occupational therapy  10/23/2023 0954 by Tawanna Montejo RN  Outcome: Progressing

## 2023-10-24 NOTE — PROGRESS NOTES
Physical & Occupational Therapy    Orders received and chart reviewed. Pt is on bedrest orders and is a hold for the day. Will be seen once medically cleared and follow up schedule allows.      Michael Barth, SPT  [address], OTR/L, 0502

## 2023-10-25 ENCOUNTER — APPOINTMENT (OUTPATIENT)
Dept: GENERAL RADIOLOGY | Age: 73
DRG: 064 | End: 2023-10-25
Attending: INTERNAL MEDICINE
Payer: MEDICARE

## 2023-10-25 LAB
ANION GAP SERPL CALCULATED.3IONS-SCNC: 11 MMOL/L (ref 3–16)
ANISOCYTOSIS BLD QL SMEAR: ABNORMAL
BASOPHILS # BLD: 0 K/UL (ref 0–0.2)
BASOPHILS NFR BLD: 0 %
BUN SERPL-MCNC: 19 MG/DL (ref 7–20)
CALCIUM SERPL-MCNC: 9 MG/DL (ref 8.3–10.6)
CHLORIDE SERPL-SCNC: 105 MMOL/L (ref 99–110)
CO2 SERPL-SCNC: 25 MMOL/L (ref 21–32)
CREAT SERPL-MCNC: 0.7 MG/DL (ref 0.6–1.2)
DEPRECATED RDW RBC AUTO: 15.4 % (ref 12.4–15.4)
EOSINOPHIL # BLD: 0.2 K/UL (ref 0–0.6)
EOSINOPHIL NFR BLD: 2 %
GFR SERPLBLD CREATININE-BSD FMLA CKD-EPI: >60 ML/MIN/{1.73_M2}
GLUCOSE BLD-MCNC: 170 MG/DL (ref 70–99)
GLUCOSE BLD-MCNC: 176 MG/DL (ref 70–99)
GLUCOSE BLD-MCNC: 185 MG/DL (ref 70–99)
GLUCOSE BLD-MCNC: 218 MG/DL (ref 70–99)
GLUCOSE SERPL-MCNC: 166 MG/DL (ref 70–99)
HCT VFR BLD AUTO: 29.8 % (ref 36–48)
HGB BLD-MCNC: 9.7 G/DL (ref 12–16)
LYMPHOCYTES # BLD: 1.1 K/UL (ref 1–5.1)
LYMPHOCYTES NFR BLD: 11 %
MACROCYTES BLD QL SMEAR: ABNORMAL
MCH RBC QN AUTO: 30.6 PG (ref 26–34)
MCHC RBC AUTO-ENTMCNC: 32.5 G/DL (ref 31–36)
MCV RBC AUTO: 94 FL (ref 80–100)
MICROCYTES BLD QL SMEAR: ABNORMAL
MONOCYTES # BLD: 0.2 K/UL (ref 0–1.3)
MONOCYTES NFR BLD: 2 %
MYELOCYTES NFR BLD MANUAL: 1 %
NEUTROPHILS # BLD: 8.5 K/UL (ref 1.7–7.7)
NEUTROPHILS NFR BLD: 83 %
PERFORMED ON: ABNORMAL
PHOSPHATE SERPL-MCNC: 2.5 MG/DL (ref 2.5–4.9)
PHOSPHATE SERPL-MCNC: 2.5 MG/DL (ref 2.5–4.9)
PLATELET # BLD AUTO: 86 K/UL (ref 135–450)
PMV BLD AUTO: 11.8 FL (ref 5–10.5)
POLYCHROMASIA BLD QL SMEAR: ABNORMAL
POTASSIUM SERPL-SCNC: 3.7 MMOL/L (ref 3.5–5.1)
POTASSIUM SERPL-SCNC: 3.8 MMOL/L (ref 3.5–5.1)
POTASSIUM SERPL-SCNC: 3.8 MMOL/L (ref 3.5–5.1)
PROMYELOCYTES NFR BLD MANUAL: 1 %
RBC # BLD AUTO: 3.17 M/UL (ref 4–5.2)
SODIUM SERPL-SCNC: 141 MMOL/L (ref 136–145)
WBC # BLD AUTO: 10 K/UL (ref 4–11)

## 2023-10-25 PROCEDURE — 2580000003 HC RX 258: Performed by: INTERNAL MEDICINE

## 2023-10-25 PROCEDURE — 6360000002 HC RX W HCPCS

## 2023-10-25 PROCEDURE — 71045 X-RAY EXAM CHEST 1 VIEW: CPT

## 2023-10-25 PROCEDURE — 80048 BASIC METABOLIC PNL TOTAL CA: CPT

## 2023-10-25 PROCEDURE — 6360000002 HC RX W HCPCS: Performed by: INTERNAL MEDICINE

## 2023-10-25 PROCEDURE — 84132 ASSAY OF SERUM POTASSIUM: CPT

## 2023-10-25 PROCEDURE — 99232 SBSQ HOSP IP/OBS MODERATE 35: CPT | Performed by: NURSE PRACTITIONER

## 2023-10-25 PROCEDURE — C9113 INJ PANTOPRAZOLE SODIUM, VIA: HCPCS

## 2023-10-25 PROCEDURE — 85025 COMPLETE CBC W/AUTO DIFF WBC: CPT

## 2023-10-25 PROCEDURE — 92610 EVALUATE SWALLOWING FUNCTION: CPT

## 2023-10-25 PROCEDURE — 94761 N-INVAS EAR/PLS OXIMETRY MLT: CPT

## 2023-10-25 PROCEDURE — 6370000000 HC RX 637 (ALT 250 FOR IP): Performed by: STUDENT IN AN ORGANIZED HEALTH CARE EDUCATION/TRAINING PROGRAM

## 2023-10-25 PROCEDURE — 2500000003 HC RX 250 WO HCPCS

## 2023-10-25 PROCEDURE — 1200000000 HC SEMI PRIVATE

## 2023-10-25 PROCEDURE — 99233 SBSQ HOSP IP/OBS HIGH 50: CPT | Performed by: INTERNAL MEDICINE

## 2023-10-25 PROCEDURE — 6370000000 HC RX 637 (ALT 250 FOR IP)

## 2023-10-25 PROCEDURE — 36415 COLL VENOUS BLD VENIPUNCTURE: CPT

## 2023-10-25 PROCEDURE — 2580000003 HC RX 258: Performed by: STUDENT IN AN ORGANIZED HEALTH CARE EDUCATION/TRAINING PROGRAM

## 2023-10-25 PROCEDURE — 92523 SPEECH SOUND LANG COMPREHEN: CPT

## 2023-10-25 PROCEDURE — 6360000002 HC RX W HCPCS: Performed by: STUDENT IN AN ORGANIZED HEALTH CARE EDUCATION/TRAINING PROGRAM

## 2023-10-25 PROCEDURE — 2700000000 HC OXYGEN THERAPY PER DAY

## 2023-10-25 PROCEDURE — 84100 ASSAY OF PHOSPHORUS: CPT

## 2023-10-25 PROCEDURE — 2580000003 HC RX 258

## 2023-10-25 RX ORDER — AMIODARONE HYDROCHLORIDE 200 MG/1
200 TABLET ORAL 2 TIMES DAILY
Status: DISCONTINUED | OUTPATIENT
Start: 2023-10-25 | End: 2023-10-28 | Stop reason: HOSPADM

## 2023-10-25 RX ADMIN — TROSPIUM CHLORIDE 20 MG: 20 TABLET, FILM COATED ORAL at 17:20

## 2023-10-25 RX ADMIN — POLYVINYL ALCOHOL 1 DROP: 14 SOLUTION/ DROPS OPHTHALMIC at 21:27

## 2023-10-25 RX ADMIN — ATORVASTATIN CALCIUM 40 MG: 40 TABLET, FILM COATED ORAL at 21:20

## 2023-10-25 RX ADMIN — LEVETIRACETAM 1000 MG: 100 INJECTION INTRAVENOUS at 17:31

## 2023-10-25 RX ADMIN — THIAMINE HYDROCHLORIDE 100 MG: 100 INJECTION, SOLUTION INTRAMUSCULAR; INTRAVENOUS at 08:18

## 2023-10-25 RX ADMIN — THERA TABS 1 TABLET: TAB at 08:17

## 2023-10-25 RX ADMIN — AMIODARONE HYDROCHLORIDE 200 MG: 200 TABLET ORAL at 21:20

## 2023-10-25 RX ADMIN — SODIUM CHLORIDE, PRESERVATIVE FREE 10 ML: 5 INJECTION INTRAVENOUS at 21:28

## 2023-10-25 RX ADMIN — POLYVINYL ALCOHOL 1 DROP: 14 SOLUTION/ DROPS OPHTHALMIC at 08:09

## 2023-10-25 RX ADMIN — POTASSIUM PHOSPHATE, MONOBASIC AND POTASSIUM PHOSPHATE, DIBASIC 15 MMOL: 224; 236 INJECTION, SOLUTION, CONCENTRATE INTRAVENOUS at 10:45

## 2023-10-25 RX ADMIN — LEVETIRACETAM 1000 MG: 100 INJECTION INTRAVENOUS at 06:03

## 2023-10-25 RX ADMIN — PANTOPRAZOLE SODIUM 40 MG: 40 INJECTION, POWDER, FOR SOLUTION INTRAVENOUS at 08:18

## 2023-10-25 RX ADMIN — DEXTROSE MONOHYDRATE 0.25 MG/MIN: 50 INJECTION, SOLUTION INTRAVENOUS at 11:39

## 2023-10-25 RX ADMIN — TROSPIUM CHLORIDE 20 MG: 20 TABLET, FILM COATED ORAL at 08:17

## 2023-10-25 RX ADMIN — METOPROLOL TARTRATE 50 MG: 50 TABLET, FILM COATED ORAL at 21:20

## 2023-10-25 RX ADMIN — METOPROLOL TARTRATE 50 MG: 50 TABLET, FILM COATED ORAL at 08:17

## 2023-10-25 ASSESSMENT — PAIN SCALES - GENERAL
PAINLEVEL_OUTOF10: 0

## 2023-10-25 NOTE — PROGRESS NOTES
NEUROLOGY / NEUROCRITICAL CARE PROGRESS NOTE       Patient Name: Faustino Carreno YOB: 1950   Sex: Female Age: 81 yrs     CC / Reason for Consult: AMS    Subjective / Julia Mendoza / Updates over last 24 hours:  Taken down from EEG yesterday - last seizure 10/23 ~11am  MRI w/ acute R MCA stroke & acute bleeding into L PCA subacute infarct   Will need to restart anticoagulation for patient's afib - w/ bleeding and new stroke likely plan 10 days after stroke         HISTORY   Faustino Carreno is a 81 y.o. female with PMH of Afib (on Eliquis), aphasia, blindness, hearing impairment, HTN, T2DM, COPD who presented with altered mental status. CT head showed acute hyperdense hemorrhage in anteromedial aspect of pervious L occipital parietal infarction. She was started on Kcentra for Eliquis reversal and Keppra. She was started on diltiazem drip for Afib. She was also given antibiotics for possible urinary tract infection. Allergies Allergies   Allergen Reactions    Ace Inhibitors Anaphylaxis and Angioedema     Possible anaphylaxis; With likely angioedema given tongue, lip swelling. Etiology unclear though acyclovir given just prior to onset of sx. Also on ACE-I at that times as well, which is known to cause angioedema. Unable to rule out delayed rxn to ceftriaxone either as was started the day prior    Acyclovir Anaphylaxis and Angioedema     Possible anaphylaxis; With likely angioedema given tongue, lip swelling. Etiology unclear though acyclovir given just prior to onset of sx. Also on ACE-I at that times as well, which is known to cause angioedema. Unable to rule out delayed rxn to ceftriaxone either as was started the day prior    Ceftriaxone Angioedema     Possible anaphylaxis; With likely angioedema given tongue, lip swelling. Etiology unclear though acyclovir given just prior to onset of sx. Also on ACE-I at that times as well, which is known to cause angioedema.  Unable to rule out delayed rxn to

## 2023-10-25 NOTE — PROGRESS NOTES
Cardiology Consult Service  Daily Progress Note        Admit Date:  10/22/2023  Primary cardiologist: Dr Eunice Ramos for Consultation/Chief Complaint: Pola Harrison, hemorrhagic stroke    Subjective:      Domingo Durham is a 81 y.o. female with a past medical history of HTN, DM type II, COPD, PAF complicated by recurrent ischemic and hemorrhagic strokes with on-off anticoagulation, DVT, residual aphasia, blindness, hard of hearing. Patient was brought to the [facility]. on 10/22 when she presented to [facility] ED with altered mental status from her nursing home at [facility]. She was found to have a acute hemorrhagic stroke within the old left occipital parietal infarction. ECG revealed AF RVR and therefore patient was started on amiodarone drip after receiving diltiazem IV x2 and digoxin 250 mcg IV x1 today. Cardiology was consulted for further evaluation. Anticoagulation was held. ECG 10/22/2023: Sinus tachycardia; repeat ECG consistent with AF RVR. Patient is unable to provide any history, therefore history was obtained from EMR. Patient remains hemodynamically stable. Telemetry was personally reviewed this morning, reveals sinus tachycardia with frequent PACs. Interval history: There were no overnight events. Tele shows NSR. Currently on TF 10 ml/hr.      Objective:     Medications:   amiodarone  200 mg Oral BID    multivitamin  1 tablet Oral Daily    thiamine  100 mg IntraVENous Daily    levETIRAcetam  1,000 mg IntraVENous Q12H    [Held by provider] aspirin EC  81 mg Oral Daily    atorvastatin  40 mg Oral Nightly    polyvinyl alcohol  1 drop Both Eyes BID    metoprolol tartrate  50 mg Oral BID    trospium  20 mg Oral BID AC    [Held by provider] venlafaxine  150 mg Oral Daily    sodium chloride flush  5-40 mL IntraVENous 2 times per day    insulin lispro  0-4 Units SubCUTAneous TID WC    insulin lispro  0-4 Units SubCUTAneous Nightly    pantoprazole  40 mg IntraVENous Daily

## 2023-10-25 NOTE — PROGRESS NOTES
Speech Language Pathology  Facility/Department:[facility] ICU  Initial Evaluation                                                       Name: Asif Mares  : 1950  MRN: [id number]    Patient Diagnosis(es):   Patient Active Problem List    Diagnosis Date Noted    Acute encephalopathy 02/15/2023    Acute CVA (cerebrovascular accident) ([address]) 2022    Memory loss due to medical condition 2022    Cognitive deficits 2022    Encounter for palliative care 10/23/2023    Advance care planning 10/23/2023    Seizure ([address]) 10/23/2023    Stupor 10/22/2023    Intraparenchymal hemorrhage of brain ([address]) 10/22/2023    Atrial fibrillation with rapid ventricular response ([address]) 10/22/2023    Longstanding persistent atrial fibrillation ([address]) 10/22/2023    Acute cystitis without hematuria 10/22/2023    ELYSIA (acute kidney injury) ([address]) 10/22/2023    Intracerebral hemorrhage, nontraumatic ([address]) 10/22/2023    Acute respiratory failure due to severe acute respiratory syndrome coronavirus 2 (SARS-CoV-2) infection ([address]) 2023    History of ischemic cerebrovascular accident with residual deficit 2023    Stroke-like symptoms     Acute focal neurological deficit 2022    Cerebrovascular accident (CVA) ([address]) 2020    PAF (paroxysmal atrial fibrillation) ([address]) 2020    HTN (hypertension), benign 2020    Encounter for loop recorder check 2019    First degree AV block 2019    TIA (transient ischemic attack) 2019    COPD exacerbation ([address]) 10/22/2018    Nontraumatic cortical hemorrhage of left cerebral hemisphere ([address]) 2018    Dyslipidemia 2018    CAD in native artery 2018    Mild malnutrition ([address]) 2018    PVC (premature ventricular contraction) 2018    DM type 2, controlled, with complication ([address])     Hemorrhagic stroke ([address])     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 2018    Acute nonintractable headache     Meningoencephalitis . Sent to [facility] ED for mental status change; recent fall per . From [facility] ED to [facility] ICU. ..'    CXR: 10/23/2023  Mild cardiomegaly. Minimal pulmonary vascular congestion without pulmonary  edema. The mediastinum appears to be significantly wide, presumably due to uncoiling  of arch of aorta. Mild focal fibrotic/atelectatic changes in the right perihilar area and right  apical area. No pneumothorax or pleural effusion. CT head: 10/22/2023  Small 1.6 cm parenchymal hematoma in the left occipital lobe. No mass effect. Date of onset: 10/22/2023    Current Diet:  Diet NPO  DIET TUBE FEED VOLUME BASED WITH DIET Diabetic 1.5 (Glucerena 1.5); Nasogastric; Continuous; 960; 24    Treatment Diagnosis: dysphagia    Pain:  None indicated by any means    General:  Chart reviewed: Yes  Behavior/Cognition: awake, but altered mentation, did not open eyes  Communication Observation: minimal output, stated 'uh' several times; has aphasia at baseline from prior CVA  Follows Directions: none  Dentition/Oral Mucosa: some dentition  Oral motor exam: natural dentition  Vocal quality: clear, dry  Respiratory Status/oxygen requirements: 2L O2 via nc  Vision/Hearing: hard of hearing and has visual cut at baseline (wears glasses)  Patient Complaint: unable to state  Patient Positioning: upright in bed  PLOF: from SNF, previously lived at home with spouse    Prior Dysphagia/Speech History:   Last seen by Speech Therapy at [facility] on 10/1/23- at that time a regular diet with thin liquids was recommended. Per pt's hard chart, pt was on a regular diet with thin liquids at the NH. Also, pt was seen for outpatient Speech Therapy at [facility] from 10/28/22-2/2/23 to address aphasia. Bedside Swallowing Evaluation Impression 10/25/2023  Oropharyngeal dysphagia in setting of altered mentation, acute illness/stroke.  Bedside, pt accepted ice chips, with positive oral closure, slowed mastication, and

## 2023-10-25 NOTE — PLAN OF CARE
Problem: SLP Adult - Impaired Swallowing  Goal: By Discharge: Advance to least restrictive diet without signs or symptoms of aspiration for planned discharge setting. See evaluation for individualized goals. Outcome: Progressing     Problem: SLP Adult - Impaired Communication  Goal: By Discharge: Demonstrates communication skills at highest level of function for planned discharge setting. See evaluation for individualized goals.   Outcome: Not Progressing

## 2023-10-25 NOTE — PROGRESS NOTES
Palliative Care Chart Review  and Check in Note:     NAME:  Stephanie Haq Date: 10/22/2023  Hospital Day:  Hospital Day: 4   Current Code status: DNR-CCA    Palliative care is continuing to following Ms. Georgi Roper for symptom management,  and goals of care discussion as needed. Patient's chart reviewed today 10/25/23. Spoke with pt's  and his sister at the bedside. He feels like she is having improvement each day and wants to give her more time. Neuro entered the room as well and let him know her chance of making a significant recovery are small, and she may be confined to a nursing home and bed for the rest of her life. We discussed PEG placement and whether the pt would want it in her current state. Discussed that the alternative would be comfort care and hospice.  is understandably tearful. He wants to think about it. The following are the currently established goals/code status, and Symptom management. Goals of care:  wants to give her more time to recover.     Code status: DNR-CCA, confirmed with  today    Discharge plan: BECCA Kennedy, CHRISTEL  [address] [facility]

## 2023-10-25 NOTE — CARE COORDINATION
others, and if so, who? Yes (spouse)  Plans to Return to Present Housing: Unknown at present  Potential Assistance needed at discharge: [address]            Potential DME:  deferred  Patient expects to discharge to:  Unknown  Plan for transportation at discharge:  TBD pending disposition    Financial    Payor: Cynthia Patino / Plan: [address] [place] [address] Road / Product Type: *No Product type* /     Does insurance require precert for SNF: Yes    Potential assistance Purchasing Medications: No  Meds-to-Beds request: Yes      Sly Ghosh [address], [address] [facility] [address]  [address] [facility] [address]  Phone: [phone] Fax: [phone]    350 [facility], [address] [place] [address] [address] [facility] [address] 660 675  28  [address]  Phone: [phone] Fax: [phone]    [address] [place] [address]  Phone: [phone] Fax: [phone]      Notes:    Factors facilitating achievement of predicted outcomes: Family support    Barriers to discharge: Medical complications      Radha Barth RN  Case Management Department  [phone]

## 2023-10-25 NOTE — PROGRESS NOTES
Physical & Occupational Therapy    Orders received and chart reviewed. Pt has bedrest orders and is not fit to be seen today per RN. Will follow up one patient is medically cleared and schedule allows. Addendum (12:00): Bedrest orders lifted however RN reports pt still unable to keep eyes open or follow commands and is not ready for therapy evaluations at this time. Lonnie Santana, JESSICA Dewitt, [address] [place] [address]

## 2023-10-25 NOTE — PROGRESS NOTES
BASED WITH DIET Diabetic 1.5 (Glucerena 1.5); Nasogastric; Continuous; 960; 24   Code status:  DNR-CCA     ELOS:  Barriers to discharge:  Disposition  - Preadmission: nursing home  - Current:  - Upon discharge: TBD    Will discuss with attending physician Dr. Verito Paz MD     _____________________  Mary Melgar,    Internal Medicine Resident, PGY-1    Patient seen and examined, labs and imaging studies reviewed, agree with assessment and plan as outlined above. Continue with current care and plan. Discussed case with patients nurse, discussed case with care team, discussed plan. D/w palliative care I would like to discuss case with family as well.     MD Adele Parker

## 2023-10-25 NOTE — CONSULTS
Reason for Consult: Stroke and seizure  Attending Physician:  Florence Funes MD       See also note by Sarah Chavez NP. Agree with her physical exam, history and plan with exceptions and additions below. HISTORY OF PRESENT ILLNESS:              The patient is a 81 y.o. female with significant past medical history of atrial fibrillation on anticoagulation, multiple previous strokes, diabetes, hypertension. Patient presented with altered mental status. CT head showed acute hyperdense hemorrhage in the left occipital parietal lobe. This is in an area where she has had extensive ischemic stroke in the past.  She is on antibiotics for urinary tract infection. Past Medical History:    Past Medical History:   Diagnosis Date    Arthritis     Asthma     Depression     Diabetes mellitus (HCC)     GERD (gastroesophageal reflux disease)     HCAP (healthcare-associated pneumonia)     Hypertension     Mycobacterium gordonae infection      5/7/18 + afb called from [facility] pending  ( from [facility] 3/29/18)    Paroxysmal atrial fibrillation (HCC)     Sepsis ([address])     TIA (transient ischemic attack)     Unspecified cerebral artery occlusion with cerebral infarction        Past Surgical History:    Past Surgical History:   Procedure Laterality Date    APPENDECTOMY      BREAST BIOPSY      CHOLECYSTECTOMY      COLONOSCOPY  01/19/2017    polyp    CYST INCISION AND DRAINAGE  1986    on head    HYSTERECTOMY (CERVIX STATUS UNKNOWN)      UPPER GASTROINTESTINAL ENDOSCOPY  01/19/2017    barretts ?        Medications:   Current Facility-Administered Medications: multivitamin 1 tablet, 1 tablet, Oral, Daily  thiamine (B-1) injection 100 mg, 100 mg, IntraVENous, Daily  amiodarone (CORDARONE) 450 mg in dextrose 5 % 250 mL infusion, 0.25 mg/min, IntraVENous, Continuous  levETIRAcetam (KEPPRA) injection 1,000 mg, 1,000 mg, IntraVENous, Q12H  [Held by provider] aspirin EC tablet 81 mg, 81 mg, Oral, Daily  atorvastatin employed. FINDINGS:    Small parenchymal hematoma in the left occipital lobe measuring 1.6 x 1.1 cm. Remote infarcts are present in the bilateral cerebellum, bilateral parietal  lobes, and right frontal lobe. Moderate hypoattenuation within the cerebral  white matter. Mild diffuse enlargement of the ventricles and extra-axial spaces. The globes  and orbits are normal. The paranasal sinuses are clear. No abnormality of the  calvarium. Impression  Small 1.6 cm parenchymal hematoma in the left occipital lobe. No mass effect. Impression:  Gulshan Manzanares is a 81 y.o. female who presented with acute right MCA stroke and seizure. Patient has extensive encephalomalacia both cortical and subcortical on both sides of the brain and brainstem. Her prospect for significant neurologic recovery is slim. I discussed this with the patient's  at bedside. Palliative care NP was also present. Family is considering PEG tube placement versus comfort care. Recommendations:  See NP recommendations in separate note. Restart anticoagulation 10 days after stroke. Continue Keppra 1000 mg twice daily. PT/OT/SLP.     Electronically signed by Laura Sweeney MD on 10/25/2023 at 3:39 PM

## 2023-10-25 NOTE — PROGRESS NOTES
ICU to Wards Transfer  Internal Medicine Wards Acceptance Note   The [facility] of [address] transfer documentation has been acknowledged and reviewed by the wards team.  Additionally, the wards team has received official sign-out from the ICU team with acceptance of care of the patient. At the Time of transfer, patient was seen and examined by the wards team with the findings below:    Review of Systems   Unable to perform ROS: Mental status change       Constitutional:       Comments: Somnolent   HENT:      Head: Normocephalic and atraumatic. Cardiovascular:      Rate and Rhythm: Normal rate. Rhythm irregular. Heart sounds: Normal heart sounds. Pulmonary:      Breath sounds: Normal breath sounds. Musculoskeletal:      Right lower leg: No edema. Left lower leg: No edema. Neurological:      Comments: Somnolent. Pt withdraws to pain in all 4 extremities. Updates to Assessment & Plan at this time:  Plan continued from ICU as follows:    Neuro  Altered Mental Status  - MRI 10/23: acute infarct in posterior R MCA   - cvEEG 10/22: abnormal tracing indicating diffuse cerebral dysfunction, no seizure activity noted  - cvEEG 10/23-24: brief electrographic seizures without any clinical correlate  - Keppra 1g BID  - SBP < 160     Cardiovascular  Atrial Fibrillation w/ RVR  Pt presented with -160. Was on diltiazem gtt during transport. Received 2x 10mg dilt bolus + digoxin 250mcg on arrival to ICU. On Eliquis at home.   - Cont Amio PO  - Cardiology consulted; recommend lopressor 50mg BID when pt can tolerate PO     Renal  Acute Kidney Injury, improved  Baseline Cr 0.9  - elevated to Cr 1.3 on admission     Infectious   Sepsis, secondary to Urinary Tract Infection  - WBC 26.5 on admission, down-trending  - Merrem course completed     Chronic Conditions  - COPD: cont home inhalers PRN  - T2DM: low dose SSI  - HTN: cont home lopressor 50 mg BID when pt can tolerate PO meds    Hospitalist assigned: Madalyn Graham MD  Internal Medicine Resident  PGY-2

## 2023-10-25 NOTE — PLAN OF CARE
Problem: Discharge Planning  Goal: Discharge to home or other facility with appropriate resources  Outcome: Progressing     Problem: Safety - Adult  Goal: Free from fall injury  Outcome: Progressing     Problem: Pain  Goal: Verbalizes/displays adequate comfort level or baseline comfort level  Outcome: Progressing     Problem: Skin/Tissue Integrity  Goal: Absence of new skin breakdown  Description: 1. Monitor for areas of redness and/or skin breakdown  2. Assess vascular access sites hourly  3. Every 4-6 hours minimum:  Change oxygen saturation probe site  4. Every 4-6 hours:  If on nasal continuous positive airway pressure, respiratory therapy assess nares and determine need for appliance change or resting period.   Outcome: Progressing     Problem: ABCDS Injury Assessment  Goal: Absence of physical injury  Outcome: Progressing     Problem: Chronic Conditions and Co-morbidities  Goal: Patient's chronic conditions and co-morbidity symptoms are monitored and maintained or improved  Outcome: Progressing     Problem: Neurosensory - Adult  Goal: Achieves stable or improved neurological status  Outcome: Progressing     Problem: Neurosensory - Adult  Goal: Achieves maximal functionality and self care  Outcome: Progressing     Problem: Nutrition Deficit:  Goal: Optimize nutritional status  Outcome: Progressing

## 2023-10-26 ENCOUNTER — APPOINTMENT (OUTPATIENT)
Dept: ULTRASOUND IMAGING | Age: 73
DRG: 064 | End: 2023-10-26
Attending: INTERNAL MEDICINE
Payer: MEDICARE

## 2023-10-26 ENCOUNTER — ANESTHESIA (OUTPATIENT)
Dept: ENDOSCOPY | Age: 73
End: 2023-10-26
Payer: MEDICARE

## 2023-10-26 ENCOUNTER — ANESTHESIA EVENT (OUTPATIENT)
Dept: ENDOSCOPY | Age: 73
End: 2023-10-26
Payer: MEDICARE

## 2023-10-26 LAB
ALBUMIN SERPL-MCNC: 2.7 G/DL (ref 3.4–5)
ALBUMIN/GLOB SERPL: 0.6 {RATIO} (ref 1.1–2.2)
ALP SERPL-CCNC: 125 U/L (ref 40–129)
ALT SERPL-CCNC: 15 U/L (ref 10–40)
ANION GAP SERPL CALCULATED.3IONS-SCNC: 10 MMOL/L (ref 3–16)
AST SERPL-CCNC: 30 U/L (ref 15–37)
BACTERIA BLD CULT ORG #2: NORMAL
BACTERIA BLD CULT: NORMAL
BASOPHILS # BLD: 0 K/UL (ref 0–0.2)
BASOPHILS NFR BLD: 0.4 %
BILIRUB SERPL-MCNC: 0.8 MG/DL (ref 0–1)
BUN SERPL-MCNC: 15 MG/DL (ref 7–20)
CALCIUM SERPL-MCNC: 9.1 MG/DL (ref 8.3–10.6)
CHLORIDE SERPL-SCNC: 105 MMOL/L (ref 99–110)
CO2 SERPL-SCNC: 26 MMOL/L (ref 21–32)
CREAT SERPL-MCNC: 0.6 MG/DL (ref 0.6–1.2)
DEPRECATED RDW RBC AUTO: 15.2 % (ref 12.4–15.4)
EOSINOPHIL # BLD: 0.1 K/UL (ref 0–0.6)
EOSINOPHIL NFR BLD: 1.1 %
GFR SERPLBLD CREATININE-BSD FMLA CKD-EPI: >60 ML/MIN/{1.73_M2}
GLUCOSE BLD-MCNC: 132 MG/DL (ref 70–99)
GLUCOSE BLD-MCNC: 150 MG/DL (ref 70–99)
GLUCOSE BLD-MCNC: 158 MG/DL (ref 70–99)
GLUCOSE BLD-MCNC: 163 MG/DL (ref 70–99)
GLUCOSE SERPL-MCNC: 153 MG/DL (ref 70–99)
HCT VFR BLD AUTO: 28.4 % (ref 36–48)
HGB BLD-MCNC: 9.5 G/DL (ref 12–16)
INR PPP: 1.15 (ref 0.84–1.16)
LYMPHOCYTES # BLD: 1.1 K/UL (ref 1–5.1)
LYMPHOCYTES NFR BLD: 13.2 %
MCH RBC QN AUTO: 30.8 PG (ref 26–34)
MCHC RBC AUTO-ENTMCNC: 33.6 G/DL (ref 31–36)
MCV RBC AUTO: 91.8 FL (ref 80–100)
MONOCYTES # BLD: 0.7 K/UL (ref 0–1.3)
MONOCYTES NFR BLD: 8.6 %
NEUTROPHILS # BLD: 6.2 K/UL (ref 1.7–7.7)
NEUTROPHILS NFR BLD: 76.7 %
PERFORMED ON: ABNORMAL
PLATELET # BLD AUTO: 77 K/UL (ref 135–450)
PMV BLD AUTO: 10.8 FL (ref 5–10.5)
POTASSIUM SERPL-SCNC: 3.9 MMOL/L (ref 3.5–5.1)
PROT SERPL-MCNC: 6.9 G/DL (ref 6.4–8.2)
PROTHROMBIN TIME: 14.7 SEC (ref 11.5–14.8)
RBC # BLD AUTO: 3.09 M/UL (ref 4–5.2)
SODIUM SERPL-SCNC: 141 MMOL/L (ref 136–145)
WBC # BLD AUTO: 8.1 K/UL (ref 4–11)

## 2023-10-26 PROCEDURE — 2580000003 HC RX 258: Performed by: STUDENT IN AN ORGANIZED HEALTH CARE EDUCATION/TRAINING PROGRAM

## 2023-10-26 PROCEDURE — 97530 THERAPEUTIC ACTIVITIES: CPT

## 2023-10-26 PROCEDURE — 36415 COLL VENOUS BLD VENIPUNCTURE: CPT

## 2023-10-26 PROCEDURE — 80053 COMPREHEN METABOLIC PANEL: CPT

## 2023-10-26 PROCEDURE — 85610 PROTHROMBIN TIME: CPT

## 2023-10-26 PROCEDURE — C9113 INJ PANTOPRAZOLE SODIUM, VIA: HCPCS

## 2023-10-26 PROCEDURE — 3700000000 HC ANESTHESIA ATTENDED CARE: Performed by: INTERNAL MEDICINE

## 2023-10-26 PROCEDURE — 94761 N-INVAS EAR/PLS OXIMETRY MLT: CPT

## 2023-10-26 PROCEDURE — 76700 US EXAM ABDOM COMPLETE: CPT

## 2023-10-26 PROCEDURE — 6360000002 HC RX W HCPCS

## 2023-10-26 PROCEDURE — 2700000000 HC OXYGEN THERAPY PER DAY

## 2023-10-26 PROCEDURE — 6370000000 HC RX 637 (ALT 250 FOR IP): Performed by: STUDENT IN AN ORGANIZED HEALTH CARE EDUCATION/TRAINING PROGRAM

## 2023-10-26 PROCEDURE — 97162 PT EVAL MOD COMPLEX 30 MIN: CPT

## 2023-10-26 PROCEDURE — 3700000001 HC ADD 15 MINUTES (ANESTHESIA): Performed by: INTERNAL MEDICINE

## 2023-10-26 PROCEDURE — 43762 RPLC GTUBE NO REVJ TRC: CPT

## 2023-10-26 PROCEDURE — 0DH63UZ INSERTION OF FEEDING DEVICE INTO STOMACH, PERCUTANEOUS APPROACH: ICD-10-PCS | Performed by: INTERNAL MEDICINE

## 2023-10-26 PROCEDURE — 85025 COMPLETE CBC W/AUTO DIFF WBC: CPT

## 2023-10-26 PROCEDURE — 2720000010 HC SURG SUPPLY STERILE: Performed by: INTERNAL MEDICINE

## 2023-10-26 PROCEDURE — 6360000002 HC RX W HCPCS: Performed by: STUDENT IN AN ORGANIZED HEALTH CARE EDUCATION/TRAINING PROGRAM

## 2023-10-26 PROCEDURE — 97167 OT EVAL HIGH COMPLEX 60 MIN: CPT

## 2023-10-26 PROCEDURE — 1200000000 HC SEMI PRIVATE

## 2023-10-26 PROCEDURE — 2500000003 HC RX 250 WO HCPCS

## 2023-10-26 PROCEDURE — 3609013300 HC EGD TUBE PLACEMENT: Performed by: INTERNAL MEDICINE

## 2023-10-26 PROCEDURE — 6360000002 HC RX W HCPCS: Performed by: INTERNAL MEDICINE

## 2023-10-26 PROCEDURE — 2580000003 HC RX 258: Performed by: INTERNAL MEDICINE

## 2023-10-26 PROCEDURE — 6370000000 HC RX 637 (ALT 250 FOR IP)

## 2023-10-26 RX ORDER — LIDOCAINE HYDROCHLORIDE 20 MG/ML
INJECTION, SOLUTION EPIDURAL; INFILTRATION; INTRACAUDAL; PERINEURAL PRN
Status: DISCONTINUED | OUTPATIENT
Start: 2023-10-26 | End: 2023-10-26 | Stop reason: SDUPTHER

## 2023-10-26 RX ORDER — SODIUM CHLORIDE, SODIUM LACTATE, POTASSIUM CHLORIDE, CALCIUM CHLORIDE 600; 310; 30; 20 MG/100ML; MG/100ML; MG/100ML; MG/100ML
INJECTION, SOLUTION INTRAVENOUS CONTINUOUS
Status: DISCONTINUED | OUTPATIENT
Start: 2023-10-26 | End: 2023-10-28 | Stop reason: HOSPADM

## 2023-10-26 RX ORDER — PROPOFOL 10 MG/ML
INJECTION, EMULSION INTRAVENOUS CONTINUOUS PRN
Status: DISCONTINUED | OUTPATIENT
Start: 2023-10-26 | End: 2023-10-26 | Stop reason: SDUPTHER

## 2023-10-26 RX ADMIN — SODIUM CHLORIDE, PRESERVATIVE FREE 10 ML: 5 INJECTION INTRAVENOUS at 21:29

## 2023-10-26 RX ADMIN — LEVETIRACETAM 1000 MG: 100 INJECTION INTRAVENOUS at 05:21

## 2023-10-26 RX ADMIN — PROPOFOL 30 MG: 10 INJECTION, EMULSION INTRAVENOUS at 12:30

## 2023-10-26 RX ADMIN — THIAMINE HYDROCHLORIDE 100 MG: 100 INJECTION, SOLUTION INTRAMUSCULAR; INTRAVENOUS at 11:16

## 2023-10-26 RX ADMIN — POLYVINYL ALCOHOL 1 DROP: 14 SOLUTION/ DROPS OPHTHALMIC at 11:15

## 2023-10-26 RX ADMIN — PROPOFOL 150 MCG/KG/MIN: 10 INJECTION, EMULSION INTRAVENOUS at 12:31

## 2023-10-26 RX ADMIN — PANTOPRAZOLE SODIUM 40 MG: 40 INJECTION, POWDER, FOR SOLUTION INTRAVENOUS at 11:15

## 2023-10-26 RX ADMIN — LEVETIRACETAM 1000 MG: 100 INJECTION INTRAVENOUS at 17:51

## 2023-10-26 RX ADMIN — AMIODARONE HYDROCHLORIDE 200 MG: 200 TABLET ORAL at 20:50

## 2023-10-26 RX ADMIN — SODIUM CHLORIDE, SODIUM LACTATE, POTASSIUM CHLORIDE, AND CALCIUM CHLORIDE: .6; .31; .03; .02 INJECTION, SOLUTION INTRAVENOUS at 12:16

## 2023-10-26 RX ADMIN — VANCOMYCIN HYDROCHLORIDE 1500 MG: 10 INJECTION, POWDER, LYOPHILIZED, FOR SOLUTION INTRAVENOUS at 12:29

## 2023-10-26 RX ADMIN — METOPROLOL TARTRATE 50 MG: 50 TABLET, FILM COATED ORAL at 21:45

## 2023-10-26 RX ADMIN — LIDOCAINE HYDROCHLORIDE 30 MG: 20 INJECTION, SOLUTION EPIDURAL; INFILTRATION; INTRACAUDAL; PERINEURAL at 12:30

## 2023-10-26 RX ADMIN — POLYVINYL ALCOHOL 1 DROP: 14 SOLUTION/ DROPS OPHTHALMIC at 21:29

## 2023-10-26 RX ADMIN — ATORVASTATIN CALCIUM 40 MG: 40 TABLET, FILM COATED ORAL at 21:27

## 2023-10-26 ASSESSMENT — PAIN SCALES - PAIN ASSESSMENT IN ADVANCED DEMENTIA (PAINAD)
FACIALEXPRESSION: 0
FACIALEXPRESSION: 0
CONSOLABILITY: 0
CONSOLABILITY: 0
TOTALSCORE: 0
BODYLANGUAGE: 0
BODYLANGUAGE: 0
TOTALSCORE: 0
TOTALSCORE: 0
BODYLANGUAGE: 0
FACIALEXPRESSION: 0
NEGVOCALIZATION: 0
BREATHING: 0
NEGVOCALIZATION: 0
TOTALSCORE: 0
NEGVOCALIZATION: 0
FACIALEXPRESSION: 0
NEGVOCALIZATION: 0
CONSOLABILITY: 0
BODYLANGUAGE: 0
TOTALSCORE: 0
NEGVOCALIZATION: 0
CONSOLABILITY: 0
FACIALEXPRESSION: 0
CONSOLABILITY: 0
BREATHING: 0
CONSOLABILITY: 0
BODYLANGUAGE: 0
NEGVOCALIZATION: 0
BREATHING: 0
CONSOLABILITY: 0
BREATHING: 0
NEGVOCALIZATION: 0
FACIALEXPRESSION: 0
FACIALEXPRESSION: 0
TOTALSCORE: 0
BODYLANGUAGE: 0
TOTALSCORE: 0
BODYLANGUAGE: 0
BREATHING: 0

## 2023-10-26 ASSESSMENT — PAIN SCALES - GENERAL
PAINLEVEL_OUTOF10: 0

## 2023-10-26 ASSESSMENT — PAIN - FUNCTIONAL ASSESSMENT: PAIN_FUNCTIONAL_ASSESSMENT: WONG-BAKER FACES

## 2023-10-26 ASSESSMENT — COPD QUESTIONNAIRES: CAT_SEVERITY: NO INTERVAL CHANGE

## 2023-10-26 NOTE — PLAN OF CARE
of injury from restraints (restraint for interference with medical device):   Determine that other, less restrictive measures have been tried or would not be effective before applying the restraint   Evaluate the patient's condition at the time of restraint application  Taken 08/02/2898 0000  Remains free of injury from restraints (restraint for interference with medical device):   Every 2 hours: Monitor safety, psychosocial status, comfort, nutrition and hydration   Evaluate the patient's condition at the time of restraint application

## 2023-10-26 NOTE — PROGRESS NOTES
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 She tolerated evaluation fairly well, sitting EOB 12 min with assist without adverse effect. She moves her extremities sponaneously but not on command, L weaker than R. Pt limited by baseline vision and hearing impairment. Recommend return to SNF. Discharge Recommendations:  Subacute/Skilled Nursing Facility  OT Equipment Recommendations  Equipment Needed: No       Treatment Diagnosis: Decreased activity tolerance, impaired ADLs and mobility      Assessment   Performance deficits / Impairments: Decreased functional mobility ; Decreased ADL status; Decreased ROM; Decreased strength;Decreased cognition;Decreased endurance;Decreased balance;Decreased vision/visual deficit; Decreased fine motor control;Decreased coordination;Decreased posture  Treatment Diagnosis: Decreased activity tolerance, impaired ADLs and mobility  Decision Making: High Complexity  REQUIRES OT FOLLOW-UP: Yes  Activity Tolerance  Activity Tolerance: Patient Tolerated treatment well  Activity Tolerance Comments: no adverse events; fatigued after about 12 min in sitting        Plan   Occupational Therapy Plan  Times Per Week: 5-7x  Times Per Day: Once a day  Current Treatment Recommendations: Strengthening, Balance training, Functional mobility training, Endurance training, Cognitive reorientation, Safety education & training, Patient/Caregiver education & training, Equipment evaluation, education, & procurement, Self-Care / ADL, ROM, Neuromuscular re-education, Cognitive/Perceptual training, Coordination training     Restrictions  Position Activity Restriction  Other position/activity restrictions:  Up with assist    Subjective

## 2023-10-26 NOTE — PROGRESS NOTES
Physical Therapy  Facility/Department: [address] [place] [address]  Physical Therapy Initial Assessment    Name: Maddy Galeas  : 1950  MRN: [id number]  Date of Service: 10/26/2023    Discharge Recommendations: Maddy Galeas scored a 6/24 on the AM-PAC short mobility form. Current research shows that an AM-PAC score of 17 or less is typically not associated with a discharge to the patient's home setting. Based on the patient's AM-PAC score and their current functional mobility deficits, it is recommended that the patient have 3-5 sessions per week of Physical Therapy at d/c to increase the patient's independence. Please see assessment section for further patient specific details. If patient discharges prior to next session this note will serve as a discharge summary. Please see below for the latest assessment towards goals. Subacute/Skilled Nursing Facility   PT Equipment Recommendations  Equipment Needed:  (defer)      Patient Diagnosis(es): There were no encounter diagnoses. Past Medical History:  has a past medical history of Arthritis, Asthma, Depression, Diabetes mellitus ([address]), GERD (gastroesophageal reflux disease), HCAP (healthcare-associated pneumonia), Hypertension, Mycobacterium gordonae infection, Paroxysmal atrial fibrillation ([address]), Sepsis ([address]), TIA (transient ischemic attack), and Unspecified cerebral artery occlusion with cerebral infarction. Past Surgical History:  has a past surgical history that includes Hysterectomy; Cholecystectomy; Appendectomy; Breast cyst incision and drainage (); Colonoscopy (2017); Upper gastrointestinal endoscopy (2017); and Breast biopsy. Assessment   Body Structures, Functions, Activity Limitations Requiring Skilled Therapeutic Intervention: Decreased functional mobility ; Decreased strength;Decreased safe awareness;Decreased cognition;Decreased endurance;Decreased balance;Decreased posture; Increased pain  Assessment: Pt currently at [facility] nursing

## 2023-10-26 NOTE — PROGRESS NOTES
Speech-Language Pathology    Attempted therapy follow-up. Reviewed chart, spoke with RN. Will hold as pt NPO for PEG tube placement.     Nadira Huntley, [place], [place].[address]  Ph. # [phone]

## 2023-10-26 NOTE — PROGRESS NOTES
Pt pulled NG tube to 35, this RN took remaining of NG out.  Spoke with ICU team, plan was to stop TF 0000 10/26 for placement of PEG in AM. No reinsertion per ICU team.

## 2023-10-26 NOTE — PROCEDURES
Continuous EEG monitoring record    Patient name: Sony Posey    START: 10/22/2023 @ 18:29pm   END: 10/23/2023 @ 08:00am       Electroencephalographer: Elisa Jhaveri MD PhD      CLINICAL DETAILS:  This EEG was performed on this 81 y. o.yo female admitted for Altered mental Status and concer for subclinical seizures      TECHNICAL DETAILS:  Continuous video-EEG monitoring was performed with 27 surface scalp electrodes placed according to the International 10-20 electrode placement system, using a 32-channel General Dynamicsee headbox. All EEG and video information was acquired digitally, including the use of automated spike and seizure detection software to detect epileptiform activity. An event button was also available to be depressed during clinical events. 10/22/2023 22:00pm  to 08:00am on 10/23/2023  SEIZURES:  None  INTERICTAL:  None  PUSHBUTTONS:  None  BACKGROUND:  Abundant generalized 2-3 Hz delta slowing of the background with frequent superimposed theta frequencies. EKG:  regular    10/22/2023 18:29pm - 22:00pm    SEIZURES:  None  INTERICTAL:  None  PUSHBUTTONS:  None  BACKGROUND:  Abundant generalized 2-3 Hz delta slowing of the background with frequent superimposed theta frequencies. EKG:  regular    CLINICAL INTERPRETATION:  This was an abnormal tracing for age and state due to a severe generalized slow wave abnormality indicating diffuse cerebral dysfunction which can be of multiple causes, including structural, or vascular abnormalities, toxic/metabolic conditions, hydrocephalus, or postictal conditions. No epileptiform discharge, focal slowing, or seizures were seen during this recording. Clinical correlation is advised.          Elisa Jhaveri MD PhD
PEG Procedure  Note          Patient: Jhon Gonzales   : 1950  Acct#:     Procedure: Esophagogastroduodenoscopy with percutaneous endoscopic gastrostomy tube placement. Date:  10/26/2023     Surgeon: Lucas Lugo MD,    Endoscopist:     Referring Physician:  Naa Mcdonald MD      Preoperative Diagnosis:    32-year-old female with CVA with oropharyngeal dysphagia for PEG tube placement for nutrition    EBL: minimal    Anesthesia:  MAC.  IV antibiotics: IV Vancomycin before the procedure as pt is allergic to cephalosporins,    Consent:  The patient or their legal guardian has signed an informed consent, and is aware of the potential risks, benefits, alternatives, and potential complications of this procedure. These include, but are not limited to hemorrhage, bleeding, post procedural pain, perforation, phlebitis, aspiration, hypotension, hypoxia, cardiovascular events such as arryhthmia, and possibly death. Procedure: Informed consent was obtained from the patient and the patient's power of  after explanation of indications, benefits, possible risks and complications of the procedure. The patient was then taken to the endoscopy suite, placed in the supine position with the head of the bed elevated at 45 degrees, and the above IV anesthesia was administered. An Olympus video endoscope was place in the patient's mouth, and under direct visualization passed into the esophagus. Visualization of the esophagus demonstrated normal mucosa. Scope was then advanced into the stomach. Visualization of the gastric body and antrum demonstrated normal mucosa. The pylorus was patent, and the scope was advance into the duodenum. Visualization of the duodenal bulb demonstrated normal mucosa. The second portion of the duodenum was also normal.  The scope was then withdrawn back into the stomach.     Transillumination and finger denting were accomplished successfully through the skin of the
The background rhythm consists of 3-4 Hz activity in the delta frequency range. No well formed PDR, minimal reactivity to stimulation. No clear state changes or sleep seen. The recording is remarkable for the presence of:   Several brief electrographic seizures occurring in the right FC region consisting of 1-2 Hz activity right F4/C4 evolving to rhythmic theta, these lasted less than 30 seconds at a time without any clinical correlate. Team notified and last sz occurred around 11:30 am on 10-23. NO seizures seen after this time till end of the file. During the recording:   No focal slowing was seen. No epileptiform discharges or seizures were detected. Video was reviewed intermittently at times when significant amounts of EMG artifact were present. The EKG monitoring channel did not detect any significant rhythm abnormalities. EEG Interpretation: This EEG is abnormal due to the presence of:     Severe generalized slowing. This is a non-specific finding but is consistent with a generalized disturbance of cerebral function. It may be seen in a variety of conditions, such as toxic, metabolic, post-anoxic, multi-focal, or diffuse structural abnormalities. Several brief electrographic seizures occurring in the right FC region consisting of 1-2 Hz activity right F4/C4 evolving to rhythmic theta, these lasted less than 30 seconds at a time without any clinical correlate. Team notified and last sz occurred around 11:30 am on 10-23. No additional seizures seen on this file till the end of the recording,. Clinical correlation is recommended.          Georges Renee MD,   10/24/2023  6:45 AM

## 2023-10-26 NOTE — PROGRESS NOTES
RN entered room to due to red alarm on tele-monitor. SpO2 found to be as low as 53% with a good pleth wave. Patient was not responding to RN during this time. Patient was placed on 6L nasal cannula and returned to 98%. Lungs auscultated and R lung found to be rhoncorus. Patient noted to be more pale and sluggish to respond during assessment. ICU residents and hospitalist notified. Order for chest xray received. [facility] residents came to assess pt. No new orders at this time.

## 2023-10-26 NOTE — PROGRESS NOTES
Palliative Care Chart Review  and Check in Note:     NAME:  Beverly Whitaker Date: 10/22/2023  Hospital Day:  Hospital Day: 5   Current Code status: DNR-CCA    Palliative care is continuing to following Ms. Steve Silverman for symptom management,  and goals of care discussion as needed. Patient's chart reviewed today 10/26/23. Called pt's  to f/u on our goals of care conversation yesterday. He wants to go ahead with PEG placement. He said he's hoping for the best recovery but preparing for the worst. We again talked about the option of hospice, if she is not recovering back to an acceptable quality of life in the coming weeks/months.  also expressed wanting to speak with CM about SNF options. The following are the currently established goals/code status, and Symptom management. Goals of care:  wants to pursue PEG placement. Code status: DNR-CCA    Discharge plan: d/c to SNF when medically ready. PEG today.     Andressa Pittman NP  [address] [facility]

## 2023-10-26 NOTE — CARE COORDINATION
Cm left message for [place] at [facility] regarding if pt was SNF and had they stated Medicaid pending papers. Asked for return call. PEG tube placed today. 1:59 PM  CM spoke with [facility] pending is in process. They have all the papers submitted. They can accept pt back. She was Skilled. Cm spoke with pt's  Kenyatta Reyna. Cm discussed possible dc back to SNF tomorrow. Kenyatta Reyna was concerned and thought pt would be in the hospital for 2 or more days. Plan is for pt to go back to [facility] skilled. Pt will need precert.

## 2023-10-26 NOTE — ANESTHESIA POSTPROCEDURE EVALUATION
Department of Anesthesiology  Postprocedure Note    Patient: Karina Graves  MRN: [id number]  YOB: 1950  Date of evaluation: 10/26/2023      Procedure Summary     Date: 10/26/23 Room / Location: [facility] 01 / The [address] [facility]    Anesthesia Start: 1216 Anesthesia Stop: 6073    Procedure: EGD PEG TUBE PLACEMENT Diagnosis:       Dysphagia due to recent cerebrovascular accident      (Dysphagia due to recent cerebrovascular accident [I69.391])    Surgeons: Jesus Lopez MD Responsible Provider: Beverly Kenyon MD    Anesthesia Type: MAC ASA Status: 4          Anesthesia Type: No value filed.     Beti Phase I: Beti Score: 6    Beti Phase II:        Anesthesia Post Evaluation    Patient location during evaluation: PACU  Patient participation: complete - patient participated  Pain score: 2  Airway patency: patent  Nausea & Vomiting: no nausea and no vomiting  Complications: no  Cardiovascular status: hemodynamically stable  Respiratory status: acceptable  Hydration status: euvolemic  Pain management: adequate

## 2023-10-27 LAB
ANION GAP SERPL CALCULATED.3IONS-SCNC: 12 MMOL/L (ref 3–16)
BACTERIA BLD CULT ORG #2: NORMAL
BACTERIA BLD CULT: NORMAL
BASOPHILS # BLD: 0 K/UL (ref 0–0.2)
BASOPHILS NFR BLD: 0.3 %
BUN SERPL-MCNC: 16 MG/DL (ref 7–20)
CALCIUM SERPL-MCNC: 9.4 MG/DL (ref 8.3–10.6)
CHLORIDE SERPL-SCNC: 106 MMOL/L (ref 99–110)
CO2 SERPL-SCNC: 26 MMOL/L (ref 21–32)
CREAT SERPL-MCNC: 0.6 MG/DL (ref 0.6–1.2)
DEPRECATED RDW RBC AUTO: 15.2 % (ref 12.4–15.4)
EOSINOPHIL # BLD: 0 K/UL (ref 0–0.6)
EOSINOPHIL NFR BLD: 0.7 %
GFR SERPLBLD CREATININE-BSD FMLA CKD-EPI: >60 ML/MIN/{1.73_M2}
GLUCOSE BLD-MCNC: 173 MG/DL (ref 70–99)
GLUCOSE BLD-MCNC: 173 MG/DL (ref 70–99)
GLUCOSE BLD-MCNC: 181 MG/DL (ref 70–99)
GLUCOSE BLD-MCNC: 208 MG/DL (ref 70–99)
GLUCOSE SERPL-MCNC: 182 MG/DL (ref 70–99)
HCT VFR BLD AUTO: 30.1 % (ref 36–48)
HGB BLD-MCNC: 9.9 G/DL (ref 12–16)
LYMPHOCYTES # BLD: 0.8 K/UL (ref 1–5.1)
LYMPHOCYTES NFR BLD: 12.5 %
MAGNESIUM SERPL-MCNC: 1.7 MG/DL (ref 1.8–2.4)
MAGNESIUM SERPL-MCNC: 1.8 MG/DL (ref 1.8–2.4)
MCH RBC QN AUTO: 30.8 PG (ref 26–34)
MCHC RBC AUTO-ENTMCNC: 33 G/DL (ref 31–36)
MCV RBC AUTO: 93.4 FL (ref 80–100)
MONOCYTES # BLD: 0.5 K/UL (ref 0–1.3)
MONOCYTES NFR BLD: 7 %
NEUTROPHILS # BLD: 5.4 K/UL (ref 1.7–7.7)
NEUTROPHILS NFR BLD: 79.5 %
PERFORMED ON: ABNORMAL
PHOSPHATE SERPL-MCNC: 2.6 MG/DL (ref 2.5–4.9)
PHOSPHATE SERPL-MCNC: 3 MG/DL (ref 2.5–4.9)
PLATELET # BLD AUTO: 88 K/UL (ref 135–450)
PMV BLD AUTO: 11.1 FL (ref 5–10.5)
POTASSIUM SERPL-SCNC: 3.8 MMOL/L (ref 3.5–5.1)
POTASSIUM SERPL-SCNC: 3.8 MMOL/L (ref 3.5–5.1)
POTASSIUM SERPL-SCNC: 4.1 MMOL/L (ref 3.5–5.1)
RBC # BLD AUTO: 3.22 M/UL (ref 4–5.2)
SODIUM SERPL-SCNC: 144 MMOL/L (ref 136–145)
WBC # BLD AUTO: 6.8 K/UL (ref 4–11)

## 2023-10-27 PROCEDURE — 84100 ASSAY OF PHOSPHORUS: CPT

## 2023-10-27 PROCEDURE — 80048 BASIC METABOLIC PNL TOTAL CA: CPT

## 2023-10-27 PROCEDURE — 6370000000 HC RX 637 (ALT 250 FOR IP)

## 2023-10-27 PROCEDURE — 2060000000 HC ICU INTERMEDIATE R&B

## 2023-10-27 PROCEDURE — 92507 TX SP LANG VOICE COMM INDIV: CPT

## 2023-10-27 PROCEDURE — 83735 ASSAY OF MAGNESIUM: CPT

## 2023-10-27 PROCEDURE — 6370000000 HC RX 637 (ALT 250 FOR IP): Performed by: INTERNAL MEDICINE

## 2023-10-27 PROCEDURE — 97530 THERAPEUTIC ACTIVITIES: CPT

## 2023-10-27 PROCEDURE — 6360000002 HC RX W HCPCS: Performed by: STUDENT IN AN ORGANIZED HEALTH CARE EDUCATION/TRAINING PROGRAM

## 2023-10-27 PROCEDURE — C9113 INJ PANTOPRAZOLE SODIUM, VIA: HCPCS

## 2023-10-27 PROCEDURE — 2580000003 HC RX 258: Performed by: STUDENT IN AN ORGANIZED HEALTH CARE EDUCATION/TRAINING PROGRAM

## 2023-10-27 PROCEDURE — 6360000002 HC RX W HCPCS

## 2023-10-27 PROCEDURE — 84132 ASSAY OF SERUM POTASSIUM: CPT

## 2023-10-27 PROCEDURE — 97535 SELF CARE MNGMENT TRAINING: CPT

## 2023-10-27 PROCEDURE — 36415 COLL VENOUS BLD VENIPUNCTURE: CPT

## 2023-10-27 PROCEDURE — 92526 ORAL FUNCTION THERAPY: CPT

## 2023-10-27 PROCEDURE — 6370000000 HC RX 637 (ALT 250 FOR IP): Performed by: STUDENT IN AN ORGANIZED HEALTH CARE EDUCATION/TRAINING PROGRAM

## 2023-10-27 PROCEDURE — 85025 COMPLETE CBC W/AUTO DIFF WBC: CPT

## 2023-10-27 PROCEDURE — 99233 SBSQ HOSP IP/OBS HIGH 50: CPT | Performed by: INTERNAL MEDICINE

## 2023-10-27 PROCEDURE — 97112 NEUROMUSCULAR REEDUCATION: CPT

## 2023-10-27 RX ORDER — ATORVASTATIN CALCIUM 40 MG/1
40 TABLET, FILM COATED ORAL NIGHTLY
Status: DISCONTINUED | OUTPATIENT
Start: 2023-10-27 | End: 2023-10-28 | Stop reason: HOSPADM

## 2023-10-27 RX ORDER — LEVETIRACETAM 100 MG/ML
1000 SOLUTION ORAL 2 TIMES DAILY
Status: DISCONTINUED | OUTPATIENT
Start: 2023-10-27 | End: 2023-10-28 | Stop reason: HOSPADM

## 2023-10-27 RX ORDER — VENLAFAXINE 75 MG/1
75 TABLET ORAL 2 TIMES DAILY
Status: DISCONTINUED | OUTPATIENT
Start: 2023-10-27 | End: 2023-10-28 | Stop reason: HOSPADM

## 2023-10-27 RX ORDER — MULTIVIT-MIN/FERROUS GLUCONATE 9 MG/15 ML
15 LIQUID (ML) ORAL DAILY
Status: DISCONTINUED | OUTPATIENT
Start: 2023-10-27 | End: 2023-10-27

## 2023-10-27 RX ORDER — GAUZE BANDAGE 2" X 2"
100 BANDAGE TOPICAL DAILY
Status: COMPLETED | OUTPATIENT
Start: 2023-10-28 | End: 2023-10-28

## 2023-10-27 RX ORDER — MULTIVIT-MIN/FERROUS GLUCONATE 9 MG/15 ML
15 LIQUID (ML) ORAL DAILY
Status: DISCONTINUED | OUTPATIENT
Start: 2023-10-28 | End: 2023-10-28 | Stop reason: HOSPADM

## 2023-10-27 RX ORDER — LANSOPRAZOLE 30 MG/1
30 TABLET, ORALLY DISINTEGRATING, DELAYED RELEASE ORAL
Status: DISCONTINUED | OUTPATIENT
Start: 2023-10-28 | End: 2023-10-28 | Stop reason: HOSPADM

## 2023-10-27 RX ADMIN — ATORVASTATIN CALCIUM 40 MG: 40 TABLET, FILM COATED ORAL at 22:59

## 2023-10-27 RX ADMIN — LEVETIRACETAM 1000 MG: 100 INJECTION INTRAVENOUS at 05:30

## 2023-10-27 RX ADMIN — VENLAFAXINE 75 MG: 75 TABLET ORAL at 22:59

## 2023-10-27 RX ADMIN — PANTOPRAZOLE SODIUM 40 MG: 40 INJECTION, POWDER, FOR SOLUTION INTRAVENOUS at 10:13

## 2023-10-27 RX ADMIN — SODIUM CHLORIDE, PRESERVATIVE FREE 10 ML: 5 INJECTION INTRAVENOUS at 10:15

## 2023-10-27 RX ADMIN — INSULIN LISPRO 1 UNITS: 100 INJECTION, SOLUTION INTRAVENOUS; SUBCUTANEOUS at 13:06

## 2023-10-27 RX ADMIN — LEVETIRACETAM 1000 MG: 500 SOLUTION ORAL at 22:59

## 2023-10-27 RX ADMIN — METOPROLOL TARTRATE 50 MG: 50 TABLET, FILM COATED ORAL at 10:14

## 2023-10-27 RX ADMIN — AMIODARONE HYDROCHLORIDE 200 MG: 200 TABLET ORAL at 22:59

## 2023-10-27 RX ADMIN — METOPROLOL TARTRATE 50 MG: 50 TABLET, FILM COATED ORAL at 22:59

## 2023-10-27 RX ADMIN — TROSPIUM CHLORIDE 20 MG: 20 TABLET, FILM COATED ORAL at 10:11

## 2023-10-27 RX ADMIN — POLYVINYL ALCOHOL 1 DROP: 14 SOLUTION/ DROPS OPHTHALMIC at 23:09

## 2023-10-27 RX ADMIN — THIAMINE HYDROCHLORIDE 100 MG: 100 INJECTION, SOLUTION INTRAMUSCULAR; INTRAVENOUS at 10:19

## 2023-10-27 RX ADMIN — POLYVINYL ALCOHOL 1 DROP: 14 SOLUTION/ DROPS OPHTHALMIC at 10:14

## 2023-10-27 RX ADMIN — Medication 15 ML: at 11:35

## 2023-10-27 RX ADMIN — AMIODARONE HYDROCHLORIDE 200 MG: 200 TABLET ORAL at 10:14

## 2023-10-27 RX ADMIN — TROSPIUM CHLORIDE 20 MG: 20 TABLET, FILM COATED ORAL at 17:52

## 2023-10-27 ASSESSMENT — PAIN SCALES - PAIN ASSESSMENT IN ADVANCED DEMENTIA (PAINAD)
FACIALEXPRESSION: 0
BODYLANGUAGE: 0
BREATHING: 0
TOTALSCORE: 0
FACIALEXPRESSION: 0
BREATHING: 0
BODYLANGUAGE: 0
NEGVOCALIZATION: 0
TOTALSCORE: 0
BREATHING: 0
BREATHING: 0
CONSOLABILITY: 0
CONSOLABILITY: 0
NEGVOCALIZATION: 0
FACIALEXPRESSION: 0
TOTALSCORE: 0
CONSOLABILITY: 0
BODYLANGUAGE: 0
NEGVOCALIZATION: 0
NEGVOCALIZATION: 0
FACIALEXPRESSION: 0
TOTALSCORE: 0
BODYLANGUAGE: 0
CONSOLABILITY: 0

## 2023-10-27 ASSESSMENT — PAIN SCALES - GENERAL: PAINLEVEL_OUTOF10: 0

## 2023-10-27 ASSESSMENT — PAIN SCALES - WONG BAKER: WONGBAKER_NUMERICALRESPONSE: 0

## 2023-10-27 NOTE — PROGRESS NOTES
Patient admitted to room 5518. Report received from trevor ICU RN over the phone at [phone]. VSS on 2L. Pt A&Ox0. Pt arrived with a soft nonviolent restraint on right wrist. Pt arrived with TF running at 20mL/hr. Rights and responsibilites provided to patient, and welcome packet provided to patient. 4 eyes skin assessment completed with 2nd RN.

## 2023-10-27 NOTE — PROGRESS NOTES
Internal Medicine Progress Note    Date: 10/27/2023   Patient: [facility] Day: 5      CC: No chief complaint on file. Interval Hx   Patient was seen and examined at bedside this morning. No acute overnight events. Patient's Palliative and GI are on board. Received PEG tube yesterday for nutrition. Spoke with  today about the necessity of getting therapy as soon as possible.  understood. Restraint order discontinued. HPI:   \"Ms Yuri Ruelas is a 77y/o F with a PMHx of Afib (on Eliquis), aphasia, blindness, hearing impairment, HTN, T2DM, COPD who presents with AMS. She was taken to [facility] ED via EMS for AMS per her nursing home staff at [facility]. Staff stated she had only been responsive to pain, compared to baseline of speaking, telling her name and spontaneous movements. CT head showed acute hyperdense hemorrhage. Pt was admitted to ICU for [facility] management of acute hyperdense hemorrhage in anteromedial aspect of a samanta occipitoparietal infarction. On presentation, pt was aphasic. Also in Afib with RVR to 140s-160s. WBC elevated to 26.5, with concern for UTI based on prior UA. Pt was afebrile. Was started on an amiodarone gtt, which reverted her back to normal sinus rhythm. Pt was switched to PO amiodarone. Neurological exam waxed and waned, at best answering simple questions and oriented x3, following directives in BLE and RUE, responding to pain in all 4 extremities. Pt was started on Keppra 1g BID. Started on [place]. Urine cultures negative, so Merrem was stopped. Started on cvEEG to monitor seizure activity. Only having short (<30 sec), clinically insignificant seizure activity recorded. Electrolytes have been low and replaced as needed daily via IV or NG tube. Pt started on tube feeds 10/24. Concern for refeeding syndrome, so added thiamine and multivitamin. Will monitor electrolytes Q8H. \"      Objective     Vital Signs:  Patient Vitals for the past nodular contour of the liver suggestive of   cirrhosis. XR CHEST PORTABLE   Final Result      Increased gradient opacity in the right lung which is favored to represent   progressive pleural effusion. Underlying pneumonia/aspiration not excluded. Nasogastric tube is absent. XR ABDOMEN (KUB) (SINGLE AP VIEW)   Final Result   Findings/IMPRESSION:   The tip of the enteric tube terminates in the body of the stomach. MRI BRAIN WO CONTRAST   Final Result   1. Acute area of infarction in the posterior right MCA distribution along the   central sulcus. The infarct lies along the anterior margin of a remote area of   encephalomalacia and prior infarct. 2. Hemorrhagic infarct in the left occipital region which appears to represent   expected evolution in comparison to previous brain MRI report from 10/4/2023. Direct image comparison is not available. 3. Numerous large areas of chronic infarct with encephalomalacia which appear to   correspond to previous MRI description. 4. Areas of susceptibility artifact suggesting remote areas of hemorrhagic   infarct. No acute intracranial hemorrhage otherwise. CT HEAD WO CONTRAST   Final Result      Small 1.6 cm parenchymal hematoma in the left occipital lobe. No mass effect. Assessment & Plan   Ms Leah Barth is a 77y/o F with a PMHx of Afib (on Eliquis), aphasia, blindness, hearing impairment, HTN, T2DM, COPD who presents with AMS. She was taken to [facility] ED via EMS for AMS per her nursing home staff at [facility]. #Altered Mental Status  Pt presented with findings of small parenchymal hematoma in L occipital lobe. Possible confounding infection, encephalomalacia from previous CVA. Received Kcentra prior to transfer.   - MRI 10/23: acute infarct in posterior R MCA   - cvEEG 10/22: abnormal tracing indicating diffuse cerebral dysfunction, no seizure activity noted  - cvEEG 10/23-24: brief electrographic seizures without any clinical

## 2023-10-27 NOTE — PROGRESS NOTES
Cardiology Consult Service  Daily Progress Note        Admit Date:  10/22/2023  Primary cardiologist: Dr Sean Ladd for Consultation/Chief Complaint: Sophia Credit, hemorrhagic stroke    Subjective:      Kelsey Hurtado is a 81 y.o. female with a past medical history of HTN, DM type II, COPD, PAF complicated by recurrent ischemic and hemorrhagic strokes with on-off anticoagulation, DVT, residual aphasia, blindness, hard of hearing. [facility] 12/2013: Normal coronaries     Patient was brought to the [facility], [facility]. on 10/22 when she presented to [facility] ED with altered mental status from her nursing home at [facility]. She was found to have a acute hemorrhagic stroke within the old left occipital parietal infarction. ECG revealed AF RVR and therefore patient was started on amiodarone drip after receiving diltiazem IV x2 and digoxin 250 mcg IV x1 today. Cardiology was consulted for further evaluation. Anticoagulation was held. ECG 10/22/2023: Sinus tachycardia; repeat ECG consistent with AF RVR. Echo 10/24/2023: Normal LV, LVEF 55%, distal anterior apical hypokinesis, grade 2 diastolic dysfunction, normal RV, mild MR, negative bubble study. Patient is unable to provide any history, therefore history was obtained from EMR. Patient remains hemodynamically stable. Telemetry was personally reviewed this morning, reveals sinus tachycardia with frequent PACs. Interval history: There were no overnight events. Patient is currently not on telemetry.     Objective:     Medications:   CENTRUM/CERTA-SHANNON with minerals oral  15 mL Oral Daily    amiodarone  200 mg Oral BID    thiamine  100 mg IntraVENous Daily    levETIRAcetam  1,000 mg IntraVENous Q12H    [Held by provider] aspirin EC  81 mg Oral Daily    atorvastatin  40 mg Oral Nightly    polyvinyl alcohol  1 drop Both Eyes BID    metoprolol tartrate  50 mg Oral BID    trospium  20 mg Oral BID AC    [Held by provider] venlafaxine  150 mg Oral Daily tenderness or deformity   Heart:  Regular rhythm, rate is controlled, S1, S2 normal, there is no murmur, there is no rub or gallop, cannot assess jvd, no bilateral lower extremity edema   Abdomen:   Soft, non-tender, bowel sounds active all four quadrants,  no masses, no organomegaly       Extremities: Extremities normal, atraumatic, no cyanosis. Pulses: 2+ and symmetric   Skin: Skin color, texture, turgor normal, no rashes or lesions               Labs:   Recent Labs     10/25/23  0241 10/25/23  1033 10/26/23  0403 10/27/23  0836     --  141 144   K 3.7  3.8   < > 3.9 3.8  3.8   BUN 19  --  15 16   CREATININE 0.7  --  0.6 0.6     --  105 106   CO2 25  --  26 26   GLUCOSE 166*  --  153* 182*   CALCIUM 9.0  --  9.1 9.4   MG  --   --   --  1.70*    < > = values in this interval not displayed. Recent Labs     10/25/23  0241 10/26/23  0403 10/27/23  0836   WBC 10.0 8.1 6.8   HGB 9.7* 9.5* 9.9*   HCT 29.8* 28.4* 30.1*   PLT 86* 77* 88*   MCV 94.0 91.8 93.4       No results for input(s): \"CHOLTOT\", \"TRIG\", \"HDL\", \"CHOLHDL\", \"LDL\" in the last 72 hours. Invalid input(s): \"LIPIDCOMM\", \"VLDCHOL\"    Recent Labs     10/26/23  0404   INR 1.15     No results for input(s): \"CKTOTAL\", \"CKMB\", \"CKMBINDEX\", \"TROPONINI\" in the last 72 hours. No results for input(s): \"BNP\" in the last 72 hours. No results for input(s): \"NTPROBNP\" in the last 72 hours. No results for input(s): \"TSH\" in the last 72 hours. Imaging:       Assessment & Plan:     1. PAF with RVR. Upon transfer to the [facility] repeat ECG revealed AF RVR. Patient received multiple medications and appears to be converting into sinus tachycardia with frequent PACs. 2.  History of recurrent strokes (ischemic and hemorrhagic), now acute hemorrhagic stroke. Patient is now off anticoagulation. 3.  Hypertension  4. COPD           - Continue 200 mg bid. - Continue lopressor 50 bid.    - Patient cannot be on any anticoagulation at this

## 2023-10-27 NOTE — PROGRESS NOTES
Occupational Therapy  Facility/Department: [address] [place] [address]   Occupational Therapy Progress Note    Name: Saw Hope  : 1950  MRN: [id number]  Date of Service: 10/27/2023    Discharge Recommendations:  Saw Hope scored a 6/24 on the AM-PAC ADL Inpatient form. Current research shows that an AM-PAC score of 17 or less is typically not associated with a discharge to the patient's home setting. Based on the patient's AM-PAC score and their current ADL deficits, it is recommended that the patient have 3-5 sessions per week of Occupational Therapy at d/c to increase the patient's independence. Please see assessment section for further patient specific details. If patient discharges prior to next session this note will serve as a discharge summary. Please see below for the latest assessment towards goals. Subacute/Skilled Nursing Facility          Patient Diagnosis(es): There were no encounter diagnoses. Past Medical History:  has a past medical history of Arthritis, Asthma, Depression, Diabetes mellitus ([address]), GERD (gastroesophageal reflux disease), HCAP (healthcare-associated pneumonia), Hypertension, Mycobacterium gordonae infection, Paroxysmal atrial fibrillation ([address]), Sepsis ([address]), TIA (transient ischemic attack), and Unspecified cerebral artery occlusion with cerebral infarction. Past Surgical History:  has a past surgical history that includes Hysterectomy; Cholecystectomy; Appendectomy; Breast cyst incision and drainage (); Colonoscopy (2017); Upper gastrointestinal endoscopy (2017); Breast biopsy; and Gastrostomy tube placement (N/A, 10/26/2023). Treatment Diagnosis: Decreased activity tolerance, impaired ADLs and mobility      Assessment   Performance deficits / Impairments: Decreased functional mobility ; Decreased ADL status; Decreased ROM; Decreased strength;Decreased cognition;Decreased endurance;Decreased balance;Decreased vision/visual deficit; Decreased fine Therapy Time   Individual Concurrent Group Co-treatment   Time In 0467         Time Out 1559         Minutes 27          Timed Code Treatment Minutes:   27    Total Treatment Minutes:   [address], OTR/L 94 31 11

## 2023-10-27 NOTE — PROGRESS NOTES
4 Eyes Skin Assessment     NAME:  Jhon Gonzales  YOB: 1950  MEDICAL RECORD NUMBER:  [id number]    The patient is being assessed for  Admission    I agree that at least one RN has performed a thorough Head to Toe Skin Assessment on the patient. ALL assessment sites listed below have been assessed. Areas assessed by both nurses:    Head, Face, Ears, Shoulders, Back, Chest, Arms, Elbows, Hands, Sacrum. Buttock, Coccyx, Ischium, Legs. Feet and Heels, Under Medical Devices , and Other ***    -scattered abrasions  - bruise on left hand  = bruise right forearm        Does the Patient have a Wound?  No noted wound(s)       Tony Prevention initiated by RN: No  Wound Care Orders initiated by RN: No    Pressure Injury (Stage 3,4, Unstageable, DTI, NWPT, and Complex wounds) if present, place Wound referral order by RN under : No    New Ostomies, if present place, Ostomy referral order under : No     Nurse 1 eSignature: Electronically signed by Cassie Padilla RN on 10/27/23 at 5:13 AM EDT    **SHARE this note so that the co-signing nurse can place an eSignature**    Nurse 2 eSignature: {Esignature:[id number]}

## 2023-10-27 NOTE — PROGRESS NOTES
. Sent to [facility] ED for mental status change; recent fall per . From [facility] ED to [facility] ICU. ..'    CXR: 10/23/2023  Mild cardiomegaly. Minimal pulmonary vascular congestion without pulmonary  edema. The mediastinum appears to be significantly wide, presumably due to uncoiling  of arch of aorta. Mild focal fibrotic/atelectatic changes in the right perihilar area and right  apical area. No pneumothorax or pleural effusion. CT head: 10/22/2023  Small 1.6 cm parenchymal hematoma in the left occipital lobe. No mass effect. Date of onset: 10/22/2023    Current Diet:  Diet NPO  ADULT TUBE FEEDING; PEG; Diabetic; Continuous; 40; No; 30; Q 4 hours; Other (specify); Provide Banatrol BID    Treatment Diagnosis: dysphagia    Pain:  None indicated by any means    Prior Dysphagia/Speech History:   Last seen by Speech Therapy at [facility] on 10/1/23- at that time a regular diet with thin liquids was recommended. Per pt's hard chart, pt was on a regular diet with thin liquids at the NH. Also, pt was seen for outpatient Speech Therapy at [facility] from 10/28/22-2/2/23 to address aphasia. Bedside Swallowing Evaluation Impression 10/25/2023  Oropharyngeal dysphagia in setting of altered mentation, acute illness/stroke. Bedside, pt accepted ice chips, with positive oral closure, slowed mastication, and positive swallow movement. Delayed cough. Withheld further PO trials d/t mentation. Recommend ongoing alternative nutrition, oral hygiene, and small amounts ice chips w/ nursing. Will continue to follow. Speech, Language, Cognition 25/2023  Altered mentation. Did not follow any directions. Voiced 'uh' several times. At baseline patient does have aphasia for which she had been seeing outpatient speech therapy, but she was able to communicate at the sentence level. Currently, she is not utilizing any alternative means of communication (e.g. gestures, pointing, or nodding).  Of note, patient has discharge  Discussed with RN, King Aid  Needs met prior to leaving room, call light within reach    [facility], [address], [id number]  Pg. # [phone]    This document will serve as a discharge summary if patient discharges prior to next visit.

## 2023-10-27 NOTE — PROGRESS NOTES
Pt tube feed Glucera running through PEG tube. Peg tube wrapped. Site assessment is clean, dry, and intact. PT tube feed was running 20mL/hr at 0430, 30mL water flush. Pt tolerated well. TF increased to 40mL/hr at 0530, pt tolerated well. TF increased to 40mL/hr at 0630, reaching the goal rate. Pt tolerating at the moment. HOB elevated.

## 2023-10-27 NOTE — PLAN OF CARE
Problem: Safety - Medical Restraint  Goal: Remains free of injury from restraints (Restraint for Interference with Medical Device)  Description: INTERVENTIONS:  1. Determine that other, less restrictive measures have been tried or would not be effective before applying the restraint  2. Evaluate the patient's condition at the time of restraint application  3. Inform patient/family regarding the reason for restraint  4.  Q2H: Monitor safety, psychosocial status, comfort, nutrition and hydration  10/27/2023 0939 by Sneha Charlton RN  Note: Pt confused moves all extremities , right side weakness , general bilateral leg weakness , G tube feed ing infusing at 40 ml per hour , per peg tube , waist belt on to cover tube insertion site , head of bed elevated , patient turned Q 2 hours , air mattress ordered , family at bed side , medical team in to talk with family , and right wrist restraint discontinued , called for camera

## 2023-10-27 NOTE — PROGRESS NOTES
Minutes:  25    If patient is discharged prior to next treatment, this note will serve as the discharge summary.   Nahid Claire, PT, DPT  [id number]

## 2023-10-27 NOTE — PROGRESS NOTES
Comprehensive Nutrition Assessment    RECOMMENDATIONS:  Continue Glucerna 1.5 (Diabetic formula) @ goal rate of 40 mL/hr  Flushes: 30 mL q4h  Continue 100 mg B1 x5 days + MVI (10/24-10/29)  K+ and phos ordered today, please replete as appropriate as pt is at risk for refeeding with EN now at goal rate  Nutrition Education: Education not appropriate     NUTRITION ASSESSMENT:   Nutritional summary & status: Follow up and consult for EN ordering and management. Patient s/p PEG yesterday and now out of ICU to floor. EN initiated last night and now at goal rate of 40 ml/hr. BG slightly elevated even with diabetic formula, low dose SSI in place. MVI and thiamine continue, K+ is slightly low, will order new Phos and K+ to assess need for repletion now that EN is at goal rate. SLP continues to follow and recommending NPO. RD will continue to monitor nutritional status.    Admission // PMH: Intraparenchymal hemorrhage of brain, AMS, aphasia, afib, ELYSIA, HTN, T2DM, COPD    MALNUTRITION ASSESSMENT  Context of Malnutrition: Chronic Illness   Malnutrition Status: Mild malnutrition  Findings of the 6 clinical characteristics of malnutrition (Minimum of 2 out of 6 clinical characteristics is required to make the diagnosis of moderate or severe Protein Calorie Malnutrition based on AND/ASPEN Guidelines):  Energy Intake:  75% or less estimated energy requirements for 1 month or longer  Weight Loss:  No significant weight loss     Body Fat Loss:  No significant body fat loss     Muscle Mass Loss:  No significant muscle mass loss    Fluid Accumulation:  No significant fluid accumulation      NUTRITION DIAGNOSIS   Inadequate oral intake related to cognitive or neurological impairment as evidenced by NPO or clear liquid status due to medical condition    Nutrition Monitoring and Evaluation:   Food/Nutrient Intake Outcomes:  Diet Advancement/Tolerance, Enteral Nutrition Intake/Tolerance  Physical Signs/Symptoms Outcomes:  Biochemical

## 2023-10-27 NOTE — PROGRESS NOTES
Problems:    * No resolved hospital problems. *      81 y F with prior history of ischemic strokes admitted to the hospital due to [address] 2/2 hemorrhagic conversion of an ischemic stroke being seen for PEG placement. Recommendations:       S/p PEG tube placement. The tube appears to be working well. Ok to Start tube feeds if hemodynamically stable. 2.  You may convert medications to liquid if desired and infuse through PEG. If no liquid alternative, then crush and mix in 20-30 cc of water and infuse through PEG. 3.  Keep head of bed elevated during tube feeds. 4.  Keep outer bumper at 4 cm. 5.  Place dressing over top of PEG. Do not place dressing between outer bumper and skin. 6.  Place abdominal binder to prevent patient tampering with the PEG. 7.  Flush PEG with 30mL of water tid and before and after each use.   8.  Please call with any problems with the 72 Melissa Morin MD  10/27/2023  11:38 AM

## 2023-10-27 NOTE — PROGRESS NOTES
Feeding via PEG started 12 hrs post procedure per handoff. Pt neuro wise stable, EKG in CM with PVCs  rate controlled after due meds; SBP 120mmhg, afebrile, O2 support at 2 lpm.     PLAN to transfer to Nursing home after precert. Transferred pt to 5T.

## 2023-10-27 NOTE — CARE COORDINATION
UPDATE: CM spoke with pt's , Rohith Hillman, on the phone. CM updated Rohith Hillman that precert is currently pending and that if received pt may be cleared to discharge tomorrow. Pt reports he plans to  some family to visit the pt tomorrow and would like a call about possible discharge as soon as possible tomorrow to ensure that he isn't driving here with family when they could instead visit at [facility]. CM will continue to follow for discharge planning. Electronically signed by DEYANIRA Lopez on 10/27/2023 at 3:27 PM  [phone]    ARNAV following: ARNAV spoke with Chica at [facility] who reports they can accept the pt tomorrow if discharge ready and precert approved. ARNAV submitted [facility] precert today and is currently pending. Cruz Luke is weekend contact at [address] and report: [phone] and fax: [phone]. ARNAV will continue to follow for discharge planning.   Electronically signed by DEYANIRA Lopez on 10/27/2023 at 11:04 AM  [phone]

## 2023-10-28 VITALS
WEIGHT: 198.41 LBS | OXYGEN SATURATION: 94 % | DIASTOLIC BLOOD PRESSURE: 61 MMHG | HEIGHT: 60 IN | HEART RATE: 85 BPM | RESPIRATION RATE: 19 BRPM | BODY MASS INDEX: 38.95 KG/M2 | SYSTOLIC BLOOD PRESSURE: 126 MMHG | TEMPERATURE: 98.3 F

## 2023-10-28 LAB
ANION GAP SERPL CALCULATED.3IONS-SCNC: 12 MMOL/L (ref 3–16)
BASOPHILS # BLD: 0 K/UL (ref 0–0.2)
BASOPHILS NFR BLD: 0.3 %
BUN SERPL-MCNC: 17 MG/DL (ref 7–20)
CALCIUM SERPL-MCNC: 9.4 MG/DL (ref 8.3–10.6)
CHLORIDE SERPL-SCNC: 104 MMOL/L (ref 99–110)
CO2 SERPL-SCNC: 23 MMOL/L (ref 21–32)
CREAT SERPL-MCNC: 0.6 MG/DL (ref 0.6–1.2)
DEPRECATED RDW RBC AUTO: 14.9 % (ref 12.4–15.4)
EOSINOPHIL # BLD: 0.1 K/UL (ref 0–0.6)
EOSINOPHIL NFR BLD: 1.2 %
GFR SERPLBLD CREATININE-BSD FMLA CKD-EPI: >60 ML/MIN/{1.73_M2}
GLUCOSE BLD-MCNC: 188 MG/DL (ref 70–99)
GLUCOSE BLD-MCNC: 215 MG/DL (ref 70–99)
GLUCOSE SERPL-MCNC: 193 MG/DL (ref 70–99)
HCT VFR BLD AUTO: 32.5 % (ref 36–48)
HGB BLD-MCNC: 11 G/DL (ref 12–16)
LYMPHOCYTES # BLD: 0.9 K/UL (ref 1–5.1)
LYMPHOCYTES NFR BLD: 11.8 %
MAGNESIUM SERPL-MCNC: 1.7 MG/DL (ref 1.8–2.4)
MCH RBC QN AUTO: 30.6 PG (ref 26–34)
MCHC RBC AUTO-ENTMCNC: 34 G/DL (ref 31–36)
MCV RBC AUTO: 90 FL (ref 80–100)
MONOCYTES # BLD: 0.4 K/UL (ref 0–1.3)
MONOCYTES NFR BLD: 5.8 %
NEUTROPHILS # BLD: 6.1 K/UL (ref 1.7–7.7)
NEUTROPHILS NFR BLD: 80.9 %
PERFORMED ON: ABNORMAL
PERFORMED ON: ABNORMAL
PHOSPHATE SERPL-MCNC: 3 MG/DL (ref 2.5–4.9)
PLATELET # BLD AUTO: 94 K/UL (ref 135–450)
PMV BLD AUTO: 10.9 FL (ref 5–10.5)
POTASSIUM SERPL-SCNC: 4 MMOL/L (ref 3.5–5.1)
POTASSIUM SERPL-SCNC: 4.1 MMOL/L (ref 3.5–5.1)
RBC # BLD AUTO: 3.61 M/UL (ref 4–5.2)
SODIUM SERPL-SCNC: 139 MMOL/L (ref 136–145)
WBC # BLD AUTO: 7.6 K/UL (ref 4–11)

## 2023-10-28 PROCEDURE — 6370000000 HC RX 637 (ALT 250 FOR IP): Performed by: STUDENT IN AN ORGANIZED HEALTH CARE EDUCATION/TRAINING PROGRAM

## 2023-10-28 PROCEDURE — 6370000000 HC RX 637 (ALT 250 FOR IP)

## 2023-10-28 PROCEDURE — 2580000003 HC RX 258: Performed by: STUDENT IN AN ORGANIZED HEALTH CARE EDUCATION/TRAINING PROGRAM

## 2023-10-28 PROCEDURE — 85025 COMPLETE CBC W/AUTO DIFF WBC: CPT

## 2023-10-28 PROCEDURE — 84100 ASSAY OF PHOSPHORUS: CPT

## 2023-10-28 PROCEDURE — 80048 BASIC METABOLIC PNL TOTAL CA: CPT

## 2023-10-28 PROCEDURE — 84132 ASSAY OF SERUM POTASSIUM: CPT

## 2023-10-28 PROCEDURE — 36415 COLL VENOUS BLD VENIPUNCTURE: CPT

## 2023-10-28 PROCEDURE — 83735 ASSAY OF MAGNESIUM: CPT

## 2023-10-28 RX ORDER — LANSOPRAZOLE 30 MG/1
30 TABLET, ORALLY DISINTEGRATING, DELAYED RELEASE ORAL
Qty: 30 TABLET | Refills: 1 | Status: SHIPPED | OUTPATIENT
Start: 2023-10-29

## 2023-10-28 RX ORDER — LEVETIRACETAM 100 MG/ML
1000 SOLUTION ORAL 2 TIMES DAILY
Qty: 473 ML | Refills: 1 | Status: SHIPPED | OUTPATIENT
Start: 2023-10-28

## 2023-10-28 RX ORDER — MULTIVIT-MIN/FERROUS GLUCONATE 9 MG/15 ML
15 LIQUID (ML) ORAL DAILY
Qty: 480 ML | Refills: 0 | Status: SHIPPED | OUTPATIENT
Start: 2023-10-29

## 2023-10-28 RX ORDER — AMIODARONE HYDROCHLORIDE 200 MG/1
200 TABLET ORAL 2 TIMES DAILY
Qty: 90 TABLET | Refills: 1 | Status: SHIPPED | OUTPATIENT
Start: 2023-10-28

## 2023-10-28 RX ADMIN — Medication 15 ML: at 08:18

## 2023-10-28 RX ADMIN — SODIUM CHLORIDE, PRESERVATIVE FREE 10 ML: 5 INJECTION INTRAVENOUS at 08:30

## 2023-10-28 RX ADMIN — LANSOPRAZOLE 30 MG: 30 TABLET, ORALLY DISINTEGRATING, DELAYED RELEASE ORAL at 06:02

## 2023-10-28 RX ADMIN — INSULIN LISPRO 1 UNITS: 100 INJECTION, SOLUTION INTRAVENOUS; SUBCUTANEOUS at 08:21

## 2023-10-28 RX ADMIN — METOPROLOL TARTRATE 50 MG: 50 TABLET, FILM COATED ORAL at 08:18

## 2023-10-28 RX ADMIN — Medication 100 MG: at 08:18

## 2023-10-28 RX ADMIN — LEVETIRACETAM 1000 MG: 500 SOLUTION ORAL at 08:17

## 2023-10-28 RX ADMIN — TROSPIUM CHLORIDE 20 MG: 20 TABLET, FILM COATED ORAL at 06:02

## 2023-10-28 RX ADMIN — POLYVINYL ALCOHOL 1 DROP: 14 SOLUTION/ DROPS OPHTHALMIC at 08:18

## 2023-10-28 RX ADMIN — VENLAFAXINE 75 MG: 75 TABLET ORAL at 08:18

## 2023-10-28 RX ADMIN — AMIODARONE HYDROCHLORIDE 200 MG: 200 TABLET ORAL at 08:17

## 2023-10-28 ASSESSMENT — PAIN SCALES - WONG BAKER: WONGBAKER_NUMERICALRESPONSE: 0

## 2023-10-28 NOTE — PROGRESS NOTES
Report was given to accepting facility. IV and tele have been removed. PEG has been disconnected from pump, flushed with water and locked. ATUL and eMAR report printed out and sent with transport. Report given to transport prior to DC. Last set of vitals are stable.

## 2023-10-28 NOTE — PLAN OF CARE
Problem: Discharge Planning  Goal: Discharge to home or other facility with appropriate resources  Outcome: Progressing  Note: CM is following for discharge. Flowsheets (Taken 10/27/2023 2000)  Discharge to home or other facility with appropriate resources:   Identify barriers to discharge with patient and caregiver   Arrange for needed discharge resources and transportation as appropriate   Identify discharge learning needs (meds, wound care, etc)     Problem: Safety - Adult  Goal: Free from fall injury  Outcome: Progressing  Note: All fall precautions in place, call light within reach, free from fall injury. Flowsheets (Taken 10/27/2023 2356)  Free From Fall Injury: Instruct family/caregiver on patient safety     Problem: Skin/Tissue Integrity  Goal: Absence of new skin breakdown  Description: 1. Monitor for areas of redness and/or skin breakdown  2. Assess vascular access sites hourly  3. Every 4-6 hours minimum:  Change oxygen saturation probe site  4. Every 4-6 hours:  If on nasal continuous positive airway pressure, respiratory therapy assess nares and determine need for appliance change or resting period. Outcome: Progressing     Problem: ABCDS Injury Assessment  Goal: Absence of physical injury  Outcome: Progressing  Note: Pt free from physical injury.    Flowsheets (Taken 10/27/2023 2356)  Absence of Physical Injury: Implement safety measures based on patient assessment

## 2023-10-28 NOTE — CARE COORDINATION
Case Management Assessment            Discharge Note                    Date / Time of Note: 10/28/2023 12:07 PM                  Discharge Note Completed by: DEYANIRA Valente, JEANW    Patient Name: Alan Ocasio   YOB: 1950  Diagnosis: Intraparenchymal hemorrhage of brain [facility]) [I61.9]  Intracerebral hemorrhage, nontraumatic ([facility]) [I61.9]   Date / Time: 10/22/2023  8:48 AM    Current PCP: Michelle Felix MD  Clinic patient: No    Hospitalization in the last 30 days: No       Advance Directives:  Code Status: DNR-CCA  [place] DNR form completed and on chart: No    Financial:  Payor: [employer] / Plan: [address] [place] [address] HMO / Product Type: *No Product type* /      Pharmacy:    [facility] [address] [facility], [address]  [address]  Phone: [phone] Fax: [phone]    [address] [facility], [address] [place] [address] [address] [facility]  [address] [facility] [address]  [address] [facility]  [address]  Phone: [phone] Fax: [phone]    [address] [place] [address]  [address] [place] [address] [address] [place]  Phone: [phone] Fax: [phone]      Assistance purchasing medications?: Potential Assistance Purchasing Medications: No  Assistance provided by Case Management: None at this time    Does patient want to participate in local refill/ meds to beds program?: Yes    Meds To Beds General Rules:  1. Can ONLY be done Monday- Friday between 8:30am-5pm  2. Prescription(s) must be in pharmacy by 3pm to be filled same day  3. Copy of patient's insurance/ prescription drug card and patient face sheet must be sent along with the prescription(s)  4. Cost of Rx cannot be added to hospital bill. If financial assistance is needed, please contact unit  or ;   or  CANNOT provide pharmacy voucher for patients Company: [employer]  Phone: [phone]  Time of Transport: 330pm    Transport form completed: Yes    Referrals made at [facility] for outpatient continued care:  Not Applicable    Additional CM Notes:     SW met w/Pt's  at bedside. Pt is from home w/ and will DC to [facility]. Pre-cert has been approved auth #: [id number]. Transport scheduled via [facility] at 3:30pm. Pt's  is in agreement w/DCP. SW confirmed with admissions, they are able to accept pt today and aware of transport time. Report: [phone]    COVID Result:    Lab Results   Component Value Date/Time    COVID19 NOT DETECTED 10/22/2023 04:08 AM       The Plan for Transition of Care is related to the following treatment goals of Intraparenchymal hemorrhage of brain ([address]) [I61.9]  Intracerebral hemorrhage, nontraumatic ([address]) [I61.9]    The Patient and/or patient representative Shiva and her family were provided with a choice of provider and agrees with the discharge plan Yes    Freedom of choice list was provided with basic dialogue that supports the patient's individualized plan of care/goals and shares the quality data associated with the providers.  Yes    Care Transitions patient: No    DEYANIRA Webb, [address] [place] [address] [facility]  Case Management Department  Ph: [phone]

## 2023-10-28 NOTE — PROGRESS NOTES
Pt alert and disoriented X4 so re- orientation provided . VS taken and recorded. SPO2 maintained with O2 at 2 L/M via nasal cannula. No acute changes this shift. Is on NPO. PEG feeding continue at 40ml/hr with 30 ml q4 flush. Pt voiding adequately with pure wick in place. NO BM during this shift. All fall precautions in place, call light within reach, free from fall injury. Plan of care ongoing.

## 2023-10-28 NOTE — PLAN OF CARE
Problem: Safety - Medical Restraint  Goal: Remains free of injury from restraints (Restraint for Interference with Medical Device)  Description: INTERVENTIONS:  1. Determine that other, less restrictive measures have been tried or would not be effective before applying the restraint  2. Evaluate the patient's condition at the time of restraint application  3. Inform patient/family regarding the reason for restraint  4.  Q2H: Monitor safety, psychosocial status, comfort, nutrition and hydration  Note: Pt confused moves all extremities , right side weakness , general bilateral leg weakness , G tube feed ing infusing at 40 ml per hour , per peg tube , waist belt on to cover tube insertion site , head of bed elevated , patient turned Q 2 hours , air mattress ordered , family at bed side , medical team in to talk with family , and right wrist restraint discontinued , called for camera

## 2023-10-28 NOTE — PLAN OF CARE
Problem: Discharge Planning  Goal: Discharge to home or other facility with appropriate resources  Outcome: Progressing  Flowsheets (Taken 10/28/2023 0348 by Gissel Langford RN)  Discharge to home or other facility with appropriate resources:   Identify barriers to discharge with patient and caregiver   Arrange for needed discharge resources and transportation as appropriate   Identify discharge learning needs (meds, wound care, etc)   Pt will be assessed for readiness to discharge. Precept pending at accepting facility. Problem: Safety - Adult  Goal: Free from fall injury  Outcome: Progressing  Flowsheets (Taken 10/28/2023 0800)  Free From Fall Injury: Instruct family/caregiver on patient safety   Pt will be free from falls and injury this shift, seizure precautions will remain in place. Problem: Pain  Goal: Verbalizes/displays adequate comfort level or baseline comfort level  Outcome: Progressing  Flowsheets (Taken 10/28/2023 0817)  Verbalizes/displays adequate comfort level or baseline comfort level: Encourage patient to monitor pain and request assistance   Pt will be assessed for pain using the correct pain scale. Problem: Skin/Tissue Integrity  Goal: Absence of new skin breakdown  Description: 1. Monitor for areas of redness and/or skin breakdown  2. Assess vascular access sites hourly  3. Every 4-6 hours minimum:  Change oxygen saturation probe site  4. Every 4-6 hours:  If on nasal continuous positive airway pressure, respiratory therapy assess nares and determine need for appliance change or resting period. Outcome: Progressing   Skin integrity will remain intact this shift.

## 2023-10-28 NOTE — DISCHARGE INSTR - COC
Continuity of Care Form    Patient Name: Wyatt Gates   :  1950  MRN:  [id number]    Admit date:  10/22/2023  Discharge date:  10/28/23    Code Status Order: [id number]   Advance Directives:   Amirah Blank Documentation       Date/Time Healthcare Directive Type of Healthcare Directive Copy in [address] Agent's Name Healthcare Agent's Phone Number    10/26/23 1215 Unknown, patient unable to respond due to medical condition -- -- -- -- --            Admitting Physician:  Todd Han MD  PCP: Lamont Brunner, MD    Discharging Nurse: Anni Lewis RN  [facility] Unit/Room#: 3491/2331-70  Discharging Unit Phone Number: [phone]    Emergency Contact:   Extended Emergency Contact Information  Primary Emergency Contact: Hardeep Gallego  Address: [address] [place] [address], [address] [place]av[place] of [address] [facility] Phone: [phone]  Mobile Phone: [phone]  Relation: Spouse    Past Surgical History:  Past Surgical History:   Procedure Laterality Date    APPENDECTOMY      BREAST BIOPSY      CHOLECYSTECTOMY      COLONOSCOPY  2017    polyp    CYST INCISION AND DRAINAGE  1986    on head    GASTROSTOMY TUBE PLACEMENT N/A 10/26/2023    EGD PEG TUBE PLACEMENT performed by Lucrecia Dove MD at [facility] ([facility])      UPPER GASTROINTESTINAL ENDOSCOPY  2017    barretts ?        Immunization History:   Immunization History   Administered Date(s) Administered    COVID-19, MODERNA BLUE border, Primary or Immunocompromised, (age 12y+), IM, 100 mcg/0.5mL 2021, 04/15/2021, 2021    Influenza Virus Vaccine 12/10/2012, 2013, 10/07/2014, 2016, 10/19/2017, 10/19/2017, 2019, 10/01/2020, 10/01/2020    Influenza Whole 2013    Influenza, FLUAD, (age 72 y+), Adjuvanted, 0.5mL 2021, 2022    Influenza, FLUARIX, FLULAVAL, FLUZONE (age 10 mo+) AND AFLURIA, (age 1 y+), Treatments After Discharge: ***    Physician Certification: I certify the above information and transfer of Sim Standing  is necessary for the continuing treatment of the diagnosis listed and that she requires {Admit to Appropriate Level of Care:90899} for {GREATER/LESS:111611801} 30 days.      Update Admission H&P: {CHP DME Changes in UGOXD:195970276}    PHYSICIAN SIGNATURE:  {Esignature:[id number]}

## 2023-11-01 NOTE — TELEPHONE ENCOUNTER
Medication Samples    Medication: Eliquis     Dosage of the medication: 5mg     How are you taking this medication (QD, BID, TID, QID, PRN): BID      in the office or Mail to your home?     IN OFFICE
OM for pt to contact the office. Samples have been set aside and are ready for  from the [facility].
2

## 2023-11-08 NOTE — TELEPHONE ENCOUNTER
Spoke with pt's  Nahum regarding eliquis renewal. Nahum informed me that Shiva is currently in hospice care and is unsure if she will be around much longer. Nahum would like [facility] and device specialist know what was going on.

## 2025-02-19 NOTE — PROGRESS NOTES
[place] [id number]   Electrophysiology Note                 HISTORY OF PRESENT ILLNESS:  The patient is a 74 y.o.   who presents for follow up for Atrial Fibrillation on 12/02/2013. Patient underwent cardiac cath at that time that showed -Angiographically normal coronary arteries -LVEF 55% -Elevated LVEDP secondary to HTN   -Inferoapical hypokinesis. Patient was admitted to the hospital for chest pain. Patient has a past medical history significant for DM, HTN, hx of TIA x2, hx of DVT, and parox AF. She presented to the hospital with c/o chest pain associated with worsening shortness of breath, generalized fatigue and weakness. Symptoms were relieved with sub lingual nitro. Patient was previously on Plavix for her CVA however she stated it was stopped for an unknown reaction. Patient had also previously on Xarelto without side effects however she was lost to follow up and she stopped taking the medication. Interval history since last office visit:   Her EKG from today shows sinus rhythm rate 88. She reports she has been feeling well from a cardiac standpoint. She has been taking her medications as prescribed. She tolerates activity well without symptoms. Patient currently denies any weight gain, edema, palpitations, chest pain, shortness of breath, dizziness, and syncope. Past Medical History:   has a past medical history of Arthritis; Asthma; Depression; Diabetes mellitus ([address] GERD (gastroesophageal reflux disease); HCAP (healthcare-associated pneumonia); Hypertension; Paroxysmal atrial fibrillation ([address] Sepsis ([facility] [address] TIA (transient ischemic attack); and Unspecified cerebral artery occlusion with cerebral infarction. Past Surgical History:   has a past surgical history that includes Hysterectomy; Cholecystectomy; Appendectomy; cyst incision and drainage (1986); Colonoscopy (01/19/2017); and Upper gastrointestinal endoscopy (01/19/2017).      Home Medications:    Prior to Admission
language
Not Done

## (undated) DEVICE — PEG KIT STD 20 FR PUL W/ ENFIT ENDOVIVE